# Patient Record
Sex: MALE | Race: WHITE | NOT HISPANIC OR LATINO | Employment: FULL TIME | URBAN - METROPOLITAN AREA
[De-identification: names, ages, dates, MRNs, and addresses within clinical notes are randomized per-mention and may not be internally consistent; named-entity substitution may affect disease eponyms.]

---

## 2017-02-17 ENCOUNTER — TELEPHONE (OUTPATIENT)
Dept: FAMILY MEDICINE | Facility: CLINIC | Age: 57
End: 2017-02-17

## 2017-02-17 NOTE — TELEPHONE ENCOUNTER
Reason for Call:  Other appointment    Detailed comments: Patient would like to come in for his Physical and Fasting Labs  Is Dr. Neetu Kaur willing to squeeze him in? He starts a New Job on March 3rd and  Would like to come in before then, he said he could come in separate for his labs if needed  Thank you    Phone Number Patient can be reached at: Cell number on file:    Telephone Information:   Mobile [phone]       Best Time: Anytime    Can we leave a detailed message on this number? YES    Call taken on 2/17/2017 at 11:53 AM by Mehul Marr

## 2017-02-20 ENCOUNTER — DOCUMENTATION ONLY (OUTPATIENT)
Dept: LAB | Facility: CLINIC | Age: 57
End: 2017-02-20

## 2017-02-20 DIAGNOSIS — Z11.59 NEED FOR HEPATITIS C SCREENING TEST: Primary | ICD-10-CM

## 2017-02-20 DIAGNOSIS — E03.9 HYPOTHYROIDISM, UNSPECIFIED TYPE: Chronic | ICD-10-CM

## 2017-02-20 DIAGNOSIS — E78.5 HYPERLIPIDEMIA, UNSPECIFIED HYPERLIPIDEMIA TYPE: Chronic | ICD-10-CM

## 2017-02-20 DIAGNOSIS — I10 HTN, GOAL BELOW 140/90: Chronic | ICD-10-CM

## 2017-02-20 NOTE — TELEPHONE ENCOUNTER
?Wed March 1st at 11 AM? Ok to offer fasting labs ahead of time and let me know if he wants them ordered.

## 2017-02-20 NOTE — PROGRESS NOTES
Please review, associate diagnosis, and sign pending pre physical lab orders for patient's upcoming 2/22/17 lab appointment.     Thank you,  Lab

## 2017-02-22 DIAGNOSIS — I10 HTN, GOAL BELOW 140/90: Chronic | ICD-10-CM

## 2017-02-22 DIAGNOSIS — E78.5 HYPERLIPIDEMIA, UNSPECIFIED HYPERLIPIDEMIA TYPE: Chronic | ICD-10-CM

## 2017-02-22 DIAGNOSIS — E03.9 HYPOTHYROIDISM, UNSPECIFIED TYPE: Chronic | ICD-10-CM

## 2017-02-22 DIAGNOSIS — Z11.59 NEED FOR HEPATITIS C SCREENING TEST: ICD-10-CM

## 2017-02-22 LAB
ALBUMIN SERPL-MCNC: 4 G/DL (ref 3.4–5)
ALP SERPL-CCNC: 87 U/L (ref 40–150)
ALT SERPL W P-5'-P-CCNC: 26 U/L (ref 0–70)
ANION GAP SERPL CALCULATED.3IONS-SCNC: 7 MMOL/L (ref 3–14)
AST SERPL W P-5'-P-CCNC: 17 U/L (ref 0–45)
BILIRUB SERPL-MCNC: 0.7 MG/DL (ref 0.2–1.3)
BUN SERPL-MCNC: 17 MG/DL (ref 7–30)
CALCIUM SERPL-MCNC: 9.5 MG/DL (ref 8.5–10.1)
CHLORIDE SERPL-SCNC: 108 MMOL/L (ref 94–109)
CHOLEST SERPL-MCNC: 184 MG/DL
CO2 SERPL-SCNC: 27 MMOL/L (ref 20–32)
CREAT SERPL-MCNC: 0.76 MG/DL (ref 0.66–1.25)
ERYTHROCYTE [DISTWIDTH] IN BLOOD BY AUTOMATED COUNT: 13 % (ref 10–15)
GFR SERPL CREATININE-BSD FRML MDRD: ABNORMAL ML/MIN/1.7M2
GLUCOSE SERPL-MCNC: 103 MG/DL (ref 70–99)
HCT VFR BLD AUTO: 40.6 % (ref 40–53)
HCV AB SERPL QL IA: NORMAL
HDLC SERPL-MCNC: 66 MG/DL
HGB BLD-MCNC: 12.9 G/DL (ref 13.3–17.7)
LDLC SERPL CALC-MCNC: 106 MG/DL
MCH RBC QN AUTO: 30.4 PG (ref 26.5–33)
MCHC RBC AUTO-ENTMCNC: 31.8 G/DL (ref 31.5–36.5)
MCV RBC AUTO: 96 FL (ref 78–100)
NONHDLC SERPL-MCNC: 118 MG/DL
PLATELET # BLD AUTO: 296 10E9/L (ref 150–450)
POTASSIUM SERPL-SCNC: 4.4 MMOL/L (ref 3.4–5.3)
PROT SERPL-MCNC: 7.2 G/DL (ref 6.8–8.8)
RBC # BLD AUTO: 4.25 10E12/L (ref 4.4–5.9)
SODIUM SERPL-SCNC: 142 MMOL/L (ref 133–144)
TRIGL SERPL-MCNC: 62 MG/DL
TSH SERPL DL<=0.005 MIU/L-ACNC: 2.65 MU/L (ref 0.4–4)
WBC # BLD AUTO: 6.9 10E9/L (ref 4–11)

## 2017-02-22 PROCEDURE — 80053 COMPREHEN METABOLIC PANEL: CPT | Performed by: INTERNAL MEDICINE

## 2017-02-22 PROCEDURE — 36415 COLL VENOUS BLD VENIPUNCTURE: CPT | Performed by: INTERNAL MEDICINE

## 2017-02-22 PROCEDURE — 86803 HEPATITIS C AB TEST: CPT | Performed by: INTERNAL MEDICINE

## 2017-02-22 PROCEDURE — 85027 COMPLETE CBC AUTOMATED: CPT | Performed by: INTERNAL MEDICINE

## 2017-02-22 PROCEDURE — 80061 LIPID PANEL: CPT | Performed by: INTERNAL MEDICINE

## 2017-02-22 PROCEDURE — 84443 ASSAY THYROID STIM HORMONE: CPT | Performed by: INTERNAL MEDICINE

## 2017-03-01 ENCOUNTER — OFFICE VISIT (OUTPATIENT)
Dept: FAMILY MEDICINE | Facility: CLINIC | Age: 57
End: 2017-03-01
Payer: COMMERCIAL

## 2017-03-01 VITALS
WEIGHT: 209.1 LBS | TEMPERATURE: 97.1 F | OXYGEN SATURATION: 99 % | SYSTOLIC BLOOD PRESSURE: 121 MMHG | HEART RATE: 89 BPM | HEIGHT: 74 IN | DIASTOLIC BLOOD PRESSURE: 81 MMHG | BODY MASS INDEX: 26.84 KG/M2

## 2017-03-01 DIAGNOSIS — Z00.01 ENCOUNTER FOR ANNUAL GENERAL MEDICAL EXAMINATION WITH ABNORMAL FINDINGS IN ADULT: Primary | ICD-10-CM

## 2017-03-01 DIAGNOSIS — E78.5 HYPERLIPIDEMIA, UNSPECIFIED HYPERLIPIDEMIA TYPE: Chronic | ICD-10-CM

## 2017-03-01 DIAGNOSIS — L13.0 DERMATITIS HERPETIFORMIS: ICD-10-CM

## 2017-03-01 DIAGNOSIS — I10 HTN, GOAL BELOW 140/90: Chronic | ICD-10-CM

## 2017-03-01 DIAGNOSIS — Z88.9 MULTIPLE ALLERGIES: Chronic | ICD-10-CM

## 2017-03-01 PROCEDURE — 99396 PREV VISIT EST AGE 40-64: CPT | Performed by: INTERNAL MEDICINE

## 2017-03-01 RX ORDER — LEVOTHYROXINE SODIUM 137 UG/1
TABLET ORAL
Qty: 90 TABLET | Refills: 3 | Status: CANCELLED | OUTPATIENT
Start: 2017-03-01

## 2017-03-01 RX ORDER — LOSARTAN POTASSIUM AND HYDROCHLOROTHIAZIDE 12.5; 5 MG/1; MG/1
1 TABLET ORAL DAILY
Qty: 90 TABLET | Refills: 3 | Status: CANCELLED | OUTPATIENT
Start: 2017-03-01

## 2017-03-01 RX ORDER — ATORVASTATIN CALCIUM 20 MG/1
20 TABLET, FILM COATED ORAL AT BEDTIME
Qty: 90 TABLET | Refills: 3 | Status: CANCELLED | OUTPATIENT
Start: 2017-03-01

## 2017-03-01 RX ORDER — ATORVASTATIN CALCIUM 10 MG/1
10 TABLET, FILM COATED ORAL AT BEDTIME
Qty: 90 TABLET | Refills: 3 | Status: SHIPPED | OUTPATIENT
Start: 2017-03-01 | End: 2018-04-01

## 2017-03-01 RX ORDER — DAPSONE 25 MG/1
50 TABLET ORAL DAILY
Qty: 180 TABLET | Refills: 3 | Status: CANCELLED | OUTPATIENT
Start: 2017-03-01

## 2017-03-01 NOTE — NURSING NOTE
"Chief Complaint   Patient presents with     Physical       Initial /81 (BP Location: Right arm, Patient Position: Chair, Cuff Size: Adult Large)  Pulse 89  Temp 97.1  F (36.2  C) (Oral)  Ht 6' 2\" (1.88 m)  Wt 209 lb 1.6 oz (94.8 kg)  SpO2 99%  BMI 26.85 kg/m2 Estimated body mass index is 26.85 kg/(m^2) as calculated from the following:    Height as of this encounter: 6' 2\" (1.88 m).    Weight as of this encounter: 209 lb 1.6 oz (94.8 kg).  Medication Reconciliation: complete   Carola Gomez MA  "

## 2017-03-01 NOTE — PATIENT INSTRUCTIONS
"Take a half tablet (10 mg) of Lipitor daily.  Read the book \"Younger Next Year\".  Follow up with me annually or sooner as needed-- feel free to schedule an appointment sooner for fasting labs and to see me if major changes.     Preventive Health Recommendations  Male Ages 50   64    Yearly exam:             See your health care provider every year in order to  o   Review health changes.   o   Discuss preventive care.    o   Review your medicines if your doctor has prescribed any.     Have a cholesterol test every 5 years, or more frequently if you are at risk for high cholesterol/heart disease.     Have a diabetes test (fasting glucose) every three years. If you are at risk for diabetes, you should have this test more often.     Have a colonoscopy at age 50, or have a yearly FIT test (stool test). These exams will check for colon cancer.      Talk with your health care provider about whether or not a prostate cancer screening test (PSA) is right for you.    You should be tested each year for STDs (sexually transmitted diseases), if you re at risk.     Shots: Get a flu shot each year. Get a tetanus shot every 10 years.     Nutrition:    Eat at least 5 servings of fruits and vegetables daily.     Eat whole-grain bread, whole-wheat pasta and brown rice instead of white grains and rice.     Talk to your provider about Calcium and Vitamin D.     Lifestyle    Exercise for at least 150 minutes a week (30 minutes a day, 5 days a week). This will help you control your weight and prevent disease.     Limit alcohol to one drink per day.     No smoking.     Wear sunscreen to prevent skin cancer.     See your dentist every six months for an exam and cleaning.     See your eye doctor every 1 to 2 years.  "

## 2017-03-01 NOTE — MR AVS SNAPSHOT
"              After Visit Summary   3/1/2017    Juan C Garcia    MRN: [id number]           Patient Information     Date Of Birth          1960        Visit Information        Provider Department      3/1/2017 11:00 AM Neetu Kaur,  [facility]        Today's Diagnoses     Encounter for annual general medical examination with abnormal findings in adult    -  1    HTN, goal below 140/90        Hypothyroidism        Hyperlipidemia, unspecified hyperlipidemia type        Dermatitis herpetiformis          Care Instructions    Take a half tablet (10 mg) of Lipitor daily.  Read the book \"Younger Next Year\".  Follow up with me annually or sooner as needed-- feel free to schedule an appointment sooner for fasting labs and to see me if major changes.     Preventive Health Recommendations  Male Ages 50 - 64    Yearly exam:             See your health care provider every year in order to  o   Review health changes.   o   Discuss preventive care.    o   Review your medicines if your doctor has prescribed any.     Have a cholesterol test every 5 years, or more frequently if you are at risk for high cholesterol/heart disease.     Have a diabetes test (fasting glucose) every three years. If you are at risk for diabetes, you should have this test more often.     Have a colonoscopy at age 50, or have a yearly FIT test (stool test). These exams will check for colon cancer.      Talk with your health care provider about whether or not a prostate cancer screening test (PSA) is right for you.    You should be tested each year for STDs (sexually transmitted diseases), if you re at risk.     Shots: Get a flu shot each year. Get a tetanus shot every 10 years.     Nutrition:    Eat at least 5 servings of fruits and vegetables daily.     Eat whole-grain bread, whole-wheat pasta and brown rice instead of white grains and rice.     Talk to your provider about Calcium and Vitamin D.     Lifestyle    Exercise for at " "least 150 minutes a week (30 minutes a day, 5 days a week). This will help you control your weight and prevent disease.     Limit alcohol to one drink per day.     No smoking.     Wear sunscreen to prevent skin cancer.     See your dentist every six months for an exam and cleaning.     See your eye doctor every 1 to 2 years.        Follow-ups after your visit        Who to contact     If you have questions or need follow up information about today's clinic visit or your schedule please contact [facility] directly at [phone].  Normal or non-critical lab and imaging results will be communicated to you by [facility], letter or phone within 4 business days after the clinic has received the results. If you do not hear from us within 7 days, please contact the clinic through [facility] or phone. If you have a critical or abnormal lab result, we will notify you by phone as soon as possible.  Submit refill requests through [employer] or call your pharmacy and they will forward the refill request to us. Please allow 3 business days for your refill to be completed.          Additional Information About Your Visit        [employer] Information     [employer] gives you secure access to your electronic health record. If you see a primary care provider, you can also send messages to your care team and make appointments. If you have questions, please call your primary care clinic.  If you do not have a primary care provider, please call [phone] and they will assist you.        Care EveryWhere ID     This is your Care EveryWhere ID. This could be used by other organizations to access your [place] medical records  [id number]        Your Vitals Were     Pulse Temperature Height Pulse Oximetry BMI (Body Mass Index)       89 97.1  F (36.2  C) (Oral) 6' 2\" (1.88 m) 99% 26.85 kg/m2        Blood Pressure from Last 3 Encounters:   03/01/17 121/81   06/25/16 129/75   06/14/16 129/74    Weight from Last 3 Encounters:   03/01/17 209 " lb 1.6 oz (94.8 kg)   06/25/16 220 lb (99.8 kg)   06/14/16 220 lb (99.8 kg)              Today, you had the following     No orders found for display         Today's Medication Changes          These changes are accurate as of: 3/1/17 11:46 AM.  If you have any questions, ask your nurse or doctor.               These medicines have changed or have updated prescriptions.        Dose/Directions    atorvastatin 10 MG tablet   Commonly known as:  LIPITOR   This may have changed:    - medication strength  - how much to take   Used for:  Hyperlipidemia, unspecified hyperlipidemia type   Changed by:  Neetu Kaur DO        Dose:  10 mg   Take 1 tablet (10 mg) by mouth At Bedtime   Quantity:  90 tablet   Refills:  3            Where to get your medicines      These medications were sent to [facility]/pharmacy #[id number] - [place], [place] - [address]  [address] [address]     Phone:  [phone]     atorvastatin 10 MG tablet                Primary Care Provider Office Phone # Fax #    Neetu Kaur [phone]       [facility] [address] [facility] [address]  [facility] [address]        Thank you!     Thank you for choosing [facility]  for your care. Our goal is always to provide you with excellent care. Hearing back from our patients is one way we can continue to improve our services. Please take a few minutes to complete the written survey that you may receive in the mail after your visit with us. Thank you!             Your Updated Medication List - Protect others around you: Learn how to safely use, store and throw away your medicines at www.disposemymeds.org.          This list is accurate as of: 3/1/17 11:46 AM.  Always use your most recent med list.                   Brand Name Dispense Instructions for use    ADVIL PO      Take 400 mg by mouth as needed       ALLERGY INJECTIONS          atorvastatin 10 MG tablet    LIPITOR    90 tablet    Take 1 tablet (10 mg) by mouth At  Bedtime       azelastine-fluticasone 137-50 MCG/ACT nasal spray    DYMISTA     Spray 1 spray into both nostrils daily       BREO ELLIPTA 200-25 MCG/INH oral inhaler   Generic drug:  fluticasone-vilanterol      Inhale 1 puff into the lungs daily       dapsone 25 MG tablet     180 tablet    Take 2 tablets (50 mg) by mouth daily       fexofenadine 180 MG tablet    ALLEGRA     Take 180 mg by mouth as needed for allergies       levothyroxine 137 MCG tablet    SYNTHROID/LEVOTHROID    90 tablet    TAKE 137 MCG (1 TABLET) BY MOUTH DAILY       losartan-hydrochlorothiazide 50-12.5 MG per tablet    HYZAAR    90 tablet    Take 1 tablet by mouth daily       LOVAZA 1 G capsule   Generic drug:  omega-3 acid ethyl esters      Take 1 g by mouth daily       testosterone 50 MG/5GM (1%) topical gel    ANDROGEL/TESTIM     Place onto the skin daily

## 2017-03-01 NOTE — PROGRESS NOTES
"  SUBJECTIVE:     CC: Juan C Garcia is an 56 year old male who presents for preventative health visit.     Healthy Habits:  Annual Exam:  Getting at least 3 servings of Calcium per day:: NO  Bi-annual eye exam:: Yes  Dental care twice a year:: NO  Sleep apnea or symptoms of sleep apnea:: Sleep apnea  Diet:: Gluten-free/reduced  Frequency of exercise:: 2-3 days/week  Taking medications regularly:: No  Medication side effects:: Not applicable  Additional concerns today:: YES  PHQ-2 Depressed: Several days, Several days  PHQ-2 Score: 2  Duration of exercise:: Greater than 60 minutes  Barriers to taking medications:: Problems remembering to take them      {Outside tests to abstract? :307024}    {additional problems to add:248811}    Today's PHQ-2 Score:   PHQ-2 ( 1999 Pfizer) 2/26/2017 3/15/2016   Q1: Little interest or pleasure in doing things - 0   Q2: Feeling down, depressed or hopeless - 0   PHQ-2 Score - 0   Little interest or pleasure in doing things Several days -   Feeling down, depressed or hopeless Several days -   PHQ-2 Score 2 -       Abuse: Current or Past(Physical, Sexual or Emotional)- NO  Do you feel safe in your environment - Yes    Social History   Substance Use Topics     Smoking status: Never Smoker     Smokeless tobacco: Never Used     Alcohol use 0.0 oz/week     0 Standard drinks or equivalent per week      Comment: H/o significant alcohol use, currently none June 2016     The patient does not drink >3 drinks per day nor >7 drinks per week.    Last PSA:   PSA   Date Value Ref Range Status   07/02/2014 0.6 ng/mL Final       Recent Labs   Lab Test  02/22/17   0828  03/17/16   0809  02/02/15   1002   CHOL  184  213*  252*   HDL  66  46  48   LDL  106*  140*  152*   TRIG  62  133  259*   CHOLHDLRATIO   --    --   5.2*   NHDL  118  167*   --        Reviewed orders with patient. Reviewed health maintenance and updated orders accordingly - {Yes/No:493499::\"Yes\"}    Reviewed and updated as needed this " "visit by clinical staff         Reviewed and updated as needed this visit by Provider        {HISTORY OPTIONS:810453}    ROS:  {MALE ROS-adult preventive care package:539045::\"C: NEGATIVE for fever, chills, change in weight\",\"I: NEGATIVE for worrisome rashes, moles or lesions\",\"E: NEGATIVE for vision changes or irritation\",\"ENT: NEGATIVE for ear, mouth and throat problems\",\"R: NEGATIVE for significant cough or SOB\",\"CV: NEGATIVE for chest pain, palpitations or peripheral edema\",\"GI: NEGATIVE for nausea, abdominal pain, heartburn, or change in bowel habits\",\" male: negative for dysuria, hematuria, decreased urinary stream, erectile dysfunction, urethral discharge\",\"M: NEGATIVE for significant arthralgias or myalgia\",\"N: NEGATIVE for weakness, dizziness or paresthesias\",\"P: NEGATIVE for changes in mood or affect\"}    {CHRONICPROBDATA:625520}  OBJECTIVE:     There were no vitals taken for this visit.  EXAM:  {Exam Choices:833041}    ASSESSMENT/PLAN:     {Diag Picklist:978250}    COUNSELING:  {MALE COUNSELING MESSAGES:094471::\"Reviewed preventive health counseling, as reflected in patient instructions\"}    {Blood Pressure - Adult Preventive:936498}     reports that he has never smoked. He has never used smokeless tobacco.  {Tobacco Cessation needed for ACO -- Delete if patient is a non-smoker:689614}  Estimated body mass index is 28.25 kg/(m^2) as calculated from the following:    Height as of 6/25/16: 6' 2\" (1.88 m).    Weight as of 6/25/16: 220 lb (99.8 kg).   {Weight Management Plan needed for ACO:452201}    Counseling Resources:  ATP IV Guidelines  Pooled Cohorts Equation Calculator  FRAX Risk Assessment  ICSI Preventive Guidelines  Dietary Guidelines for Americans, 2010  USDA's MyPlate  ASA Prophylaxis  Lung CA Screening    Neetu Kaur, DO  [facility]  Answers for HPI/ROS submitted by the patient on 2/26/2017     "

## 2017-03-01 NOTE — PROGRESS NOTES
SUBJECTIVE:     CC: Juan C Garcia is an 56 year old male who presents for preventative health visit.     Healthy Habits:  Annual Exam:  Getting at least 3 servings of Calcium per day:: NO  Bi-annual eye exam:: Yes  Dental care twice a year:: NO - planning to find a dentist  Sleep apnea or symptoms of sleep apnea:: Sleep apnea  Diet:: Gluten-free/reduced  Frequency of exercise:: 2-3 days/week  Taking medications regularly:: No - problems remembering to take them at times  Medication side effects:: Not applicable  Additional concerns today:: YES  Duration of exercise:: Greater than 60 minutes  Barriers to taking medications:: Problems remembering to take them      Juan C reports that he is feeling well and is pleased that he has lost weight and is following a healthier diet. Really decided to take his health seriously.  Beginning new job in a week after period of unemployment.   Has been currently taking cholesterol medications about 2x per week. Forgets to take it at times. Wonders about being able to reduce dose given healthier habits.  Will seek out a dentist for the coming year.  Pt reports sleeps through the night but will rotate frequently due to back pain. H/o past surgeries. Said motivated him to loose weight from abdomen to reduce pain. Wants to strengthen core.  Reviewed labs with patient with all normal results except for slightly elevated LDL and blood sugar.   Denies CP or SOB with activity-- continues to use Breo inhaler daily.   Continues to follow with dermatology annually.   Continues to get allergy shots with good effect.     Today's PHQ-2 Score:   PHQ-2 ( 1999 Pfizer) 3/1/2017   Q1: Little interest or pleasure in doing things 0   Q2: Feeling down, depressed or hopeless 0   PHQ-2 Score 0   Little interest or pleasure in doing things -   Feeling down, depressed or hopeless -   PHQ-2 Score -     Abuse: Current or Past(Physical, Sexual or Emotional)- NO  Do you feel safe in your environment -  "Yes    Social History   Substance Use Topics     Smoking status: Never Smoker     Smokeless tobacco: Never Used     Alcohol use 0.0 oz/week     0 Standard drinks or equivalent per week      Comment: H/o significant alcohol use, currently none June 2016     The patient does not drink >3 drinks per day nor >7 drinks per week.    Last PSA:   PSA   Date Value Ref Range Status   07/02/2014 0.6 ng/mL Final       Recent Labs   Lab Test  02/22/17   0828  03/17/16   0809  02/02/15   1002   CHOL  184  213*  252*   HDL  66  46  48   LDL  106*  140*  152*   TRIG  62  133  259*   CHOLHDLRATIO   --    --   5.2*   NHDL  118  167*   --        Reviewed orders with patient. Reviewed health maintenance and updated orders accordingly - Yes    ROS:  Comprehensive ROS negative unless as stated above in HPI.    This document serves as a record of the services and decisions personally performed and made by Neetu Kaur DO. It was created on his/her behalf by Roselia Ramirez, a trained medical scribe. The creation of this document is based the provider's statements to the medical scribe.  Scribe Roselia Ramirez 11:26 AM, March 1, 2017    OBJECTIVE:     /81 (BP Location: Right arm, Patient Position: Chair, Cuff Size: Adult Large)  Pulse 89  Temp 97.1  F (36.2  C) (Oral)  Ht 6' 2\" (1.88 m)  Wt 209 lb 1.6 oz (94.8 kg)  SpO2 99%  BMI 26.85 kg/m2  EXAM:  GENERAL: healthy, alert and no distress  EYES: Eyes grossly normal to inspection, PERRL and conjunctivae and sclerae normal  HENT: ear canals and TM's normal, nose and mouth without ulcers or lesions  NECK: no adenopathy, no asymmetry, masses, or scars and thyroid normal to palpation  RESP: lungs clear to auscultation - no rales, rhonchi or wheezes  CV: regular rate and rhythm, normal S1 S2, no S3 or S4, no murmur, click or rub, no peripheral edema, no carotid bruits   ABDOMEN: soft, nontender, no hepatosplenomegaly, no masses and bowel sounds normal  MS: no gross musculoskeletal " "defects noted, no edema  SKIN: no suspicious lesions or rashes, well healed surgical scar incisions on back and neck  NEURO: Normal strength and tone, mentation intact and speech normal  PSYCH: mentation appears normal, affect normal/bright    ASSESSMENT/PLAN:     1. Encounter for annual general medical examination with abnormal findings in adult  Congratulated Juan C on lifestyle changes - he looks really energized and the healthiest I have seen him    2. HTN, goal below 140/90  At goal.    3. Hypothyroidism  Follows with endocrinologist    4. Hyperlipidemia, unspecified hyperlipidemia type  Better controlled  Counseled patient on healthy diet and assessed ASCVD risk factor calculator and he'd like to try to cut statin dose in half and take daily b/c is currently only remembering to take the 20 mg dose a few times per week  Total cholesterol: 184, HDL: 66, LDL: 106  - atorvastatin (LIPITOR) 10 MG tablet; Take 1 tablet (10 mg) by mouth At Bedtime  Dispense: 90 tablet; Refill: 3    5. Dermatitis herpetiformis  Follows with dermatology - on dapsone  Stable hemoglobin  Monitors gluten in diet    6. Multiple allergies  Follows with Allergist and getting allergy shots and using inhalers as directed     Patient Instructions   Take a half tablet (10 mg) of Lipitor daily.  Read the book \"Younger Next Year\".  Follow up with me annually or sooner as needed-- feel free to schedule an appointment sooner for fasting labs and to see me if major changes.     COUNSELING:  Reviewed preventive health counseling, as reflected in patient instructions       Regular exercise       Healthy diet/nutrition   reports that he has never smoked. He has never used smokeless tobacco.  Estimated body mass index is 26.85 kg/(m^2) as calculated from the following:    Height as of this encounter: 6' 2\" (1.88 m).    Weight as of this encounter: 209 lb 1.6 oz (94.8 kg).   Weight management plan: Discussed healthy diet and exercise guidelines and " patient will follow up in 12 months in clinic to re-evaluate.    This document serves as a record of the services and decisions personally performed and made by Neetu Kaur DO. It was created on his/her behalf by Roselia Ramirez, a trained medical scribe. The creation of this document is based the provider's statements to the medical scribe.  Scribhever Ramirez 11:29 AM, March 1, 2017    Neetu Kaur DO  [facility]

## 2017-03-11 RX ORDER — DAPSONE 25 MG/1
50 TABLET ORAL DAILY
Qty: 180 TABLET | Refills: 3 | Status: SHIPPED | OUTPATIENT
Start: 2017-03-11 | End: 2018-04-11

## 2017-03-11 RX ORDER — LOSARTAN POTASSIUM AND HYDROCHLOROTHIAZIDE 12.5; 5 MG/1; MG/1
1 TABLET ORAL DAILY
Qty: 90 TABLET | Refills: 3 | Status: SHIPPED | OUTPATIENT
Start: 2017-03-11 | End: 2018-04-09

## 2017-03-25 ENCOUNTER — MYC MEDICAL ADVICE (OUTPATIENT)
Dept: FAMILY MEDICINE | Facility: CLINIC | Age: 57
End: 2017-03-25

## 2017-03-25 DIAGNOSIS — R06.83 SNORING: Primary | ICD-10-CM

## 2017-04-13 ENCOUNTER — TELEPHONE (OUTPATIENT)
Dept: SLEEP MEDICINE | Facility: CLINIC | Age: 57
End: 2017-04-13

## 2017-04-13 ENCOUNTER — OFFICE VISIT (OUTPATIENT)
Dept: SLEEP MEDICINE | Facility: CLINIC | Age: 57
End: 2017-04-13
Payer: COMMERCIAL

## 2017-04-13 VITALS
HEIGHT: 74 IN | SYSTOLIC BLOOD PRESSURE: 125 MMHG | OXYGEN SATURATION: 95 % | DIASTOLIC BLOOD PRESSURE: 76 MMHG | HEART RATE: 79 BPM | BODY MASS INDEX: 27.34 KG/M2 | WEIGHT: 213 LBS

## 2017-04-13 DIAGNOSIS — R06.83 SNORING: ICD-10-CM

## 2017-04-13 DIAGNOSIS — G47.30 OBSERVED SLEEP APNEA: Primary | ICD-10-CM

## 2017-04-13 PROCEDURE — 99204 OFFICE O/P NEW MOD 45 MIN: CPT | Performed by: INTERNAL MEDICINE

## 2017-04-13 ASSESSMENT — PAIN SCALES - GENERAL: PAINLEVEL: NO PAIN (0)

## 2017-04-13 NOTE — MR AVS SNAPSHOT
"              After Visit Summary   4/13/2017    Juan C Garcia    MRN: [id number]           Patient Information     Date Of Birth          1960        Visit Information        Provider Department      4/13/2017 10:30 AM Edgardo Madsen MD [facility]        Today's Diagnoses     Observed sleep apnea    -  1    Snoring          Care Instructions    MY TREATMENT INFORMATION FOR SLEEP APNEA-  Juan C Garcia    DOCTOR : Edgardo Madsen MD  SLEEP CENTER :      MY CONTACT NUMBER:       If I haven't had a sleep study yet, what can I expect?  A personal story from [employer]  https://www.[employer] [facility]com/watch?v=AxPLmlRpnCs    Am I having a home sleep study?  Here is a video in case you get home and want to make sure you have done it correctly  https://www.[employer].com/watch?v=MPJ4P9eKsa8&feature=youtu.be    Frequently asked questions:  1. What is Obstructive Sleep Apnea (BARRY)? BARRY is the most common type of sleep apnea. Apnea literally means, \"without breath.\" It is characterized by repetitive pauses in breathing, despite continued effort to breathe, and is usually associated with a reduction in blood oxygen saturation. Apneas can last 10 to over 60 seconds. It is caused by narrowing or collapse of the upper airway as muscles relax during sleep. Severity of sleep apnea is determined by frequency of breathing events and their effect on your sleep and oxygen levels determined during sleep testing.   2. What are the consequences of BARRY? Symptoms include: daytime sleepiness- possibly increasing the risk of falling asleep while driving, unrefreshing/restless sleep, snoring, insomnia, waking frequently to urinate, waking with heartburn or reflux, reduced concentration and memory, and morning headaches. Other health consequences may include development of high blood pressure and other cardiovascular disease in persons who are susceptible. Untreated BARRY  can contribute to heart disease, stroke and diabetes.   3. What " are the treatment options? In most situations, sleep apnea is a lifelong disease that must be managed with daily therapy. Medications are not effective for sleep apnea and surgery is generally not performed until other therapies have been tried. Therapy is usually tailored to the individual patient based on many factors including your wishes as well as severity of sleep apnea and severity of obesity. Continuous Positive Airway (CPAP) is the most reliable treatment. An oral device to hold your jaw forward is usually the next most reliable option. Other options include postioning devices (to keep you off your back), weight loss, and surgery including a tongue pacing device. There is more detail about some of these options below.            1. CPAP-  WHAT DOES IT DO AND HOW CAN I LEARN TO WEAR IT?                               BEFORE I START, CAN I WATCH A MOVIE TO GET A PLAN ON HOW TO USE CPAP?  https://www.[employer].com/watch?e=u7F86ph854N      Continuous positive airway pressure, or CPAP, is the most effective treatment for obstructive sleep apnea. It works by blowing room air, through a mask, to hold your throat open. A decision to use CPAP is a major step forward in the pursuit of a healthier life. The successful use of CPAP will help you breathe easier, sleep better and live healthier. You can choose CPAP equipment from any durable medical equipment provider that meets your needs.  Using CPAP can be a positive experience if you keep these alexandra points in mind:  1. Commitment  CPAP is not a quick fix for your problem. It involves a long-term commitment to improve your sleep and your health.    2. Communication  Stay in close communication with both your sleep doctor and your CPAP supplier. Ask lots of questions and seek help when you need it.    3. Consistency  Use CPAP all night, every night and for every nap. You will receive the maximum health benefits from CPAP when you use it every time that you sleep. This will  "also make it easier for your body to adjust to the treatment.    4. Correction  The first machine and mask that you try may not be the best ones for you. Work with your sleep doctor and your CPAP supplier to make corrections to your equipment selection. Ask about trying a different type of machine or mask if you have ongoing problems. Make sure that your mask is a good fit and learn to use your equipment properly.    5. Challenge  Tell a family member or close friend to ask you each morning if you used your CPAP the previous night. Have someone to challenge you to give it your best effort.    6. Connection   Your adjustment to CPAP will be easier if you are able to connect with others who use the same treatment. Ask your sleep doctor if there is a support group in your area for people who have sleep apnea, or look for one on the Internet.  7. Comfort   Increase your level of comfort by using a saline spray, decongestant or heated humidifier if CPAP irritates your nose, mouth or throat. Use your unit's \"ramp\" setting to slowly get used to the air pressure level. There may be soft pads you can buy that will fit over your mask straps. Look on www.CPAP.com for accessories that can help make CPAP use more comfortable.  8. Cleaning   Clean your mask, tubing and headgear on a regular basis. Put this time in your schedule so that you don't forget to do it. Check and replace the filters for your CPAP unit and humidifier.    9. Completion   Although you are never finished with CPAP therapy, you should reward yourself by celebrating the completion of your first month of treatment. Expect this first month to be your hardest period of adjustment. It will involve some trial and error as you find the machine, mask and pressure settings that are right for you.    10. Continuation  After your first month of treatment, continue to make a daily commitment to use your CPAP all night, every night and for every nap.    CPAP-Tips to " starting with success:  Begin using your CPAP for short periods of time during the day while you watch TV or read.    Use CPAP every night and for every nap. Using it less often reduces the health benefits and makes it harder for your body to get used to it.    Make small adjustments to your mask, tubing, straps and headgear until you get the right fit. Tightening the mask may actually worsen the leak.  If it leaves significant marks on your face or irritates the bridge of your nose, it may not be the best mask for you.  Speak with the person who supplied the mask and consider trying other masks. Insurances will allow you to try different masks during the first month of starting CPAP.  Insurance also covers a new mask, hose and filter about every 6 months.    Use a saline nasal spray to ease mild nasal congestion. Neti-Pot or saline nasal rinses may also help. Nasal gel sprays can help reduce nasal dryness.  Biotene mouthwash can be helpful to protect your teeth if you experience frequent dry mouth.  Dry mouth may be a sign of air escaping out of your mouth or out of the mask in the case of a full face mask.  Speak with your provider if you expect that is the case.     Take a nasal decongestant to relieve more severe nasal or sinus congestion.  Do not use Afrin (oxymetazoline) nasal spray more than 3 days in a row.  Speak with your sleep doctor if your nasal congestion is chronic.    Use a heated humidifier that fits your CPAP model to enhance your breathing comfort. Adjust the heat setting up if you get a dry nose or throat, down if you get condensation in the hose or mask.  Position the CPAP lower than you so that any condensation in the hose drains back into the machine rather than towards the mask.    Try a system that uses nasal pillows if traditional masks give you problems.    Clean your mask, tubing and headgear once a week. Make sure the equipment dries fully.    Regularly check and replace the filters for  your CPAP unit and humidifier.    Work closely with your sleep provider and your CPAP supplier to make sure that you have the machine, mask and air pressure setting that works best for you. It is better to stop using it and call your provider to solve problems than to lay awake all night frustrated with the device.    BESIDES CPAP, WHAT OTHER THERAPIES ARE THERE?      Positioning Device  Positioning devices are generally used when sleep apnea is mild and only occurs on your back.This example shows a pillow that straps around the waist. It may be appropriate for those whose sleep study shows milder sleep apnea that occurs primarily when lying flat on one's back. Preliminary studies have shown benefit but effectiveness at home may need to be verified by a home sleep test. These devices are generally not covered by medical insurance.                      Oral Appliance  What is oral appliance therapy?  An oral appliance is a small acrylic device that fits over the upper and lower teeth or tongue (similar to an orthodontic retainer or a mouth guard). This device slightly advances the lower jaw or tongue, which moves the base of the tongue forward, opens the airway, improves breathing and can effectively treat snoring and obstructive sleep apnea sleep apnea. The appliance is fabricated and customized by a qualified dentist with experience in treating snoring and sleep apnea. Oral appliances are usually well tolerated and have relatively high compliance by patients1, 2, 3.  When is an oral appliance indicated?  Oral appliance therapy is recommended as a first-line treatment for patients with primary snoring, mild sleep apnea, and for patients with moderate sleep apnea who prefer appliance therapy to use of CPAP4, 5. Severity of sleep apnea is determined by sleep testing and is based on the number of respiratory events per hour of sleep.   How successful is oral appliance therapy?  The success rate of oral appliance  therapy in patients with mild sleep apnea is 75-80% while in patients with moderate sleep apnea it is 50-70%. The chance of success in patients with severe sleep apnea is 40-50%. The research also shows that oral appliances have a beneficial effect on the cardiovascular health of BARRY patients at the same magnitude as CPAP therapy7.  Oral appliances should be a second-line treatment in cases of severe sleep apnea, but if not completely successful then a combination therapy utilizing CPAP plus oral appliance therapy may be effective. Oral appliances tend to be effective in a broad range of patients although studies show that the patients who have the highest success are females, younger patients, those with milder disease, and less severe obesity. 3, 6.   The chances of success are lower in patients who have more severe BARRY, are older, and those who are morbidly obese.     Example of an oral appliance   Finding a dentist that practices dental sleep medicine  Specific training is available through the American Academy of Dental Sleep Medicine for dentists interested in working in the field of sleep. To find a dentist who is educated in the field of sleep and the use of oral appliances, near you, visit the Web site of the American Academy of Dental Sleep Medicine; also see   http://www.accpstorage.org/newOrganization/patients/oralAppliances.pdf  To search for a dentist certified in these practices:  Http://aadsm.org/FindADentist.aspx?1  1. Hai et al. Objectively measured vs self-reported compliance during oral appliance therapy for sleep-disordered breathing. Chest 2013; 144(5): 0472-3098.  2. Ivy et al. Objective measurement of compliance during oral appliance therapy for sleep-disordered breathing. Thorax 2013; 68(1): 91-96.  3. Nahomy et al. Mandibular advancement devices in 620 men and women with BARRY and snoring: tolerability and predictors of treatment success. Chest 2004; 125: 1601-5408.  4.  Timothy et al. Oral appliances for snoring and BARRY: a review. Sleep 2006; 29: 244-262.  5. Rodrick et al. Oral appliance treatment for BARRY: an update. J Clin Sleep Med 2014; 10(2): 215-227.  6. lloyd Campbell al. Predictors of OSAH treatment outcome. J Dent Res 2007; 86: 3012-3253.      Weight Loss:    Weight management is a personal decision.  If you are interested in exploring weight loss strategies, the following discussion covers the impact on weight loss on sleep apnea and the approaches that may be successful.    Weight loss decreases severity of sleep apnea in most people with obesity. For those with mild obesity who have developed snoring with weight gain, even 15-30 pound weight loss can improve and occasionally eliminate sleep apnea.  Structured and life-long dietary and health habits are necessary to lose weight and keep healthier weight levels.     Though there may be significant health benefits from weight loss, long-term weight loss is very difficult to achieve- studies show success with dietary management in less than 10% of people. In addition, substantial weight loss may require years of dietary control and may be difficult if patients have severe obesity. In these cases, surgical management may be considered.  Finally, older individuals who have tolerated obesity without health complications may be less likely to benefit from weight loss strategies.    Your BMI is Body mass index is 27.35 kg/(m^2).  Body mass index (BMI) is one way to tell whether you are at a healthy weight, overweight, or obese. It measures your weight in relation to your height.  A BMI of 18.5 to 24.9 is in the healthy range. A person with a BMI of 25 to 29.9 is considered overweight, and someone with a BMI of 30 or greater is considered obese. More than two-thirds of American adults are considered overweight or obese.  Being overweight or obese increases the risk for further weight gain. Excess weight may lead to heart  disease and diabetes.  Creating and following plans for healthy eating and physical activity may help you improve your health.  Weight control is part of healthy lifestyle and includes exercise, emotional health, and healthy eating habits. Careful eating habits lifelong are the mainstay of weight control. Though there are significant health benefits from weight loss, long-term weight loss with diet alone may be very difficult to achieve- studies show long-term success with dietary management in less than 10% of people. Attaining a healthy weight may be especially difficult to achieve in those with severe obesity. In some cases, medications, devices and surgical management might be considered.  What can you do?  If you are overweight or obese and are interested in methods for weight loss, you should discuss this with your provider.     Consider reducing daily calorie intake by 500 calories.     Keep a food journal.     Avoiding skipping meals, consider cutting portions instead.    Diet combined with exercise helps maintain muscle while optimizing fat loss. Strength training is particularly important for building and maintaining muscle mass. Exercise helps reduce stress, increase energy, and improves fitness. Increasing exercise without diet control, however, may not burn enough calories to loose weight.       Start walking three days a week 10-20 minutes at a time    Work towards walking thirty minutes five days a week     Eventually, increase the speed of your walking for 1-2 minutes at time    In addition, we recommend that you review healthy lifestyles and methods for weight loss available through the National Institutes of Health patient information sites:  http://win.niddk.nih.gov/publications/index.htm    And look into health and wellness programs that may be available through your health insurance provider, employer, local community center, or [employer].    Weight management plan: Patient was referred to  their PCP to discuss a diet and exercise plan.    Surgery:    Upper Airway Surgery for BARRY  Surgery for BARRY is a second-line treatment option in the management of sleep apnea.  Surgery should be considered for patients who are having a difficult time tolerating CPAP.    Surgery for BARRY is directed at areas that are responsible for narrowing or complete obstruction of the airway during sleep.  There are a wide range of procedures available to enlarge and/or stabilize the airway to prevent blockage of breathing in the three major areas where it can occur: the palate, tongue, and nasal regions.  Successful surgical treatment depends on the accurate identification of the factors responsible for obstructive sleep apnea in each person.  A personalized approach is required because there is no single treatment that works well for everyone.  Because of anatomic variation, consultation with an examination by a sleep surgeon is a critical first step in determining what surgical options are best for each patient.  In some cases, examination during sedation may be recommended in order to guide the selection of procedures.  Patients will be counseled about risks and benefits as well as the typical recovery course after surgery. Surgery is typically not a cure for a person s BARRY.  However, surgery will often significantly improve one s BARRY severity (termed  success rate ).  Even in the absence of a cure, surgery will decrease the cardiovascular risk associated with OSA7; improve overall quality of life8 (sleepiness, functionality, sleep quality, etc).          Palate Procedures:  Patients with BARRY often have narrowing of their airway in the region of their tonsils and uvula.  The goals of palate procedures are to widen the airway in this region as well as to help the tissues resist collapse.  Modern palate procedure techniques focus on tissue conservation and soft tissue rearrangement, rather than tissue removal.  Often the uvula  is preserved in this procedure. Residual sleep apnea is common in patient after pharyngoplasty with an average reduction in sleep apnea events of 33%2.      Tongue Procedures:  While patients are awake, the muscles that surround the throat are active and keep this region open for breathing. These muscles relax during sleep, allowing the tongue and other structures to collapse and block breathing.  There are several different tongue procedures available.  Selection of a tongue base procedure depends on characteristics seen on physical exam.  Generally, procedures are aimed at removing bulky tissues in this area or preventing the back of the tongue from falling back during sleep.  Success rates for tongue surgery range from 50-62%3.    Hypoglossal Nerve Stimulation:  Hypoglossal nerve stimulation has recently received approval from the [place] Food and Drug Administration for the treatment of obstructive sleep apnea.  This is based on research showing that the system was safe and effective in treating sleep apnea6.  Results showed that the median AHI score decreased 68%, from 29.3 to 9.0. This therapy uses an implant system that senses breathing patterns and delivers mild stimulation to airway muscles, which keeps the airway open during sleep.  The system consists of three fully implanted components: a small generator (similar in size to a pacemaker), a breathing sensor, and a stimulation lead.  Using a small handheld remote, a patient turns the therapy on before bed and off upon awakening.    Candidates for this device must be greater than 22 years of age, have moderate to severe BARRY (AHI between 20-65), BMI less than 32, have tried CPAP/oral appliance without success, and have appropriate upper airway anatomy (determined by a sleep endoscopy performed by Dr. Francois).    Hypoglossal Nerve Stimulation Pathway:    The sleep surgeon s office will work with the patient through the insurance prior-authorization  process (including communications and appeals).    Nasal Procedures:  Nasal obstruction can interfere with nasal breathing during the day and night.  Studies have shown that relief of nasal obstruction can improve the ability of some patients to tolerate positive airway pressure therapy for obstructive sleep apnea1.  Treatment options include medications such as nasal saline, topical corticosteroid and antihistamine sprays, and oral medications such as antihistamines or decongestants. Non-surgical treatments can include external nasal dilators for selected patients. If these are not successful by themselves, surgery can improve the nasal airway either alone or in combination with these other options.      Combination Procedures:  Combination of surgical procedures and other treatments may be recommended, particularly if patients have more than one area of narrowing or persistent positional disease.  The success rate of combination surgery ranges from 66-80%2,3.      1. Yonatan AGUILAR. The Role of the Nose in Snoring and Obstructive Sleep Apnoea: An Update.  Eur Arch Otorhinolaryngol. 2011; 268: 1365-73.  2.  Kervin SM; Nahun JA; Jigna JR; Pallanch JF; Sneha MB; Juanis SG; Jc DRIVER. Surgical modifications of the upper airway for obstructive sleep apnea in adults: a systematic review and meta-analysis. SLEEP 2010;33(10):7303-5898. Carlos THOMSON. Hypopharyngeal surgery in obstructive sleep apnea: an evidence-based medicine review.  Arch Otolaryngol Head Neck Surg. 2006 Feb;132(2):206-13.  3. Mauro YH1, Amdaor Y, Adam MIGUEL. The efficacy of anatomically based multilevel surgery for obstructive sleep apnea. Otolaryngol Head Neck Surg. 2003 Oct;129(4):327-35.  4. Kezirian E, Goldberg A. Hypopharyngeal Surgery in Obstructive Sleep Apnea: An Evidence-Based Medicine Review. Arch Otolaryngol Head Neck Surg. 2006 Feb;132(2):206-13.  5. Nichole VALERA et al. Upper-Airway Stimulation for Obstructive Sleep Apnea.  N Engl J Med. 2014 Tripp  9;370(2):139-49.  6. Nelsy Y et al. Increased Incidence of Cardiovascular Disease in Middle-aged Men with Obstructive Sleep Apnea. Am J Respir Crit Care Med; 2002 166: 159-165  7. Logan DUFFY et al. Studying Life Effects and Effectiveness of Palatopharyngoplasty (SLEEP) study: Subjective Outcomes of Isolated Uvulopalatopharyngoplasty. Otolaryngol Head Neck Surg. 2011; 144: 623-631.              Your BMI is Body mass index is 27.35 kg/(m^2).  Weight management is a personal decision.  If you are interested in exploring weight loss strategies, the following discussion covers the approaches that may be successful. Body mass index (BMI) is one way to tell whether you are at a healthy weight, overweight, or obese. It measures your weight in relation to your height.  A BMI of 18.5 to 24.9 is in the healthy range. A person with a BMI of 25 to 29.9 is considered overweight, and someone with a BMI of 30 or greater is considered obese. More than two-thirds of American adults are considered overweight or obese.  Being overweight or obese increases the risk for further weight gain. Excess weight may lead to heart disease and diabetes.  Creating and following plans for healthy eating and physical activity may help you improve your health.  Weight control is part of healthy lifestyle and includes exercise, emotional health, and healthy eating habits. Careful eating habits lifelong are the mainstay of weight control. Though there are significant health benefits from weight loss, long-term weight loss with diet alone may be very difficult to achieve- studies show long-term success with dietary management in less than 10% of people. Attaining a healthy weight may be especially difficult to achieve in those with severe obesity. In some cases, medications, devices and surgical management might be considered.  What can you do?  If you are overweight or obese and are interested in methods for weight loss, you should discuss this with your  provider.     Consider reducing daily calorie intake by 500 calories.     Keep a food journal.     Avoiding skipping meals, consider cutting portions instead.    Diet combined with exercise helps maintain muscle while optimizing fat loss. Strength training is particularly important for building and maintaining muscle mass. Exercise helps reduce stress, increase energy, and improves fitness. Increasing exercise without diet control, however, may not burn enough calories to loose weight.       Start walking three days a week 10-20 minutes at a time    Work towards walking thirty minutes five days a week     Eventually, increase the speed of your walking for 1-2 minutes at time    In addition, we recommend that you review healthy lifestyles and methods for weight loss available through the National Institutes of Health patient information sites:  http://win.niddk.nih.gov/publications/index.htm    And look into health and wellness programs that may be available through your health insurance provider, employer, local community center, or [employer].    Weight management plan: Patient was referred to their PCP to discuss a diet and exercise plan.            Follow-ups after your visit        Your next 10 appointments already scheduled     Apr 18, 2017  2:30 PM CDT   HST  with BED 7  SLEEP   [facility] [place] ([facility])    [address]  [place] [facility] [address]   [phone]            Apr 19, 2017 11:00 AM CDT   HST Drop Off with  SLEEP CENTER DME   [facility] ([facility])    [address]  [facility] [facility] [address]   [phone]            Apr 28, 2017  4:00 PM CDT   Return Sleep Patient with Edgardo Madsen MD   [facility] [place] ([facility])    [address]  [facility] [address]   [phone]              Future tests that were ordered for you today      "Open Future Orders        Priority Expected Expires Ordered    HST-Home Sleep Apnea Test Routine  10/13/2017 4/13/2017            Who to contact     If you have questions or need follow up information about today's clinic visit or your schedule please contact [facility] directly at [phone].  Normal or non-critical lab and imaging results will be communicated to you by MyChart, letter or phone within 4 business days after the clinic has received the results. If you do not hear from us within 7 days, please contact the clinic through [employer] or phone. If you have a critical or abnormal lab result, we will notify you by phone as soon as possible.  Submit refill requests through EasyLink or call your pharmacy and they will forward the refill request to us. Please allow 3 business days for your refill to be completed.          Additional Information About Your Visit        MyChart Information     EasyLink gives you secure access to your electronic health record. If you see a primary care provider, you can also send messages to your care team and make appointments. If you have questions, please call your primary care clinic.  If you do not have a primary care provider, please call [phone] and they will assist you.        Care EveryWhere ID     This is your Care EveryWhere ID. This could be used by other organizations to access your [place] medical records  [id number]        Your Vitals Were     Pulse Height Pulse Oximetry BMI (Body Mass Index)          79 1.88 m (6' 2\") 95% 27.35 kg/m2         Blood Pressure from Last 3 Encounters:   04/13/17 125/76   03/01/17 121/81   06/25/16 129/75    Weight from Last 3 Encounters:   04/13/17 96.6 kg (213 lb)   03/01/17 94.8 kg (209 lb 1.6 oz)   06/25/16 99.8 kg (220 lb)              We Performed the Following     SLEEP EVALUATION & MANAGEMENT REFERRAL - ADULT        Primary Care Provider Office Phone # Fax #    Neetu Kaur [phone] " [phone]       [facility] [address]  [facility] [address]        Thank you!     Thank you for choosing [facility]  for your care. Our goal is always to provide you with excellent care. Hearing back from our patients is one way we can continue to improve our services. Please take a few minutes to complete the written survey that you may receive in the mail after your visit with us. Thank you!             Your Updated Medication List - Protect others around you: Learn how to safely use, store and throw away your medicines at www.disposemymeds.org.          This list is accurate as of: 4/13/17 11:15 AM.  Always use your most recent med list.                   Brand Name Dispense Instructions for use    ADVIL PO      Take 400 mg by mouth as needed       ALLERGY INJECTIONS          atorvastatin 10 MG tablet    LIPITOR    90 tablet    Take 1 tablet (10 mg) by mouth At Bedtime       azelastine-fluticasone 137-50 MCG/ACT nasal spray    DYMISTA     Spray 1 spray into both nostrils daily       BREO ELLIPTA 200-25 MCG/INH oral inhaler   Generic drug:  fluticasone-vilanterol      Inhale 1 puff into the lungs daily       dapsone 25 MG tablet     180 tablet    Take 2 tablets (50 mg) by mouth daily       fexofenadine 180 MG tablet    ALLEGRA     Take 180 mg by mouth as needed for allergies       levothyroxine 137 MCG tablet    SYNTHROID/LEVOTHROID    90 tablet    TAKE 137 MCG (1 TABLET) BY MOUTH DAILY       losartan-hydrochlorothiazide 50-12.5 MG per tablet    HYZAAR    90 tablet    Take 1 tablet by mouth daily       LOVAZA 1 G capsule   Generic drug:  omega-3 acid ethyl esters      Take 1 g by mouth daily       testosterone 50 MG/5GM (1%) topical gel    ANDROGEL/TESTIM     Place onto the skin daily

## 2017-04-13 NOTE — TELEPHONE ENCOUNTER
Left a message patient to contact sleep center with his bedtime, this info is needed for his home sleep test.

## 2017-04-13 NOTE — PATIENT INSTRUCTIONS
"MY TREATMENT INFORMATION FOR SLEEP APNEA-  Juan C Garcia    DOCTOR : Edgardo Madsen MD  SLEEP CENTER :      MY CONTACT NUMBER:       If I haven't had a sleep study yet, what can I expect?  A personal story from [place]  https://www.[employer].com/watch?v=AxPLmlRpnCs    Am I having a home sleep study?  Here is a video in case you get home and want to make sure you have done it correctly  https://www.[employer].com/watch?v=QPD4S9iEbe0&feature=youtu.be    Frequently asked questions:  1. What is Obstructive Sleep Apnea (BARRY)? BARRY is the most common type of sleep apnea. Apnea literally means, \"without breath.\" It is characterized by repetitive pauses in breathing, despite continued effort to breathe, and is usually associated with a reduction in blood oxygen saturation. Apneas can last 10 to over 60 seconds. It is caused by narrowing or collapse of the upper airway as muscles relax during sleep. Severity of sleep apnea is determined by frequency of breathing events and their effect on your sleep and oxygen levels determined during sleep testing.   2. What are the consequences of BARRY? Symptoms include: daytime sleepiness- possibly increasing the risk of falling asleep while driving, unrefreshing/restless sleep, snoring, insomnia, waking frequently to urinate, waking with heartburn or reflux, reduced concentration and memory, and morning headaches. Other health consequences may include development of high blood pressure and other cardiovascular disease in persons who are susceptible. Untreated BARRY  can contribute to heart disease, stroke and diabetes.   3. What are the treatment options? In most situations, sleep apnea is a lifelong disease that must be managed with daily therapy. Medications are not effective for sleep apnea and surgery is generally not performed until other therapies have been tried. Therapy is usually tailored to the individual patient based on many factors including your wishes as well as severity of sleep " apnea and severity of obesity. Continuous Positive Airway (CPAP) is the most reliable treatment. An oral device to hold your jaw forward is usually the next most reliable option. Other options include postioning devices (to keep you off your back), weight loss, and surgery including a tongue pacing device. There is more detail about some of these options below.            1. CPAP-  WHAT DOES IT DO AND HOW CAN I LEARN TO WEAR IT?                               BEFORE I START, CAN I WATCH A MOVIE TO GET A PLAN ON HOW TO USE CPAP?  https://www.[employer].com/watch?x=x0E71kg376L      Continuous positive airway pressure, or CPAP, is the most effective treatment for obstructive sleep apnea. It works by blowing room air, through a mask, to hold your throat open. A decision to use CPAP is a major step forward in the pursuit of a healthier life. The successful use of CPAP will help you breathe easier, sleep better and live healthier. You can choose CPAP equipment from any durable medical equipment provider that meets your needs.  Using CPAP can be a positive experience if you keep these alexandra points in mind:  1. Commitment  CPAP is not a quick fix for your problem. It involves a long-term commitment to improve your sleep and your health.    2. Communication  Stay in close communication with both your sleep doctor and your CPAP supplier. Ask lots of questions and seek help when you need it.    3. Consistency  Use CPAP all night, every night and for every nap. You will receive the maximum health benefits from CPAP when you use it every time that you sleep. This will also make it easier for your body to adjust to the treatment.    4. Correction  The first machine and mask that you try may not be the best ones for you. Work with your sleep doctor and your CPAP supplier to make corrections to your equipment selection. Ask about trying a different type of machine or mask if you have ongoing problems. Make sure that your mask is a good  "fit and learn to use your equipment properly.    5. Challenge  Tell a family member or close friend to ask you each morning if you used your CPAP the previous night. Have someone to challenge you to give it your best effort.    6. Connection   Your adjustment to CPAP will be easier if you are able to connect with others who use the same treatment. Ask your sleep doctor if there is a support group in your area for people who have sleep apnea, or look for one on the Internet.  7. Comfort   Increase your level of comfort by using a saline spray, decongestant or heated humidifier if CPAP irritates your nose, mouth or throat. Use your unit's \"ramp\" setting to slowly get used to the air pressure level. There may be soft pads you can buy that will fit over your mask straps. Look on www.CPAP.com for accessories that can help make CPAP use more comfortable.  8. Cleaning   Clean your mask, tubing and headgear on a regular basis. Put this time in your schedule so that you don't forget to do it. Check and replace the filters for your CPAP unit and humidifier.    9. Completion   Although you are never finished with CPAP therapy, you should reward yourself by celebrating the completion of your first month of treatment. Expect this first month to be your hardest period of adjustment. It will involve some trial and error as you find the machine, mask and pressure settings that are right for you.    10. Continuation  After your first month of treatment, continue to make a daily commitment to use your CPAP all night, every night and for every nap.    CPAP-Tips to starting with success:  Begin using your CPAP for short periods of time during the day while you watch TV or read.    Use CPAP every night and for every nap. Using it less often reduces the health benefits and makes it harder for your body to get used to it.    Make small adjustments to your mask, tubing, straps and headgear until you get the right fit. Tightening the mask " may actually worsen the leak.  If it leaves significant marks on your face or irritates the bridge of your nose, it may not be the best mask for you.  Speak with the person who supplied the mask and consider trying other masks. Insurances will allow you to try different masks during the first month of starting CPAP.  Insurance also covers a new mask, hose and filter about every 6 months.    Use a saline nasal spray to ease mild nasal congestion. Neti-Pot or saline nasal rinses may also help. Nasal gel sprays can help reduce nasal dryness.  Biotene mouthwash can be helpful to protect your teeth if you experience frequent dry mouth.  Dry mouth may be a sign of air escaping out of your mouth or out of the mask in the case of a full face mask.  Speak with your provider if you expect that is the case.     Take a nasal decongestant to relieve more severe nasal or sinus congestion.  Do not use Afrin (oxymetazoline) nasal spray more than 3 days in a row.  Speak with your sleep doctor if your nasal congestion is chronic.    Use a heated humidifier that fits your CPAP model to enhance your breathing comfort. Adjust the heat setting up if you get a dry nose or throat, down if you get condensation in the hose or mask.  Position the CPAP lower than you so that any condensation in the hose drains back into the machine rather than towards the mask.    Try a system that uses nasal pillows if traditional masks give you problems.    Clean your mask, tubing and headgear once a week. Make sure the equipment dries fully.    Regularly check and replace the filters for your CPAP unit and humidifier.    Work closely with your sleep provider and your CPAP supplier to make sure that you have the machine, mask and air pressure setting that works best for you. It is better to stop using it and call your provider to solve problems than to lay awake all night frustrated with the device.    BESIDES CPAP, WHAT OTHER THERAPIES ARE  THERE?      Positioning Device  Positioning devices are generally used when sleep apnea is mild and only occurs on your back.This example shows a pillow that straps around the waist. It may be appropriate for those whose sleep study shows milder sleep apnea that occurs primarily when lying flat on one's back. Preliminary studies have shown benefit but effectiveness at home may need to be verified by a home sleep test. These devices are generally not covered by medical insurance.                      Oral Appliance  What is oral appliance therapy?  An oral appliance is a small acrylic device that fits over the upper and lower teeth or tongue (similar to an orthodontic retainer or a mouth guard). This device slightly advances the lower jaw or tongue, which moves the base of the tongue forward, opens the airway, improves breathing and can effectively treat snoring and obstructive sleep apnea sleep apnea. The appliance is fabricated and customized by a qualified dentist with experience in treating snoring and sleep apnea. Oral appliances are usually well tolerated and have relatively high compliance by patients1, 2, 3.  When is an oral appliance indicated?  Oral appliance therapy is recommended as a first-line treatment for patients with primary snoring, mild sleep apnea, and for patients with moderate sleep apnea who prefer appliance therapy to use of CPAP4, 5. Severity of sleep apnea is determined by sleep testing and is based on the number of respiratory events per hour of sleep.   How successful is oral appliance therapy?  The success rate of oral appliance therapy in patients with mild sleep apnea is 75-80% while in patients with moderate sleep apnea it is 50-70%. The chance of success in patients with severe sleep apnea is 40-50%. The research also shows that oral appliances have a beneficial effect on the cardiovascular health of BARRY patients at the same magnitude as CPAP therapy7.  Oral appliances should be a  second-line treatment in cases of severe sleep apnea, but if not completely successful then a combination therapy utilizing CPAP plus oral appliance therapy may be effective. Oral appliances tend to be effective in a broad range of patients although studies show that the patients who have the highest success are females, younger patients, those with milder disease, and less severe obesity. 3, 6.   The chances of success are lower in patients who have more severe BARRY, are older, and those who are morbidly obese.     Example of an oral appliance   Finding a dentist that practices dental sleep medicine  Specific training is available through the American Academy of Dental Sleep Medicine for dentists interested in working in the field of sleep. To find a dentist who is educated in the field of sleep and the use of oral appliances, near you, visit the Web site of the American Academy of Dental Sleep Medicine; also see   http://www.accpstorage.org/newOrganization/patients/oralAppliances.pdf  To search for a dentist certified in these practices:  Http://aadsm.org/FindADentist.aspx?1  1. Hai et al. Objectively measured vs self-reported compliance during oral appliance therapy for sleep-disordered breathing. Chest 2013; 144(5): 9834-3375.  2. Ivy, et al. Objective measurement of compliance during oral appliance therapy for sleep-disordered breathing. Thorax 2013; 68(1): 91-96.  3. Nahomy, et al. Mandibular advancement devices in 620 men and women with BARRY and snoring: tolerability and predictors of treatment success. Chest 2004; 125: 3652-2387.  4. Timothy, et al. Oral appliances for snoring and BARRY: a review. Sleep 2006; 29: 244-262.  5. Rodrick et al. Oral appliance treatment for BARRY: an update. J Clin Sleep Med 2014; 10(2): 215-227.  6. Shannan et al. Predictors of OSAH treatment outcome. J Dent Res 2007; 86: 2866-5194.      Weight Loss:    Weight management is a personal decision.  If you are  interested in exploring weight loss strategies, the following discussion covers the impact on weight loss on sleep apnea and the approaches that may be successful.    Weight loss decreases severity of sleep apnea in most people with obesity. For those with mild obesity who have developed snoring with weight gain, even 15-30 pound weight loss can improve and occasionally eliminate sleep apnea.  Structured and life-long dietary and health habits are necessary to lose weight and keep healthier weight levels.     Though there may be significant health benefits from weight loss, long-term weight loss is very difficult to achieve- studies show success with dietary management in less than 10% of people. In addition, substantial weight loss may require years of dietary control and may be difficult if patients have severe obesity. In these cases, surgical management may be considered.  Finally, older individuals who have tolerated obesity without health complications may be less likely to benefit from weight loss strategies.    Your BMI is Body mass index is 27.35 kg/(m^2).  Body mass index (BMI) is one way to tell whether you are at a healthy weight, overweight, or obese. It measures your weight in relation to your height.  A BMI of 18.5 to 24.9 is in the healthy range. A person with a BMI of 25 to 29.9 is considered overweight, and someone with a BMI of 30 or greater is considered obese. More than two-thirds of American adults are considered overweight or obese.  Being overweight or obese increases the risk for further weight gain. Excess weight may lead to heart disease and diabetes.  Creating and following plans for healthy eating and physical activity may help you improve your health.  Weight control is part of healthy lifestyle and includes exercise, emotional health, and healthy eating habits. Careful eating habits lifelong are the mainstay of weight control. Though there are significant health benefits from weight  loss, long-term weight loss with diet alone may be very difficult to achieve- studies show long-term success with dietary management in less than 10% of people. Attaining a healthy weight may be especially difficult to achieve in those with severe obesity. In some cases, medications, devices and surgical management might be considered.  What can you do?  If you are overweight or obese and are interested in methods for weight loss, you should discuss this with your provider.     Consider reducing daily calorie intake by 500 calories.     Keep a food journal.     Avoiding skipping meals, consider cutting portions instead.    Diet combined with exercise helps maintain muscle while optimizing fat loss. Strength training is particularly important for building and maintaining muscle mass. Exercise helps reduce stress, increase energy, and improves fitness. Increasing exercise without diet control, however, may not burn enough calories to loose weight.       Start walking three days a week 10-20 minutes at a time    Work towards walking thirty minutes five days a week     Eventually, increase the speed of your walking for 1-2 minutes at time    In addition, we recommend that you review healthy lifestyles and methods for weight loss available through the National Institutes of Health patient information sites:  http://win.niddk.nih.gov/publications/index.htm    And look into health and wellness programs that may be available through your health insurance provider, employer, local community center, or [employer].    Weight management plan: Patient was referred to their PCP to discuss a diet and exercise plan.    Surgery:    Upper Airway Surgery for BARRY  Surgery for BARRY is a second-line treatment option in the management of sleep apnea.  Surgery should be considered for patients who are having a difficult time tolerating CPAP.    Surgery for BARRY is directed at areas that are responsible for narrowing or complete obstruction  of the airway during sleep.  There are a wide range of procedures available to enlarge and/or stabilize the airway to prevent blockage of breathing in the three major areas where it can occur: the palate, tongue, and nasal regions.  Successful surgical treatment depends on the accurate identification of the factors responsible for obstructive sleep apnea in each person.  A personalized approach is required because there is no single treatment that works well for everyone.  Because of anatomic variation, consultation with an examination by a sleep surgeon is a critical first step in determining what surgical options are best for each patient.  In some cases, examination during sedation may be recommended in order to guide the selection of procedures.  Patients will be counseled about risks and benefits as well as the typical recovery course after surgery. Surgery is typically not a cure for a person s BARRY.  However, surgery will often significantly improve one s BARRY severity (termed  success rate ).  Even in the absence of a cure, surgery will decrease the cardiovascular risk associated with OSA7; improve overall quality of life8 (sleepiness, functionality, sleep quality, etc).          Palate Procedures:  Patients with BARRY often have narrowing of their airway in the region of their tonsils and uvula.  The goals of palate procedures are to widen the airway in this region as well as to help the tissues resist collapse.  Modern palate procedure techniques focus on tissue conservation and soft tissue rearrangement, rather than tissue removal.  Often the uvula is preserved in this procedure. Residual sleep apnea is common in patient after pharyngoplasty with an average reduction in sleep apnea events of 33%2.      Tongue Procedures:  While patients are awake, the muscles that surround the throat are active and keep this region open for breathing. These muscles relax during sleep, allowing the tongue and other structures  to collapse and block breathing.  There are several different tongue procedures available.  Selection of a tongue base procedure depends on characteristics seen on physical exam.  Generally, procedures are aimed at removing bulky tissues in this area or preventing the back of the tongue from falling back during sleep.  Success rates for tongue surgery range from 50-62%3.    Hypoglossal Nerve Stimulation:  Hypoglossal nerve stimulation has recently received approval from the [place] Food and Drug Administration for the treatment of obstructive sleep apnea.  This is based on research showing that the system was safe and effective in treating sleep apnea6.  Results showed that the median AHI score decreased 68%, from 29.3 to 9.0. This therapy uses an implant system that senses breathing patterns and delivers mild stimulation to airway muscles, which keeps the airway open during sleep.  The system consists of three fully implanted components: a small generator (similar in size to a pacemaker), a breathing sensor, and a stimulation lead.  Using a small handheld remote, a patient turns the therapy on before bed and off upon awakening.    Candidates for this device must be greater than 22 years of age, have moderate to severe BARRY (AHI between 20-65), BMI less than 32, have tried CPAP/oral appliance without success, and have appropriate upper airway anatomy (determined by a sleep endoscopy performed by Dr. Francois).    Hypoglossal Nerve Stimulation Pathway:    The sleep surgeon s office will work with the patient through the insurance prior-authorization process (including communications and appeals).    Nasal Procedures:  Nasal obstruction can interfere with nasal breathing during the day and night.  Studies have shown that relief of nasal obstruction can improve the ability of some patients to tolerate positive airway pressure therapy for obstructive sleep apnea1.  Treatment options include medications such as nasal  saline, topical corticosteroid and antihistamine sprays, and oral medications such as antihistamines or decongestants. Non-surgical treatments can include external nasal dilators for selected patients. If these are not successful by themselves, surgery can improve the nasal airway either alone or in combination with these other options.      Combination Procedures:  Combination of surgical procedures and other treatments may be recommended, particularly if patients have more than one area of narrowing or persistent positional disease.  The success rate of combination surgery ranges from 66-80%2,3.      1. Yonatan AGUILAR. The Role of the Nose in Snoring and Obstructive Sleep Apnoea: An Update.  Eur Arch Otorhinolaryngol. 2011; 268: 1365-73.  2.  Kervin SM; Nahun JA; Jigna JR; Pallanch JF; Sneha MB; Juanis SG; Jc DRIVER. Surgical modifications of the upper airway for obstructive sleep apnea in adults: a systematic review and meta-analysis. SLEEP 2010;33(10):7321-8009. Carlos THOMSON. Hypopharyngeal surgery in obstructive sleep apnea: an evidence-based medicine review.  Arch Otolaryngol Head Neck Surg. 2006 Feb;132(2):206-13.  3. Mauro YH1, Amador Y, Adam MIGUEL. The efficacy of anatomically based multilevel surgery for obstructive sleep apnea. Otolaryngol Head Neck Surg. 2003 Oct;129(4):327-35.  4. Carlos THOMSON, Goldberg A. Hypopharyngeal Surgery in Obstructive Sleep Apnea: An Evidence-Based Medicine Review. Arch Otolaryngol Head Neck Surg. 2006 Feb;132(2):206-13.  5. Nichole VALERA et al. Upper-Airway Stimulation for Obstructive Sleep Apnea.  N Engl J Med. 2014 Jan 9;370(2):139-49.  6. Nelsy Y et al. Increased Incidence of Cardiovascular Disease in Middle-aged Men with Obstructive Sleep Apnea. Am J Respir Crit Care Med; 2002 166: 159-165  7. Logan DUFFY et al. Studying Life Effects and Effectiveness of Palatopharyngoplasty (SLEEP) study: Subjective Outcomes of Isolated Uvulopalatopharyngoplasty. Otolaryngol Head Neck Surg. 2011;  144: 623-631.              Your BMI is Body mass index is 27.35 kg/(m^2).  Weight management is a personal decision.  If you are interested in exploring weight loss strategies, the following discussion covers the approaches that may be successful. Body mass index (BMI) is one way to tell whether you are at a healthy weight, overweight, or obese. It measures your weight in relation to your height.  A BMI of 18.5 to 24.9 is in the healthy range. A person with a BMI of 25 to 29.9 is considered overweight, and someone with a BMI of 30 or greater is considered obese. More than two-thirds of American adults are considered overweight or obese.  Being overweight or obese increases the risk for further weight gain. Excess weight may lead to heart disease and diabetes.  Creating and following plans for healthy eating and physical activity may help you improve your health.  Weight control is part of healthy lifestyle and includes exercise, emotional health, and healthy eating habits. Careful eating habits lifelong are the mainstay of weight control. Though there are significant health benefits from weight loss, long-term weight loss with diet alone may be very difficult to achieve- studies show long-term success with dietary management in less than 10% of people. Attaining a healthy weight may be especially difficult to achieve in those with severe obesity. In some cases, medications, devices and surgical management might be considered.  What can you do?  If you are overweight or obese and are interested in methods for weight loss, you should discuss this with your provider.     Consider reducing daily calorie intake by 500 calories.     Keep a food journal.     Avoiding skipping meals, consider cutting portions instead.    Diet combined with exercise helps maintain muscle while optimizing fat loss. Strength training is particularly important for building and maintaining muscle mass. Exercise helps reduce stress, increase  energy, and improves fitness. Increasing exercise without diet control, however, may not burn enough calories to loose weight.       Start walking three days a week 10-20 minutes at a time    Work towards walking thirty minutes five days a week     Eventually, increase the speed of your walking for 1-2 minutes at time    In addition, we recommend that you review healthy lifestyles and methods for weight loss available through the National Institutes of Health patient information sites:  http://win.niddk.nih.gov/publications/index.htm    And look into health and wellness programs that may be available through your health insurance provider, employer, local community center, or [employer].    Weight management plan: Patient was referred to their PCP to discuss a diet and exercise plan.

## 2017-04-13 NOTE — PROGRESS NOTES
Sleep Consultation:    Date on this visit: 4/13/2017    Juan C Garcia is sent by Neetu Kaur for a sleep consultation regarding sleep apnea.    Primary Physician: Neetu Kaur     Chief complaint: snoring, witnessed apneas in sleep    Presenting History:    Juan C Garcia presents with a history of loud, frequent snoring and apneas in his sleep witnessed by his wife. His wife has made him aware of loud snoring for about 5 years. Over the last one year, he has been noted to have frequent snort arousals, gasping episodes in sleep and witnessed apneas. Snoring is loud and disruptive to wife's sleep. There are no clear precipitating factors. Patent is not aware of these breathing disturbances.     He doesnot sleep on his back due to back pain. He usually sleeps on his sides.     He experiences his sleep as non restorative. He wake sup feeling unrefreshed. He drinks 3-4 cups of coffee in the morning. He reports some tiredness in the day but denies any excessive sleepiness. [place] score is 6. He denies any significant impairment in alertness while driving.     He goes to bed at 9:30 pm and can fall asleep in 5 minutes. He wakes up 3-4 times during the night spontaneously;y. He can return to sleep without difficulty.     He denies restless leg symptoms. There is no history of dream enactment behavior or other parasomnias.     Allergies:    Allergies   Allergen Reactions     No Known Allergies        Medications:    Current Outpatient Prescriptions   Medication Sig Dispense Refill     losartan-hydrochlorothiazide (HYZAAR) 50-12.5 MG per tablet Take 1 tablet by mouth daily 90 tablet 3     dapsone 25 MG tablet Take 2 tablets (50 mg) by mouth daily 180 tablet 3     atorvastatin (LIPITOR) 10 MG tablet Take 1 tablet (10 mg) by mouth At Bedtime 90 tablet 3     levothyroxine (SYNTHROID, LEVOTHROID) 137 MCG tablet TAKE 137 MCG (1 TABLET) BY MOUTH DAILY 90 tablet 3     ALLERGY INJECTIONS        testosterone  (ANDROGEL/TESTIM) 50 MG/5GM (1%) gel Place onto the skin daily       fluticasone - vilanterol (BREO ELLIPTA) 200-25 MCG/INH oral inhaler Inhale 1 puff into the lungs daily       Ibuprofen (ADVIL PO) Take 400 mg by mouth as needed        azelastine-fluticasone (DYMISTA) 137-50 MCG/ACT nasal spray Spray 1 spray into both nostrils daily       fexofenadine (ALLEGRA) 180 MG tablet Take 180 mg by mouth as needed for allergies        omega-3 acid ethyl esters (LOVAZA) 1 G capsule Take 1 g by mouth daily         Problem List:  Patient Active Problem List    Diagnosis Date Noted     Anxiety 10/22/2016     Priority: Medium     Anemia, unspecified anemia type 03/20/2016     Priority: Medium     With elevated reticulocyte count - likely r/t Dapsone       Hypothyroidism 02/02/2015     Priority: Medium     Allergic rhinitis 10/13/2014     Priority: Medium     Problem list name updated by automated process. Provider to review       Primary hypogonadism in male      Low FSH, single left testicle - followed by Dr. Flores of endo       Dermatitis herpetiformis      Dx by biopsy of rash on elbow per derm       HTN, goal below 140/90      Hyperlipidemia      Multiple allergies      allergist with immunotherapy       Alcohol use         Past Medical/Surgical History:  Past Medical History:   Diagnosis Date     Alcohol use      Dermatitis herpetiformis 2000    Dx by biopsy of rash on elbow per derm     HTN, goal below 140/90      Hyperlipidemia      Lung nodule Feb 2003    Benign; Left upper lobe     Multiple allergies     allergist with immunotherapy; Allergist is Dr. Santos in [place]     Primary hypogonadism in male     Low FSH, single left testicle - followed by Dr. Flores of endo     Pulmonary embolus (H) Feb 2003    Evaluated by ?[facility] - unknown cause     Thyroid nodule Feb 2005    Benign per [facility]NA --> taking Levothyroxine ever since     Past Surgical History:   Procedure Laterality Date     ORCHIOPEXY CHILD Right     Age 10 due  to undescended testicl     OTHER SURGICAL HISTORY  July 1997    Cervical laminectomy C4-5-6     OTHER SURGICAL HISTORY  Jan 2000    Right index tendon repair     OTHER SURGICAL HISTORY  August 2004    Ankle/wrist repair     OTHER SURGICAL HISTORY  April 2007    Lumbar microdiscectomy L5-S1     OTHER SURGICAL HISTORY  March 2010    Cervical fusion C5-6-7     OTHER SURGICAL HISTORY  March 2011    Cervical fusion C3-4-5     OTHER SURGICAL HISTORY  July 2011    Lumbar fusion L5-S1       Social History:    works in marketing. No smoking history. Alcohol use is occasional.     Social History     Social History     Marital status:      Spouse name: N/A     Number of children: N/A     Years of education: N/A     Occupational History     Not on file.     Social History Main Topics     Smoking status: Never Smoker     Smokeless tobacco: Never Used     Alcohol use 0.0 oz/week     0 Standard drinks or equivalent per week      Comment: H/o significant alcohol use, currently none June 2016     Drug use: No     Sexual activity: Yes     Partners: Female     Other Topics Concern     Not on file     Social History Narrative       Family History:    Father snored loudly.     Family History   Problem Relation Age of Onset     Hypertension Mother      Hypertension Father      CEREBROVASCULAR DISEASE Father      CANCER Father      throat - was a smoker     HEART DISEASE Father      CABG     DIABETES Maternal Grandfather      Family History Negative Brother      Family History Negative Sister      Unknown/Adopted Maternal Grandmother      CEREBROVASCULAR DISEASE Paternal Grandmother      Family History Negative Son      Family History Negative Son        Review of Systems:  A complete review of systems reviewed by me is negative with the exeption of what has been mentioned in the history of present illness.  CONSTITUTIONAL: NEGATIVE for weight gain/loss, fever, chills, sweats or night sweats, drug allergies.  EYES: NEGATIVE for  "changes in vision, blind spots, double vision.  ENT:  POSITIVE for  ear pain and sore throat  CARDIAC: NEGATIVE for fast heartbeats or fluttering in chest, chest pain or pressure, breathlessness when lying flat, swollen legs or swollen feet.  NEUROLOGIC: NEGATIVE headaches, weakness or numbness in the arms or legs.  DERMATOLOGIC: NEGATIVE for rashes, new moles or change in mole(s)  PULMONARY: NEGATIVE SOB at rest, SOB with activity, dry cough, productive cough, coughing up blood, wheezing or whistling when breathing.    GASTROINTESTINAL: NEGATIVE for nausea or vomitting, loose or watery stools, fat or grease in stools, constipation, abdominal pain, bowel movements black in color or blood noted.  GENITOURINARY: NEGATIVE for pain during urination, blood in urine, urinating more frequently than usual, irregular menstrual periods.  MUSCULOSKELETAL:  POSITIVE for  muscle pain and bone or joint pain  ENDOCRINE: NEGATIVE for increased thirst or urination, diabetes.  LYMPHATIC: NEGATIVE for swollen lymph nodes, lumps or bumps in the breasts or nipple discharge.    Physical Examination:  Vitals: /76  Pulse 79  Ht 1.88 m (6' 2\")  Wt 96.6 kg (213 lb)  SpO2 95%  BMI 27.35 kg/m2  BMI= Body mass index is 27.35 kg/(m^2).    Neck Cir (cm): 42 cm    [place] Total Score 4/13/2017   Total score - [place] 6       GENERAL APPEARANCE: healthy, alert and no distress  EYES: Eyes grossly normal to inspection, PERRL and conjunctivae and sclerae normal  HENT: nose and mouth without ulcers or lesions and oropharynx crowded  NECK: no adenopathy, no asymmetry, masses, or scars and thyroid normal to palpation  RESP: lungs clear to auscultation - no rales, rhonchi or wheezes  CV: regular rates and rhythm, normal S1 S2, no S3 or S4 and no murmur, click or rub  ABDOMEN: normal  MS: extremities normal- no gross deformities noted  NEURO: Normal strength and tone, mentation intact and speech normal  PSYCH: mentation appears normal and affect " normal/bright  Mallampati Class: IV.  Tonsillar Stage: 1  hidden by pillars.    Impression/Plan:    1. High risk for obstructive sleep apnea  2. Hypertension  3. Snoring   4. Witnessed apneas in sleep     - Patient presents with loud, frequent snoring, witnessed apneas and non restorative sleep. Oropharynx is narrow on examination and he has neck circumference at 41 cm. He is at a high risk of sleep apnea considering symptoms, anatomical and dermatographic risk factors.,     An overnight sleep study is recommended for revaluation of sleep apnea. He can be a candidate for home sleep apnea testing considering high risk for sleep apnea.     He has a history of bruxism and is interested in a dental appliance for treatmnet. This can be appropriate if sleep apnea is in the mild to moderate range.     Plan:     1. Home sleep apnea testing   2. Follow up after study for further recommendations.       Literature provided regarding sleep apnea.      He will follow up with me in approximately two weeks after his sleep study has been competed to review the results and discuss plan of care.       Sleep study reviewed.   Obstructive sleep apnea reviewed.  Complications of untreated sleep apnea were reviewed.    I spent a total of 45 minutes with patient with more than 50% in counseling     Edgardo Madsen MD     CC: Neetu Kaur

## 2017-04-13 NOTE — NURSING NOTE
"Chief Complaint   Patient presents with     Sleep Problem     Sleep issues, waking up during the night       Initial /76  Pulse 79  Ht 1.88 m (6' 2\")  Wt 96.6 kg (213 lb)  SpO2 95%  BMI 27.35 kg/m2 Estimated body mass index is 27.35 kg/(m^2) as calculated from the following:    Height as of this encounter: 1.88 m (6' 2\").    Weight as of this encounter: 96.6 kg (213 lb).  Medication Reconciliation: complete   Neck 42cm  ESS 6/24  Isabell Doss MA      "

## 2017-04-18 ENCOUNTER — OFFICE VISIT (OUTPATIENT)
Dept: SLEEP MEDICINE | Facility: CLINIC | Age: 57
End: 2017-04-18
Payer: COMMERCIAL

## 2017-04-18 DIAGNOSIS — G47.30 OBSERVED SLEEP APNEA: ICD-10-CM

## 2017-04-18 DIAGNOSIS — R06.83 SNORING: Primary | ICD-10-CM

## 2017-04-18 PROCEDURE — G0399 HOME SLEEP TEST/TYPE 3 PORTA: HCPCS | Performed by: INTERNAL MEDICINE

## 2017-04-18 NOTE — PROGRESS NOTES
Patient presented to clinic for  and demonstration of the 9:30pm. Patient was set up and instructed use. Patient verbalized understanding and will be returning device before noon.    Kathy Cesar  Clinical Coordinator

## 2017-04-18 NOTE — MR AVS SNAPSHOT
After Visit Summary   4/18/2017    Juan C Garcia    MRN: [id number]           Patient Information     Date Of Birth          1960        Visit Information        Provider Department      4/18/2017 2:30 PM BED 7 SH SLEEP [facility]        Today's Diagnoses     Snoring    -  1       Follow-ups after your visit        Your next 10 appointments already scheduled     Apr 19, 2017 11:00 AM CDT   HST Drop Off with  SLEEP CENTER DME   [facility] ([facility])    [address]  [place] [id number] [phone]            Apr 28, 2017  4:00 PM CDT   Return Sleep Patient with Edgardo Madsen MD   [facility]    [address]  [place] [id number] [phone]              Who to contact     If you have questions or need follow up information about today's clinic visit or your schedule please contact [facility] directly at [phone].  Normal or non-critical lab and imaging results will be communicated to you by Shiftgighart, letter or phone within 4 business days after the clinic has received the results. If you do not hear from us within 7 days, please contact the clinic through MiniVaxt or phone. If you have a critical or abnormal lab result, we will notify you by phone as soon as possible.  Submit refill requests through Lumatix or call your pharmacy and they will forward the refill request to us. Please allow 3 business days for your refill to be completed.          Additional Information About Your Visit        Shiftgighart Information     Lumatix gives you secure access to your electronic health record. If you see a primary care provider, you can also send messages to your care team and make appointments. If you have questions, please call your primary care clinic.  If you do not have a primary care provider, please call [phone]  and they will assist you.        Care EveryWhere ID     This is your Care EveryWhere ID. This could be used by other organizations to access your [place] medical records  [id number]         Blood Pressure from Last 3 Encounters:   04/13/17 125/76   03/01/17 121/81   06/25/16 129/75    Weight from Last 3 Encounters:   04/13/17 96.6 kg (213 lb)   03/01/17 94.8 kg (209 lb 1.6 oz)   06/25/16 99.8 kg (220 lb)              Today, you had the following     No orders found for display       Primary Care Provider Office Phone # Fax #    Neetu Kaur, [phone]       [facility] [place] [address] [facility] [address]  [place] [address]        Thank you!     Thank you for choosing [facility]  for your care. Our goal is always to provide you with excellent care. Hearing back from our patients is one way we can continue to improve our services. Please take a few minutes to complete the written survey that you may receive in the mail after your visit with us. Thank you!             Your Updated Medication List - Protect others around you: Learn how to safely use, store and throw away your medicines at www.disposemymeds.org.          This list is accurate as of: 4/18/17  4:46 PM.  Always use your most recent med list.                   Brand Name Dispense Instructions for use    ADVIL PO      Take 400 mg by mouth as needed       ALLERGY INJECTIONS          atorvastatin 10 MG tablet    LIPITOR    90 tablet    Take 1 tablet (10 mg) by mouth At Bedtime       azelastine-fluticasone 137-50 MCG/ACT nasal spray    DYMISTA     Spray 1 spray into both nostrils daily       BREO ELLIPTA 200-25 MCG/INH oral inhaler   Generic drug:  fluticasone-vilanterol      Inhale 1 puff into the lungs daily       dapsone 25 MG tablet     180 tablet    Take 2 tablets (50 mg) by mouth daily       fexofenadine 180 MG tablet    ALLEGRA     Take 180 mg by mouth as needed for allergies       levothyroxine 137 MCG tablet     SYNTHROID/LEVOTHROID    90 tablet    TAKE 137 MCG (1 TABLET) BY MOUTH DAILY       losartan-hydrochlorothiazide 50-12.5 MG per tablet    HYZAAR    90 tablet    Take 1 tablet by mouth daily       LOVAZA 1 G capsule   Generic drug:  omega-3 acid ethyl esters      Take 1 g by mouth daily       testosterone 50 MG/5GM (1%) topical gel    ANDROGEL/TESTIM     Place onto the skin daily

## 2017-04-19 ENCOUNTER — DOCUMENTATION ONLY (OUTPATIENT)
Dept: SLEEP MEDICINE | Facility: CLINIC | Age: 57
End: 2017-04-19

## 2017-04-20 NOTE — PROCEDURES
"HOME SLEEP STUDY INTERPRETATION    Patient: Juan C Garcia  MRN: [id number]  YOB: 1960  Study Date: 2017  Referring Provider: Neetu Kaur;   Ordering Provider: Edgardo Madsen MD, MD     Indications for Home Study: Juan C Garcia is a 57 year old male with a history of hypertension, hyperlipidemia, hypothyroidism  who presents with symptoms suggestive of obstructive sleep apnea.    Estimated body mass index is 27.35 kg/(m^2) as calculated from the following:    Height as of 17: 1.88 m (6' 2\").    Weight as of 17: 96.6 kg (213 lb).  Total score - [place]: 6 (2017 10:00 AM)  STOP-BAN/8    Data: A full night home sleep study was performed recording the standard physiologic parameters including body position, movement, sound, nasal pressure, thermal oral airflow, chest and abdominal movements with respiratory inductance plethysmography, and oxygen saturation by pulse oximetry. Pulse rate was estimated by oximetry recording. This study was considered adequate based on > 4 hours of quality oximetry and respiratory recording. As specified by the AASM Manual for the Scoring of Sleep and Associated events, version 2.3, Rule VIII.D 1B, 4% oxygen desaturation scoring for hypopneas is used as a standard of care on all home sleep apnea testing.    Analysis Time:  475.6 minutes    Respiration:   Sleep Associated Hypoxemia: sustained hypoxemia was present. Baseline oxygen saturation was 91.6%.  Time with saturation less than or equal to 88% was 49 minutes. The lowest oxygen saturation was 72%.   Snoring: Snoring was present.  Respiratory events: The home study revealed a presence of 5 obstructive apneas and 0 mixed and 3 central apneas. There were 46 hypopneas resulting in a combined apnea/hypopnea index [AHI] of 6.8 events per hour.  AHI was 15.2 per hour supine, - per hour prone, 2.6 per hour on left side, and 0.7 per hour on right side.   Pattern: Excluding events noted above, " respiratory rate and pattern was Normal.    Position: Percent of time spent: supine - 38.8%, prone - 0%, on left - 24.3%, on right - 36.5%.    Heart Rate: By pulse oximetry tachycardia was noted.     Assessment:   Mild obstructive sleep apnea.  Sleep associated hypoxemia was present.    Recommendations:  Consider auto-CPAP at 5-15 cmH2O, oral appliance therapy or polysomnography with full night PAP titration.  Suggest optimizing sleep hygiene and avoiding sleep deprivation.  Weight management.    Diagnosis Code(s): Obstructive Sleep Apnea G47.33, Hypoxemia G47.36    Edgardo Madsen MD, MD, April 20, 2017   Diplomate, American Board of Psychiatry and Neurology, Sleep Medicine

## 2017-04-28 ENCOUNTER — OFFICE VISIT (OUTPATIENT)
Dept: SLEEP MEDICINE | Facility: CLINIC | Age: 57
End: 2017-04-28
Payer: COMMERCIAL

## 2017-04-28 DIAGNOSIS — G47.33 OSA (OBSTRUCTIVE SLEEP APNEA): Primary | ICD-10-CM

## 2017-04-28 PROCEDURE — 99441 ZZC PHYSICIAN TELEPHONE EVALUATION 5-10 MIN: CPT | Performed by: INTERNAL MEDICINE

## 2017-04-28 NOTE — PROGRESS NOTES
Phone call.      Sleep Study Follow-Up :    Date : 4/28/2017    Juan C Garcia was contacted on the phone for follow-up of his home sleep study done on 4/18/2017 at the [facility] [place] for possible sleep apnea.    Respiratory events: The home study revealed a presence of 5 obstructive apneas and 0 mixed and 3 central apneas. There were 46 hypopneas resulting in a combined apnea/hypopnea index [AHI] of 6.8 events per hour. AHI was 15.2 per hour supine, - per hour prone, 2.6 per hour on left side, and 0.7 per hour on right side.     Snoring: Snoring was present.    Sleep Associated Hypoxemia: sustained hypoxemia was present. Baseline oxygen saturation was 91.6%. Time with saturation less than or equal to 88% was 49 minutes. The lowest oxygen saturation was 72%.     Position: Percent of time spent: supine - 38.8%, prone - 0%, on left - 24.3%, on right - 36.5%.    These findings were reviewed with patient.     Past medical/surgical history, family history, social history, medications and allergies were reviewed.      Problem List:  Patient Active Problem List    Diagnosis Date Noted     Anxiety 10/22/2016     Priority: Medium     Anemia, unspecified anemia type 03/20/2016     Priority: Medium     With elevated reticulocyte count - likely r/t Dapsone       Hypothyroidism 02/02/2015     Priority: Medium     Allergic rhinitis 10/13/2014     Priority: Medium     Problem list name updated by automated process. Provider to review       Primary hypogonadism in male      Low FSH, single left testicle - followed by Dr. Flores of endo       Dermatitis herpetiformis      Dx by biopsy of rash on elbow per derm       HTN, goal below 140/90      Hyperlipidemia      Multiple allergies      allergist with immunotherapy       Alcohol use         Impression/Plan:    1. Mild obstructive sleep apnea with supine predominance   2. Sleep related hypoxemia     - Treatment options for mild sleep apnea was reviewed. Patient  prefers a dental appliance. This is appropriate therapy considering mild positional sleep apnea.      Plan:     1. Dental sleep medicine referral for treatment of sleep apnea.      He will follow up with me in about 6 month(s).     Ten minutes spent with patient on phone , all of which were spent counseling, coordinating plan of care.      Edgardo Madsen MD    CC: Neetu Kaur

## 2017-04-28 NOTE — MR AVS SNAPSHOT
After Visit Summary   4/28/2017    Juan C Garcia    MRN: [id number]           Patient Information     Date Of Birth          1960        Visit Information        Provider Department      4/28/2017 4:00 PM Edgardo Madsen MD [facility]        Today's Diagnoses     BARRY (obstructive sleep apnea)           Follow-ups after your visit        Who to contact     If you have questions or need follow up information about today's clinic visit or your schedule please contact [facility] directly at [phone].  Normal or non-critical lab and imaging results will be communicated to you by KidzVuzhart, letter or phone within 4 business days after the clinic has received the results. If you do not hear from us within 7 days, please contact the clinic through MeetingSproutt or phone. If you have a critical or abnormal lab result, we will notify you by phone as soon as possible.  Submit refill requests through Litehouse or call your pharmacy and they will forward the refill request to us. Please allow 3 business days for your refill to be completed.          Additional Information About Your Visit        MyChart Information     Litehouse gives you secure access to your electronic health record. If you see a primary care provider, you can also send messages to your care team and make appointments. If you have questions, please call your primary care clinic.  If you do not have a primary care provider, please call [phone] and they will assist you.        Care EveryWhere ID     This is your Care EveryWhere ID. This could be used by other organizations to access your [place] medical records  [id number]         Blood Pressure from Last 3 Encounters:   04/13/17 125/76   03/01/17 121/81   06/25/16 129/75    Weight from Last 3 Encounters:   04/13/17 96.6 kg (213 lb)   03/01/17 94.8 kg (209 lb 1.6 oz)   06/25/16 99.8 kg (220 lb)              Today, you had the following     No orders  found for display       Primary Care Provider Office Phone # Fax #    Neetu Kaur [phone]       [facility] [address] [facility] [address]  [facility] [address]        Thank you!     Thank you for choosing [facility]  for your care. Our goal is always to provide you with excellent care. Hearing back from our patients is one way we can continue to improve our services. Please take a few minutes to complete the written survey that you may receive in the mail after your visit with us. Thank you!             Your Updated Medication List - Protect others around you: Learn how to safely use, store and throw away your medicines at www.disposemymeds.org.          This list is accurate as of: 4/28/17  4:27 PM.  Always use your most recent med list.                   Brand Name Dispense Instructions for use    ADVIL PO      Take 400 mg by mouth as needed       ALLERGY INJECTIONS          atorvastatin 10 MG tablet    LIPITOR    90 tablet    Take 1 tablet (10 mg) by mouth At Bedtime       azelastine-fluticasone 137-50 MCG/ACT nasal spray    DYMISTA     Spray 1 spray into both nostrils daily       BREO ELLIPTA 200-25 MCG/INH oral inhaler   Generic drug:  fluticasone-vilanterol      Inhale 1 puff into the lungs daily       dapsone 25 MG tablet     180 tablet    Take 2 tablets (50 mg) by mouth daily       fexofenadine 180 MG tablet    ALLEGRA     Take 180 mg by mouth as needed for allergies       levothyroxine 137 MCG tablet    SYNTHROID/LEVOTHROID    90 tablet    TAKE 137 MCG (1 TABLET) BY MOUTH DAILY       losartan-hydrochlorothiazide 50-12.5 MG per tablet    HYZAAR    90 tablet    Take 1 tablet by mouth daily       LOVAZA 1 G capsule   Generic drug:  omega-3 acid ethyl esters      Take 1 g by mouth daily       testosterone 50 MG/5GM (1%) topical gel    ANDROGEL/TESTIM     Place onto the skin daily

## 2017-05-18 DIAGNOSIS — E78.5 HYPERLIPIDEMIA: ICD-10-CM

## 2017-05-19 RX ORDER — ATORVASTATIN CALCIUM 20 MG/1
TABLET, FILM COATED ORAL
Qty: 90 TABLET | Refills: 2 | OUTPATIENT
Start: 2017-05-19

## 2017-06-04 DIAGNOSIS — E78.5 HYPERLIPIDEMIA: ICD-10-CM

## 2017-06-05 RX ORDER — ATORVASTATIN CALCIUM 20 MG/1
TABLET, FILM COATED ORAL
Qty: 90 TABLET | Refills: 2 | OUTPATIENT
Start: 2017-06-05

## 2017-07-07 ENCOUNTER — TRANSFERRED RECORDS (OUTPATIENT)
Dept: HEALTH INFORMATION MANAGEMENT | Facility: CLINIC | Age: 57
End: 2017-07-07

## 2017-09-25 ENCOUNTER — RADIANT APPOINTMENT (OUTPATIENT)
Dept: GENERAL RADIOLOGY | Facility: CLINIC | Age: 57
End: 2017-09-25
Attending: FAMILY MEDICINE
Payer: COMMERCIAL

## 2017-09-25 ENCOUNTER — OFFICE VISIT (OUTPATIENT)
Dept: URGENT CARE | Facility: URGENT CARE | Age: 57
End: 2017-09-25
Payer: COMMERCIAL

## 2017-09-25 VITALS
WEIGHT: 216 LBS | TEMPERATURE: 97.8 F | SYSTOLIC BLOOD PRESSURE: 117 MMHG | OXYGEN SATURATION: 98 % | BODY MASS INDEX: 27.73 KG/M2 | HEART RATE: 72 BPM | DIASTOLIC BLOOD PRESSURE: 72 MMHG

## 2017-09-25 DIAGNOSIS — M79.645 PAIN OF FINGER OF LEFT HAND: Primary | ICD-10-CM

## 2017-09-25 DIAGNOSIS — M79.645 PAIN OF FINGER OF LEFT HAND: ICD-10-CM

## 2017-09-25 PROCEDURE — 73130 X-RAY EXAM OF HAND: CPT | Mod: LT

## 2017-09-25 PROCEDURE — 99213 OFFICE O/P EST LOW 20 MIN: CPT | Performed by: FAMILY MEDICINE
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
## 2017-09-25 NOTE — MR AVS SNAPSHOT
After Visit Summary   9/25/2017    Juan C Garcia    MRN: [id number]           Patient Information     Date Of Birth          1960        Visit Information        Provider Department      9/25/2017 5:45 PM Ben Richmond MD [facility]        Today's Diagnoses     Pain of finger of left hand    -  1       Follow-ups after your visit        Your next 10 appointments already scheduled     Oct 18, 2017  3:00 PM CDT   Return Sleep Patient with Edgardo Madsen MD   [facility] ([facility]    [address] [address] [phone]              Who to contact     If you have questions or need follow up information about today's clinic visit or your schedule please contact [facility] directly at [phone].  Normal or non-critical lab and imaging results will be communicated to you by MyChart, letter or phone within 4 business days after the clinic has received the results. If you do not hear from us within 7 days, please contact the clinic through [employer] or phone. If you have a critical or abnormal lab result, we will notify you by phone as soon as possible.  Submit refill requests through [employer] or call your pharmacy and they will forward the refill request to us. Please allow 3 business days for your refill to be completed.          Additional Information About Your Visit        MyChart Information     [employer] gives you secure access to your electronic health record. If you see a primary care provider, you can also send messages to your care team and make appointments. If you have questions, please call your primary care clinic.  If you do not have a primary care provider, please call [phone] and they will assist you.        Care EveryWhere ID     This is your Care EveryWhere ID. This could be used by other organizations to access your [facility]  records  [id number]        Your Vitals Were     Pulse Temperature Pulse Oximetry BMI (Body Mass Index)          72 97.8  F (36.6  C) (Oral) 98% 27.73 kg/m2         Blood Pressure from Last 3 Encounters:   09/25/17 117/72   04/13/17 125/76   03/01/17 121/81    Weight from Last 3 Encounters:   09/25/17 216 lb (98 kg)   04/13/17 213 lb (96.6 kg)   03/01/17 209 lb 1.6 oz (94.8 kg)               Primary Care Provider Office Phone # Fax #    Neetu Kaur,  [phone] [address] [facility] [address]  [facility] [address]        Equal Access to Services     SABA HOLLINGSWORTH : Hadii red zhu hadasho Soomaali, waaxda luqadaha, qaybta kaalmada adeegyada, mayra godinez . So [facility] [phone].    ATENCIÓN: Si habla español, tiene a clay disposición servicios gratuitos de asistencia lingüística. [facility] [phone].    We comply with applicable federal civil rights laws and [place] laws. We do not discriminate on the basis of race, color, national origin, age, disability sex, sexual orientation or gender identity.            Thank you!     Thank you for choosing [facility]  for your care. Our goal is always to provide you with excellent care. Hearing back from our patients is one way we can continue to improve our services. Please take a few minutes to complete the written survey that you may receive in the mail after your visit with us. Thank you!             Your Updated Medication List - Protect others around you: Learn how to safely use, store and throw away your medicines at www.disposemymeds.org.          This list is accurate as of: 9/25/17  7:11 PM.  Always use your most recent med list.                   Brand Name Dispense Instructions for use Diagnosis    ADVIL PO      Take 400 mg by mouth as needed        ALLERGY INJECTIONS       Other chest pain       atorvastatin 10 MG tablet    LIPITOR    90 tablet    Take 1 tablet (10 mg) by mouth At Bedtime     Hyperlipidemia, unspecified hyperlipidemia type       azelastine-fluticasone 137-50 MCG/ACT nasal spray    DYMISTA     Spray 1 spray into both nostrils daily        BREO ELLIPTA 200-25 MCG/INH oral inhaler   Generic drug:  fluticasone-vilanterol      Inhale 1 puff into the lungs daily    Other chest pain       dapsone 25 MG tablet     180 tablet    Take 2 tablets (50 mg) by mouth daily    Dermatitis herpetiformis       fexofenadine 180 MG tablet    ALLEGRA     Take 180 mg by mouth as needed for allergies        levothyroxine 137 MCG tablet    SYNTHROID/LEVOTHROID    90 tablet    TAKE 137 MCG (1 TABLET) BY MOUTH DAILY    Hypothyroidism       losartan-hydrochlorothiazide 50-12.5 MG per tablet    HYZAAR    90 tablet    Take 1 tablet by mouth daily    HTN, goal below 140/90       LOVAZA 1 G capsule   Generic drug:  omega-3 acid ethyl esters      Take 1 g by mouth daily        testosterone 50 MG/5GM (1%) topical gel    ANDROGEL/TESTIM     Place onto the skin daily    Other chest pain
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
## 2017-10-13 ENCOUNTER — TRANSFERRED RECORDS (OUTPATIENT)
Dept: HEALTH INFORMATION MANAGEMENT | Facility: CLINIC | Age: 57
End: 2017-10-13

## 2017-10-18 ENCOUNTER — OFFICE VISIT (OUTPATIENT)
Dept: SLEEP MEDICINE | Facility: CLINIC | Age: 57
End: 2017-10-18
Payer: COMMERCIAL

## 2017-10-18 VITALS
HEART RATE: 90 BPM | WEIGHT: 215.4 LBS | BODY MASS INDEX: 27.64 KG/M2 | HEIGHT: 74 IN | RESPIRATION RATE: 16 BRPM | DIASTOLIC BLOOD PRESSURE: 91 MMHG | SYSTOLIC BLOOD PRESSURE: 135 MMHG | OXYGEN SATURATION: 90 %

## 2017-10-18 DIAGNOSIS — G47.33 OSA (OBSTRUCTIVE SLEEP APNEA): Primary | ICD-10-CM

## 2017-10-18 DIAGNOSIS — G47.34 SLEEP RELATED HYPOXIA: ICD-10-CM

## 2017-10-18 PROCEDURE — 99213 OFFICE O/P EST LOW 20 MIN: CPT | Performed by: INTERNAL MEDICINE

## 2017-10-18 NOTE — PATIENT INSTRUCTIONS

## 2017-10-18 NOTE — MR AVS SNAPSHOT
After Visit Summary   10/18/2017    Juan C Garcia    MRN: [id number]           Patient Information     Date Of Birth          1960        Visit Information        Provider Department      10/18/2017 3:00 PM Edgardo Madsen MD [facility]        Today's Diagnoses     BARRY (obstructive sleep apnea)    -  1    Sleep related hypoxia          Care Instructions      Your BMI is Body mass index is 27.64 kg/(m^2).  Weight management is a personal decision.  If you are interested in exploring weight loss strategies, the following discussion covers the approaches that may be successful. Body mass index (BMI) is one way to tell whether you are at a healthy weight, overweight, or obese. It measures your weight in relation to your height.  A BMI of 18.5 to 24.9 is in the healthy range. A person with a BMI of 25 to 29.9 is considered overweight, and someone with a BMI of 30 or greater is considered obese. More than two-thirds of American adults are considered overweight or obese.  Being overweight or obese increases the risk for further weight gain. Excess weight may lead to heart disease and diabetes.  Creating and following plans for healthy eating and physical activity may help you improve your health.  Weight control is part of healthy lifestyle and includes exercise, emotional health, and healthy eating habits. Careful eating habits lifelong are the mainstay of weight control. Though there are significant health benefits from weight loss, long-term weight loss with diet alone may be very difficult to achieve- studies show long-term success with dietary management in less than 10% of people. Attaining a healthy weight may be especially difficult to achieve in those with severe obesity. In some cases, medications, devices and surgical management might be considered.  What can you do?  If you are overweight or obese and are interested in methods for weight loss, you should discuss this with  your provider.     Consider reducing daily calorie intake by 500 calories.     Keep a food journal.     Avoiding skipping meals, consider cutting portions instead.    Diet combined with exercise helps maintain muscle while optimizing fat loss. Strength training is particularly important for building and maintaining muscle mass. Exercise helps reduce stress, increase energy, and improves fitness. Increasing exercise without diet control, however, may not burn enough calories to loose weight.       Start walking three days a week 10-20 minutes at a time    Work towards walking thirty minutes five days a week     Eventually, increase the speed of your walking for 1-2 minutes at time    In addition, we recommend that you review healthy lifestyles and methods for weight loss available through the National Institutes of Health patient information sites:  http://win.niddk.nih.gov/publications/index.htm    And look into health and wellness programs that may be available through your health insurance provider, employer, local community center, or [employer].    Weight management plan: Patient was referred to their PCP to discuss a diet and exercise plan.              Follow-ups after your visit        Your next 10 appointments already scheduled     Oct 24, 2017  3:30 PM CDT   Oximetry  with BED 7  SLEEP   [facility] ([facility])    [address] [place] [id number] [phone]              Future tests that were ordered for you today     Open Future Orders        Priority Expected Expires Ordered    Overnight oximetry study Routine  4/16/2018 10/18/2017            Who to contact     If you have questions or need follow up information about today's clinic visit or your schedule please contact [facility] directly at [phone].  Normal or non-critical lab and imaging results will be communicated to you by MyChart, letter or phone  "within 4 business days after the clinic has received the results. If you do not hear from us within 7 days, please contact the clinic through [employer] or phone. If you have a critical or abnormal lab result, we will notify you by phone as soon as possible.  Submit refill requests through [employer] or call your pharmacy and they will forward the refill request to us. Please allow 3 business days for your refill to be completed.          Additional Information About Your Visit        [employer] Information     [employer] gives you secure access to your electronic health record. If you see a primary care provider, you can also send messages to your care team and make appointments. If you have questions, please call your primary care clinic.  If you do not have a primary care provider, please call [phone] and they will assist you.        Care EveryWhere ID     This is your Care EveryWhere ID. This could be used by other organizations to access your [place] medical records  [id number]        Your Vitals Were     Pulse Respirations Height Pulse Oximetry BMI (Body Mass Index)       90 16 1.88 m (6' 2.02\") 90% 27.64 kg/m2        Blood Pressure from Last 3 Encounters:   10/18/17 (!) 135/91   09/25/17 117/72   04/13/17 125/76    Weight from Last 3 Encounters:   10/18/17 97.7 kg (215 lb 6.4 oz)   09/25/17 98 kg (216 lb)   04/13/17 96.6 kg (213 lb)                 Today's Medication Changes          These changes are accurate as of: 10/18/17  3:37 PM.  If you have any questions, ask your nurse or doctor.               Stop taking these medicines if you haven't already. Please contact your care team if you have questions.     azelastine-fluticasone 137-50 MCG/ACT nasal spray   Commonly known as:  DYMISTA           BREO ELLIPTA 200-25 MCG/INH oral inhaler   Generic drug:  fluticasone-vilanterol           fexofenadine 180 MG tablet   Commonly known as:  ALLEGRA           LOVAZA 1 G capsule   Generic drug:  omega-3 acid ethyl esters "                    Primary Care Provider Office Phone # Fax #    Neetu Kaur [phone] [address] [facility] [address]  [facility] [address]        Equal Access to Services     SABA HOLLINGSWORTH : Hadii red ku hadrocioo Soomaali, waaxda luqadaha, qaybta kaalmada adeegyada, mayra piñan kandacemadelyn bowden gretchen tam. So [facility] [phone].    ATENCIÓN: Si habla español, tiene a clay disposición servicios gratuitos de asistencia lingüística. Llame al [phone].    We comply with applicable federal civil rights laws and [place] laws. We do not discriminate on the basis of race, color, national origin, age, disability, sex, sexual orientation, or gender identity.            Thank you!     Thank you for choosing [facility]  for your care. Our goal is always to provide you with excellent care. Hearing back from our patients is one way we can continue to improve our services. Please take a few minutes to complete the written survey that you may receive in the mail after your visit with us. Thank you!             Your Updated Medication List - Protect others around you: Learn how to safely use, store and throw away your medicines at www.disposemymeds.org.          This list is accurate as of: 10/18/17  3:37 PM.  Always use your most recent med list.                   Brand Name Dispense Instructions for use Diagnosis    ADVIL PO      Take 400 mg by mouth as needed        ALLERGY INJECTIONS       Other chest pain       atorvastatin 10 MG tablet    LIPITOR    90 tablet    Take 1 tablet (10 mg) by mouth At Bedtime    Hyperlipidemia, unspecified hyperlipidemia type       dapsone 25 MG tablet     180 tablet    Take 2 tablets (50 mg) by mouth daily    Dermatitis herpetiformis       levothyroxine 137 MCG tablet    SYNTHROID/LEVOTHROID    90 tablet    TAKE 137 MCG (1 TABLET) BY MOUTH DAILY    Hypothyroidism       losartan-hydrochlorothiazide 50-12.5 MG per tablet    HYZAAR    90 tablet    Take 1  tablet by mouth daily    HTN, goal below 140/90       testosterone 50 MG/5GM (1%) topical gel    ANDROGEL/TESTIM     Place onto the skin daily    Other chest pain

## 2017-10-18 NOTE — NURSING NOTE
"Chief Complaint   Patient presents with     Sleep Problem     Follow up lolly, dental device       Initial BP (!) 135/91  Pulse 90  Resp 16  Ht 1.88 m (6' 2.02\")  Wt 97.7 kg (215 lb 6.4 oz)  SpO2 90%  BMI 27.64 kg/m2 Estimated body mass index is 27.64 kg/(m^2) as calculated from the following:    Height as of this encounter: 1.88 m (6' 2.02\").    Weight as of this encounter: 97.7 kg (215 lb 6.4 oz).  Medication Reconciliation: complete   ESS 3  Isabell Doss MA        "

## 2017-10-19 NOTE — PROGRESS NOTES
PROGRESS NOTE      CHIEF COMPLAINT:  Follow up for obstructive sleep apnea.      HISTORY OF PRESENT ILLNESS:  Mr. Juan C Garcia has mild obstructive sleep apnea.  His home sleep test from 04/18/2017 showed an apnea-hypopnea index of 6.8 events per hour with a supine dependence.  AHI was 15.2 per hour in the supine position, 2.6 on the left side and 0.7 on the right side.  There was evidence for sleep-related hypoxemia with a total of 49 minutes with a saturation of 88%.      The patient opted for a dental appliance, which he has obtained through dental sleep specialist at the [facility].      The patient had a positive response to dental appliance therapy.  Snoring has significantly reduced, resolved on many days and presented to light snoring on other days.  He has experienced an improvement in his sleep quality.  He wakes up feeling more refreshed and has an improvement in his daytime alertness.  He had some initial intolerance with the dental appliance, but is now able to use it every day without any significant difficulty.      The patient drinks 3-4 coffees daily,  2-3 in the morning, 1 in the afternoon and another one after dinner.  He wakes up 2-3 times during the night for uncertain reasons and sometimes uses the restroom.      We discussed his progress so far.  A positive response to the dental appliance is noted.  At this time, I have recommended that we perform an overnight oximetry to assess his oxygen saturations in sleep.      ASSESSMENT:   1.  Mild obstructive sleep apnea.   2.  Sleep-related hypoxemia.      TREATMENT PLAN:   1.  Overnight oximetry on dental appliance to assess response.   2.  I will phone him with the results.   3.  I have advised him to avoid coffee or caffeinated drinks after 4:00 p.m.      I spent a total of 15 minutes with this patient, with more than 50% spent in counseling.         HUI GRIMALDO MD             D: 10/18/2017 15:36   T: 10/18/2017 20:16   MT: MEL       Name:     GENARO CASANOVA   MRN:      [id number]        Account:      [id number]   :      1960           Visit Date:   10/18/2017      Document: [id number]       cc: Neetu Kaur DO

## 2017-11-01 ENCOUNTER — MYC MEDICAL ADVICE (OUTPATIENT)
Dept: FAMILY MEDICINE | Facility: CLINIC | Age: 57
End: 2017-11-01

## 2017-11-01 DIAGNOSIS — M79.645 PAIN OF FINGER OF LEFT HAND: Primary | ICD-10-CM

## 2017-11-01 NOTE — TELEPHONE ENCOUNTER
TO PCP:   See below, Pt was seen in [facility] 9/25/17  Should pt schedule follow up with you? Or a specialist?   Please advise     Nadia LAWLER RN

## 2017-11-03 ENCOUNTER — OFFICE VISIT (OUTPATIENT)
Dept: ORTHOPEDICS | Facility: CLINIC | Age: 57
End: 2017-11-03

## 2017-11-03 VITALS
WEIGHT: 221.8 LBS | HEART RATE: 85 BPM | DIASTOLIC BLOOD PRESSURE: 83 MMHG | HEIGHT: 75 IN | SYSTOLIC BLOOD PRESSURE: 146 MMHG | BODY MASS INDEX: 27.58 KG/M2

## 2017-11-03 DIAGNOSIS — M79.645 FINGER PAIN, LEFT: Primary | ICD-10-CM

## 2017-11-03 NOTE — MR AVS SNAPSHOT
After Visit Summary   11/3/2017    Juan C Garcia    MRN: [id number]           Patient Information     Date Of Birth          1960        Visit Information        Provider Department      11/3/2017 10:30 AM Kalpana Miranda MD [facility]        Today's Diagnoses     Finger pain, left    -  1       Follow-ups after your visit        Additional Services     HAND THERAPY       Hand Therapy Referral: left hand sprained collaterals 3rd and 4th fingers                  Follow-up notes from your care team     Return in about 4 weeks (around 12/1/2017), or if symptoms worsen or fail to improve.      Who to contact     Please call your clinic at [phone] to:    Ask questions about your health    Make or cancel appointments    Discuss your medicines    Learn about your test results    Speak to your doctor   If you have compliments or concerns about an experience at your clinic, or if you wish to file a complaint, please contact [facility] Patient Relations at [phone] or email us at [email or web address]         Additional Information About Your Visit        MyChart Information     [employer] gives you secure access to your electronic health record. If you see a primary care provider, you can also send messages to your care team and make appointments. If you have questions, please call your primary care clinic.  If you do not have a primary care provider, please call [phone] and they will assist you.      [employer] is an electronic gateway that provides easy, online access to your medical records. With [employer], you can request a clinic appointment, read your test results, renew a prescription or communicate with your care team.     To access your existing account, please contact your [facility] or call [phone] for assistance.        Care EveryWhere ID     This is your Care EveryWhere ID. This could be  "used by other organizations to access your [place] medical records  [id number]        Your Vitals Were     Pulse Height BMI (Body Mass Index)             85 6' 3\" (1.905 m) 27.72 kg/m2          Blood Pressure from Last 3 Encounters:   11/03/17 146/83   10/18/17 (!) 135/91   09/25/17 117/72    Weight from Last 3 Encounters:   11/03/17 221 lb 12.8 oz (100.6 kg)   10/18/17 215 lb 6.4 oz (97.7 kg)   09/25/17 216 lb (98 kg)              We Performed the Following     HAND THERAPY        Primary Care Provider Office Phone # Fax #    Neetu Kaur,  [phone] [address]  [place] [address]        Equal Access to Services     [facility] : Hadii red zhu hadasho Soomaali, waaxda luqadaha, qaybta kaalmada adeegyada, mayra godinez . So [facility] [phone].    ATENCIÓN: Si habla español, tiene a clay disposición servicios gratuitos de asistencia lingüística. Angel al [phone].    We comply with applicable federal civil rights laws and [place] laws. We do not discriminate on the basis of race, color, national origin, age, disability, sex, sexual orientation, or gender identity.            Thank you!     Thank you for choosing [facility]  for your care. Our goal is always to provide you with excellent care. Hearing back from our patients is one way we can continue to improve our services. Please take a few minutes to complete the written survey that you may receive in the mail after your visit with us. Thank you!             Your Updated Medication List - Protect others around you: Learn how to safely use, store and throw away your medicines at www.disposemymeds.org.          This list is accurate as of: 11/3/17 12:07 PM.  Always use your most recent med list.                   Brand Name Dispense Instructions for use Diagnosis    ADVIL PO      Take 400 mg by mouth as needed        ALLERGY INJECTIONS       Other chest pain       atorvastatin 10 MG " tablet    LIPITOR    90 tablet    Take 1 tablet (10 mg) by mouth At Bedtime    Hyperlipidemia, unspecified hyperlipidemia type       dapsone 25 MG tablet     180 tablet    Take 2 tablets (50 mg) by mouth daily    Dermatitis herpetiformis       levothyroxine 137 MCG tablet    SYNTHROID/LEVOTHROID    90 tablet    TAKE 137 MCG (1 TABLET) BY MOUTH DAILY    Hypothyroidism       losartan-hydrochlorothiazide 50-12.5 MG per tablet    HYZAAR    90 tablet    Take 1 tablet by mouth daily    HTN, goal below 140/90       testosterone 50 MG/5GM (1%) topical gel    ANDROGEL/TESTIM     Place onto the skin daily    Other chest pain
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
## 2017-12-18 ENCOUNTER — THERAPY VISIT (OUTPATIENT)
Dept: OCCUPATIONAL THERAPY | Facility: CLINIC | Age: 57
End: 2017-12-18
Payer: COMMERCIAL

## 2017-12-18 DIAGNOSIS — M25.642 STIFFNESS OF FINGER JOINT OF LEFT HAND: ICD-10-CM

## 2017-12-18 DIAGNOSIS — M79.645 PAIN OF FINGER OF LEFT HAND: Primary | ICD-10-CM

## 2017-12-18 PROCEDURE — 97165 OT EVAL LOW COMPLEX 30 MIN: CPT | Mod: GO | Performed by: OCCUPATIONAL THERAPIST

## 2017-12-18 PROCEDURE — 97022 WHIRLPOOL THERAPY: CPT | Mod: GO | Performed by: OCCUPATIONAL THERAPIST

## 2017-12-18 PROCEDURE — 97110 THERAPEUTIC EXERCISES: CPT | Mod: GO | Performed by: OCCUPATIONAL THERAPIST
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
## 2018-01-03 ENCOUNTER — THERAPY VISIT (OUTPATIENT)
Dept: OCCUPATIONAL THERAPY | Facility: CLINIC | Age: 58
End: 2018-01-03
Payer: COMMERCIAL

## 2018-01-03 DIAGNOSIS — M79.645 PAIN OF FINGER OF LEFT HAND: ICD-10-CM

## 2018-01-03 DIAGNOSIS — M25.642 STIFFNESS OF FINGER JOINT OF LEFT HAND: ICD-10-CM

## 2018-01-03 PROCEDURE — 97140 MANUAL THERAPY 1/> REGIONS: CPT | Mod: GO | Performed by: OCCUPATIONAL THERAPIST

## 2018-01-03 PROCEDURE — 97110 THERAPEUTIC EXERCISES: CPT | Mod: GO | Performed by: OCCUPATIONAL THERAPIST

## 2018-01-03 NOTE — PROGRESS NOTES
SOAP note objective information for 1/3/2018.  ROM  Pain Report:  - none    + mild    ++ moderate    +++ severe   HAND 12/18/2017 1/3/18   AROM(PROM) left    Long MP 0/95 0/103   PIP 0/101 0/106   DIP 0/75 0/77   CERVANTES 271 286   Ring MP 0/88 0/92   PIP 0/104 0/104   DIP 0/79 0/79   CERVANTES 271 275   Please refer to the daily flowsheet for treatment today, total treatment time and time spent performing 1:1 timed codes.       Home Exercise Program:  Tendon gliding  Gentle  and pinch strengthening  Massage to long and ring fingers    Next Visit:  A/AA/PROM  MFR

## 2018-01-29 ENCOUNTER — THERAPY VISIT (OUTPATIENT)
Dept: OCCUPATIONAL THERAPY | Facility: CLINIC | Age: 58
End: 2018-01-29
Payer: COMMERCIAL

## 2018-01-29 DIAGNOSIS — M25.642 STIFFNESS OF FINGER JOINT OF LEFT HAND: ICD-10-CM

## 2018-01-29 DIAGNOSIS — M79.645 PAIN OF FINGER OF LEFT HAND: ICD-10-CM

## 2018-01-29 PROCEDURE — 97110 THERAPEUTIC EXERCISES: CPT | Mod: GO | Performed by: OCCUPATIONAL THERAPIST

## 2018-01-29 PROCEDURE — 97140 MANUAL THERAPY 1/> REGIONS: CPT | Mod: GO | Performed by: OCCUPATIONAL THERAPIST

## 2018-01-29 NOTE — MR AVS SNAPSHOT
After Visit Summary   1/29/2018    Juan C Garcia    MRN: [id number]           Patient Information     Date Of Birth          1960        Visit Information        Provider Department      1/29/2018 4:30 PM Eileen Kilgore OT [facility]        Today's Diagnoses     Pain of finger of left hand        Stiffness of finger joint of left hand           Follow-ups after your visit        Who to contact     If you have questions or need follow up information about today's clinic visit or your schedule please contact [facility] directly at [phone].  Normal or non-critical lab and imaging results will be communicated to you by [employer], letter or phone within 4 business days after the clinic has received the results. If you do not hear from us within 7 days, please contact the clinic through [employer] or phone. If you have a critical or abnormal lab result, we will notify you by phone as soon as possible.  Submit refill requests through [employer] or call your pharmacy and they will forward the refill request to us. Please allow 3 business days for your refill to be completed.          Additional Information About Your Visit        MyChart Information     [employer] gives you secure access to your electronic health record. If you see a primary care provider, you can also send messages to your care team and make appointments. If you have questions, please call your primary care clinic.  If you do not have a primary care provider, please call [phone] and they will assist you.        Care EveryWhere ID     This is your Care EveryWhere ID. This could be used by other organizations to access your [facility] medical records  [id number]         Blood Pressure from Last 3 Encounters:   11/03/17 146/83   10/18/17 (!) 135/91   09/25/17 117/72    Weight from Last 3 Encounters:   11/03/17 100.6 kg (221 lb 12.8 oz)   10/18/17 97.7 kg (215 lb 6.4 oz)   09/25/17 98 kg (216 lb)              We  Performed the Following     MANUAL THER TECH,1+REGIONS,EA 15 MIN     THERAPEUTIC EXERCISES        Primary Care Provider Office Phone # Fax #    Neetu Kaur [phone] [address] [facility] [address]  [facility] [address]        Equal Access to Services     JUNE HOLLINGSWORTH : Hadii aad ku hadasho Soomaali, waaxda luqadaha, qaybta kaalmada adeegyada, waxay idiin hayaan ademadelyn irvincasiejose lalillie . So [facility] [phone].    ATENCIÓN: Si habla español, tiene a clay disposición servicios gratuitos de asistencia lingüística. Llame al [phone].    We comply with applicable federal civil rights laws and [place] laws. We do not discriminate on the basis of race, color, national origin, age, disability, sex, sexual orientation, or gender identity.            Thank you!     Thank you for choosing [facility]  for your care. Our goal is always to provide you with excellent care. Hearing back from our patients is one way we can continue to improve our services. Please take a few minutes to complete the written survey that you may receive in the mail after your visit with us. Thank you!             Your Updated Medication List - Protect others around you: Learn how to safely use, store and throw away your medicines at www.disposemymeds.org.          This list is accurate as of 1/29/18 11:59 PM.  Always use your most recent med list.                   Brand Name Dispense Instructions for use Diagnosis    ADVIL PO      Take 400 mg by mouth as needed        ALLERGY INJECTIONS       Other chest pain       atorvastatin 10 MG tablet    LIPITOR    90 tablet    Take 1 tablet (10 mg) by mouth At Bedtime    Hyperlipidemia, unspecified hyperlipidemia type       dapsone 25 MG tablet     180 tablet    Take 2 tablets (50 mg) by mouth daily    Dermatitis herpetiformis       levothyroxine 137 MCG tablet    SYNTHROID/LEVOTHROID    90 tablet    TAKE 137 MCG (1 TABLET) BY MOUTH DAILY    Hypothyroidism        losartan-hydrochlorothiazide 50-12.5 MG per tablet    HYZAAR    90 tablet    Take 1 tablet by mouth daily    HTN, goal below 140/90       testosterone 50 MG/5GM (1%) topical gel    ANDROGEL/TESTIM     Place onto the skin daily    Other chest pain

## 2018-01-29 NOTE — PROGRESS NOTES
"Hand Therapy Progress Note    Current Date:  1/29/2018    Diagnosis: L long and ring finger PIP joint sprains  DOI:  October 2017  Procedure:  none    Precautions: NA    Reporting period is 12/18/17 to 1/29/2018    Subjective:   Subjective changes noted by patient:  \"It's been doing better.  The ring finger is still a little bit problematic.\"  Functional changes noted by patient:  Improvement in Self Care Tasks (dressing, eating, bathing, hygiene/toileting), Work Tasks, Sleep Patterns, Recreational Activities, Household Chores and Driving   Patient has noted adverse reaction to:  None    Objective:  Pain Level Report  VAS(0-10) 12/18/2017 1/29/18   At Rest: 0/10 0/10   At Worst: 5/10 2/10     Report of Pain:  Location:  long finger and ring finger PIP joints  Pain Quality:  Sharp  Frequency: intermittent    Pain is worst:  daytime  Exacerbated by:  Over doing it, bumping it  Relieved by:  rest  Progression:  improving  Finger Edema  Circumference:  (Measured in cm)  Edema 12/18/2017 1/29/18   side left    Long P1 7.1 6.6   PIP 7.1 7.0   P2 6.4 6.3   Ring P1 6.3 6.2   PIP 6.5 6.4   P2 5.7 5.5     Sensation: WNL throughout all nerve distributions; per patient report    ROM  Pain Report:  - none    + mild    ++ moderate    +++ severe   HAND 12/18/2017 1/29/18   AROM(PROM) left    Long MP 0/95 0/96   PIP 0/101 0/100   DIP 0/75 0/72   CERVANTES 271    Ring MP 0/88 0/86   PIP 0/104 0/104   DIP 0/79 0/75   CERVANTES 271      Strength:   (Measured in pounds)  Pain Report:  - none    + mild    ++ moderate    +++ severe     12/18/2017 1/29/18   Trials right left    1  2  3 99  101  102 28+  32+  46+ 30++  54+  63   Average: 101 35 49     Lat Pinch  12/18/2017 1/29/18   Trials right left    1  2  3 21  23  22 22++  15+  18+ 21  20  20   Average: 22 18 20     3 Pt Pinch  12/18/2017 1/29/18   Trials right left    1  2  3 21  22  22 18  19  16 17  17  19   Average: 22 18 18     Please refer to the daily flowsheet for treatment provided " today.     Assessment:  Response to therapy has been improvement to:  ROM of Fingers: All Planes  Strength:   and pinch  Pain:  frequency is less, intensity of pain is decreased, duration of pain is decreased and less tender over affected area    Overall Assessment:  Patient's symptoms are resolving.  STG/LTG:  STGoals have been reviewed and progress or achievement has occurred;  see goal sheet for details and updates.    Plan:  Frequency/Duration:  DC to I HEP.  Appropriateness of Rx I have re-evaluated this patient and find that the nature, scope, duration and intensity of the therapy is appropriate for the medical condition of the patient.  Recommendations for Continued Therapy  DC to I HEP.    Home Exercise Program:  Tendon gliding  Gentle  and pinch strengthening  Massage to long and ring fingers   strengthening

## 2018-02-19 PROBLEM — M79.645 PAIN OF FINGER OF LEFT HAND: Status: RESOLVED | Noted: 2017-12-18 | Resolved: 2018-02-19

## 2018-02-19 PROBLEM — M25.642 STIFFNESS OF FINGER JOINT OF LEFT HAND: Status: RESOLVED | Noted: 2017-12-18 | Resolved: 2018-02-19

## 2018-03-05 ENCOUNTER — TELEPHONE (OUTPATIENT)
Dept: FAMILY MEDICINE | Facility: CLINIC | Age: 58
End: 2018-03-05

## 2018-03-05 DIAGNOSIS — E78.5 HYPERLIPIDEMIA, UNSPECIFIED HYPERLIPIDEMIA TYPE: Chronic | ICD-10-CM

## 2018-03-05 DIAGNOSIS — E03.9 HYPOTHYROIDISM, UNSPECIFIED TYPE: Primary | Chronic | ICD-10-CM

## 2018-03-05 DIAGNOSIS — I10 HTN, GOAL BELOW 140/90: Chronic | ICD-10-CM

## 2018-03-05 NOTE — TELEPHONE ENCOUNTER
PCP see below - pt requesting lab orders   Annual physical scheduled for 4/11/18    Nadia LAWLER RN

## 2018-03-19 ENCOUNTER — TRANSFERRED RECORDS (OUTPATIENT)
Dept: HEALTH INFORMATION MANAGEMENT | Facility: CLINIC | Age: 58
End: 2018-03-19

## 2018-04-09 DIAGNOSIS — I10 HTN, GOAL BELOW 140/90: Chronic | ICD-10-CM

## 2018-04-09 NOTE — TELEPHONE ENCOUNTER
"losartan-hydrochlorothiazide (HYZAAR) 50-12.5 MG per tablet 90 tablet 3 3/11/2017     Last Written Prescription Date:  3/11/17  Last Fill Quantity: 90,  # refills: 3   Last office visit: 3/1/2017 with prescribing provider:  ashley   Future Office Visit:   Next 5 appointments (look out 90 days)     Apr 11, 2018  2:00 PM CDT   PHYSICAL with Neetu Kaur,    [facility]    [address] [facility] [address] [phone]                 Requested Prescriptions   Pending Prescriptions Disp Refills     losartan-hydrochlorothiazide (HYZAAR) 50-12.5 MG per tablet [Pharmacy Med Name: LOSARTAN-HCTZ 50-12.5 MG TAB] 90 tablet 3     Sig: TAKE 1 TABLET BY MOUTH DAILY    Angiotensin-II Receptors Failed    4/9/2018  9:39 AM       Failed - Blood pressure under 140/90 in past 12 months    BP Readings from Last 3 Encounters:   11/03/17 146/83   10/18/17 (!) 135/91   09/25/17 117/72                Failed - Normal serum creatinine on file in past 12 months    Recent Labs   Lab Test  02/22/17   0828   CR  0.76            Failed - Normal serum potassium on file in past 12 months    Recent Labs   Lab Test  02/22/17   0828   POTASSIUM  4.4                   Passed - Recent (12 mo) or future (30 days) visit within the authorizing provider's specialty    Patient had office visit in the last 12 months or has a visit in the next 30 days with authorizing provider or within the authorizing provider's specialty.  See \"Patient Info\" tab in inbasket, or \"Choose Columns\" in Meds & Orders section of the refill encounter.           Passed - Patient is age 18 or older        PHQ-9 SCORE 1/29/2016 2/15/2016   Total Score 12 5       "

## 2018-04-10 RX ORDER — LOSARTAN POTASSIUM AND HYDROCHLOROTHIAZIDE 12.5; 5 MG/1; MG/1
TABLET ORAL
Qty: 90 TABLET | Refills: 0 | Status: SHIPPED | OUTPATIENT
Start: 2018-04-10 | End: 2018-04-11

## 2018-04-10 NOTE — TELEPHONE ENCOUNTER
Pres[place] per [facility] [place].  Jaz Roy RN

## 2018-04-11 ENCOUNTER — OFFICE VISIT (OUTPATIENT)
Dept: FAMILY MEDICINE | Facility: CLINIC | Age: 58
End: 2018-04-11
Payer: COMMERCIAL

## 2018-04-11 VITALS
DIASTOLIC BLOOD PRESSURE: 80 MMHG | TEMPERATURE: 97.8 F | HEIGHT: 75 IN | WEIGHT: 220 LBS | BODY MASS INDEX: 27.35 KG/M2 | OXYGEN SATURATION: 95 % | SYSTOLIC BLOOD PRESSURE: 132 MMHG | HEART RATE: 72 BPM

## 2018-04-11 DIAGNOSIS — L13.0 DERMATITIS HERPETIFORMIS: ICD-10-CM

## 2018-04-11 DIAGNOSIS — E03.9 HYPOTHYROIDISM, UNSPECIFIED TYPE: Chronic | ICD-10-CM

## 2018-04-11 DIAGNOSIS — Z12.5 SCREENING FOR PROSTATE CANCER: ICD-10-CM

## 2018-04-11 DIAGNOSIS — E78.5 HYPERLIPIDEMIA, UNSPECIFIED HYPERLIPIDEMIA TYPE: Chronic | ICD-10-CM

## 2018-04-11 DIAGNOSIS — Z00.01 ENCOUNTER FOR PREVENTATIVE ADULT HEALTH CARE EXAM WITH ABNORMAL FINDINGS: Primary | ICD-10-CM

## 2018-04-11 DIAGNOSIS — Z78.9 ALCOHOL USE: ICD-10-CM

## 2018-04-11 DIAGNOSIS — I10 HTN, GOAL BELOW 140/90: Chronic | ICD-10-CM

## 2018-04-11 DIAGNOSIS — G47.33 OSA (OBSTRUCTIVE SLEEP APNEA): ICD-10-CM

## 2018-04-11 LAB
ERYTHROCYTE [DISTWIDTH] IN BLOOD BY AUTOMATED COUNT: 12.7 % (ref 10–15)
HCT VFR BLD AUTO: 40.7 % (ref 40–53)
HGB BLD-MCNC: 13.1 G/DL (ref 13.3–17.7)
MCH RBC QN AUTO: 30.8 PG (ref 26.5–33)
MCHC RBC AUTO-ENTMCNC: 32.2 G/DL (ref 31.5–36.5)
MCV RBC AUTO: 96 FL (ref 78–100)
PLATELET # BLD AUTO: 246 10E9/L (ref 150–450)
RBC # BLD AUTO: 4.25 10E12/L (ref 4.4–5.9)
WBC # BLD AUTO: 7.8 10E9/L (ref 4–11)

## 2018-04-11 PROCEDURE — 80061 LIPID PANEL: CPT | Performed by: INTERNAL MEDICINE

## 2018-04-11 PROCEDURE — 36415 COLL VENOUS BLD VENIPUNCTURE: CPT | Performed by: INTERNAL MEDICINE

## 2018-04-11 PROCEDURE — 82607 VITAMIN B-12: CPT | Performed by: INTERNAL MEDICINE

## 2018-04-11 PROCEDURE — G0103 PSA SCREENING: HCPCS | Performed by: INTERNAL MEDICINE

## 2018-04-11 PROCEDURE — 80053 COMPREHEN METABOLIC PANEL: CPT | Performed by: INTERNAL MEDICINE

## 2018-04-11 PROCEDURE — 85027 COMPLETE CBC AUTOMATED: CPT | Performed by: INTERNAL MEDICINE

## 2018-04-11 PROCEDURE — 99396 PREV VISIT EST AGE 40-64: CPT | Performed by: INTERNAL MEDICINE

## 2018-04-11 RX ORDER — DAPSONE 25 MG/1
50 TABLET ORAL DAILY
Qty: 180 TABLET | Refills: 3 | Status: SHIPPED | OUTPATIENT
Start: 2018-04-11 | End: 2019-05-06

## 2018-04-11 RX ORDER — LOSARTAN POTASSIUM AND HYDROCHLOROTHIAZIDE 12.5; 5 MG/1; MG/1
1 TABLET ORAL DAILY
Qty: 90 TABLET | Refills: 3 | Status: SHIPPED | OUTPATIENT
Start: 2018-04-11 | End: 2019-03-08

## 2018-04-11 NOTE — MR AVS SNAPSHOT
After Visit Summary   4/11/2018    Juan C Garcia    MRN: [id number]           Patient Information     Date Of Birth          1960        Visit Information        Provider Department      4/11/2018 2:00 PM Neetu Kaur,  [facility]        Today's Diagnoses     Encounter for preventative adult health care exam with abnormal findings    -  1    Dermatitis herpetiformis        Hypothyroidism, unspecified type        Hyperlipidemia, unspecified hyperlipidemia type        BARRY (obstructive sleep apnea)        HTN, goal below 140/90        Screening for prostate cancer          Care Instructions    Labs today and keep up the good work  Follow up at least annually or sooner if needed    Preventive Health Recommendations  Male Ages 50 - 64    Yearly exam:             See your health care provider every year in order to  o   Review health changes.   o   Discuss preventive care.    o   Review your medicines if your doctor has prescribed any.     Have a cholesterol test every 5 years, or more frequently if you are at risk for high cholesterol/heart disease.     Have a diabetes test (fasting glucose) every three years. If you are at risk for diabetes, you should have this test more often.     Have a colonoscopy at age 50, or have a yearly FIT test (stool test). These exams will check for colon cancer.      Talk with your health care provider about whether or not a prostate cancer screening test (PSA) is right for you.    You should be tested each year for STDs (sexually transmitted diseases), if you re at risk.     Shots: Get a flu shot each year. Get a tetanus shot every 10 years.     Nutrition:    Eat at least 5 servings of fruits and vegetables daily.     Eat whole-grain bread, whole-wheat pasta and brown rice instead of white grains and rice.     Talk to your provider about Calcium and Vitamin D.     Lifestyle    Exercise for at least 150 minutes a week (30 minutes a day, 5 days a  week). This will help you control your weight and prevent disease.     Limit alcohol to one drink per day.     No smoking.     Wear sunscreen to prevent skin cancer.     See your dentist every six months for an exam and cleaning.     See your eye doctor every 1 to 2 years.            Follow-ups after your visit        Your next 10 appointments already scheduled     May 24, 2018  3:30 PM CDT   Oximetry  with BED 7 SH SLEEP   [facility] ([facility]    [address]  [place] [address] [phone]            May 25, 2018  3:00 PM CDT   Oximetry Drop Off with  SLEEP CENTER DME   [facility] ([facility])    [address]  [facility] [address] [phone]              Who to contact     If you have questions or need follow up information about today's clinic visit or your schedule please contact [facility] directly at [phone].  Normal or non-critical lab and imaging results will be communicated to you by [employer], letter or phone within 4 business days after the clinic has received the results. If you do not hear from us within 7 days, please contact the clinic through [employer] or phone. If you have a critical or abnormal lab result, we will notify you by phone as soon as possible.  Submit refill requests through [employer] or call your pharmacy and they will forward the refill request to us. Please allow 3 business days for your refill to be completed.          Additional Information About Your Visit        Anybots Information     Any[employer]ots gives you secure access to your electronic health record. If you see a primary care provider, you can also send messages to your care team and make appointments. If you have questions, please call your primary care clinic.  If you do not have a primary care provider, please call [phone] and they will assist you.        Care EveryWhere ID  "    This is your Care EveryWhere ID. This could be used by other organizations to access your [place] medical records  [id number]        Your Vitals Were     Pulse Temperature Height Pulse Oximetry BMI (Body Mass Index)       72 97.8  F (36.6  C) (Tympanic) 6' 3\" (1.905 m) 95% 27.5 kg/m2        Blood Pressure from Last 3 Encounters:   04/11/18 132/80   11/03/17 146/83   10/18/17 (!) 135/91    Weight from Last 3 Encounters:   04/11/18 220 lb (99.8 kg)   11/03/17 221 lb 12.8 oz (100.6 kg)   10/18/17 215 lb 6.4 oz (97.7 kg)              We Performed the Following     CBC with platelets     Comprehensive metabolic panel     Lipid panel reflex to direct LDL Fasting     PSA, screen     Vitamin B12          Today's Medication Changes          These changes are accurate as of 4/11/18  2:56 PM.  If you have any questions, ask your nurse or doctor.               These medicines have changed or have updated prescriptions.        Dose/Directions    losartan-hydrochlorothiazide 50-12.5 MG per tablet   Commonly known as:  HYZAAR   This may have changed:  See the new instructions.   Used for:  HTN, goal below 140/90   Changed by:  Neetu Kaur DO        Dose:  1 tablet   Take 1 tablet by mouth daily   Quantity:  90 tablet   Refills:  3            Where to get your medicines      These medications were sent to [facility]/pharmacy #[id number] - [place], [place] - [address]  [address] [address]     Phone:  [phone]     dapsone 25 MG tablet    losartan-hydrochlorothiazide 50-12.5 MG per tablet                Primary Care Provider Office Phone # Fax #    Neetu Kaur [phone] [address]  [facility] [address]        Equal Access to Services     SABA HOLLINGSWORTH AH: Philip Cooper, waaxda luqadaha, qaybta kaalmada nikki, mayra tam. So [facility] [phone].    ATENCIÓN: Si habla español, tiene a clay disposición servicios gratuitos de asistencia " lingüística. Angel al [phone].    We comply with applicable federal civil rights laws and [place] laws. We do not discriminate on the basis of race, color, national origin, age, disability, sex, sexual orientation, or gender identity.            Thank you!     Thank you for choosing [facility]  for your care. Our goal is always to provide you with excellent care. Hearing back from our patients is one way we can continue to improve our services. Please take a few minutes to complete the written survey that you may receive in the mail after your visit with us. Thank you!             Your Updated Medication List - Protect others around you: Learn how to safely use, store and throw away your medicines at www.disposemymeds.org.          This list is accurate as of 4/11/18  2:56 PM.  Always use your most recent med list.                   Brand Name Dispense Instructions for use Diagnosis    ADVIL PO      Take 400 mg by mouth as needed        ALLERGY INJECTIONS       Other chest pain       ARNUITY ELLIPTA IN           atorvastatin 10 MG tablet    LIPITOR    30 tablet    TAKE 1 TABLET (10 MG) BY MOUTH AT BEDTIME    Hyperlipidemia, unspecified hyperlipidemia type       dapsone 25 MG tablet     180 tablet    Take 2 tablets (50 mg) by mouth daily    Dermatitis herpetiformis       levothyroxine 137 MCG tablet    SYNTHROID/LEVOTHROID    90 tablet    TAKE 137 MCG (1 TABLET) BY MOUTH DAILY    Hypothyroidism       losartan-hydrochlorothiazide 50-12.5 MG per tablet    HYZAAR    90 tablet    Take 1 tablet by mouth daily    HTN, goal below 140/90       testosterone 50 MG/5GM (1%) topical gel    ANDROGEL/TESTIM     Place onto the skin daily    Other chest pain

## 2018-04-11 NOTE — PROGRESS NOTES
"  SUBJECTIVE:   CC: Juan C Garcia is an 58 year old male who presents for preventative health visit.     Healthy Habits:    Do you get at least three servings of calcium containing foods daily (dairy, green leafy vegetables, etc.)? yes    Amount of exercise or daily activities, outside of work: 2-3 day(s) per week    Problems taking medications regularly No    Medication side effects: No    Have you had an eye exam in the past two years? yes    Do you see a dentist twice per year? yes    Do you have sleep apnea, excessive snoring or daytime drowsiness? Yes - treated with dental appliance    Testosterone checked with [facility] Dr. Kavya Flores and very stable and she is monitoring his thyroid as well  Taking MVI and B12 and fish oil PRN  Dx sleep apnea and has dental device that is \"outstanding\" but Nov crowns placed which threw things off for a few months   Getting alcoholism under control as well and read a couple good books that help him feel under better control but still drinks for a period at times and realizes he feels better if he doesn't drink at all and is coming to realization that it will be better if he quits completely. Mom has dementia and has h/o alcohol abuse as well so that is a motivator to him - she lives in an [facility].  Fingers are really improved after hand therapy ; 95% better.   Better job with less stress; travels monthly for a week at a time.  Son was dx with testicular cancer last year but doing really well with therapy.  Dapsone 25-50 mg per day to keep his dermatitis herpetiformis under control.  Lipitor 5-6 days per week as forgets sometimes.  No urinary flow changes but some increased frequency; nocturia less with BARRY treatment (now 1 time per night).  Back on allergy shots per allergist.  No mole concerns.    Today's PHQ-2 Score:   PHQ-2 ( 1999 [employer]) 3/1/2017 2/26/2017   Q1: Little interest or pleasure in doing things 0 -   Q2: Feeling down, depressed or hopeless " "0 -   PHQ-2 Score 0 -   Q1: Little interest or pleasure in doing things - Several days   Q2: Feeling down, depressed or hopeless - Several days   PHQ-2 Score - 2       Abuse: Current or Past(Physical, Sexual or Emotional)- No  Do you feel safe in your environment - Yes    Social History   Substance Use Topics     Smoking status: Never Smoker     Smokeless tobacco: Never Used     Alcohol use 0.0 oz/week     0 Standard drinks or equivalent per week      Comment: H/o significant alcohol use, currently none June 2016      If you drink alcohol do you typically have >3 drinks per day or >7 drinks per week? Yes - AUDIT SCORE:     No flowsheet data found.                      Last PSA:   PSA   Date Value Ref Range Status   04/11/2018 0.50 0 - 4 ug/L Final     Comment:     Assay Method:  Chemiluminescence using Siemens Vista analyzer       Reviewed orders with patient. Reviewed health maintenance and updated orders accordingly - Yes    ROS:  Comprehensive ROS negative unless as stated above in HPI.     OBJECTIVE:   /80  Pulse 72  Temp 97.8  F (36.6  C) (Tympanic)  Ht 6' 3\" (1.905 m)  Wt 220 lb (99.8 kg)  SpO2 95%  BMI 27.5 kg/m2  EXAM:  GENERAL: healthy, alert and no distress  EYES: Eyes grossly normal to inspection, PERRL and conjunctivae and sclerae normal  HENT: ear canals and TM's normal, nose and mouth without ulcers or lesions  NECK: no adenopathy, no asymmetry, masses, or scars and thyroid normal to palpation  RESP: lungs clear to auscultation - no rales, rhonchi or wheezes  CV: regular rate and rhythm, normal S1 S2, no S3 or S4, no murmur, click or rub, no peripheral edema   ABDOMEN: soft, nontender, no hepatosplenomegaly, no masses and bowel sounds normal  MS: minor swelling and stiffness around left 3rd and 4th fingers  SKIN: no suspicious lesions or rashes  NEURO: Normal strength and tone, mentation intact and speech normal  PSYCH: mentation appears normal, affect normal/bright  Wt Readings from Last 4 " "Encounters:   04/11/18 220 lb (99.8 kg)   11/03/17 221 lb 12.8 oz (100.6 kg)   10/18/17 215 lb 6.4 oz (97.7 kg)   09/25/17 216 lb (98 kg)       ASSESSMENT/PLAN:   1. Encounter for preventative adult health care exam with abnormal findings  Up to date on preventative health care    2. Dermatitis herpetiformis  Well controlled on medication and with diet of reduced glutin  No longer follows with dermatology  - dapsone 25 MG tablet; Take 2 tablets (50 mg) by mouth daily  Dispense: 180 tablet; Refill: 3  - Vitamin B12    3. Hypothyroidism, unspecified type  Follows with endocrinologist Dr. Flores for Levothyroxine and also testosterone therapy    4. Hyperlipidemia, unspecified hyperlipidemia type  Send in refill based on labs - advised if he is forgetting occasionally to take statin at night, ok to take in AM for better compliance  - Lipid panel reflex to direct LDL Fasting    5. BARRY (obstructive sleep apnea)  Follows with sleep medicine physician Dr. Madsen and using the dental appliance with improvement in how he feels and is due to schedule f/u overnight oximetry    6. HTN, goal below 140/90  At goal  - CBC with platelets  - Comprehensive metabolic panel  - losartan-hydrochlorothiazide (HYZAAR) 50-12.5 MG per tablet; Take 1 tablet by mouth daily  Dispense: 90 tablet; Refill: 3    7. Alcohol use  Goes in waves but heading in right direction in terms of understanding alcohol abuse and health benefits of not drinking at all    8. Screening for prostate cancer  Check PSA b/c of testosterone therapy  - PSA, screen    COUNSELING:  Reviewed preventive health counseling, as reflected in patient instructions       Regular exercise       Healthy diet/nutrition   reports that he has never smoked. He has never used smokeless tobacco.  Estimated body mass index is 27.5 kg/(m^2) as calculated from the following:    Height as of this encounter: 6' 3\" (1.905 m).    Weight as of this encounter: 220 lb (99.8 kg).   Weight management " plan: Discussed healthy diet and exercise guidelines and patient will follow up in 12 months in clinic to re-evaluate.    Patient Instructions   Labs today and keep up the good work  Follow up at least annually or sooner if needed    Neetu Kaur,   [facility]

## 2018-04-11 NOTE — NURSING NOTE
"Chief Complaint   Patient presents with     Physical       Initial There were no vitals taken for this visit. Estimated body mass index is 27.72 kg/(m^2) as calculated from the following:    Height as of 11/3/17: 6' 3\" (1.905 m).    Weight as of 11/3/17: 221 lb 12.8 oz (100.6 kg).  Medication Reconciliation: complete    Oscar Vasquez CMA on 4/11/2018 at 2:11 PM    "

## 2018-04-11 NOTE — PATIENT INSTRUCTIONS
Labs today and keep up the good work  Follow up at least annually or sooner if needed    Preventive Health Recommendations  Male Ages 50   64    Yearly exam:             See your health care provider every year in order to  o   Review health changes.   o   Discuss preventive care.    o   Review your medicines if your doctor has prescribed any.     Have a cholesterol test every 5 years, or more frequently if you are at risk for high cholesterol/heart disease.     Have a diabetes test (fasting glucose) every three years. If you are at risk for diabetes, you should have this test more often.     Have a colonoscopy at age 50, or have a yearly FIT test (stool test). These exams will check for colon cancer.      Talk with your health care provider about whether or not a prostate cancer screening test (PSA) is right for you.    You should be tested each year for STDs (sexually transmitted diseases), if you re at risk.     Shots: Get a flu shot each year. Get a tetanus shot every 10 years.     Nutrition:    Eat at least 5 servings of fruits and vegetables daily.     Eat whole-grain bread, whole-wheat pasta and brown rice instead of white grains and rice.     Talk to your provider about Calcium and Vitamin D.     Lifestyle    Exercise for at least 150 minutes a week (30 minutes a day, 5 days a week). This will help you control your weight and prevent disease.     Limit alcohol to one drink per day.     No smoking.     Wear sunscreen to prevent skin cancer.     See your dentist every six months for an exam and cleaning.     See your eye doctor every 1 to 2 years.

## 2018-04-12 LAB
ALBUMIN SERPL-MCNC: 4.2 G/DL (ref 3.4–5)
ALP SERPL-CCNC: 67 U/L (ref 40–150)
ALT SERPL W P-5'-P-CCNC: 43 U/L (ref 0–70)
ANION GAP SERPL CALCULATED.3IONS-SCNC: 7 MMOL/L (ref 3–14)
AST SERPL W P-5'-P-CCNC: 77 U/L (ref 0–45)
BILIRUB SERPL-MCNC: 0.8 MG/DL (ref 0.2–1.3)
BUN SERPL-MCNC: 14 MG/DL (ref 7–30)
CALCIUM SERPL-MCNC: 8.8 MG/DL (ref 8.5–10.1)
CHLORIDE SERPL-SCNC: 107 MMOL/L (ref 94–109)
CHOLEST SERPL-MCNC: 240 MG/DL
CO2 SERPL-SCNC: 24 MMOL/L (ref 20–32)
CREAT SERPL-MCNC: 0.75 MG/DL (ref 0.66–1.25)
GFR SERPL CREATININE-BSD FRML MDRD: >90 ML/MIN/1.7M2
GLUCOSE SERPL-MCNC: 88 MG/DL (ref 70–99)
HDLC SERPL-MCNC: 65 MG/DL
LDLC SERPL CALC-MCNC: 165 MG/DL
NONHDLC SERPL-MCNC: 175 MG/DL
POTASSIUM SERPL-SCNC: 3.7 MMOL/L (ref 3.4–5.3)
PROT SERPL-MCNC: 7.4 G/DL (ref 6.8–8.8)
PSA SERPL-ACNC: 0.5 UG/L (ref 0–4)
SODIUM SERPL-SCNC: 138 MMOL/L (ref 133–144)
TRIGL SERPL-MCNC: 52 MG/DL
VIT B12 SERPL-MCNC: 316 PG/ML (ref 193–986)

## 2018-04-15 RX ORDER — ATORVASTATIN CALCIUM 10 MG/1
10 TABLET, FILM COATED ORAL DAILY
Qty: 90 TABLET | Refills: 3 | Status: SHIPPED | OUTPATIENT
Start: 2018-04-15 | End: 2019-03-08

## 2018-05-24 ENCOUNTER — OFFICE VISIT (OUTPATIENT)
Dept: SLEEP MEDICINE | Facility: CLINIC | Age: 58
End: 2018-05-24
Payer: COMMERCIAL

## 2018-05-24 DIAGNOSIS — G47.33 OSA (OBSTRUCTIVE SLEEP APNEA): ICD-10-CM

## 2018-05-24 DIAGNOSIS — G47.34 SLEEP RELATED HYPOXIA: ICD-10-CM

## 2018-05-24 NOTE — MR AVS SNAPSHOT
After Visit Summary   5/24/2018    Juan C Garcia    MRN: [id number]           Patient Information     Date Of Birth          1960        Visit Information        Provider Department      5/24/2018 3:30 PM BED 7 SH SLEEP [facility]        Today's Diagnoses     BARRY (obstructive sleep apnea)           Follow-ups after your visit        Who to contact     If you have questions or need follow up information about today's clinic visit or your schedule please contact [facility] directly at [phone].  Normal or non-critical lab and imaging results will be communicated to you by [employer], letter or phone within 4 business days after the clinic has received the results. If you do not hear from us within 7 days, please contact the clinic through [employer] or phone. If you have a critical or abnormal lab result, we will notify you by phone as soon as possible.  Submit refill requests through [employer] or call your pharmacy and they will forward the refill request to us. Please allow 3 business days for your refill to be completed.          Additional Information About Your Visit        MyChart Information     [employer] gives you secure access to your electronic health record. If you see a primary care provider, you can also send messages to your care team and make appointments. If you have questions, please call your primary care clinic.  If you do not have a primary care provider, please call [phone] and they will assist you.        Care EveryWhere ID     This is your Care EveryWhere ID. This could be used by other organizations to access your [facility] medical records  [id number]         Blood Pressure from Last 3 Encounters:   04/11/18 132/80   11/03/17 146/83   10/18/17 (!) 135/91    Weight from Last 3 Encounters:   04/11/18 99.8 kg (220 lb)   11/03/17 100.6 kg (221 lb 12.8 oz)   10/18/17 97.7 kg (215 lb 6.4 oz)              Today, you had the following     No  orders found for display       Primary Care Provider Office Phone # Fax #    Neetu Kaur [phone] [address] [address]        Equal Access to Services     SABA HOLLINGSWORTH : Hadii red ku hadrocioo Sochristianali, waaxda luqadaha, qaybta kaalmada adeegyada, waxay idiin wangn kandacemadelyn bowden gretchen tam. So [facility] [phone].    ATENCIÓN: Si habla español, tiene a clay disposición servicios gratuitos de asistencia lingüística. Llame al [phone].    We comply with applicable federal civil rights laws and [place] laws. We do not discriminate on the basis of race, color, national origin, age, disability, sex, sexual orientation, or gender identity.            Thank you!     Thank you for choosing [facility]  for your care. Our goal is always to provide you with excellent care. Hearing back from our patients is one way we can continue to improve our services. Please take a few minutes to complete the written survey that you may receive in the mail after your visit with us. Thank you!             Your Updated Medication List - Protect others around you: Learn how to safely use, store and throw away your medicines at www.disposemymeds.org.          This list is accurate as of 5/24/18 11:59 PM.  Always use your most recent med list.                   Brand Name Dispense Instructions for use Diagnosis    ADVIL PO      Take 400 mg by mouth as needed        ALLERGY INJECTIONS       Other chest pain       ARNUITY ELLIPTA IN           atorvastatin 10 MG tablet    LIPITOR    90 tablet    Take 1 tablet (10 mg) by mouth daily    Hyperlipidemia, unspecified hyperlipidemia type       dapsone 25 MG tablet     180 tablet    Take 2 tablets (50 mg) by mouth daily    Dermatitis herpetiformis       levothyroxine 137 MCG tablet    SYNTHROID/LEVOTHROID    90 tablet    TAKE 137 MCG (1 TABLET) BY MOUTH DAILY    Hypothyroidism       losartan-hydrochlorothiazide 50-12.5 MG per tablet     HYZAAR    90 tablet    Take 1 tablet by mouth daily    HTN, goal below 140/90       testosterone 50 MG/5GM (1%) topical gel    ANDROGEL/TESTIM     Place onto the skin daily    Other chest pain

## 2018-05-25 ENCOUNTER — DOCUMENTATION ONLY (OUTPATIENT)
Dept: SLEEP MEDICINE | Facility: CLINIC | Age: 58
End: 2018-05-25
Payer: COMMERCIAL

## 2018-05-25 DIAGNOSIS — G47.33 OSA (OBSTRUCTIVE SLEEP APNEA): ICD-10-CM

## 2018-05-30 NOTE — PROGRESS NOTES
Patient instructed on GRIFFIN use. Patient demonstrated and verbalized knowledge of use. Insurance was verified by financial securing. Device programmed. Device will be returned tomorrow before noon.      Kathy BryantCesar  [facility] - Specialist

## 2018-06-07 NOTE — NURSING NOTE
Overnight oximetry completed by patient.     Recording date: 05/24/2018    Duration: 06:46:36    Note: Download successful. Copy given to provider and scanned in to chart.      Kathy Cesar  [facility] - Specialist

## 2018-06-20 NOTE — PROGRESS NOTES
Oximetry on dental appliance results were reviewed with patient. KAIT is 6.8 which is not a significant improvement from baseline AHI of 6.8 on home sleep test from April 2017. Time below 88 is 20 minutes compared to 49 minutes.     Results were discussed. Patient plans to follow up with Dr. Weiss, dental sleep specialist for further adjustment.     He will follow up with me again in 3 months to consider repeat testing or other treatment options.

## 2018-06-20 NOTE — PROCEDURES
PHYSICIAN INTERPRETATION   HOME OXIMETRY   Patient: Juan C Garcia  MRN: [id number]  YOB: 1960  Study Date: 05/24/2018   Referring Physician: Neetu Kaur  Ordering Physician: Edgardo Madsen MD, MD     Conditions:  Dental appliance,Room air  Quality: artifact-free     Measure [threshold]                 Time less than or equal to SpO2 88% [5 min]:  20.5min  Duration monitoring [> 2 hours artifact free]:  06:46 hours                    4% O2 desat index [ > 15/ hour]:    6.8/ hour                    Pattern:       sustained                                  Assessment:   No significant change in sleep apnea events with dental appliance considering KAIT of 6.8 on this oximetry compared to AHI of 6.8 on HST from 4/18/2017.   Hypoxemia is better compared to baseline but some degree of hypoxemia remains.     Recommendations:    - Consider further mandibular advancement if clinically appropriate.   - If acceptable to patient, consider auto PAP therapy.       Diagnosis Code(s):  Hypoxemia G47.36, Sleep apnea G47.33   Edgardo Madsen MD, MD, June 20, 2018

## 2018-09-17 ENCOUNTER — TRANSFERRED RECORDS (OUTPATIENT)
Dept: HEALTH INFORMATION MANAGEMENT | Facility: CLINIC | Age: 58
End: 2018-09-17

## 2018-09-18 ENCOUNTER — TRANSFERRED RECORDS (OUTPATIENT)
Dept: HEALTH INFORMATION MANAGEMENT | Facility: CLINIC | Age: 58
End: 2018-09-18

## 2018-10-18 ENCOUNTER — OFFICE VISIT (OUTPATIENT)
Dept: FAMILY MEDICINE | Facility: CLINIC | Age: 58
End: 2018-10-18
Payer: COMMERCIAL

## 2018-10-18 VITALS
OXYGEN SATURATION: 95 % | HEIGHT: 75 IN | WEIGHT: 215 LBS | TEMPERATURE: 98 F | BODY MASS INDEX: 26.73 KG/M2 | DIASTOLIC BLOOD PRESSURE: 79 MMHG | SYSTOLIC BLOOD PRESSURE: 124 MMHG | HEART RATE: 84 BPM

## 2018-10-18 DIAGNOSIS — G89.29 CHRONIC PAIN OF LEFT KNEE: ICD-10-CM

## 2018-10-18 DIAGNOSIS — Z01.818 PREOP GENERAL PHYSICAL EXAM: Primary | ICD-10-CM

## 2018-10-18 DIAGNOSIS — M25.562 CHRONIC PAIN OF LEFT KNEE: ICD-10-CM

## 2018-10-18 PROCEDURE — 99214 OFFICE O/P EST MOD 30 MIN: CPT | Performed by: INTERNAL MEDICINE

## 2018-10-18 NOTE — MR AVS SNAPSHOT
After Visit Summary   10/18/2018    Juan C Garcia    MRN: [id number]           Patient Information     Date Of Birth          1960        Visit Information        Provider Department      10/18/2018 5:40 PM Suzanne Zhang MD [facility]        Today's Diagnoses     Preop general physical exam    -  1    Chronic pain of left knee          Care Instructions      Before Your Surgery      Call your surgeon if there is any change in your health. This includes signs of a cold or flu (such as a sore throat, runny nose, cough, rash or fever).    Do not smoke, drink alcohol or take over the counter medicine (unless your surgeon or primary care doctor tells you to) for the 24 hours before and after surgery.    If you take prescribed drugs: Follow your doctor s orders about which medicines to take and which to stop until after surgery.    Eating and drinking prior to surgery: follow the instructions from your surgeon    Take a shower or bath the night before surgery. Use the soap your surgeon gave you to gently clean your skin. If you do not have soap from your surgeon, use your regular soap. Do not shave or scrub the surgery site.  Wear clean pajamas and have clean sheets on your bed.           Follow-ups after your visit        Who to contact     If you have questions or need follow up information about today's clinic visit or your schedule please contact [facility] directly at [phone].  Normal or non-critical lab and imaging results will be communicated to you by MyChart, letter or phone within 4 business days after the clinic has received the results. If you do not hear from us within 7 days, please contact the clinic through MyChart or phone. If you have a critical or abnormal lab result, we will notify you by phone as soon as possible.  Submit refill requests through [employer] or call your pharmacy and they will forward the refill request to us. Please allow 3 business  "days for your refill to be completed.          Additional Information About Your Visit        MyChart Information     [employer] gives you secure access to your electronic health record. If you see a primary care provider, you can also send messages to your care team and make appointments. If you have questions, please call your primary care clinic.  If you do not have a primary care provider, please call [phone] and they will assist you.        Care EveryWhere ID     This is your Care EveryWhere ID. This could be used by other organizations to access your [facility] medical records  [id number]        Your Vitals Were     Pulse Temperature Height Pulse Oximetry BMI (Body Mass Index)       84 98  F (36.7  C) (Tympanic) 6' 3\" (1.905 m) 95% 26.87 kg/m2        Blood Pressure from Last 3 Encounters:   10/18/18 124/79   04/11/18 132/80   11/03/17 146/83    Weight from Last 3 Encounters:   10/18/18 215 lb (97.5 kg)   04/11/18 220 lb (99.8 kg)   11/03/17 221 lb 12.8 oz (100.6 kg)              Today, you had the following     No orders found for display       Primary Care Provider Office Phone # Fax #    Neetu Hassan DO Natasha [phone] [address] [facility] [address]  [facility] [address]        Equal Access to Services     [facility]: Hadii aad ku hadasho Soomaali, waaxda luqadaha, qaybta kaalmada adeegyada, waxay irma haydaphne godinez . So [facility] [phone].    ATENCIÓN: Si habla español, tiene a clay disposición servicios gratuitos de asistencia lingüística. Llame al [phone].    We comply with applicable federal civil rights laws and [place] laws. We do not discriminate on the basis of race, color, national origin, age, disability, sex, sexual orientation, or gender identity.            Thank you!     Thank you for choosing [facility]  for your care. Our goal is always to provide you with excellent care. Hearing back from our patients is one way we can continue to improve our " services. Please take a few minutes to complete the written survey that you may receive in the mail after your visit with us. Thank you!             Your Updated Medication List - Protect others around you: Learn how to safely use, store and throw away your medicines at www.disposemymeds.org.          This list is accurate as of 10/18/18  6:10 PM.  Always use your most recent med list.                   Brand Name Dispense Instructions for use Diagnosis    ADVIL PO      Take 400 mg by mouth as needed        ALLERGY INJECTIONS       Other chest pain       ARNUITY ELLIPTA IN           atorvastatin 10 MG tablet    LIPITOR    90 tablet    Take 1 tablet (10 mg) by mouth daily    Hyperlipidemia, unspecified hyperlipidemia type       dapsone 25 MG tablet     180 tablet    Take 2 tablets (50 mg) by mouth daily    Dermatitis herpetiformis       levothyroxine 137 MCG tablet    SYNTHROID/LEVOTHROID    90 tablet    TAKE 137 MCG (1 TABLET) BY MOUTH DAILY    Hypothyroidism       losartan-hydrochlorothiazide 50-12.5 MG per tablet    HYZAAR    90 tablet    Take 1 tablet by mouth daily    HTN, goal below 140/90       testosterone 50 MG/5GM (1%) topical gel    ANDROGEL/TESTIM     Place onto the skin daily    Other chest pain

## 2018-10-18 NOTE — NURSING NOTE
"Chief Complaint   Patient presents with     Pre-Op Exam       Initial /79  Pulse 84  Temp 98  F (36.7  C) (Tympanic)  Ht 6' 3\" (1.905 m)  Wt 215 lb (97.5 kg)  SpO2 95%  BMI 26.87 kg/m2 Estimated body mass index is 26.87 kg/(m^2) as calculated from the following:    Height as of this encounter: 6' 3\" (1.905 m).    Weight as of this encounter: 215 lb (97.5 kg).  BP completed using cuff size: large    Health Maintenance Due   Topic Date Due     HIV SCREEN (SYSTEM ASSIGNED)  03/18/1976     ADVANCE DIRECTIVE PLANNING Q5 YRS  03/18/2013     INFLUENZA VACCINE (1) 09/01/2018         Madelyn Schaeffer MA 10/18/18        "

## 2018-10-18 NOTE — PROGRESS NOTES
[place] [address] [facility] [address]  [phone]  Dept: [phone]    PRE-OP EVALUATION:  Today's date: 10/18/2018    Juan C Garcia (: 1960) presents for pre-operative evaluation assessment as requested by Dr. Velasquez.  He requires evaluation and anesthesia risk assessment prior to undergoing surgery/procedure for left knee ortho scope surgery for treatment of Left knee pains.    Fax number for surgical facility: [phone]  [facility]   Primary Physician: Neetu Kaur  Type of Anesthesia Anticipated: to be determined    Patient has a Health Care Directive or Living Will:  YES     Preop Questions 10/18/2018   Who is doing your surgery? Ron   What are you having done? [facility]   Date of Surgery/Procedure: 10/25/18   Facility or Hospital where procedure/surgery will be performed: [facility]   1.  Do you have a history of Heart attack, stroke, stent, coronary bypass surgery, or other heart surgery? No   2.  Do you ever have any pain or discomfort in your chest? YES - pulmonary embolism in the past 2003.     3.  Do you have a history of  Heart Failure? No   4.   Are you troubled by shortness of breath when:  walking on a level surface, or up a slight hill, or at night? No   5.  Do you currently have a cold, bronchitis or other respiratory infection? No   6.  Do you have a cough, shortness of breath, or wheezing? No   7.  Do you sometimes get pains in the calves of your legs when you walk? No   8. Do you or anyone in your family have previous history of blood clots? YES - Hx of blood clots.    9.  Do you or does anyone in your family have a serious bleeding problem such as prolonged bleeding following surgeries or cuts? No   10. Have you ever had problems with anemia or been told to take iron pills? No   11. Have you had any abnormal blood loss such as black, tarry or bloody stools? No   12. Have you ever had a blood  transfusion? No   13. Have you or any of your relatives ever had problems with anesthesia? No   14. Do you have sleep apnea, excessive snoring or daytime drowsiness? YES - dental appliance for sleep apnea.    15. Do you have any prosthetic heart valves? No   16. Do you have prosthetic joints? No         HPI:     HPI related to upcoming procedure: patient Juan C Garcia is a very pleasant 58 year old male with history of chronic left knee pains who presents to internal medicine clinic for a pre op cardiac evaluation for upcoming left knee ortho scope surgery for treatment of Left knee pains. Patient denies any chest pain, headaches, fever or chills. He denies any allergies to anesthesia agents. He denies any CAD, CVA or Type 2 Diabetes. Patient does not take any daily aspirin or any other blood thinner medications.      MEDICAL HISTORY:     Patient Active Problem List    Diagnosis Date Noted     BARRY (obstructive sleep apnea) 04/28/2017     Priority: Medium     Mild BARRY       Anxiety 10/22/2016     Priority: Medium     Anemia, unspecified anemia type 03/20/2016     Priority: Medium     With elevated reticulocyte count - likely r/t Dapsone       Primary hypogonadism in male      Priority: Medium     Low FSH, single left testicle - followed by Dr. Flores of endo       Hypothyroidism 02/02/2015     Priority: Medium     Dermatitis herpetiformis      Priority: Medium     Dx by biopsy of rash on elbow per derm       HTN, goal below 140/90      Priority: Medium     Hyperlipidemia      Priority: Medium     Alcohol use      Priority: Medium     Allergic rhinitis 10/13/2014     Priority: Medium     Problem list name updated by automated process. Provider to review        Past Medical History:   Diagnosis Date     Alcohol use      Dermatitis herpetiformis 2000    Dx by biopsy of rash on elbow per derm     HTN, goal below 140/90      Hyperlipidemia      Lung nodule Feb 2003    Benign; Left upper lobe     Multiple allergies      allergist with immunotherapy; Allergist is Dr. Santos in [place]     Primary hypogonadism in male     Low FSH, single left testicle - followed by Dr. Flores of endo     Pulmonary embolus (H) Feb 2003    Evaluated by ?[place] - unknown cause     Thyroid nodule Feb 2005    Benign per FNA --> taking Levothyroxine ever since     Past Surgical History:   Procedure Laterality Date     ORCHIOPEXY CHILD Right     Age 10 due to undescended testicl     OTHER SURGICAL HISTORY  July 1997    Cervical laminectomy C4-5-6     OTHER SURGICAL HISTORY  Jan 2000    Right index tendon repair     OTHER SURGICAL HISTORY  August 2004    Ankle/wrist repair     OTHER SURGICAL HISTORY  April 2007    Lumbar microdiscectomy L5-S1     OTHER SURGICAL HISTORY  March 2010    Cervical fusion C5-6-7     OTHER SURGICAL HISTORY  March 2011    Cervical fusion C3-4-5     OTHER SURGICAL HISTORY  July 2011    Lumbar fusion L5-S1     Current Outpatient Prescriptions   Medication Sig Dispense Refill     ALLERGY INJECTIONS        atorvastatin (LIPITOR) 10 MG tablet Take 1 tablet (10 mg) by mouth daily 90 tablet 3     dapsone 25 MG tablet Take 2 tablets (50 mg) by mouth daily 180 tablet 3     Fluticasone Furoate (ARNUITY ELLIPTA IN)        Ibuprofen (ADVIL PO) Take 400 mg by mouth as needed        levothyroxine (SYNTHROID, LEVOTHROID) 137 MCG tablet TAKE 137 MCG (1 TABLET) BY MOUTH DAILY 90 tablet 3     losartan-hydrochlorothiazide (HYZAAR) 50-12.5 MG per tablet Take 1 tablet by mouth daily 90 tablet 3     testosterone (ANDROGEL/TESTIM) 50 MG/5GM (1%) gel Place onto the skin daily       OTC products: None, except as noted above    No Known Allergies   Latex Allergy: NO    Social History   Substance Use Topics     Smoking status: Never Smoker     Smokeless tobacco: Never Used     Alcohol use 0.0 oz/week     0 Standard drinks or equivalent per week      Comment: H/o significant alcohol use, currently none June 2016     History   Drug Use No       REVIEW OF  "SYSTEMS:   Constitutional, neuro, ENT, endocrine, pulmonary, cardiac, gastrointestinal, genitourinary, musculoskeletal, integument and psychiatric systems are negative, except as otherwise noted.    EXAM:   /79  Pulse 84  Temp 98  F (36.7  C) (Tympanic)  Ht 6' 3\" (1.905 m)  Wt 215 lb (97.5 kg)  SpO2 95%  BMI 26.87 kg/m2    GENERAL APPEARANCE: alert and no distress     EYES: EOMI,  PERRL     HENT: ear canals and TM's normal and nose and mouth without ulcers or lesions     NECK: no adenopathy, no asymmetry, masses, or scars and thyroid normal to palpation     RESP: lungs clear to auscultation - no rales, rhonchi or wheezes     CV: regular rates and rhythm, normal S1 S2, no S3 or S4 and no murmur, click or rub     ABDOMEN:  soft, nontender, no HSM or masses and bowel sounds normal     MS: left knee pains noted     SKIN: no suspicious lesions or rashes     NEURO: Normal strength and tone, sensory exam grossly normal, mentation intact and speech normal     PSYCH: mentation appears normal. and affect normal/bright     LYMPHATICS: No cervical adenopathy    DIAGNOSTICS:   No labs or EKG required for low risk surgery    Recent Labs   Lab Test  04/11/18   1506  02/22/17   0828   06/25/16   1442   03/17/16   0809   HGB  13.1*  12.9*   < >  13.2*   < >  12.8*   PLT  246  296   < >  301   --   245   INR   --    --    --   0.97   --   0.93   NA  138  142   --   138   --   140   POTASSIUM  3.7  4.4   --   3.8   --   4.0   CR  0.75  0.76   --   0.84   --   0.81    < > = values in this interval not displayed.        IMPRESSION:   Reason for surgery/procedure: Left knee pains.  Diagnosis/reason for consult: pre op cardiac evaluation for upcoming left knee ortho scope surgery for treatment of Left knee pains.    The proposed surgical procedure is considered LOW risk.    REVISED CARDIAC RISK INDEX  The patient has the following serious cardiovascular risks for perioperative complications such as (MI, PE, VFib and 3  AV " Block):  No serious cardiac risks  INTERPRETATION: 0 risks: Class I (very low risk - 0.4% complication rate)    The patient has the following additional risks for perioperative complications:  No identified additional risks      ICD-10-CM    1. Preop general physical exam Z01.818    2. Chronic pain of left knee M25.562     G89.29        RECOMMENDATIONS:     --Patient is to take all scheduled medications on the day of surgery.    APPROVAL GIVEN to proceed with proposed procedure, without further diagnostic evaluation       Signed Electronically by: Suzanne Zhang MD    Copy of this evaluation report is provided to requesting physician.    [facility] Preop Guidelines    Revised Cardiac Risk Index

## 2018-11-02 ENCOUNTER — TRANSFERRED RECORDS (OUTPATIENT)
Dept: HEALTH INFORMATION MANAGEMENT | Facility: CLINIC | Age: 58
End: 2018-11-02

## 2019-02-11 ENCOUNTER — TELEPHONE (OUTPATIENT)
Dept: LAB | Facility: CLINIC | Age: 59
End: 2019-02-11

## 2019-02-11 NOTE — TELEPHONE ENCOUNTER
"Patient sent a web request today:    \"Blood Draw.\"    Patient would like to schedule at the  Clinic.    Earliest time avail 3/4 or 3/5 (prep for Friday appt in [place]).    Please contact patient.    Thank you.    Central Scheduling  April ROSE  "

## 2019-02-11 NOTE — TELEPHONE ENCOUNTER
Routing to Dr. Zhang for possible lab orders.     Route back so can be scheduled at [facility]     Should we change PCP ?

## 2019-02-13 NOTE — TELEPHONE ENCOUNTER
Returned call. Patient wants to schedule a new fasting yearly physical appointment and have the labs done on the same day after the yearly physical exam appointment.

## 2019-03-08 ENCOUNTER — OFFICE VISIT (OUTPATIENT)
Dept: FAMILY MEDICINE | Facility: CLINIC | Age: 59
End: 2019-03-08
Payer: COMMERCIAL

## 2019-03-08 VITALS
WEIGHT: 220 LBS | TEMPERATURE: 97.1 F | SYSTOLIC BLOOD PRESSURE: 124 MMHG | OXYGEN SATURATION: 97 % | DIASTOLIC BLOOD PRESSURE: 77 MMHG | BODY MASS INDEX: 28.23 KG/M2 | HEIGHT: 74 IN | HEART RATE: 71 BPM

## 2019-03-08 DIAGNOSIS — Z00.00 ROUTINE HISTORY AND PHYSICAL EXAMINATION OF ADULT: Primary | ICD-10-CM

## 2019-03-08 DIAGNOSIS — I10 HTN, GOAL BELOW 140/90: Chronic | ICD-10-CM

## 2019-03-08 DIAGNOSIS — Z13.1 SCREENING FOR DIABETES MELLITUS: ICD-10-CM

## 2019-03-08 DIAGNOSIS — E78.5 HYPERLIPIDEMIA, UNSPECIFIED HYPERLIPIDEMIA TYPE: Chronic | ICD-10-CM

## 2019-03-08 DIAGNOSIS — Z12.5 SCREENING FOR PROSTATE CANCER: ICD-10-CM

## 2019-03-08 LAB
ANION GAP SERPL CALCULATED.3IONS-SCNC: 6 MMOL/L (ref 3–14)
BUN SERPL-MCNC: 14 MG/DL (ref 7–30)
CALCIUM SERPL-MCNC: 9.7 MG/DL (ref 8.5–10.1)
CHLORIDE SERPL-SCNC: 106 MMOL/L (ref 94–109)
CHOLEST SERPL-MCNC: 241 MG/DL
CO2 SERPL-SCNC: 28 MMOL/L (ref 20–32)
CREAT SERPL-MCNC: 0.84 MG/DL (ref 0.66–1.25)
GFR SERPL CREATININE-BSD FRML MDRD: >90 ML/MIN/{1.73_M2}
GLUCOSE SERPL-MCNC: 98 MG/DL (ref 70–99)
HBA1C MFR BLD: 4.7 % (ref 0–5.6)
HDLC SERPL-MCNC: 53 MG/DL
LDLC SERPL CALC-MCNC: 155 MG/DL
NONHDLC SERPL-MCNC: 188 MG/DL
POTASSIUM SERPL-SCNC: 4.2 MMOL/L (ref 3.4–5.3)
PSA SERPL-ACNC: 0.6 UG/L (ref 0–4)
SODIUM SERPL-SCNC: 140 MMOL/L (ref 133–144)
TRIGL SERPL-MCNC: 166 MG/DL

## 2019-03-08 PROCEDURE — 83036 HEMOGLOBIN GLYCOSYLATED A1C: CPT | Performed by: INTERNAL MEDICINE

## 2019-03-08 PROCEDURE — 80048 BASIC METABOLIC PNL TOTAL CA: CPT | Performed by: INTERNAL MEDICINE

## 2019-03-08 PROCEDURE — 99396 PREV VISIT EST AGE 40-64: CPT | Performed by: INTERNAL MEDICINE

## 2019-03-08 PROCEDURE — G0103 PSA SCREENING: HCPCS | Performed by: INTERNAL MEDICINE

## 2019-03-08 PROCEDURE — 36415 COLL VENOUS BLD VENIPUNCTURE: CPT | Performed by: INTERNAL MEDICINE

## 2019-03-08 PROCEDURE — 80061 LIPID PANEL: CPT | Performed by: INTERNAL MEDICINE

## 2019-03-08 RX ORDER — ATORVASTATIN CALCIUM 10 MG/1
10 TABLET, FILM COATED ORAL DAILY
Qty: 90 TABLET | Refills: 3 | Status: SHIPPED | OUTPATIENT
Start: 2019-03-08 | End: 2020-02-17

## 2019-03-08 RX ORDER — LOSARTAN POTASSIUM AND HYDROCHLOROTHIAZIDE 12.5; 5 MG/1; MG/1
1 TABLET ORAL DAILY
Qty: 90 TABLET | Refills: 3 | Status: SHIPPED | OUTPATIENT
Start: 2019-03-08 | End: 2019-04-15 | Stop reason: ALTCHOICE

## 2019-03-08 ASSESSMENT — MIFFLIN-ST. JEOR: SCORE: 1879.25

## 2019-03-08 NOTE — PROGRESS NOTES
SUBJECTIVE:   CC: Juan C Garcia is an 58 year old male who presents for preventive health visit.     Healthy Habits:    Do you get at least three servings of calcium containing foods daily (dairy, green leafy vegetables, etc.)? yes    Amount of exercise or daily activities, outside of work: 3 day(s) per week    Problems taking medications regularly No    Medication side effects: No    Have you had an eye exam in the past two years? yes    Do you see a dentist twice per year? yes    Do you have sleep apnea, excessive snoring or daytime drowsiness?yes      Today's PHQ-2 Score:   PHQ-2 ( 1999 [employer]) 3/1/2017 2/26/2017   Q1: Little interest or pleasure in doing things 0 -   Q2: Feeling down, depressed or hopeless 0 -   PHQ-2 Score 0 -   Q1: Little interest or pleasure in doing things - Several days   Q2: Feeling down, depressed or hopeless - Several days   PHQ-2 Score - 2       Abuse: Current or Past(Physical, Sexual or Emotional)- No  Do you feel safe in your environment? Yes    Social History     Tobacco Use     Smoking status: Never Smoker     Smokeless tobacco: Never Used   Substance Use Topics     Alcohol use: Yes     Alcohol/week: 0.0 oz     Comment: H/o significant alcohol use, currently none June 2016     If you drink alcohol do you typically have >3 drinks per day or >7 drinks per week? No                      Last PSA:   PSA   Date Value Ref Range Status   04/11/2018 0.50 0 - 4 ug/L Final     Comment:     Assay Method:  Chemiluminescence using Siemens Vista analyzer       Reviewed orders with patient. Reviewed health maintenance and updated orders accordingly - Yes  Labs reviewed in EPIC    Reviewed and updated as needed this visit by clinical staff         Reviewed and updated as needed this visit by Provider        Past Medical History:   Diagnosis Date     Alcohol use      Dermatitis herpetiformis 2000    Dx by biopsy of rash on elbow per derm     HTN, goal below 140/90      Hyperlipidemia      Lung  "nodule Feb 2003    Benign; Left upper lobe     Multiple allergies     allergist with immunotherapy; Allergist is Dr. Santos in [place]     Primary hypogonadism in male     Low FSH, single left testicle - followed by Dr. Flores of endo     Pulmonary embolus (H) Feb 2003    Evaluated by [facility] - unknown cause     Thyroid nodule Feb 2005    Benign per FNA --> taking Levothyroxine ever since        ROS:  CONSTITUTIONAL: NEGATIVE for fever, chills, change in weight  INTEGUMENTARY/SKIN: NEGATIVE for worrisome rashes, moles or lesions  EYES: NEGATIVE for vision changes or irritation  ENT: NEGATIVE for ear, mouth and throat problems  RESP: NEGATIVE for significant cough or SOB  CV: NEGATIVE for chest pain, palpitations or peripheral edema  GI: NEGATIVE for nausea, abdominal pain, heartburn, or change in bowel habits   male: negative for dysuria, hematuria, decreased urinary stream, erectile dysfunction, urethral discharge  MUSCULOSKELETAL: NEGATIVE for significant arthralgias or myalgia  NEURO: NEGATIVE for weakness, dizziness or paresthesias  PSYCHIATRIC: NEGATIVE for changes in mood or affect    OBJECTIVE:   /77 (BP Location: Left arm, Patient Position: Sitting, Cuff Size: Adult Regular)   Pulse 71   Temp 97.1  F (36.2  C) (Oral)   Ht 1.882 m (6' 2.1\")   Wt 99.8 kg (220 lb)   SpO2 97%   BMI 28.17 kg/m    EXAM:  GENERAL: healthy, alert and no distress  EYES: Eyes grossly normal to inspection, PERRL and conjunctivae and sclerae normal  HENT: ear canals and TM's normal, nose and mouth without ulcers or lesions  NECK: no adenopathy, no asymmetry, masses, or scars and thyroid normal to palpation  RESP: lungs clear to auscultation - no rales, rhonchi or wheezes  CV: regular rate and rhythm, normal S1 S2, no S3 or S4, no murmur, click or rub, no peripheral edema and peripheral pulses strong  ABDOMEN: soft, nontender, no hepatosplenomegaly, no masses and bowel sounds normal  MS: no gross musculoskeletal defects " noted, no edema  SKIN: no suspicious lesions or rashes  NEURO: Normal strength and tone, mentation intact and speech normal  PSYCH: mentation appears normal, affect normal/bright    Diagnostic Test Results:  Results for orders placed or performed in visit on 05/24/18   Overnight oximetry study    Narrative    Edgardo Madsen MD     6/20/2018  2:46 PM  PHYSICIAN INTERPRETATION   HOME OXIMETRY   Patient: Juan C Garcia  MRN: [id number]  YOB: 1960  Study Date: 05/24/2018   Referring Physician: Neetu Kaur  Ordering Physician: Edgardo Madsen MD, MD     Conditions:  Dental appliance,Room air  Quality: artifact-free     Measure [threshold]                 Time less than or equal to SpO2 88% [5 min]:  20.5min  Duration monitoring [> 2 hours artifact free]:  06:46 hours                      4% O2 desat index [ > 15/ hour]:    6.8/ hour                    Pattern:       sustained                                  Assessment:   No significant change in sleep apnea events with dental appliance   considering KAIT of 6.8 on this oximetry compared to AHI of 6.8 on   HST from 4/18/2017.   Hypoxemia is better compared to baseline but some degree of   hypoxemia remains.     Recommendations:    - Consider further mandibular advancement if clinically   appropriate.   - If acceptable to patient, consider auto PAP therapy.       Diagnosis Code(s):  Hypoxemia G47.36, Sleep apnea G47.33   Edgardo Madsen MD, MD, June 20, 2018          ASSESSMENT/PLAN:   (Z00.00) Routine history and physical examination of adult  (primary encounter diagnosis)  Comment: yearly physical exam today  Plan: CBC with platelets differential, Basic         metabolic panel  (Ca, Cl, CO2, Creat, Gluc, K,         Na, BUN)      (I10) HTN, goal below 140/90  Comment: BP is well controlled  Plan: losartan-hydrochlorothiazide (HYZAAR) 50-12.5         MG tablet      (E78.5) Hyperlipidemia, unspecified hyperlipidemia type  Comment: patient is due for lipid  "panel lab  Plan: atorvastatin (LIPITOR) 10 MG tablet, Lipid         panel reflex to direct LDL Fasting      (Z12.5) Screening for prostate cancer  Comment: patient is due for PSA lab  Plan: PSA, screen      (Z13.1) Screening for diabetes mellitus  Comment: patient is due for diabetes screening  Plan: Hemoglobin A1c        COUNSELING:  Reviewed preventive health counseling, as reflected in patient instructions  Special attention given to:        Regular exercise       Healthy diet/nutrition    BP Readings from Last 1 Encounters:   10/18/18 124/79     Estimated body mass index is 26.87 kg/m  as calculated from the following:    Height as of 10/18/18: 1.905 m (6' 3\").    Weight as of 10/18/18: 97.5 kg (215 lb).           reports that  has never smoked. he has never used smokeless tobacco.      Counseling Resources:  ATP IV Guidelines  Pooled Cohorts Equation Calculator  FRAX Risk Assessment  ICSI Preventive Guidelines  Dietary Guidelines for Americans, 2010  USDA's MyPlate  ASA Prophylaxis  Lung CA Screening    Suzanne Zhang MD  [facility]  "

## 2019-03-11 ENCOUNTER — TELEPHONE (OUTPATIENT)
Dept: FAMILY MEDICINE | Facility: CLINIC | Age: 59
End: 2019-03-11

## 2019-03-11 NOTE — TELEPHONE ENCOUNTER
Left message for pt that labs OK except for mild hyperlipidemia. Advise diet and exercise. No change in meds.     Oscar Vasquez, CMA on 3/11/2019 at 12:38 PM

## 2019-04-12 ENCOUNTER — TELEPHONE (OUTPATIENT)
Dept: FAMILY MEDICINE | Facility: CLINIC | Age: 59
End: 2019-04-12

## 2019-04-12 DIAGNOSIS — I10 ESSENTIAL HYPERTENSION: Primary | ICD-10-CM

## 2019-04-12 NOTE — TELEPHONE ENCOUNTER
Losartan-hydrochlorothiazide 50-12.5 mg    Last Written Prescription Date:  03/08/19  Last Fill Quantity: 90 tablets,  # refills: 3   Last office visit: 3/8/2019 with prescribing provider:  Vicente   Future Office Visit:      Pharmacy is running low is this medication. Is there an alternative that patient can use? Patient is willing to take alternative blood pressure medication if needed.

## 2019-04-12 NOTE — TELEPHONE ENCOUNTER
Reason for Call:  Medication or medication refill:    Do you use a [facility]?  Name of the pharmacy and phone number for the current request:      [facility] [address] [place] [facility], [place] - [address]    Name of the medication requested:   losartan-hydrochlorothiazide (HYZAAR) 50-12.5 MG tablet    Other request: Pt called and stated that he needs a refill of this medication, but he reports that the pharmacy is running low on this med. He says if need be he can take another medication for the BP.     Can we leave a detailed message on this number? YES    Phone number patient can be reached at: Cell number on file:    Telephone Information:   Mobile [phone]     Best Time: Any    Call taken on 4/12/2019 at 3:04 PM by Ayah Childers

## 2019-04-15 RX ORDER — LOSARTAN POTASSIUM 50 MG/1
50 TABLET ORAL DAILY
Qty: 90 TABLET | Refills: 3 | Status: SHIPPED | OUTPATIENT
Start: 2019-04-15 | End: 2020-02-17

## 2019-04-15 RX ORDER — HYDROCHLOROTHIAZIDE 12.5 MG/1
25 TABLET ORAL DAILY
Qty: 90 TABLET | Refills: 3 | Status: SHIPPED | OUTPATIENT
Start: 2019-04-15 | End: 2020-02-17 | Stop reason: ALTCHOICE

## 2019-06-12 DIAGNOSIS — I10 ESSENTIAL HYPERTENSION: ICD-10-CM

## 2019-06-12 NOTE — TELEPHONE ENCOUNTER
"Pending Prescriptions:                       Disp   Refills    losartan (COZAAR) 50 MG tablet            90 tab*3            Sig: Take 1 tablet (50 mg) by mouth daily    Last Written Prescription Date:  4/15/19  Last Fill Quantity: 90,  # refills: 3   Last office visit: 3/8/2019 with prescribing provider:     Future Office Visit:    Requested Prescriptions   Pending Prescriptions Disp Refills     losartan (COZAAR) 50 MG tablet 90 tablet 3     Sig: Take 1 tablet (50 mg) by mouth daily       There is no refill protocol information for this order        Requested Prescriptions   Pending Prescriptions Disp Refills     losartan (COZAAR) 50 MG tablet 90 tablet 3     Sig: Take 1 tablet (50 mg) by mouth daily       Angiotensin-II Receptors Passed - 6/12/2019  2:40 PM        Passed - Blood pressure under 140/90 in past 12 months     BP Readings from Last 3 Encounters:   03/08/19 124/77   10/18/18 124/79   04/11/18 132/80                 Passed - Recent (12 mo) or future (30 days) visit within the authorizing provider's specialty     Patient had office visit in the last 12 months or has a visit in the next 30 days with authorizing provider or within the authorizing provider's specialty.  See \"Patient Info\" tab in inbasket, or \"Choose Columns\" in Meds & Orders section of the refill encounter.              Passed - Medication is active on med list        Passed - Patient is age 18 or older        Passed - Normal serum creatinine on file in past 12 months     Recent Labs   Lab Test 03/08/19  0852   CR 0.84             Passed - Normal serum potassium on file in past 12 months     Recent Labs   Lab Test 03/08/19  0852   POTASSIUM 4.2                      "

## 2019-06-13 RX ORDER — LOSARTAN POTASSIUM 50 MG/1
50 TABLET ORAL DAILY
Refills: 0
Start: 2019-06-13

## 2019-07-26 NOTE — NURSING NOTE
HST drop off  Download successful. Routed for scoring.    Kathy Cesar  Clinical Coordinator     ambulate

## 2019-08-28 ENCOUNTER — APPOINTMENT (OUTPATIENT)
Dept: GENERAL RADIOLOGY | Facility: CLINIC | Age: 59
End: 2019-08-28
Attending: EMERGENCY MEDICINE
Payer: COMMERCIAL

## 2019-08-28 ENCOUNTER — NURSE TRIAGE (OUTPATIENT)
Dept: FAMILY MEDICINE | Facility: CLINIC | Age: 59
End: 2019-08-28

## 2019-08-28 ENCOUNTER — HOSPITAL ENCOUNTER (EMERGENCY)
Facility: CLINIC | Age: 59
Discharge: HOME | End: 2019-08-28
Attending: EMERGENCY MEDICINE | Admitting: EMERGENCY MEDICINE
Payer: COMMERCIAL

## 2019-08-28 VITALS
HEART RATE: 72 BPM | OXYGEN SATURATION: 96 % | SYSTOLIC BLOOD PRESSURE: 122 MMHG | DIASTOLIC BLOOD PRESSURE: 85 MMHG | RESPIRATION RATE: 20 BRPM | TEMPERATURE: 98.3 F

## 2019-08-28 DIAGNOSIS — R07.89 ATYPICAL CHEST PAIN: ICD-10-CM

## 2019-08-28 LAB
ANION GAP SERPL CALCULATED.3IONS-SCNC: 3 MMOL/L (ref 3–14)
BUN SERPL-MCNC: 16 MG/DL (ref 7–30)
CALCIUM SERPL-MCNC: 9 MG/DL (ref 8.5–10.1)
CHLORIDE SERPL-SCNC: 107 MMOL/L (ref 94–109)
CO2 SERPL-SCNC: 27 MMOL/L (ref 20–32)
CREAT SERPL-MCNC: 0.92 MG/DL (ref 0.66–1.25)
D DIMER PPP FEU-MCNC: 0.3 UG/ML FEU (ref 0–0.5)
ERYTHROCYTE [DISTWIDTH] IN BLOOD BY AUTOMATED COUNT: 12.4 % (ref 10–15)
GFR SERPL CREATININE-BSD FRML MDRD: 90 ML/MIN/{1.73_M2}
GLUCOSE SERPL-MCNC: 86 MG/DL (ref 70–99)
HCT VFR BLD AUTO: 42.8 % (ref 40–53)
HGB BLD-MCNC: 13.3 G/DL (ref 13.3–17.7)
MCH RBC QN AUTO: 29 PG (ref 26.5–33)
MCHC RBC AUTO-ENTMCNC: 31.1 G/DL (ref 31.5–36.5)
MCV RBC AUTO: 93 FL (ref 78–100)
PLATELET # BLD AUTO: 275 10E9/L (ref 150–450)
POTASSIUM SERPL-SCNC: 4.3 MMOL/L (ref 3.4–5.3)
RBC # BLD AUTO: 4.59 10E12/L (ref 4.4–5.9)
SODIUM SERPL-SCNC: 137 MMOL/L (ref 133–144)
TROPONIN I SERPL-MCNC: <0.015 UG/L (ref 0–0.04)
TROPONIN I SERPL-MCNC: <0.015 UG/L (ref 0–0.04)
WBC # BLD AUTO: 8 10E9/L (ref 4–11)

## 2019-08-28 PROCEDURE — 84484 ASSAY OF TROPONIN QUANT: CPT | Performed by: EMERGENCY MEDICINE

## 2019-08-28 PROCEDURE — 93005 ELECTROCARDIOGRAM TRACING: CPT | Mod: 76

## 2019-08-28 PROCEDURE — 99285 EMERGENCY DEPT VISIT HI MDM: CPT | Mod: 25

## 2019-08-28 PROCEDURE — 85379 FIBRIN DEGRADATION QUANT: CPT | Performed by: EMERGENCY MEDICINE

## 2019-08-28 PROCEDURE — 85027 COMPLETE CBC AUTOMATED: CPT | Performed by: EMERGENCY MEDICINE

## 2019-08-28 PROCEDURE — 80048 BASIC METABOLIC PNL TOTAL CA: CPT | Performed by: EMERGENCY MEDICINE

## 2019-08-28 PROCEDURE — 25000132 ZZH RX MED GY IP 250 OP 250 PS 637: Performed by: EMERGENCY MEDICINE

## 2019-08-28 PROCEDURE — 93005 ELECTROCARDIOGRAM TRACING: CPT

## 2019-08-28 PROCEDURE — 71046 X-RAY EXAM CHEST 2 VIEWS: CPT

## 2019-08-28 RX ORDER — ACETAMINOPHEN 500 MG
1000 TABLET ORAL ONCE
Status: COMPLETED | OUTPATIENT
Start: 2019-08-28 | End: 2019-08-28

## 2019-08-28 RX ORDER — ONDANSETRON 4 MG/1
4 TABLET, ORALLY DISINTEGRATING ORAL ONCE
Status: DISCONTINUED | OUTPATIENT
Start: 2019-08-28 | End: 2019-08-28 | Stop reason: CLARIF

## 2019-08-28 RX ADMIN — ACETAMINOPHEN 1000 MG: 500 TABLET, FILM COATED ORAL at 16:21

## 2019-08-28 ASSESSMENT — ENCOUNTER SYMPTOMS
SHORTNESS OF BREATH: 1
ABDOMINAL PAIN: 0

## 2019-08-28 NOTE — TELEPHONE ENCOUNTER
"FYI TO PCP:     Patient going to ER     Concern as pt has a history of pulmonary embolism, having SOB and chest pain, SOB not alleviated with inhaler     Triaged per Epic Triage Protocol, gave care advice which patient plans to follow.  See Care advice tab for more information.  Patent to call back if further questions or concerns.    Nadia LAWLER RN      Initial Assessment Questions   1. LOCATION: \"Where does it hurt?\" can't localize. Generally throughout-has been about a week, took yesterday off to relax. Today not feeling better. Hx of blood clots, occasionally gets sensation and doesn't want to over-react. Persistent x1 week. Went to work today    2. RADIATION: \"Does the pain go anywhere else?\" (e.g., into neck, jaw, arms, back) no radiation, pinching piercing sensation   3. ONSET: \"When did the chest pain begin?\" (Minutes, hours or days) constant   4. PATTERN \"Does the pain come and go, or has it been constant since it started?\" \"Does it get worse with exertion?\" not worse with exertion - fatigued, not himself   5. DURATION: \"How long does it last\" (e.g., seconds, minutes, hours) days   6. SEVERITY: \"How bad is the pain?\" (e.g., Scale 1-10; mild, moderate, or severe) 5/10   - MILD (1-3): doesn't interfere with normal activities   - MODERATE (4-7): interferes with normal activities or awakens from sleep  - SEVERE (8-10): excruciating pain, unable to do any normal activities   7. CARDIAC RISK FACTORS: \"Do you have any history of heart problems or risk factors for heart disease?\" (e.g., prior heart attack, angina; high blood pressure, diabetes, being overweight, high cholesterol, smoking, or strong family history of heart disease)  8. PULMONARY RISK FACTORS: \"Do you have any history of lung disease?\" (e.g., blood clots in lung, asthma, emphysema, birth control pills) blood clots >10 years ago. Multiple all over. Pulmonary   9. CAUSE: \"What do you think is causing the chest pain?\"  10. OTHER SYMPTOMS: \"Do you have any " "other symptoms?\" (e.g., dizziness, nausea, vomiting, sweating, fever, difficulty breathing, cough) SOB, no N/V, YES to dizziness/lightheadness   11. PREGNANCY: \"Is there any chance you are pregnant?\" \"When was your last menstrual period?\" Yes SOB - constant, not sudden onset. Faint SOB. Uses inhaler for allergies. Not helping SOB        Reason for Disposition    Chest pain lasting longer than 5 minutes and ANY of the following:* Over 50 years old* Over 30 years old and at least one cardiac risk factor (i.e., high blood pressure, diabetes, high cholesterol, obesity, smoker or strong family history of heart disease)* Pain is crushing, pressure-like, or heavy * Took nitroglycerin and chest pain was not relieved* History of heart disease (i.e., angina, heart attack, bypass surgery, angioplasty, CHF)    Difficulty breathing    Additional Information    Negative: Severe difficulty breathing (e.g., struggling for each breath, speaks in single words)    Negative: Passed out (i.e., fainted, collapsed and was not responding)    Protocols used: CHEST PAIN-A-OH      "

## 2019-08-28 NOTE — ED PROVIDER NOTES
History     Chief Complaint:  Chest Pain and Shortness of Breath    HPI   Juan C Garcia is a 59 year old male with a history of hypertension, hyperlipidemia, and previous pulmonary embolism who presents to the emergency department for evaluation of chest pain and shortness of breath. The patient reports that a week ago he began feeling pain in his central chest. He states feeling a pinching chest pain that has been constant since onset.  It does not radiate.  He does not identify any clear provoking or alleviating factors. He states that his pain is improved by taking Tylenol and Advil which he has been rotating since onset. His last dose of Advil was around 1200 today.  He mentions a concern that his pain is stress related.  He also mentions feeling short of breath. He currently rates his discomfort as a 3 or 5 our of 10. He denies any leg swelling or abdominal pain. Of note, the patient reports that he walks 30-40 minutes a day, plays golf once a week and notes no exacerbation of his pain with these activities. Patient has a previous history of unprovoked PE in 2003, and underwent thorough evaluation for hypercoagulable state, which he reports to have been negative.  No hx of premature CAD.    Cardiac/PE/DVT Risk Factors:  History of hypertension - Positive  History of hyperlipidemia - Positive  History of diabetes - Negative  History of smoking - Negative  Personal history of PE/DVT - Positive  Family history of PE/DVT - Negative  Family history of heart complications - Positive- Father  Recent travel - [facility] a month ago  Recent surgery - Positive knee surgery in 10/2018  Other immobilizations - Negative  Cancer - Negative    Allergies:  No Known Drug Allergies    Medications:    Lipitor  Aczone  Arnuity Ellipta  Hydrodiuril  Synthroid  Cozaar    Past Medical History:    Alcohol use  Dermatitis herpetiformis  Hypertension  Hyperlipidemia  Lung nodule  Multiple allergies  Primary hypogonadism  Thyroid  nodule  Anxiety  BARRY    Past Surgical History:    Orchiopexy child  Cervical laminectomy  Right index tendon repair  Ankle/wrist surgery  Lumbar microdiscectomy  Cervical fusion C5-6-7  Cervical fusion C3-4-5  Lumbar fusion    Family History:    Hypertension  Dementia  Alcoholism  Cerebrovascular disease  Cancer  Heart disease    Social History:  The patient presents alone.  Smoking Status: Never  Smokeless Tobacco: Never  Alcohol Use: Yes  Drug Use: No  Marital Status:        Review of Systems   Respiratory: Positive for shortness of breath.    Cardiovascular: Positive for chest pain. Negative for leg swelling.   Gastrointestinal: Negative for abdominal pain.   All other systems reviewed and are negative.      Physical Exam   Vitals:  Patient Vitals for the past 24 hrs:   BP Temp Temp src Pulse Heart Rate Resp SpO2   08/28/19 1530 -- -- -- -- 62 -- 96 %   08/28/19 1515 122/85 -- -- -- -- -- --   08/28/19 1509 (!) 127/92 98.3  F (36.8  C) Temporal 72 -- 20 98 %     Physical Exam  General:              Well-nourished              Speaking in full sentences  Eyes:              Conjunctiva without injection or scleral icterus  ENT:              Moist mucous membranes              Nares patent              Pinnae normal  Neck:              Full ROM              No stiffness appreciated  Resp:              Lungs CTAB              No crackles, wheezing or audible rubs              Good air movement  CV:                    Normal rate, regular rhythm              S1 and S2 present              No murmur, gallop or rub              No tenderness to palpation over chest wall  GI:              BS present              Abdomen soft without distention              Non-tender to light and deep palpation              No guarding or rebound tenderness  Skin:              Warm, dry, well perfused              No rashes or open wounds on exposed skin  MSK:              Moves all extremities              No focal deformities or  swelling              No calf tenderness  Neuro:              Alert              Answers questions appropriately              Moves all extremities equally              Gait stable  Psych:              Normal affect, normal mood     WELLS PE SCORE for Pulmonary Embolism likelihood (calculator)  Background  Calculates likelihood of PE based on 7 criteria including suspected DVT, HR>100, recent surgery or immobilization, prior VTE, hemoptysis, active cancer.  Data  has Allergic rhinitis; Dermatitis herpetiformis; HTN, goal below 140/90; Hyperlipidemia; Hypothyroidism; Alcohol use; Primary hypogonadism in male; Anemia, unspecified anemia type; Anxiety; and BARRY (obstructive sleep apnea) on their problem list.   has a past surgical history that includes other surgical history (July 1997); other surgical history (Jan 2000); other surgical history (August 2004); other surgical history (April 2007); other surgical history (March 2010); other surgical history (March 2011); other surgical history (July 2011); and Orchiopexy child (Right).  Pulse: 72  Criteria              Of possible 12.5 points for 7 possible items:  1.5 points: Surgery or immobilization in last 4 weeks  1.5 points: Prior DVT or Pulmonary embolism  Interpretation  Wells PE Score: 3  Score 3-6 points: Moderate PE probability (20.5% risk)    Emergency Department Course   ECG:  Indication: Chest pain  Completed at 1505.  Read at 1513.   Rate 70 bpm. AK interval 168. QRS duration 88. QT/QTc 398/429. P-R-T axes 72 -10 32.  Normal sinuys rhythm  Right atrial enlargement  Borderline ECG  No significant change when compared to EKG dated 06/25/16.    ECG:  Indication: Chest pain  Completed at 1724.  Read at 1728.   Rate 67 bpm. AK interval 180. QRS duration 88. QT/QTc 416/439. P-R-T axes 61 -7 27.  Normal sinus rhythm  Normal ECG.    Imaging:  Radiographic findings were communicated with the patient who voiced understanding of the findings.    XR Chest PA & LAT  No  acute cardiopulmonary disease. Partially visualized  cervical hardware. Multilevel degenerative changes seen in the spine.  Reading per radiology.    Laboratory:  Troponin (Collected 1517): <0.015  Troponin (Collected 1712): <0.015    D Dimer (Collected 1517): 0.3    CBC: WNL. (WBC 8.0, HGB 13.3, )  BMP: AWNL (Creatinine 0.92)    Interventions:  1621 Tylenol 1000 mg PO    Emergency Department Course:  Nursing notes and vitals reviewed. 1607 I performed an exam of the patient as documented above.     Medicine administered as documented above. Blood drawn. This was sent to the lab for further testing, results above.    The patient was sent for a chest XR while in the emergency department, findings above.     1753 I rechecked the patient and discussed the results of his workup thus far.     Findings and plan explained to the Patient. Patient discharged home with instructions regarding supportive care, medications, and reasons to return. The importance of close follow-up was reviewed.     I personally reviewed the laboratory results with the Patient and answered all related questions prior to discharge.     Impression & Plan      Medical Decision Making:  Juan C Garcia is a markedly pleasant 59-year-old male with a history of hypertension, and hyperlipidemia as well as previous unprovoked pulmonary embolism, presenting to the emergency department for evaluation of chest pain.  VS on presentation unremarkable.  DDX is broad, including though is not limited to, ACS, PE, dissection, pneumothorax, pneumonia, musculoskeletal pain, atypical reflux, esophageal spasm, referred intra-abdominal process, among others.  Presently, the precise cause for patient's chest discomfort remains somewhat unclear.  ACS strongly considered though presently felt to be unlikely.  His symptoms are atypical in that he exhibits a focal location of pain, described as pinching, which does not radiate, nor is it associated with exertion.  He  continues to be quite active with his daily activities, which has not exacerbated his symptoms.  EKG x2 are without acute changes compared with previous, and troponin is undetectable x2 despite greater than 1 week of constant symptoms.  Pulmonary embolism considered in light of previous history of PE as well as travel.  Patient is deemed moderate risk utilizing Wells criteria, though d-dimer returned within normal limits.  We discussed this, and using shared decision-making, do feel CT chest can be deferred safely.  Patient additionally is without pleuritic discomfort, hypoxia, nor tachycardia.  Chest x-ray negative for pneumothorax or pneumonia.  Aortic dissection unlikely in the absence of ripping or tearing pain rating towards his back, unremarkable mediastinum on chest x-ray, and symmetric radial pulses.  He exhibits no localizing abdominal tenderness to suggest referred intra-abdominal process.  He was provided Tylenol, noting improvements in symptoms.  I cannot reproduce his pain with palpation.  Clinical impression and results discussed.  At this time I feel he is safe for discharge home.  Given low pretest probability for ACS, will have patient follow-up with PCP prior to ordering stress test.  Return to ER with worsening chest pain, shortness of breath, or any other new or troubling symptoms.  Patient felt comfortable with this plan of care and all questions were answered prior to discharge.     Diagnosis:    ICD-10-CM    1. Atypical chest pain R07.89      Disposition:  discharged to home  IJustice, am serving as a scribe on 8/28/2019 at 6:19 PM to personally document services performed by Vasile Roberts MD. providers found based on my observations and the provider's statements to me.     Justice Puentes  8/28/2019   [facility] EMERGENCY DEPARTMENT       Vasile Roberts MD  08/28/19 7144

## 2019-08-28 NOTE — DISCHARGE INSTRUCTIONS
Please follow-up with your primary care provider in 2 to 3 days for recheck.    At this time, the precise cause for your pain is not entirely clear.

## 2019-08-28 NOTE — ED AVS SNAPSHOT
[facility] Emergency Department  [address]  [facility] [address]  Phone:  [phone]  Fax:  [phone]                                    Juan C Garcia   MRN: [id number]    Department:  [facility] Emergency Department   Date of Visit:  8/28/2019           After Visit Summary Signature Page    I have received my discharge instructions, and my questions have been answered. I have discussed any challenges I see with this plan with the nurse or doctor.    ..........................................................................................................................................  Patient/Patient Representative Signature      ..........................................................................................................................................  Patient Representative Print Name and Relationship to Patient    ..................................................               ................................................  Date                                   Time    ..........................................................................................................................................  Reviewed by Signature/Title    ...................................................              ..............................................  Date                                               Time          22EPIC Rev 08/18

## 2019-08-28 NOTE — TELEPHONE ENCOUNTER
"Reason for call:  Patient reporting a symptom    Symptom or request: minor \"irritation\" chest pain    Duration (how long have symptoms been present): started over the weekend, not resolved    Have you been treated for this before? Yes    Additional comments: hx of blood clots, pt thought it would resolve on own but not improving    Phone Number patient can be reached at:  Cell number on file:    Telephone Information:   Mobile [phone]       Best Time:  any    Can we leave a detailed message on this number:  YES    Call taken on 8/28/2019 at 12:42 PM by Cony Gupta    "

## 2019-08-29 LAB
INTERPRETATION ECG - MUSE: NORMAL
INTERPRETATION ECG - MUSE: NORMAL

## 2019-09-02 DIAGNOSIS — L13.0 DERMATITIS HERPETIFORMIS: ICD-10-CM

## 2019-09-04 RX ORDER — DAPSONE 25 MG/1
50 TABLET ORAL DAILY
Qty: 60 TABLET | Refills: 2 | Status: SHIPPED | OUTPATIENT
Start: 2019-09-04 | End: 2020-01-09

## 2019-09-04 NOTE — TELEPHONE ENCOUNTER
Dapsone      Last Written Prescription Date:  05/07/19  Last Fill Quantity: 180,   # refills: 0  Last Office Visit: 03/08/19  Future Office visit:       Routing refill request to provider for review/approval because:  Drug not on the [facility], [facility] or [facility] refill protocol or controlled substance    Chasity Cueto MA

## 2019-10-02 ENCOUNTER — HEALTH MAINTENANCE LETTER (OUTPATIENT)
Age: 59
End: 2019-10-02

## 2020-01-09 DIAGNOSIS — L13.0 DERMATITIS HERPETIFORMIS: ICD-10-CM

## 2020-01-09 RX ORDER — DAPSONE 25 MG/1
50 TABLET ORAL DAILY
Qty: 60 TABLET | Refills: 1 | Status: SHIPPED | OUTPATIENT
Start: 2020-01-09 | End: 2020-06-15

## 2020-01-27 ENCOUNTER — HOSPITAL ENCOUNTER (OUTPATIENT)
Facility: CLINIC | Age: 60
Setting detail: OBSERVATION
Discharge: HOME | End: 2020-01-28
Attending: EMERGENCY MEDICINE | Admitting: INTERNAL MEDICINE
Payer: COMMERCIAL

## 2020-01-27 ENCOUNTER — APPOINTMENT (OUTPATIENT)
Dept: GENERAL RADIOLOGY | Facility: CLINIC | Age: 60
End: 2020-01-27
Attending: EMERGENCY MEDICINE
Payer: COMMERCIAL

## 2020-01-27 DIAGNOSIS — R07.9 CHEST PAIN, UNSPECIFIED TYPE: ICD-10-CM

## 2020-01-27 DIAGNOSIS — Z78.9 ALCOHOL USE: Primary | ICD-10-CM

## 2020-01-27 LAB
ALBUMIN SERPL-MCNC: 4.2 G/DL (ref 3.4–5)
ALP SERPL-CCNC: 77 U/L (ref 40–150)
ALT SERPL W P-5'-P-CCNC: 33 U/L (ref 0–70)
ANION GAP SERPL CALCULATED.3IONS-SCNC: 8 MMOL/L (ref 3–14)
AST SERPL W P-5'-P-CCNC: 24 U/L (ref 0–45)
BASOPHILS # BLD AUTO: 0.1 10E9/L (ref 0–0.2)
BASOPHILS NFR BLD AUTO: 0.7 %
BILIRUB DIRECT SERPL-MCNC: 0.1 MG/DL (ref 0–0.2)
BILIRUB SERPL-MCNC: 0.7 MG/DL (ref 0.2–1.3)
BUN SERPL-MCNC: 13 MG/DL (ref 7–30)
CALCIUM SERPL-MCNC: 9.5 MG/DL (ref 8.5–10.1)
CHLORIDE SERPL-SCNC: 108 MMOL/L (ref 94–109)
CO2 SERPL-SCNC: 25 MMOL/L (ref 20–32)
CREAT SERPL-MCNC: 0.79 MG/DL (ref 0.66–1.25)
D DIMER PPP FEU-MCNC: <0.3 UG/ML FEU (ref 0–0.5)
DIFFERENTIAL METHOD BLD: NORMAL
EOSINOPHIL # BLD AUTO: 0.2 10E9/L (ref 0–0.7)
EOSINOPHIL NFR BLD AUTO: 2.5 %
ERYTHROCYTE [DISTWIDTH] IN BLOOD BY AUTOMATED COUNT: 13.3 % (ref 10–15)
ETHANOL SERPL-MCNC: 0.05 G/DL
GFR SERPL CREATININE-BSD FRML MDRD: >90 ML/MIN/{1.73_M2}
GLUCOSE SERPL-MCNC: 78 MG/DL (ref 70–99)
HCT VFR BLD AUTO: 43 % (ref 40–53)
HGB BLD-MCNC: 13.6 G/DL (ref 13.3–17.7)
IMM GRANULOCYTES # BLD: 0.1 10E9/L (ref 0–0.4)
IMM GRANULOCYTES NFR BLD: 0.7 %
LIPASE SERPL-CCNC: 138 U/L (ref 73–393)
LYMPHOCYTES # BLD AUTO: 1.8 10E9/L (ref 0.8–5.3)
LYMPHOCYTES NFR BLD AUTO: 26.4 %
MCH RBC QN AUTO: 28.9 PG (ref 26.5–33)
MCHC RBC AUTO-ENTMCNC: 31.6 G/DL (ref 31.5–36.5)
MCV RBC AUTO: 91 FL (ref 78–100)
MONOCYTES # BLD AUTO: 0.6 10E9/L (ref 0–1.3)
MONOCYTES NFR BLD AUTO: 8.3 %
NEUTROPHILS # BLD AUTO: 4.1 10E9/L (ref 1.6–8.3)
NEUTROPHILS NFR BLD AUTO: 61.4 %
NRBC # BLD AUTO: 0 10*3/UL
NRBC BLD AUTO-RTO: 0 /100
NT-PROBNP SERPL-MCNC: 31 PG/ML (ref 0–900)
PLATELET # BLD AUTO: 251 10E9/L (ref 150–450)
POTASSIUM SERPL-SCNC: 4 MMOL/L (ref 3.4–5.3)
PROT SERPL-MCNC: 7.5 G/DL (ref 6.8–8.8)
RBC # BLD AUTO: 4.71 10E12/L (ref 4.4–5.9)
SODIUM SERPL-SCNC: 141 MMOL/L (ref 133–144)
TROPONIN I SERPL-MCNC: <0.015 UG/L (ref 0–0.04)
WBC # BLD AUTO: 6.7 10E9/L (ref 4–11)

## 2020-01-27 PROCEDURE — 25000132 ZZH RX MED GY IP 250 OP 250 PS 637: Performed by: EMERGENCY MEDICINE

## 2020-01-27 PROCEDURE — G0378 HOSPITAL OBSERVATION PER HR: HCPCS

## 2020-01-27 PROCEDURE — 80320 DRUG SCREEN QUANTALCOHOLS: CPT | Performed by: EMERGENCY MEDICINE

## 2020-01-27 PROCEDURE — 84484 ASSAY OF TROPONIN QUANT: CPT | Performed by: EMERGENCY MEDICINE

## 2020-01-27 PROCEDURE — 93005 ELECTROCARDIOGRAM TRACING: CPT

## 2020-01-27 PROCEDURE — 96375 TX/PRO/DX INJ NEW DRUG ADDON: CPT

## 2020-01-27 PROCEDURE — 99285 EMERGENCY DEPT VISIT HI MDM: CPT | Mod: 25

## 2020-01-27 PROCEDURE — 84484 ASSAY OF TROPONIN QUANT: CPT | Performed by: PHYSICIAN ASSISTANT

## 2020-01-27 PROCEDURE — 25000132 ZZH RX MED GY IP 250 OP 250 PS 637: Performed by: PHYSICIAN ASSISTANT

## 2020-01-27 PROCEDURE — 96374 THER/PROPH/DIAG INJ IV PUSH: CPT

## 2020-01-27 PROCEDURE — 85379 FIBRIN DEGRADATION QUANT: CPT | Performed by: EMERGENCY MEDICINE

## 2020-01-27 PROCEDURE — 25000128 H RX IP 250 OP 636: Performed by: PHYSICIAN ASSISTANT

## 2020-01-27 PROCEDURE — 83690 ASSAY OF LIPASE: CPT | Performed by: EMERGENCY MEDICINE

## 2020-01-27 PROCEDURE — 36415 COLL VENOUS BLD VENIPUNCTURE: CPT | Performed by: PHYSICIAN ASSISTANT

## 2020-01-27 PROCEDURE — 83880 ASSAY OF NATRIURETIC PEPTIDE: CPT | Performed by: EMERGENCY MEDICINE

## 2020-01-27 PROCEDURE — 71046 X-RAY EXAM CHEST 2 VIEWS: CPT

## 2020-01-27 PROCEDURE — 80076 HEPATIC FUNCTION PANEL: CPT | Performed by: EMERGENCY MEDICINE

## 2020-01-27 PROCEDURE — 99220 ZZC INITIAL OBSERVATION CARE,LEVL III: CPT | Performed by: INTERNAL MEDICINE

## 2020-01-27 PROCEDURE — 80320 DRUG SCREEN QUANTALCOHOLS: CPT | Performed by: INTERNAL MEDICINE

## 2020-01-27 PROCEDURE — 25000128 H RX IP 250 OP 636: Performed by: EMERGENCY MEDICINE

## 2020-01-27 PROCEDURE — 85025 COMPLETE CBC W/AUTO DIFF WBC: CPT | Performed by: EMERGENCY MEDICINE

## 2020-01-27 PROCEDURE — 80048 BASIC METABOLIC PNL TOTAL CA: CPT | Performed by: EMERGENCY MEDICINE

## 2020-01-27 RX ORDER — ACETAMINOPHEN 325 MG/1
975 TABLET ORAL ONCE
Status: DISCONTINUED | OUTPATIENT
Start: 2020-01-27 | End: 2020-01-27

## 2020-01-27 RX ORDER — PANTOPRAZOLE SODIUM 40 MG/1
40 TABLET, DELAYED RELEASE ORAL
Status: DISCONTINUED | OUTPATIENT
Start: 2020-01-27 | End: 2020-01-28 | Stop reason: HOSPADM

## 2020-01-27 RX ORDER — ALUMINA, MAGNESIA, AND SIMETHICONE 2400; 2400; 240 MG/30ML; MG/30ML; MG/30ML
30 SUSPENSION ORAL EVERY 4 HOURS PRN
Status: DISCONTINUED | OUTPATIENT
Start: 2020-01-27 | End: 2020-01-28 | Stop reason: HOSPADM

## 2020-01-27 RX ORDER — LIDOCAINE 40 MG/G
CREAM TOPICAL
Status: DISCONTINUED | OUTPATIENT
Start: 2020-01-27 | End: 2020-01-28 | Stop reason: HOSPADM

## 2020-01-27 RX ORDER — ATORVASTATIN CALCIUM 10 MG/1
10 TABLET, FILM COATED ORAL EVERY EVENING
Status: DISCONTINUED | OUTPATIENT
Start: 2020-01-27 | End: 2020-01-28 | Stop reason: HOSPADM

## 2020-01-27 RX ORDER — IBUPROFEN 600 MG/1
600 TABLET, FILM COATED ORAL ONCE
Status: DISCONTINUED | OUTPATIENT
Start: 2020-01-27 | End: 2020-01-27

## 2020-01-27 RX ORDER — NITROGLYCERIN 0.4 MG/1
0.4 TABLET SUBLINGUAL EVERY 5 MIN PRN
Status: DISCONTINUED | OUTPATIENT
Start: 2020-01-27 | End: 2020-01-27

## 2020-01-27 RX ORDER — LANOLIN ALCOHOL/MO/W.PET/CERES
100 CREAM (GRAM) TOPICAL DAILY
Status: DISCONTINUED | OUTPATIENT
Start: 2020-01-27 | End: 2020-01-28 | Stop reason: HOSPADM

## 2020-01-27 RX ORDER — HYDROCHLOROTHIAZIDE 12.5 MG/1
12.5 CAPSULE ORAL DAILY
Status: DISCONTINUED | OUTPATIENT
Start: 2020-01-28 | End: 2020-01-28 | Stop reason: HOSPADM

## 2020-01-27 RX ORDER — LOSARTAN POTASSIUM 50 MG/1
50 TABLET ORAL DAILY
Status: DISCONTINUED | OUTPATIENT
Start: 2020-01-28 | End: 2020-01-28 | Stop reason: HOSPADM

## 2020-01-27 RX ORDER — NITROGLYCERIN 0.4 MG/1
0.4 TABLET SUBLINGUAL EVERY 5 MIN PRN
Status: DISCONTINUED | OUTPATIENT
Start: 2020-01-27 | End: 2020-01-28 | Stop reason: HOSPADM

## 2020-01-27 RX ORDER — DAPSONE 25 MG/1
50 TABLET ORAL DAILY
Status: DISCONTINUED | OUTPATIENT
Start: 2020-01-28 | End: 2020-01-28 | Stop reason: HOSPADM

## 2020-01-27 RX ORDER — ASPIRIN 81 MG/1
324 TABLET, CHEWABLE ORAL ONCE
Status: COMPLETED | OUTPATIENT
Start: 2020-01-27 | End: 2020-01-27

## 2020-01-27 RX ORDER — NALOXONE HYDROCHLORIDE 0.4 MG/ML
.1-.4 INJECTION, SOLUTION INTRAMUSCULAR; INTRAVENOUS; SUBCUTANEOUS
Status: DISCONTINUED | OUTPATIENT
Start: 2020-01-27 | End: 2020-01-28 | Stop reason: HOSPADM

## 2020-01-27 RX ORDER — ASPIRIN 81 MG/1
162 TABLET, CHEWABLE ORAL ONCE
Status: DISCONTINUED | OUTPATIENT
Start: 2020-01-27 | End: 2020-01-27

## 2020-01-27 RX ORDER — ACETAMINOPHEN 325 MG/1
650 TABLET ORAL EVERY 4 HOURS PRN
Status: DISCONTINUED | OUTPATIENT
Start: 2020-01-27 | End: 2020-01-28 | Stop reason: HOSPADM

## 2020-01-27 RX ORDER — LORAZEPAM 2 MG/ML
1-2 INJECTION INTRAMUSCULAR EVERY 30 MIN PRN
Status: DISCONTINUED | OUTPATIENT
Start: 2020-01-27 | End: 2020-01-28 | Stop reason: HOSPADM

## 2020-01-27 RX ORDER — LEVOTHYROXINE SODIUM 137 UG/1
137 TABLET ORAL
Status: DISCONTINUED | OUTPATIENT
Start: 2020-01-27 | End: 2020-01-28 | Stop reason: HOSPADM

## 2020-01-27 RX ORDER — ONDANSETRON 2 MG/ML
4 INJECTION INTRAMUSCULAR; INTRAVENOUS EVERY 30 MIN PRN
Status: DISCONTINUED | OUTPATIENT
Start: 2020-01-27 | End: 2020-01-27

## 2020-01-27 RX ORDER — MULTIPLE VITAMINS W/ MINERALS TAB 9MG-400MCG
1 TAB ORAL DAILY
Status: DISCONTINUED | OUTPATIENT
Start: 2020-01-27 | End: 2020-01-28 | Stop reason: HOSPADM

## 2020-01-27 RX ORDER — MORPHINE SULFATE 2 MG/ML
2 INJECTION, SOLUTION INTRAMUSCULAR; INTRAVENOUS
Status: DISCONTINUED | OUTPATIENT
Start: 2020-01-27 | End: 2020-01-28 | Stop reason: HOSPADM

## 2020-01-27 RX ORDER — ACETAMINOPHEN 650 MG/1
650 SUPPOSITORY RECTAL EVERY 4 HOURS PRN
Status: DISCONTINUED | OUTPATIENT
Start: 2020-01-27 | End: 2020-01-28 | Stop reason: HOSPADM

## 2020-01-27 RX ORDER — FOLIC ACID 1 MG/1
1 TABLET ORAL DAILY
Status: DISCONTINUED | OUTPATIENT
Start: 2020-01-27 | End: 2020-01-28 | Stop reason: HOSPADM

## 2020-01-27 RX ORDER — LORAZEPAM 1 MG/1
1-2 TABLET ORAL EVERY 30 MIN PRN
Status: DISCONTINUED | OUTPATIENT
Start: 2020-01-27 | End: 2020-01-28 | Stop reason: HOSPADM

## 2020-01-27 RX ORDER — ASPIRIN 81 MG/1
81 TABLET ORAL DAILY
Status: DISCONTINUED | OUTPATIENT
Start: 2020-01-28 | End: 2020-01-28 | Stop reason: HOSPADM

## 2020-01-27 RX ADMIN — NITROGLYCERIN 0.4 MG: 0.4 TABLET SUBLINGUAL at 10:48

## 2020-01-27 RX ADMIN — ASPIRIN 81 MG 324 MG: 81 TABLET ORAL at 10:09

## 2020-01-27 RX ADMIN — ATORVASTATIN CALCIUM 10 MG: 10 TABLET, FILM COATED ORAL at 20:52

## 2020-01-27 RX ADMIN — PANTOPRAZOLE SODIUM 40 MG: 40 TABLET, DELAYED RELEASE ORAL at 16:21

## 2020-01-27 RX ADMIN — FOLIC ACID 1 MG: 1 TABLET ORAL at 16:21

## 2020-01-27 RX ADMIN — MORPHINE SULFATE 2 MG: 2 INJECTION, SOLUTION INTRAMUSCULAR; INTRAVENOUS at 18:30

## 2020-01-27 RX ADMIN — ACETAMINOPHEN 650 MG: 325 TABLET, FILM COATED ORAL at 16:21

## 2020-01-27 RX ADMIN — ONDANSETRON HYDROCHLORIDE 4 MG: 2 INJECTION, SOLUTION INTRAMUSCULAR; INTRAVENOUS at 10:09

## 2020-01-27 RX ADMIN — LEVOTHYROXINE SODIUM 137 MCG: 137 TABLET ORAL at 18:30

## 2020-01-27 RX ADMIN — NITROGLYCERIN 0.4 MG: 0.4 TABLET SUBLINGUAL at 10:11

## 2020-01-27 RX ADMIN — MULTIPLE VITAMINS W/ MINERALS TAB 1 TABLET: TAB at 16:21

## 2020-01-27 RX ADMIN — NITROGLYCERIN 0.4 MG: 0.4 TABLET SUBLINGUAL at 10:31

## 2020-01-27 RX ADMIN — THIAMINE HCL TAB 100 MG 100 MG: 100 TAB at 16:21

## 2020-01-27 RX ADMIN — ACETAMINOPHEN 650 MG: 325 TABLET, FILM COATED ORAL at 20:52

## 2020-01-27 ASSESSMENT — ENCOUNTER SYMPTOMS
BACK PAIN: 0
COUGH: 0
CHILLS: 0
FEVER: 0
NUMBNESS: 1
ARTHRALGIAS: 0
DIAPHORESIS: 0
LIGHT-HEADEDNESS: 1
NAUSEA: 1
SHORTNESS OF BREATH: 1

## 2020-01-27 ASSESSMENT — HEART SCORE
TROPONIN: LESS THAN OR EQUAL TO NORMAL LIMIT
RISK FACTORS: >2 RISK FACTORS OR HX OF ATHEROSCLEROTIC DISEASE
HEART SCORE: 4
ECG: NORMAL
HISTORY: MODERATELY SUSPICIOUS
AGE: 45-64

## 2020-01-27 NOTE — ED NOTES
"[facility]  ED Nurse Handoff Report    Juan C Garcia is a 59 year old male   ED Chief complaint: Chest Pain  . ED Diagnosis:   Final diagnoses:   None     Allergies:   Allergies   Allergen Reactions     No Known Allergies        Code Status: Full Code  Activity level - Baseline/Home:  Independent. Activity Level - Current:   Independent. Lift room needed: No. Bariatric: No   Needed: No   Isolation: No. Infection: Not Applicable.     Vital Signs:   Vitals:    01/27/20 0916   BP: (!) 135/109   Pulse: 85   Resp: 22   Temp: 98.1  F (36.7  C)   SpO2: 99%       Cardiac Rhythm:  ,   Cardiac  Cardiac Rhythm: Normal sinus rhythm  Pain level:    Patient confused: No. Patient Falls Risk: No.   Elimination Status: Has voided   Patient Report - Initial Complaint: midsternal chest pain for an hour. Focused Assessment: hx of HTN and high cholesterol, hx of PE \"years ago\", c/o chest pain today, c/o feeling dizzy and c/o shortness of breath. No cough. Denies diaphoresis.    Tests Performed:   Labs Ordered and Resulted from Time of ED Arrival Up to the Time of Departure from the ED   CBC WITH PLATELETS DIFFERENTIAL   D DIMER QUANTITATIVE   BASIC METABOLIC PANEL   TROPONIN I   NT PROBNP INPATIENT   PULSE OXIMETRY NURSING   CARDIAC CONTINUOUS MONITORING   PERIPHERAL IV CATHETER   PATIENT CARE ORDER     XR Chest 2 Views    (Results Pending)     . Abnormal Results: see above .   Treatments provided: meds  Family Comments: none  OBS brochure/video discussed/provided to patient:  No  ED Medications:   Medications   nitroGLYcerin (NITROSTAT) sublingual tablet 0.4 mg (0.4 mg Sublingual Given 1/27/20 1048)   ondansetron (ZOFRAN) injection 4 mg (4 mg Intravenous Given 1/27/20 1009)   ibuprofen (ADVIL/MOTRIN) tablet 600 mg (has no administration in time range)   acetaminophen (TYLENOL) tablet 975 mg (has no administration in time range)   aspirin (ASA) chewable tablet 324 mg (324 mg Oral Given 1/27/20 1009)     Drips " infusing:  No  For the majority of the shift, the patient's behavior Green. Interventions performed were none.     Severe Sepsis OR Septic Shock Diagnosis Present: No      ED Nurse Name/Phone Number: Krys Salmeron RN,   11:10 AM    RECEIVING UNIT ED HANDOFF REVIEW    Above ED Nurse Handoff Report was reviewed: Yes  Reviewed by: Leopoldo Gill RN on January 27, 2020 at 1:46 PM

## 2020-01-27 NOTE — ED TRIAGE NOTES
Patient presents to the ED with midsternal chest pain. Reports began one hour ago and also feels SOB. Patient reports a history of a PE, but states this feels different.

## 2020-01-27 NOTE — ED PROVIDER NOTES
History     Chief Complaint:  Chest Pain    HPI   Juan C Garcia is a 59 year old male with history of idiopathic pulmonary embolism (2003) without current anticoagulation, hypertension, hyperlipidemia, and Obstructive Sleep Apnea who presents with chest pain. Juan C reports mid sternal chest pain with associated lightheadedness and shortness of breath beginning about 0730 this morning. His chest pain is described as a constant but dull ache. Additionally, he has some nausea and numbness in his fingertips, though he notes the latter is not unusual for him given his history of spinal surgery. He has not taken any Aspirin today -- he does not take one daily -- and has not taken any analgesia for his chest pain. Juan C denies recent cough or cold symptoms, chills, fever, diaphoresis, and radiation of his pain into his shoulder or back. He reports that his symptoms today are reminiscent of those when he was diagnosed with his unprovoked pulmonary embolism in 2003. He was on anticoagulation following the PE, but has not been on them for a long time since. At time of diagnosis in 2003, patient underwent a thorough evaluation for hypercoagulable state, which he reports was unremarkable.     Patient admits to drinking alcohol daily. He has gone days without alcohol use without significant issues. Patient denies history of alcohol withdrawal seizures or DTS.     Cardiac/PE/DVT Risk Factors:  History of hypertension - positive   History of hyperlipidemia - positive   History of diabetes - negative   History of smoking - negative   Personal history of PE/DVT - positive in 2003  Family history of PE/DVT - negative   Family history of heart complications - positive -- father had an MI around age 60 and CABG x3  Hormone use - positive: uses testosterone   Recent travel - negative   Recent surgery - negative   Other immobilizations - negative   Cancer - negative     Allergies:  No Known Drug Allergies      Medications:     Atorvastatin   Dapsone   Arnuity ellipta   Levothyroxine   Losartan  Testosterone     Past Medical History:    Alcohol use   Anxiety   Dermatitis herpetiformis   Hypertension   Hyperlipidemia   Lung nodule   Multiple allergies   Primary hypogonadism in male   Pulmonary embolism   Thyroid nodule   Obstructive Sleep Apnea     Past Surgical History:    Orchiopexy child   Cervical laminectomy   Right index tendon repair  Ankle/wrist repair   Lumbar microdiscectomy   Cervical fusion x2   Lumbar fusion    Family History:    Mother - hypertension, dementia, alcoholism   Father - hypertension, cerebrovascular disease, throat cancer  Son - testicular cancer     Social History:  The patient was alone.  Smoking Status: Never  Smokeless Tobacco: Never  Alcohol Use: yes   Marital Status:        Review of Systems   Constitutional: Negative for chills, diaphoresis and fever.   Respiratory: Positive for shortness of breath. Negative for cough.    Cardiovascular: Positive for chest pain.   Gastrointestinal: Positive for nausea.   Musculoskeletal: Negative for arthralgias and back pain.   Neurological: Positive for light-headedness and numbness.   All other systems reviewed and are negative.      Physical Exam     Patient Vitals for the past 24 hrs:   BP Temp Pulse Resp SpO2   01/27/20 0916 (!) 135/109 98.1  F (36.7  C) 85 22 99 %       Physical Exam  General: Patient is awake, alert and interactive when I enter the room  Head: The scalp, face, and head appear normal  Eyes: The pupils are equal, round, and reactive to light. Conjunctivae and sclerae are normal  Neck: Normal range of motion. No anterior cervical lymphadenopathy noted  CV: Regular rate. S1/S2. No murmurs. Peripheral pulses including radial pulses are symmetric.   Resp: Lungs are clear without wheezes or rales. No respiratory distress.   GI: Abdomen is soft, no rigidity, guarding, or rebound. No distension. No tenderness to palpation in any quadrant.     MS:  Chest wall is non tender to palpation. Normal tone. Joints grossly normal without effusions. No asymmetric leg swelling, calf or thigh tenderness.    Skin: No rash or lesions noted. Normal capillary refill noted  Neuro: Speech is normal and fluent. Face is symmetric. Moving all extremities.   Psych:  Normal affect.  Appropriate interactions.    Emergency Department Course     ECG:  Indication: chest pain   Completed at 0913.  Read at 0947.   Normal sinus rhythm   Normal ECG   No significant changes compared to previous 08/28/2019  Rate 79 bpm. AK interval 156. QRS duration 82. QT/QTc 396/454. P-R-T axes 73 -15 28.    Imaging:  Radiology findings were communicated with the patient and Admitting MD who voiced understanding of the findings.    XR Chest 2 Views  IMPRESSION: PA and lateral views of the chest. Lungs are clear. Heart  is normal in size. No effusions are evident. No pneumothorax. Lower  cervical spine fusion plate is noted.  Report per radiology     Laboratory:  Laboratory findings were communicated with the patient and Admitting MD who voiced understanding of the findings.    CBC: AWNL. (WBC 6.7, HGB 13.6, )   BMP: AWNL (Creatinine 0.79)     Troponin (Collected 0946): <0.015  Troponin pending     D-Dimer: <0.3   BNP: 31      Interventions:  1009 ASA 324mg PO    Zofran 4mg IV   1011 Nitroglycerin 0.4mg SL   1031 Nitroglycerin 0.4mg SL  1048 Nitroglycerin 0.4mg SL    Emergency Department Course:  Past medical records, nursing notes, and vitals reviewed.    0940 I performed an exam of the patient as documented above.     EKG obtained in the ED, see results above.   IV was inserted and blood was drawn for laboratory testing, results above.  The patient was sent for a CXR while in the emergency department, results above.     1147 I rechecked the patient and discussed the results of his workup thus far.     1222 I spoke with Dr. Ivan of the Hospitalist service regarding patient's presentation,  findings, and plan of care.    1223 Patient rechecked and updated.      Findings and plan explained to the Patient who consents to admission. Discussed the patient with Dr. Ivan, who will admit the patient to an observation bed for further monitoring, evaluation, and treatment.    I personally reviewed the laboratory and imaging results with the Patient and answered all related questions prior to admission.     HEART SCORE:    History:   Moderately suspicious (1)    ECG:   Non-specific repolarization abnormality (1)    Age:   45-65 (1)    Risk Factors:  3 or More Risk Factors (2)    Troponin:   <1 normal limit (0)    Total:   4      **Risk factors: DM, current or recent smoker (< 90 days), HTN, hyperlipidemia, Fam hx of CAD, BMI > 30, history of atheroslcerosis    A HEART score of < 3 places their risk of major adverse cardiac event at about 1.7% at 6 weeks (<1% at 30 days).  If repeated troponin at 3 hours likely reduces risk to less than 1%.         Impression & Plan     Medical Decision Making:  Juan C Garcia is a 59 year old male who presents for evaluation of chest pain for the last two hours. A broad differential was of course considered.  Certainly ischemia is in differential. I doubt pulmonary embolism, aortic dissection, pneumonia or pneumothorax. DDimer is negative.  Chest x-ray does not show any signs of pneumonia, pneumothorax, widened mediastinum or pulmonary edema.  EKG did not show any acute signs of ischemia or dysrhythmia.  Troponins negative x1.  Aspirin was given in the emergency department. My suspicion of unstable angina at this point is very low and given risk/benefit ratio would not start lovenox or heparin. Given the nature, patient risk factors and timing of the patient's symptoms, I will admit the patient  to the hospital for further workup and treatment  The patient agrees to be admitted and all questions were answered.      Diagnosis:    ICD-10-CM    1. Chest pain, unspecified type  R07.9        Disposition:  Admitted to observation.    Scribe Disclosure:  I, Sindy Obrien, am serving as a scribe at 9:44 AM on 1/27/2020 to document services personally performed by Mikal Fontaine MD based on my observations and the provider's statements to me. 1/27/2020   [facility] EMERGENCY DEPARTMENT       Mikal Fontaine MD  01/27/20 1222

## 2020-01-27 NOTE — PHARMACY-ADMISSION MEDICATION HISTORY
Admission medication history interview status for this patient is complete. See [facility] admission navigator for allergy information, prior to admission medications and immunization status.     Medication history interview source(s):Patient  Medication history resources (including written lists, pill bottles, clinic record):None  Primary pharmacy: [facility] [address] in [place]    Changes made to PTA medication list:  Added: none  Deleted: none  Changed: none    Actions taken by pharmacist (provider contacted, etc):called [facility] to clarify hydrochlorothiazide - dispensed as prescribed 12.5mg tabs- take two tabs (25mg) daily. Per pt: takes one tab daily.     Additional medication history information:None    Medication reconciliation/reorder completed by provider prior to medication history?  No     Do you take OTC medications (eg tylenol, ibuprofen, fish oil, eye/ear drops, etc)? Yes     For patients on insulin therapy: No    Prior to Admission medications    Medication Sig Last Dose Taking? Auth Provider   atorvastatin (LIPITOR) 10 MG tablet Take 1 tablet (10 mg) by mouth daily 1/26/2020 at pm Yes Suzanne Zhang MD   dapsone (ACZONE) 25 MG tablet TAKE 2 TABLETS (50 MG) BY MOUTH DAILY 1/27/2020 at am Yes Suzanne Zhang MD   fluticasone (ARNUITY ELLIPTA) 200 MCG/ACT inhaler Inhale 1 puff into the lungs daily 1/27/2020 at am Yes Unknown, Entered By History   hydrochlorothiazide (HYDRODIURIL) 12.5 MG tablet Take 2 tablets (25 mg) by mouth daily 1/27/2020 at am - 1 tab Yes Suzanne Zhang MD   Ibuprofen (ADVIL PO) Take 400 mg by mouth as needed  prn Yes Reported, Patient   levothyroxine (SYNTHROID, LEVOTHROID) 137 MCG tablet TAKE 137 MCG (1 TABLET) BY MOUTH DAILY 1/26/2020 at Unknown time Yes Neetu aKur,    losartan (COZAAR) 50 MG tablet Take 1 tablet (50 mg) by mouth daily 1/27/2020 at am Yes Suzanne hZang MD   testosterone (ANDROGEL/TESTIM) 50 MG/5GM (1%) gel Place onto the skin daily 1/26/2020 at  Unknown time Yes Reported, Patient

## 2020-01-27 NOTE — H&P
History and Physical     Juan C Garcia MRN# [id number]   YOB: 1960 Age: 59 year old      Date of Admission:  1/27/2020    Primary care provider: Suzanne Zhang          Assessment and Plan:   Juan C Garcia is a 59 year old male with a PMH significant for every day alcohol use, hx of PE >10 years ago not currently on anticoagulation, hypertension and hyperlipidemia who presents with constant central chest pressure since this AM.    Patient was discussed with Dr. Fontaine , who was provider in ED. Chart review of ED work up was reviewed as well as chart review of Care Everywhere, previous visits and admissions.     #Atypical nonexertional, atraumatic constant chest pressure  Patient developed nonexertional chest pressure under his sternum this morning without radiating pain.  Concurrently he feels more short of breath, lightheaded and nauseated.  He does not describe any infectious-like symptoms.  His pain is not reproducible and not worse with deep inspiration.  It has been constant since starting at 730.  He is afebrile with pressure 137/76 and breathing comfortably on room air with no hypoxia.  His initial troponin is undetectable and his d-dimer is normal.  Chest x-ray is clear and EKG shows no ischemia.  He had a normal stress test 3 years ago but risk factors include hypertension, hyperlipidemia and family history.  His story is not very concerning and I have a higher suspicion for GI source of his discomfort.  On exam he has no significant epigastric pain or right upper quadrant pain but does drink a lot of alcohol and uses NSAIDs regularly.  -Monitor on telemetry  -Trend troponins  -Add lipid panel  -Stress echo in a.m.  -Protonix for stomach protection, recommend discharge on Protonix or omeprazole  -Recommend discontinuation of NSAIDs and alcohol    #Daily alcohol use  Admits to drinking 750 mL of vodka every 2 to 3 days.  Does not have any documented history of withdrawal.  He  admits that he should quit and is likely to pursue AA after discharge.  He declines our chemical dependency services.  -CIWA protocol ordered with Ativan triggered dosing  -Oral vitamins  -Declined chemical dependency consult    #Hypertension  -Continue PTA losartan hydrochlorothiazide    #Hyperlipidemia  -Continue PTA statin    #Cutaneous celiac disease  -Continue PTA dapsone    #History of PE  Patient states he had multiple pulmonary embolism greater than 10 years ago.  He was on anticoagulation for 6 months and there was no cause found for his PEs.  -D-dimer normal  -Low suspicion for PE given vital signs and negative d-dimer      Social: No concerns  Code: Discussed with patient and they have chosen full code  VTE prophylaxis: Ambulation  Disposition: Observation                    Chief Complaint:   Chest pain         History of Present Illness:   Juan C Garcia is a 59 year old male who presents with central chest pressure.  He states he was in his usual state of health today when he arrived to work at 730 noting nonexertional central chest pressure just over his sternum.  This was not exacerbated with movement or palpation.  It did not radiate into his arms or up into his jaw.  Since the pain started he has felt slightly lightheaded, nauseous and short of breath.  Since it started it is been constant.  He has never had similar pain before but does recall having a PE but the pain was much more severe.  He also has intermittent central chest pains not associated with exertion.  He walks his dog 45 minutes daily without significant chest discomfort.  He drinks 750 mL of vodka every 2 days but denies history of withdrawal.  He denies history of reflux and denies black/bloody stools but does take approximately 10 NSAIDs a week for chronic musculoskeletal pain.  Risk factors include positive family history with his dad having a bypass surgery and also hypertension/hyperlipidemia.             Past Medical History:      Past Medical History:   Diagnosis Date     Alcohol use      Dermatitis herpetiformis 2000    Dx by biopsy of rash on elbow per derm     HTN, goal below 140/90      Hyperlipidemia      Lung nodule Feb 2003    Benign; Left upper lobe     Multiple allergies     allergist with immunotherapy; Allergist is Dr. Santos in [place]     Primary hypogonadism in male     Low FSH, single left testicle - followed by Dr. Flores of endo     Pulmonary embolus (H) Feb 2003    Evaluated by ?[place] - unknown cause     Thyroid nodule Feb 2005    Benign per [facility] --> taking Levothyroxine ever since               Past Surgical History:     Past Surgical History:   Procedure Laterality Date     ORCHIOPEXY CHILD Right     Age 10 due to undescended testicl     OTHER SURGICAL HISTORY  July 1997    Cervical laminectomy C4-5-6     OTHER SURGICAL HISTORY  Jan 2000    Right index tendon repair     OTHER SURGICAL HISTORY  August 2004    Ankle/wrist repair     OTHER SURGICAL HISTORY  April 2007    Lumbar microdiscectomy L5-S1     OTHER SURGICAL HISTORY  March 2010    Cervical fusion C5-6-7     OTHER SURGICAL HISTORY  March 2011    Cervical fusion C3-4-5     OTHER SURGICAL HISTORY  July 2011    Lumbar fusion L5-S1               Social History:     Social History     Socioeconomic History     Marital status:      Spouse name: Not on file     Number of children: Not on file     Years of education: Not on file     Highest education level: Not on file   Occupational History     Not on file   Social Needs     Financial resource strain: Not on file     Food insecurity:     Worry: Not on file     Inability: Not on file     Transportation needs:     Medical: Not on file     Non-medical: Not on file   Tobacco Use     Smoking status: Never Smoker     Smokeless tobacco: Never Used   Substance and Sexual Activity     Alcohol use: Yes     Alcohol/week: 0.0 standard drinks     Comment: 4 drinks a week     Drug use: No     Sexual activity: Yes      Partners: Female   Lifestyle     Physical activity:     Days per week: Not on file     Minutes per session: Not on file     Stress: Not on file   Relationships     Social connections:     Talks on phone: Not on file     Gets together: Not on file     Attends [employer] service: Not on file     Active member of club or organization: Not on file     Attends meetings of clubs or organizations: Not on file     Relationship status: Not on file     Intimate partner violence:     Fear of current or ex partner: Not on file     Emotionally abused: Not on file     Physically abused: Not on file     Forced sexual activity: Not on file   Other Topics Concern     Parent/sibling w/ CABG, MI or angioplasty before 65F 55M? Not Asked   Social History Narrative     Not on file               Family History:     Family History   Problem Relation Age of Onset     Hypertension Mother      Dementia Mother      Alcoholism Mother      Hypertension Father      Cerebrovascular Disease Father      Cancer Father         throat - was a smoker     Heart Disease Father         CABG     Diabetes Maternal Grandfather      Family History Negative Brother      Family History Negative Sister      Cerebrovascular Disease Paternal Grandmother      Cancer Son         Testicular cancer     Family History Negative Son               Allergies:      Allergies   Allergen Reactions     No Known Allergies                Medications:     Prior to Admission medications    Medication Sig Last Dose Taking? Auth Provider   atorvastatin (LIPITOR) 10 MG tablet Take 1 tablet (10 mg) by mouth daily 1/26/2020 at pm Yes Suzanne Zhang MD   dapsone (ACZONE) 25 MG tablet TAKE 2 TABLETS (50 MG) BY MOUTH DAILY 1/27/2020 at am Yes Suzanne Zhang MD   fluticasone (ARNUITY ELLIPTA) 200 MCG/ACT inhaler Inhale 1 puff into the lungs daily 1/27/2020 at am Yes Unknown, Entered By History   hydrochlorothiazide (HYDRODIURIL) 12.5 MG tablet Take 2 tablets (25 mg) by mouth daily  1/27/2020 at am - 1 tab Yes Suzanne Zhang MD   Ibuprofen (ADVIL PO) Take 400 mg by mouth as needed  prn Yes Reported, Patient   levothyroxine (SYNTHROID, LEVOTHROID) 137 MCG tablet TAKE 137 MCG (1 TABLET) BY MOUTH DAILY 1/26/2020 at Unknown time Yes Neetu Kaur,    losartan (COZAAR) 50 MG tablet Take 1 tablet (50 mg) by mouth daily 1/27/2020 at am Yes Suzanne Zhang MD   testosterone (ANDROGEL/TESTIM) 50 MG/5GM (1%) gel Place onto the skin daily 1/26/2020 at Unknown time Yes Reported, Patient   ALLERGY INJECTIONS    Reported, Patient              Review of Systems:   A Comprehensive greater than 10 system review of systems was carried out.  Pertinent positives and negatives are noted above.  Otherwise negative for contributory information.            Physical Exam:   Blood pressure 137/76, pulse 81, temperature 98.6  F (37  C), temperature source Oral, resp. rate 16, weight 99.3 kg (219 lb), SpO2 97 %.  Exam:  GENERAL:  Comfortable.  PSYCH: pleasant, oriented, No acute distress.  HEENT:  PERRLA. Normal conjunctiva, normal hearing, nasal mucosa and Oropharynx are normal.  NECK:  Supple, no neck vein distention, adenopathy or bruits, normal thyroid.  HEART:  Normal S1, S2 with no murmur, no pericardial rub, gallops or S3 or S4.  LUNGS:  Clear to auscultation, normal Respiratory effort. No wheezing, rales or ronchi.  ABDOMEN:  Soft, no hepatosplenomegaly, normal bowel sounds.  Nontender when I press in his epigastrium and right upper quadrant.  EXTREMITIES:  No pedal edema, +2 pulses bilateral and equal.  SKIN:  Dry to touch, No rash, wound or ulcerations.  NEUROLOGIC:  CN 2-12 grossly intact, sensation is intact with no focal deficits.               Data:     Recent Labs   Lab 01/27/20  0946   WBC 6.7   HGB 13.6   HCT 43.0   MCV 91        Recent Labs   Lab 01/27/20  0946      POTASSIUM 4.0   CHLORIDE 108   CO2 25   ANIONGAP 8   GLC 78   BUN 13   CR 0.79   GFRESTIMATED >90   GFRESTBLACK  >90   JOSÉ MIGUEL 9.5   PROTTOTAL 7.5   ALBUMIN 4.2   BILITOTAL 0.7   ALKPHOS 77   AST 24   ALT 33     Recent Labs   Lab 01/27/20  0946   DD <0.3     Recent Labs   Lab 01/27/20  1211 01/27/20  0946   TROPI <0.015 <0.015         Recent Results (from the past 24 hour(s))   XR Chest 2 Views    Narrative    CHEST TWO VIEWS January 27, 2020 11:19 AM     HISTORY: Chest pain and shortness of breath.    COMPARISON: Chest x-ray 8/28/2019.      Impression    IMPRESSION: PA and lateral views of the chest. Lungs are clear. Heart  is normal in size. No effusions are evident. No pneumothorax. Lower  cervical spine fusion plate is noted.    MD Elizabeth SHUKLA PA-C    This patient was seen and discussed with Dr. Dotson who agrees with the current plans as outlined above.

## 2020-01-28 ENCOUNTER — APPOINTMENT (OUTPATIENT)
Dept: CARDIOLOGY | Facility: CLINIC | Age: 60
End: 2020-01-28
Attending: PHYSICIAN ASSISTANT
Payer: COMMERCIAL

## 2020-01-28 VITALS
HEIGHT: 74 IN | OXYGEN SATURATION: 95 % | HEART RATE: 72 BPM | SYSTOLIC BLOOD PRESSURE: 126 MMHG | DIASTOLIC BLOOD PRESSURE: 79 MMHG | WEIGHT: 213.9 LBS | TEMPERATURE: 98.3 F | RESPIRATION RATE: 20 BRPM | BODY MASS INDEX: 27.45 KG/M2

## 2020-01-28 LAB
CHOLEST SERPL-MCNC: 231 MG/DL
HDLC SERPL-MCNC: 65 MG/DL
INTERPRETATION ECG - MUSE: NORMAL
LDLC SERPL CALC-MCNC: 137 MG/DL
NONHDLC SERPL-MCNC: 166 MG/DL
TRIGL SERPL-MCNC: 146 MG/DL

## 2020-01-28 PROCEDURE — 93321 DOPPLER ECHO F-UP/LMTD STD: CPT | Mod: 26 | Performed by: INTERNAL MEDICINE

## 2020-01-28 PROCEDURE — 80061 LIPID PANEL: CPT | Performed by: PHYSICIAN ASSISTANT

## 2020-01-28 PROCEDURE — 99207 ZZC CDG-CODE CATEGORY CHANGED: CPT | Performed by: INTERNAL MEDICINE

## 2020-01-28 PROCEDURE — 93016 CV STRESS TEST SUPVJ ONLY: CPT | Performed by: INTERNAL MEDICINE

## 2020-01-28 PROCEDURE — 93350 STRESS TTE ONLY: CPT | Mod: TC

## 2020-01-28 PROCEDURE — 99217 ZZC OBSERVATION CARE DISCHARGE: CPT | Performed by: INTERNAL MEDICINE

## 2020-01-28 PROCEDURE — 25000132 ZZH RX MED GY IP 250 OP 250 PS 637: Performed by: PHYSICIAN ASSISTANT

## 2020-01-28 PROCEDURE — G0378 HOSPITAL OBSERVATION PER HR: HCPCS

## 2020-01-28 PROCEDURE — 93018 CV STRESS TEST I&R ONLY: CPT | Performed by: INTERNAL MEDICINE

## 2020-01-28 PROCEDURE — 93350 STRESS TTE ONLY: CPT | Mod: 26 | Performed by: INTERNAL MEDICINE

## 2020-01-28 PROCEDURE — 36415 COLL VENOUS BLD VENIPUNCTURE: CPT | Performed by: PHYSICIAN ASSISTANT

## 2020-01-28 PROCEDURE — 93325 DOPPLER ECHO COLOR FLOW MAPG: CPT | Mod: 26 | Performed by: INTERNAL MEDICINE

## 2020-01-28 PROCEDURE — 25500064 ZZH RX 255 OP 636: Performed by: INTERNAL MEDICINE

## 2020-01-28 RX ADMIN — LEVOTHYROXINE SODIUM 137 MCG: 137 TABLET ORAL at 06:38

## 2020-01-28 RX ADMIN — PANTOPRAZOLE SODIUM 40 MG: 40 TABLET, DELAYED RELEASE ORAL at 06:38

## 2020-01-28 RX ADMIN — DAPSONE 50 MG: 25 TABLET ORAL at 08:36

## 2020-01-28 RX ADMIN — MULTIPLE VITAMINS W/ MINERALS TAB 1 TABLET: TAB at 08:36

## 2020-01-28 RX ADMIN — THIAMINE HCL TAB 100 MG 100 MG: 100 TAB at 08:35

## 2020-01-28 RX ADMIN — FOLIC ACID 1 MG: 1 TABLET ORAL at 08:35

## 2020-01-28 RX ADMIN — ASPIRIN 81 MG: 81 TABLET, COATED ORAL at 08:35

## 2020-01-28 RX ADMIN — HUMAN ALBUMIN MICROSPHERES AND PERFLUTREN 3 ML: 10; .22 INJECTION, SOLUTION INTRAVENOUS at 08:00

## 2020-01-28 RX ADMIN — HYDROCHLOROTHIAZIDE 12.5 MG: 12.5 CAPSULE ORAL at 08:35

## 2020-01-28 RX ADMIN — ACETAMINOPHEN 650 MG: 325 TABLET, FILM COATED ORAL at 01:20

## 2020-01-28 RX ADMIN — LOSARTAN POTASSIUM 50 MG: 50 TABLET, FILM COATED ORAL at 08:35

## 2020-01-28 ASSESSMENT — MIFFLIN-ST. JEOR: SCORE: 1844.99

## 2020-01-28 NOTE — PLAN OF CARE
VSS.A0x4. denies SOB, lung sounds are clear. Complained of chest pain, given tylenol, effective. CIWA scores 0.Pt  is aware of stress echo in the am. NPO after midnight. Voiding without complains. Continue POC.  Sent to Echo at this time.

## 2020-01-28 NOTE — PLAN OF CARE
PRIMARY DIAGNOSIS: CHEST PAIN  OUTPATIENT/OBSERVATION GOALS TO BE MET BEFORE DISCHARGE:  1. Ruled out acute coronary syndrome (negative or stable Troponin):  Yes  2. Pain Status: Improved-controlled with oral pain medications.  3. Appropriate provocative testing performed: No  - Stress Test Procedure: Echo stress test in AM  - Interpretation of cardiac rhythm per telemetry tech: SR    4. Cleared by Consultants (if applicable):Yes  5. Return to near baseline physical activity: Yes  Discharge Planner Nurse   Safe discharge environment identified: Yes  Barriers to discharge: No       Entered by: Tawny Car 01/27/2020 10:59 PM     Please review provider order for any additional goals.   Nurse to notify provider when observation goals have been met and patient is ready for discharge.  Patient alert and oriented  Peripheral IV saline locked  VSS, on room air  Lungs clear  Positive BS, last BM today  Voiding without issues  Tolerating diet, will be NPO 2 hours prior to echo stress test in AM  Continue with plan of care

## 2020-01-28 NOTE — PLAN OF CARE
PRIMARY DIAGNOSIS: CHEST PAIN  OUTPATIENT/OBSERVATION GOALS TO BE MET BEFORE DISCHARGE:  1. Ruled out acute coronary syndrome (negative or stable Troponin):  Yes  2. Pain Status: Improved but still requiring IV narcotics.  3. Appropriate provocative testing performed: No  - Stress Test Procedure: Echo stress test in AM  - Interpretation of cardiac rhythm per telemetry tech: SR    4. Cleared by Consultants (if applicable):N/A  5. Return to near baseline physical activity: Yes  Discharge Planner Nurse   Safe discharge environment identified: Yes  Barriers to discharge: No       Entered by: Tawny Car 01/27/2020 9:42 PM     Please review provider order for any additional goals.   Nurse to notify provider when observation goals have been met and patient is ready for discharge.

## 2020-01-28 NOTE — PROGRESS NOTES
SPIRITUAL HEALTH SERVICES Progress Note  [facility] 3    Spiritual Health visit per consult order for emotional/spiritual support.  Pt is in process of discharging and is not available.      Filiberto Dodson MA  Staff   Pager: [phone]  Phone: [phone]

## 2020-01-29 ENCOUNTER — TELEPHONE (OUTPATIENT)
Dept: FAMILY MEDICINE | Facility: CLINIC | Age: 60
End: 2020-01-29

## 2020-01-29 NOTE — TELEPHONE ENCOUNTER
ED / Discharge Outreach Protocol    Patient Contact    Attempt # 1    Was call answered?  No.  Left message on voicemail with information to call me back.    DIANA RasmussenN, RN  [employer] Triage

## 2020-02-02 NOTE — DISCHARGE SUMMARY
Physician Discharge Summary     Name: Juan C Garcia    MRN: [id number]     YOB: 1960    Age: 59 year old                                             [facility]  Hospitalist Discharge Summary-Critical access hospital      Primary care provider: Suzanne Zhang      Admit date:  1/27/2020      Discharge date and time: 1/28/2020  9:57 AM       Discharge Physician:  Alexandr Dotson MD      Primary Discharge Diagnosis        #1.  Atypical chest pain: Admitted to telemetry bed, ruled out for acute coronary syndrome and stress testing obtained which showed no evidence of ischemia.  Chest pain resolved  #2.  Alcohol use disorder: Daily consumption of alcohol.  Patient was followed for withdrawal symptoms during hospitalization.  He was given oral vitamins and offered chemical dependency consultation which she declined.      Secondary Diagnosis /chronic medical conditions         Past Medical History:   Diagnosis Date     Alcohol use      Dermatitis herpetiformis 2000    Dx by biopsy of rash on elbow per derm     HTN, goal below 140/90      Hyperlipidemia      Lung nodule Feb 2003    Benign; Left upper lobe     Multiple allergies     allergist with immunotherapy; Allergist is Dr. Santos in [place]     Primary hypogonadism in male     Low FSH, single left testicle - followed by Dr. Flores of endo     Pulmonary embolus (H) Feb 2003    Evaluated by ?Earnest - unknown cause     Thyroid nodule Feb 2005    Benign per FNA --> taking Levothyroxine ever since         Past Surgical History:      Past Surgical History:   Procedure Laterality Date     ORCHIOPEXY CHILD Right     Age 10 due to undescended testicl     OTHER SURGICAL HISTORY  July 1997    Cervical laminectomy C4-5-6     OTHER SURGICAL HISTORY  Jan 2000    Right index tendon repair     OTHER SURGICAL HISTORY  August 2004    Ankle/wrist repair     OTHER SURGICAL HISTORY  April 2007    Lumbar microdiscectomy L5-S1     OTHER SURGICAL HISTORY  March 2010     "Cervical fusion C5-6-7     OTHER SURGICAL HISTORY  March 2011    Cervical fusion C3-4-5     OTHER SURGICAL HISTORY  July 2011    Lumbar fusion L5-S1               Brief Summary of Hospital stay :       Please refer to  Admission H&P note for full details of patient presentation.    Reason for Hospitalization(C/C,HPI and brief patient summary): Chest pain        Significant findings(Primary diagnosis )Procedures and treatments provided(Hospital course ,consults, procedures):Please see bellow for details      Juan C Garcia is a 59 year old male with a PMH significant for every day alcohol use, hx of PE >10 years ago not currently on anticoagulation, hypertension and hyperlipidemia who presents with constant central chest pressure.  Patient was admitted and closely monitored.  Serial troponins were obtained which showed no evidence of elevated troponin or coronary syndrome.  Stress testing was done with stress echo which was unremarkable.  His chest pain and epigastric abdominal pain was most likely from alcohol and use of NSAID.  He was instructed to quit alcohol and avoid nonsteroidal anti-inflammatory drugs and advised to Prilosec as well.  On the day of discharge patient was hemodynamically stable.  He was alert and oriented x3 and ambulatory.  Written discharge instruction was provided and patient was stable on discharge.    Consultations during hospital stay:    [facility] IP CONSULT      Patient discharge Condition:     stable    /79 (BP Location: Left arm)   Pulse 72   Temp 98.3  F (36.8  C) (Oral)   Resp 20   Ht 1.88 m (6' 2\")   Wt 97 kg (213 lb 14.4 oz)   SpO2 95%   BMI 27.46 kg/m             Discharge Instructions:       Patient/family instructions: Written discharge instruction given to patient/family    Discharge Medications:       Review of your medicines      START taking      Dose / Directions   omeprazole 20 MG DR capsule  Commonly known as:  priLOSEC  Used for:  Alcohol " use      Dose:  20 mg  Take 1 capsule (20 mg) by mouth daily  Refills:  0        CONTINUE these medicines which have NOT CHANGED      Dose / Directions   ADVIL PO      Dose:  400 mg  Take 400 mg by mouth as needed  Refills:  0     ALLERGY INJECTIONS  Used for:  Other chest pain      Refills:  0     atorvastatin 10 MG tablet  Commonly known as:  LIPITOR  Used for:  Hyperlipidemia, unspecified hyperlipidemia type      Dose:  10 mg  Take 1 tablet (10 mg) by mouth daily  Quantity:  90 tablet  Refills:  3     dapsone 25 MG tablet  Commonly known as:  ACZONE  Used for:  Dermatitis herpetiformis      Dose:  50 mg  TAKE 2 TABLETS (50 MG) BY MOUTH DAILY  Quantity:  60 tablet  Refills:  1     fluticasone 200 MCG/ACT inhaler  Commonly known as:  ARNUITY ELLIPTA      Dose:  1 puff  Inhale 1 puff into the lungs daily  Refills:  0     hydrochlorothiazide 12.5 MG tablet  Commonly known as:  HYDRODIURIL  Used for:  Essential hypertension      Dose:  25 mg  Take 2 tablets (25 mg) by mouth daily  Quantity:  90 tablet  Refills:  3     levothyroxine 137 MCG tablet  Commonly known as:  SYNTHROID/LEVOTHROID  Used for:  Hypothyroidism      TAKE 137 MCG (1 TABLET) BY MOUTH DAILY  Quantity:  90 tablet  Refills:  3     losartan 50 MG tablet  Commonly known as:  COZAAR  Used for:  Essential hypertension      Dose:  50 mg  Take 1 tablet (50 mg) by mouth daily  Quantity:  90 tablet  Refills:  3     testosterone 50 MG/5GM (1%) topical gel  Commonly known as:  ANDROGEL/TESTIM  Used for:  Other chest pain      Place onto the skin daily  Refills:  0           Where to get your medicines      Some of these will need a paper prescription and others can be bought over the counter. Ask your nurse if you have questions.    You don't need a prescription for these medications    omeprazole 20 MG DR capsule          Discharge diet:Orders Placed This Encounter      Diet    cardiac diet      Discharge activity:Activity as tolerated      Discharge  follow-up:    Follow up with primary care provider in 7 days or earlier if symptoms return or gets worse.    Follow up with consultant as instructed       Other instructions:    We discussed with patient/family about detail discharge instructions as well as discharge medications above including potential risks,side effects and benefits.Patient/family understood benefits and potential serious side effects of taking these medications and need to follow up with PCP if the patient develops complications.  Patient is also advised to see a doctor immediately for severe symptoms.        Major procedure performed/  Significant Diagnostic Studies:            Results for orders placed or performed during the hospital encounter of 20   XR Chest 2 Views    Narrative    CHEST TWO VIEWS 2020 11:19 AM     HISTORY: Chest pain and shortness of breath.    COMPARISON: Chest x-ray 2019.      Impression    IMPRESSION: PA and lateral views of the chest. Lungs are clear. Heart  is normal in size. No effusions are evident. No pneumothorax. Lower  cervical spine fusion plate is noted.    YESSI CASTELLANOS MD   Echo Stress Echocardiogram    Narrative    [id number]  [id number]  [id number]  [id number]^CHERRI^CRISTAL^VIVIEN           [facility]  Echocardiography Laboratory  [address] [place] [address]        Name: GENARO CASANOVA  MRN: [id number]  : 1960  Study Date: 2020 07:46 AM  Age: 59 yrs  Gender: Male  Patient Location: [facility], [place]  Reason For Study: Chest Pain  History: Family History,HTN,Hyperlipidemia,PE >10 years ago  Ordering Physician: CRISTAL HARLEY  Referring Physician: MAURICE BAEZ  Performed By: Sana Steve     BSA: 2.2 m2  Height: 74 in  Weight: 213 lb  HR: 73  BP: 110/76 mmHg  Medications: Losartan-HCTZ  _____________________________________________________________________________  __        Procedure  Stress Echo Complete. Contrast  Optison.  _____________________________________________________________________________  __        Interpretation Summary  1. Average exercise capacity.  2. The patient exhibited no chest pain during exercise.  3. This was a normal stress EKG with no evidence of stress-induced ischemia.  4. Rest echo: Normal left ventricular function and wall motion at rest. The  visual ejection fraction is estimated at 55-60%.  5. Stress echo: This was a normal stress echocardiogram with no evidence of  stress-induced ischemia. The visual ejection fraction is estimated at 65-70%.     No changes compared to stress echo from 7/2016.  _____________________________________________________________________________  __     Stress  The patient exercised 9:30.  RPP 26,576.  Exercise was stopped due to fatigue.  The patient exhibited no chest pain during exercise.  There was a normal BP response to exercise.  Target Heart Rate was achieved.  A high workload was achieved.  The Duke treadmill score was low risk ( >5 Duke score).  This was a normal stress EKG with no evidence of stress-induced ischemia.  This was a normal stress echocardiogram with no evidence of stress-induced  ischemia.  The visual ejection fraction is estimated at 65-70%.  Normal resting wall motion and no stress-induced wall motion abnormality.     Baseline  Resting ECG is normal.  The patient is in normal sinus rhythm.  Normal left ventricular function and wall motion at rest.  The visual ejection fraction is estimated at 55-60%.     Stress Results             Protocol:  Jesse          Maximum Predicted HR:   159 bpm             Target HR: 135 bpm        % Maximum Predicted HR: 95 %                       Stage  DurationHeart Rate  BP     Comment                           (mm:ss)   (bpm)                   Stage 1   3:00     104    150/76                   Stage 2   3:00     120    156/76                   Stage 3   3:00     141    176/76                   Stage 4   0:30      151      /   XIONG Score: 7                  RecoveryR  6:00      94    120/76FAC: Above               Stress Duration:   9:30 mm:ss *        Recovery Time: 6:00 mm:ss         Maximum Stress HR: 151 bpm *           METS:          10     _____________________________________________________________________________  __                             Report approved by: Balwinder Dominguez 01/28/2020 08:38 AM                    _____________________________________________________________________________  __          Recent Labs   Lab 01/27/20  0946   WBC 6.7   HGB 13.6   HCT 43.0   MCV 91        No results for input(s): CULT in the last 168 hours.  Recent Labs   Lab 01/27/20  0946      POTASSIUM 4.0   CHLORIDE 108   CO2 25   ANIONGAP 8   GLC 78   BUN 13   CR 0.79   GFRESTIMATED >90   GFRESTBLACK >90   JOSÉ MIGUEL 9.5   PROTTOTAL 7.5   ALBUMIN 4.2   BILITOTAL 0.7   ALKPHOS 77   AST 24   ALT 33       Recent Labs   Lab 01/27/20  0946   GLC 78       No results for input(s): INR in the last 168 hours.      Incidental findings that was discussed with patient/family and need follow up with PCP:     Pending Results:       Unresulted Labs Ordered in the Past 30 Days of this Admission     No orders found from 12/28/2019 to 1/28/2020.             Patient Allergies:       Allergies   Allergen Reactions     No Known Allergies          Disposition:     Disposition: home    Discharge needs: none         I saw and evaluated the patient on day of discharge and  discharge instructions reviewed  and  all the patient's questions and concerns addressed. Over 30 minutes spent on discharge and coordination of discharge process for this patient.      Disclaimer: This note consists of symbols derived from keyboarding, dictation and/or voice recognition software. As a result, there may be errors in the script that have gone undetected. Please consider this when interpreting information found in this chart

## 2020-02-03 NOTE — TELEPHONE ENCOUNTER
"ED / Discharge Outreach Protocol    Patient Contact    Attempt # 2    Was call answered?  Yes.  \"May I please speak with <Juan C>\"  Is patient available?   Yes    ED / Discharge Outreach Protocol      ED for acute condition Discharge Protocol    \"Hi, my name is Tonia Montana RN, a registered nurse, and I am calling from [facility].  I am calling to follow up and see how things are going for you after your recent emergency visit.\"    Tell me how you are doing now that you are home?\"     Pt is feeling much better, and would like to set up an appointment       Discharge Instructions    \"Let's review your discharge instructions.  What is/are the follow-up recommendations?  Pt. Response: F/u with PCP within 7 days     \"Has an appointment with your primary care provider been scheduled?\"  Pt will call back to schedule- he has to look at his work Calendar    Medications    \"Tell me what changed about your medicines when you discharged?\"           \"What questions do you have about your medications?\"   None        Call Summary    \"What questions or concerns do you have about your recent visit and your follow-up care?\"     none    \"If you have questions or things don't continue to improve, we encourage you contact us through the main clinic number (give number).  Even if the clinic is not open, triage nurses are available 24/7 to help you.     We would like you to know that our clinic has extended hours (provide information).  We also have urgent care (provide details on closest location and hours/contact info)\"    \"Thank you for your time and take care!\"                  "

## 2020-02-17 ENCOUNTER — OFFICE VISIT (OUTPATIENT)
Dept: FAMILY MEDICINE | Facility: CLINIC | Age: 60
End: 2020-02-17
Payer: COMMERCIAL

## 2020-02-17 ENCOUNTER — TELEPHONE (OUTPATIENT)
Dept: ADDICTION MEDICINE | Facility: CLINIC | Age: 60
End: 2020-02-17

## 2020-02-17 VITALS
SYSTOLIC BLOOD PRESSURE: 117 MMHG | OXYGEN SATURATION: 98 % | HEART RATE: 84 BPM | BODY MASS INDEX: 28.28 KG/M2 | TEMPERATURE: 97.4 F | DIASTOLIC BLOOD PRESSURE: 76 MMHG | WEIGHT: 220.4 LBS | HEIGHT: 74 IN

## 2020-02-17 DIAGNOSIS — Z78.9 ALCOHOL USE: ICD-10-CM

## 2020-02-17 DIAGNOSIS — I10 HTN, GOAL BELOW 140/90: Chronic | ICD-10-CM

## 2020-02-17 DIAGNOSIS — E78.5 HYPERLIPIDEMIA, UNSPECIFIED HYPERLIPIDEMIA TYPE: Chronic | ICD-10-CM

## 2020-02-17 DIAGNOSIS — I10 ESSENTIAL HYPERTENSION: ICD-10-CM

## 2020-02-17 DIAGNOSIS — K21.9 GASTROESOPHAGEAL REFLUX DISEASE, ESOPHAGITIS PRESENCE NOT SPECIFIED: ICD-10-CM

## 2020-02-17 DIAGNOSIS — D22.9 BENIGN MOLE: ICD-10-CM

## 2020-02-17 DIAGNOSIS — R07.89 ATYPICAL CHEST PAIN: Primary | ICD-10-CM

## 2020-02-17 PROCEDURE — 99214 OFFICE O/P EST MOD 30 MIN: CPT | Performed by: INTERNAL MEDICINE

## 2020-02-17 RX ORDER — ATORVASTATIN CALCIUM 10 MG/1
10 TABLET, FILM COATED ORAL DAILY
Qty: 90 TABLET | Refills: 3 | Status: SHIPPED | OUTPATIENT
Start: 2020-02-17 | End: 2021-03-15

## 2020-02-17 RX ORDER — LOSARTAN POTASSIUM 50 MG/1
50 TABLET ORAL DAILY
Qty: 90 TABLET | Refills: 3 | Status: SHIPPED | OUTPATIENT
Start: 2020-02-17 | End: 2021-03-15

## 2020-02-17 RX ORDER — HYDROCHLOROTHIAZIDE 12.5 MG/1
12.5 TABLET ORAL DAILY
Qty: 90 TABLET | Refills: 3 | Status: SHIPPED | OUTPATIENT
Start: 2020-02-17 | End: 2021-03-15

## 2020-02-17 ASSESSMENT — MIFFLIN-ST. JEOR: SCORE: 1874.48

## 2020-02-17 NOTE — PROGRESS NOTES
Chief Complaint:       Juan C Garcia is a 59 year old male who presents to clinic today for the following health issues:          Hospital Follow-up Visit:    Hospital/Nursing Home/IP Rehab Facility: [facility]  Date of Admission: 01/27/2020  Date of Discharge: 01/28/2020  Reason(s) for Admission:     #1.  Atypical chest pain:  #2.  Alcohol use disorder:              Problems taking medications regularly:  None       Medication changes since discharge: None       Problems adhering to non-medication therapy:  None    Summary of hospitalization:  [facility] discharge summary reviewed  Diagnostic Tests/Treatments reviewed.  Follow up needed: mental health addiction clinic  Other Healthcare Providers Involved in Patient s Care:         skin care clinic  Update since discharge: improved.     Post Discharge Medication Reconciliation: discharge medications reconciled and changed, per note/orders (see AVS).  Plan of care communicated with patient     Coding guidelines for this visit:  Type of Medical   Decision Making Face-to-Face Visit       within 7 Days of discharge Face-to-Face Visit        within 14 days of discharge   Moderate Complexity [id number]   High Complexity [id number]              HPI:   Patient Juan C Garcia is a very pleasant 59 year old male with history of alcohol dependence who presents to Internal Medicine clinic today for post hospital discharge follow up of recent hospitalization for atypical chest pains in the setting of acute alcohol abuse. No further chest pains. Patient is currently attending AA meetings for his alcohol dependence problem. He is interested in a mental health addictions clinic referral to alcohol treatment going forward. He reports that he hasn't drunk any alcohol since his recent hospital discharge.       Current Medications:     Current Outpatient Medications   Medication Sig Dispense Refill     ALLERGY INJECTIONS        atorvastatin (LIPITOR) 10 MG  tablet Take 1 tablet (10 mg) by mouth daily 90 tablet 3     dapsone (ACZONE) 25 MG tablet TAKE 2 TABLETS (50 MG) BY MOUTH DAILY 60 tablet 1     fluticasone (ARNUITY ELLIPTA) 200 MCG/ACT inhaler Inhale 1 puff into the lungs daily       hydrochlorothiazide (HYDRODIURIL) 12.5 MG tablet Take 2 tablets (25 mg) by mouth daily (Patient taking differently: Take 25 mg by mouth daily Patient reports taking 1 25mg tablet) 90 tablet 3     levothyroxine (SYNTHROID, LEVOTHROID) 137 MCG tablet TAKE 137 MCG (1 TABLET) BY MOUTH DAILY 90 tablet 3     losartan (COZAAR) 50 MG tablet Take 1 tablet (50 mg) by mouth daily 90 tablet 3     testosterone (ANDROGEL/TESTIM) 50 MG/5GM (1%) gel Place onto the skin daily           Allergies:      Allergies   Allergen Reactions     No Known Allergies             Past Medical History:     Past Medical History:   Diagnosis Date     Alcohol use      Dermatitis herpetiformis 2000    Dx by biopsy of rash on elbow per derm     HTN, goal below 140/90      Hyperlipidemia      Lung nodule Feb 2003    Benign; Left upper lobe     Multiple allergies     allergist with immunotherapy; Allergist is Dr. Santos in [place]     Primary hypogonadism in male     Low FSH, single left testicle - followed by Dr. Flores of endo     Pulmonary embolus (H) Feb 2003    Evaluated by ?[place] - unknown cause     Thyroid nodule Feb 2005    Benign per [facility] --> taking Levothyroxine ever since         Past Surgical History:     Past Surgical History:   Procedure Laterality Date     ORCHIOPEXY CHILD Right     Age 10 due to undescended testicl     OTHER SURGICAL HISTORY  July 1997    Cervical laminectomy C4-5-6     OTHER SURGICAL HISTORY  Jan 2000    Right index tendon repair     OTHER SURGICAL HISTORY  August 2004    Ankle/wrist repair     OTHER SURGICAL HISTORY  April 2007    Lumbar microdiscectomy L5-S1     OTHER SURGICAL HISTORY  March 2010    Cervical fusion C5-6-7     OTHER SURGICAL HISTORY  March 2011    Cervical fusion C3-4-5      OTHER SURGICAL HISTORY  July 2011    Lumbar fusion L5-S1         Family Medical History:     Family History   Problem Relation Age of Onset     Hypertension Mother      Dementia Mother      Alcoholism Mother      Hypertension Father      Cerebrovascular Disease Father      Cancer Father         throat - was a smoker     Heart Disease Father         CABG     Diabetes Maternal Grandfather      Family History Negative Brother      Family History Negative Sister      Cerebrovascular Disease Paternal Grandmother      Cancer Son         Testicular cancer     Family History Negative Son          Social History:     Social History     Socioeconomic History     Marital status:      Spouse name: Not on file     Number of children: Not on file     Years of education: Not on file     Highest education level: Not on file   Occupational History     Not on file   Social Needs     Financial resource strain: Not on file     Food insecurity:     Worry: Not on file     Inability: Not on file     Transportation needs:     Medical: Not on file     Non-medical: Not on file   Tobacco Use     Smoking status: Never Smoker     Smokeless tobacco: Never Used   Substance and Sexual Activity     Alcohol use: Yes     Alcohol/week: 0.0 standard drinks     Comment: 4 drinks a week     Drug use: No     Sexual activity: Yes     Partners: Female   Lifestyle     Physical activity:     Days per week: Not on file     Minutes per session: Not on file     Stress: Not on file   Relationships     Social connections:     Talks on phone: Not on file     Gets together: Not on file     Attends Scientologist service: Not on file     Active member of club or organization: Not on file     Attends meetings of clubs or organizations: Not on file     Relationship status: Not on file     Intimate partner violence:     Fear of current or ex partner: Not on file     Emotionally abused: Not on file     Physically abused: Not on file     Forced sexual activity: Not  "on file   Other Topics Concern     Parent/sibling w/ CABG, MI or angioplasty before 65F 55M? Not Asked   Social History Narrative     Not on file           Review of System:     Constitutional: Negative for fever or chills  Skin: Negative for rashes, positive for skin mole of the left shoulder that got darker recently  Ears/Nose/Throat: Negative for nasal congestion, sore throat  Respiratory: No shortness of breath, dyspnea on exertion, cough, or hemoptysis  Cardiovascular: Negative for chest pain since hospital discharge  Gastrointestinal: Negative for nausea, vomiting, positive for chronic GERD  Genitourinary: Negative for dysuria, hematuria  Musculoskeletal: Negative for myalgias  Neurologic: Negative for headaches  Psychiatric: Negative for depression, anxiety  Hematologic/Lymphatic/Immunologic: Negative  Endocrine: Negative  Behavioral: Negative for tobacco use, positive for recent hospitalization for alcohol dependence, in remission since hospital discharge.      Physical Exam:   /76 (BP Location: Right arm, Patient Position: Sitting, Cuff Size: Adult Large)   Pulse 84   Temp 97.4  F (36.3  C) (Tympanic)   Ht 1.88 m (6' 2\")   Wt 100 kg (220 lb 6.4 oz)   SpO2 98%   BMI 28.30 kg/m      GENERAL: alert and no distress  EYES: eyes grossly normal to inspection, and conjunctivae and sclerae normal  HENT: Normocephalic atraumatic. Nose and mouth without ulcers or lesions  NECK: supple  RESP: lungs clear to auscultation   CV: regular rate and rhythm, normal S1 S2  LYMPH: no peripheral edema   ABDOMEN: nondistended  MS: no gross musculoskeletal defects noted  SKIN: a dark brown color mole present on the left shoulder  NEURO: Alert & Oriented x 3.   PSYCH: mentation appears normal, affect normal        Diagnostic Test Results:     Lab Results   Component Value Date    WBC 6.7 01/27/2020     Lab Results   Component Value Date    RBC 4.71 01/27/2020     Lab Results   Component Value Date    HGB 13.6 01/27/2020 "     Lab Results   Component Value Date    HCT 43.0 01/27/2020     No components found for: MCT  Lab Results   Component Value Date    MCV 91 01/27/2020     Lab Results   Component Value Date    MCH 28.9 01/27/2020     Lab Results   Component Value Date    MCHC 31.6 01/27/2020     Lab Results   Component Value Date    RDW 13.3 01/27/2020     Lab Results   Component Value Date     01/27/2020     Last Comprehensive Metabolic Panel:  Sodium   Date Value Ref Range Status   01/27/2020 141 133 - 144 mmol/L Final     Potassium   Date Value Ref Range Status   01/27/2020 4.0 3.4 - 5.3 mmol/L Final     Chloride   Date Value Ref Range Status   01/27/2020 108 94 - 109 mmol/L Final     Carbon Dioxide   Date Value Ref Range Status   01/27/2020 25 20 - 32 mmol/L Final     Anion Gap   Date Value Ref Range Status   01/27/2020 8 3 - 14 mmol/L Final     Glucose   Date Value Ref Range Status   01/27/2020 78 70 - 99 mg/dL Final     Urea Nitrogen   Date Value Ref Range Status   01/27/2020 13 7 - 30 mg/dL Final     Creatinine   Date Value Ref Range Status   01/27/2020 0.79 0.66 - 1.25 mg/dL Final     GFR Estimate   Date Value Ref Range Status   01/27/2020 >90 >60 mL/min/[1.73_m2] Final     Comment:     Non  GFR Calc  Starting 12/18/2018, serum creatinine based estimated GFR (eGFR) will be   calculated using the Chronic Kidney Disease Epidemiology Collaboration   (CKD-EPI) equation.       Calcium   Date Value Ref Range Status   01/27/2020 9.5 8.5 - 10.1 mg/dL Final       ASSESSMENT/PLAN:     (Z72.89) Alcohol use  (R07.89) Atypical chest pain  (primary encounter diagnosis)  Comment: post hospital discharge follow up of recent hospitalization for atypical chest pains in the setting of acute alcohol abuse. No further chest pains. Patient is currently attending AA meetings for his alcohol dependence problem. He is interested in a mental health addictions clinic referral to alcohol treatment going forward. He reports that  he hasn't drunk any alcohol since his recent hospital discharge  Plan: I have advised the patient to continue his alcohol cessation efforts going forward.  MENTAL HEALTH REFERRAL  - Adult; Addiction         Medicine Provider; Addiction Medicine         Evaluation & Treatment; Addiction Medicine         Consultation, Evaluation & Treatment [phone]; Alcohol; Medication Assisted         Treatment: Alcohol; We will contact you t...           (E78.5) Hyperlipidemia, unspecified hyperlipidemia type  Comment: stable. Patient is due for a refill of Lipitor medication  Plan: atorvastatin (LIPITOR) 10 MG tablet           (I10) HTN, goal below 140/90  Comment: BP is at goal  Plan: I have refilled the patient's hydrochlorothiazide (HYDRODIURIL) 12.5 MG         Tablet and losartan (COZAAR) 50 MG tablet BP medications.      (K21.9) Gastroesophageal reflux disease, esophagitis presence not specified  Comment: symptoms well controlled currently.  Plan: continue current therapy, patient is also advised to avoid alcohol use.      (D22.9) Benign mole  Comment: chronic skin mole of the left shoulder which got darker recently  Plan: SKIN CARE REFERRAL for further evaluation going forward.        Follow Up Plan:     Patient is instructed to return to Internal Medicine clinic for follow-up visit in 1 month.        Suzanne Zhang MD  Internal Medicine  [facility]

## 2020-02-17 NOTE — TELEPHONE ENCOUNTER
Please review referral. Please route back to reception pool #[phone].     Thank you,    Tawny Barker  [facility]

## 2020-02-18 NOTE — TELEPHONE ENCOUNTER
Please schedule appointment for patient with   Addiction Medicine Provider   For alcohol use.  Please also give information for CD intake   =  [facility]  [phone] [address] [facility] [address]  [phone]

## 2020-02-19 NOTE — TELEPHONE ENCOUNTER
Writer attempted to reach pt; no answer. LVM requesting a call back for an appt. Two more attempts will be made.     Tawny Barker  [facility]

## 2020-02-24 NOTE — TELEPHONE ENCOUNTER
Writer attempted to reach pt; no answer. LVM requesting a call back for an appt. One more attempt will be made.     Tawny Barker  [facility]

## 2020-02-26 NOTE — TELEPHONE ENCOUNTER
Writer was able to get a hold of patient, he stated that he is no longer interested in seeing an addiction medicine provider. Patient was also informed that his referral is good for up to 1 year. Writer is closing this encounter, no further action needed.       Tawny Barker    [facility]

## 2020-04-09 ENCOUNTER — VIRTUAL VISIT (OUTPATIENT)
Dept: FAMILY MEDICINE | Facility: OTHER | Age: 60
End: 2020-04-09

## 2020-04-09 NOTE — PROGRESS NOTES
"Date: 2020 14:59:34  Clinician: Emile Curry  Clinician NPI: [id number]  Patient: Mikhail Garcia  Patient : 1960  Patient Address: [address] [place] [id number]  Patient Phone: [phone]  Visit Protocol: URI  Patient Summary:  Mikhail is a 60 year old ( : 1960 ) male who initiated a Visit for cold, sinus infection, or influenza. When asked the question \"Please sign me up to receive news, health information and promotions from [employer].\", Mikhail responded \"No\".    Mikhail states his symptoms started gradually 2-3 weeks ago. After his symptoms started, they improved and then got worse again.   His symptoms consist of a sore throat, malaise, and a headache.   Symptom details     Sore throat: Mikhail reports having mild throat pain (1-3 on a 10 point pain scale), does not have exudate on his tonsils, and can swallow liquids. He is not sure if the lymph nodes in his neck are enlarged. A rash has not appeared on the skin since the sore throat started.     Headache: He states the headache is moderate (4-6 on a 10 point pain scale).      Mikhail denies having rhinitis, facial pain or pressure, myalgias, cough, nasal congestion, ear pain, chills, diarrhea, vomiting, nausea, teeth pain, fever, and wheezing. He also denies taking antibiotic medication for the symptoms, having recent facial or sinus surgery in the past 60 days, and having a sinus infection within the past year. He is not experiencing dyspnea.   Precipitating events  Mikhail is not sure if he has been exposed to someone with strep throat.   Pertinent COVID-19 (Coronavirus) information  Mkihail has not traveled internationally or to the areas where COVID-19 (Coronavirus) is widespread, including cruise ship travel in the last 14 days before the start of his symptoms.   Mikhail has not had a close contact with a laboratory-confirmed COVID-19 patient within 14 days of symptom onset. He also has not had a close contact with a suspected COVID-19 patient " within 14 days of symptom onset.   Mikhail does not work or volunteer as healthcare worker or a  and does not work or volunteer in a healthcare facility. He does not live with a healthcare worker.   Pertinent medical history  Mikhail does not need a return to work/school note.   Weight: 215 lbs   Mikhail does not smoke or use smokeless tobacco.   Weight: 215 lbs    MEDICATIONS: losartan-hydrochlorothiazide oral, levothyroxine oral, AndroGel transdermal, Arnuity Ellipta inhalation, atorvastatin oral, dapsone oral, ALLERGIES: NKDA  Clinician Response:  Dear Mikhail,  Based on the information provided, you have a viral upper respiratory infection, otherwise known as a cold. Symptoms vary from person to person, but can include sneezing, coughing, a runny nose, sore throat, and headache and range from mild to severe.  Unfortunately, there are no medications that can cure a cold, so treatment is focused on controlling symptoms as much as possible. Most people gradually feel better until symptoms are gone in 1-2 weeks.  Medication information  Because you have a viral infection, antibiotics will not help you get better. Treating a viral infection with antibiotics could actually make you feel worse.  Unless you are allergic to the over-the-counter medication(s) below, I recommend using:       Acetaminophen (Tylenol or store brand) oral tablet. Take 1-2 tablets by mouth every 4-6 hours to help with the discomfort.      Saline nasal spray or drops(Ocean or store brand). Use 1-2 drops or sprays in each nostril as needed for congestion.     Over-the-counter medications do not require a prescription. Ask the pharmacist if you have any questions.  Self care  Steps you can take to be as comfortable as possible:     Rest.    Drink plenty of fluids.    Use throat lozenges.    Suck on frozen items such as popsicles.    Drink hot tea with lemon and honey.    Gargle with warm salt water (1/4 teaspoon of salt per 8 ounce glass of  water).     When to seek care  Please be seen in a clinic or urgent care if any of the following occur:   New symptoms develop, or symptoms become worse   Call ahead before going to the clinic or urgent care.  Call 911 or go to the emergency room if you feel that your throat is closing off, you suddenly develop a rash, you are unable to swallow fluids, you are drooling, or you are having difficulty breathing.  COVID-19 (Coronavirus) General Information  With the increase in the number of COVID-19 (Coronavirus) cases, we understand you may have some questions. Below is some helpful information on COVID-19 (Coronavirus).  How can I protect myself and others from the COVID-19 (Coronavirus)?  Because there is currently no vaccine to prevent infection, the best way to protect yourself is to avoid being exposed to this virus. Put distance between yourself and other people if COVID-19 (Coronavirus) is spreading in your community. The virus is thought to spread mainly from person-to-person.     Between people who are in close contact with one another (within about 6 about) for a prolonged period (10 minutes or longer).    Through respiratory droplets produced when an infected person coughs or sneezes.     The CDC recommends the following additional steps to protect yourself and others:     Wash your hands often with soap and water for at least 20 seconds, especially after blowing your nose, coughing, or sneezing; going to the bathroom; and before eating or preparing food.  Use an alcohol-based hand  that contains at least 60 percent alcohol if soap and water are not available.        Avoid touching your eyes, nose and mouth with unwashed hands.    Avoid close contact with people who are sick.    Stay home when you are sick.    Cover your cough or sneeze with a tissue, then throw the tissue in the trash.    Clean and disinfect frequently touched objects and surfaces.     You can help stop COVID-19 (Coronavirus) by  knowing the signs and symptoms:     Fever    Cough    Shortness of breath     Contact your healthcare provider if   Develop symptoms   AND   Have been in close contact with a person known to have COVID-19 (Coronavirus) or live in or have recently traveled from an area with ongoing spread of COVID-19 (Coronavirus). Call ahead before you go to a doctor's office or emergency room. Tell them about your recent travel and your symptoms.   For the most up to date information, visit the CDC's website.  Self-monitoring  Self-monitoring means people should monitor themselves for fever by taking their temperatures twice a day and remain alert for a cough or difficulty breathing.  It is important to check your health two times each day for 14 days after a potential exposure to a person with COVID-19 (Coronavirus) or after travel from a location where COVID-19 (Coronavirus) is widespread. If you have been exposed to a person with COVID-19 (Coronavirus), it may take up to 14 days to know if you will get sick. Follow the steps below to check and record your health.     Take your temperature with a thermometer twice a day, once in the morning and once in the evening, and watch for a cough or difficulty breathing for 14 days.    Write down your temperature and any COVID-19 symptoms you may have: feeling feverish, coughing, or difficulty breathing.    Stay home from work or school.    Do not take public transportation, taxis, or ride-shares.    Avoid crowded places (such as shopping centers and movie theaters) and limit your activities in public.    Keep your distance from others (about 6 feet or 2 meters).    If you get sick with fever, cough, or trouble breathing, contact your healthcare provider and tell them about your recent travel and/or your symptoms.    If you need to seek medical care for other reasons, such as dialysis, call ahead to your doctor and tell them about your recent travel.     Steps to help prevent the spread of  "COVID-19 (Coronavirus) if you are sick  If you are sick with COVID-19 (Coronavirus) or suspect you are infected with the virus that causes COVID-19 (Coronavirus), follow the steps below to help prevent the disease from spreading&nbsp;to people in your home and community.     Stay home except to get medical care. Home isolation may be started in consultation with your healthcare clinician.    Separate yourself from other people and animals in your home.    Call ahead before visiting your doctor if you have a medical appointment.    Wear a facemask when you are around other people.    Cover your cough and sneezes.    Clean your hands often.    Avoid sharing personal household items.    Clean and disinfect frequently touched objects and surfaces everyday.    You will need to have someone drop off medications or household supplies (if needed) at your house without coming inside or in contact with you or others living in your house.    Monitor your symptoms and seek prompt medical care if your illness is worsening (e.g. Difficulty breathing).    Discontinue home isolation only in consultation with your healthcare provider.     For more detailed and up to date information on what to do if you are sick, visit this link: What to Do If You Are Sick With COVID-19.  Do I need to be tested for COVID-19 (Coronavirus)?     Not everyone needs to be tested for COVID-19 (Coronavirus). Decisions on which patients receive testing will be based on the local spread of COVID-19 (Coronavirus) as well as the symptoms. Your healthcare provider will make the final decision on whether you should be tested.    In the meantime, if you have concerns that you may have been exposed, it is reasonable to practice \"social distancing.\"&nbsp; If you are ill with a cold or flu-like illness, please monitor your symptoms and call your healthcare provider if your symptoms worsen.    For more up to date information, visit this link: COVID-19 (Coronavirus) " Frequently Asked Questions and Answers.      Diagnosis: Viral URI  Diagnosis ICD: J06.9

## 2020-05-21 ENCOUNTER — VIRTUAL VISIT (OUTPATIENT)
Dept: FAMILY MEDICINE | Facility: CLINIC | Age: 60
End: 2020-05-21
Payer: COMMERCIAL

## 2020-05-21 DIAGNOSIS — J30.2 SEASONAL ALLERGIC RHINITIS, UNSPECIFIED TRIGGER: Chronic | ICD-10-CM

## 2020-05-21 DIAGNOSIS — J01.91 ACUTE RECURRENT SINUSITIS, UNSPECIFIED LOCATION: Primary | ICD-10-CM

## 2020-05-21 DIAGNOSIS — Z78.9 ALCOHOL USE: ICD-10-CM

## 2020-05-21 DIAGNOSIS — E78.5 HYPERLIPIDEMIA, UNSPECIFIED HYPERLIPIDEMIA TYPE: Chronic | ICD-10-CM

## 2020-05-21 PROCEDURE — 99214 OFFICE O/P EST MOD 30 MIN: CPT | Mod: 95 | Performed by: INTERNAL MEDICINE

## 2020-05-21 RX ORDER — PREDNISONE 20 MG/1
20 TABLET ORAL 2 TIMES DAILY
Qty: 20 TABLET | Refills: 0 | Status: SHIPPED | OUTPATIENT
Start: 2020-05-21 | End: 2020-05-31

## 2020-05-21 RX ORDER — AZITHROMYCIN 250 MG/1
TABLET, FILM COATED ORAL
Qty: 6 TABLET | Refills: 0 | Status: SHIPPED | OUTPATIENT
Start: 2020-05-21 | End: 2020-05-26

## 2020-05-21 NOTE — PROGRESS NOTES
"Juan C Garcia is a 60 year old male who is being evaluated via a billable telephone visit.      The patient has been notified of following:     \"This telephone visit will be conducted via a call between you and your physician/provider. We have found that certain health care needs can be provided without the need for a physical exam.  This service lets us provide the care you need with a short phone conversation.  If a prescription is necessary we can send it directly to your pharmacy.  If lab work is needed we can place an order for that and you can then stop by our lab to have the test done at a later time.    Telephone visits are billed at different rates depending on your insurance coverage. During this emergency period, for some insurers they may be billed the same as an in-person visit.  Please reach out to your insurance provider with any questions.    If during the course of the call the physician/provider feels a telephone visit is not appropriate, you will not be charged for this service.\"    Patient has given verbal consent for Telephone visit?  Yes    What phone number would you like to be contacted at? [phone]    How would you like to obtain your AVS? Josephhareleuterio    Chief Complaint:     Sore throat, sinusitis, allergic rhinitis      HPI:   Patient Juan C Garcia is a very pleasant 60 year old male with history of allergic rhinitis today for telephone evaluation of multiple concerns including recent recurrent sinusitis symptoms with sore throat with allergic rhinitis flare up symptoms. No chest pain, fever or chills at this time. Regarding the patient's chronic alcohol dependence, the patient reports no alcohol use since last clinic visit.         Current Medications:     Current Outpatient Medications   Medication Sig Dispense Refill     ALLERGY INJECTIONS        atorvastatin (LIPITOR) 10 MG tablet Take 1 tablet (10 mg) by mouth daily 90 tablet 3     azithromycin (ZITHROMAX) 250 MG tablet Take 2 " tablets (500 mg) by mouth daily for 1 day, THEN 1 tablet (250 mg) daily for 4 days. 6 tablet 0     dapsone (ACZONE) 25 MG tablet TAKE 2 TABLETS (50 MG) BY MOUTH DAILY 60 tablet 1     fluticasone (ARNUITY ELLIPTA) 200 MCG/ACT inhaler Inhale 1 puff into the lungs daily       hydrochlorothiazide (HYDRODIURIL) 12.5 MG tablet Take 1 tablet (12.5 mg) by mouth daily 90 tablet 3     levothyroxine (SYNTHROID, LEVOTHROID) 137 MCG tablet TAKE 137 MCG (1 TABLET) BY MOUTH DAILY 90 tablet 3     losartan (COZAAR) 50 MG tablet Take 1 tablet (50 mg) by mouth daily 90 tablet 3     predniSONE (DELTASONE) 20 MG tablet Take 1 tablet (20 mg) by mouth 2 times daily for 10 days 20 tablet 0     testosterone (ANDROGEL/TESTIM) 50 MG/5GM (1%) gel Place onto the skin daily           Allergies:      Allergies   Allergen Reactions     No Known Allergies             Past Medical History:     Past Medical History:   Diagnosis Date     Alcohol use      Dermatitis herpetiformis 2000    Dx by biopsy of rash on elbow per derm     HTN, goal below 140/90      Hyperlipidemia      Lung nodule Feb 2003    Benign; Left upper lobe     Multiple allergies     allergist with immunotherapy; Allergist is Dr. Santos in [place]     Primary hypogonadism in male     Low FSH, single left testicle - followed by Dr. Flores of endo     Pulmonary embolus (H) Feb 2003    Evaluated by Javier - unknown cause     Thyroid nodule Feb 2005    Benign per [facility]NA --> taking Levothyroxine ever since         Past Surgical History:     Past Surgical History:   Procedure Laterality Date     ORCHIOPEXY CHILD Right     Age 10 due to undescended testicl     OTHER SURGICAL HISTORY  July 1997    Cervical laminectomy C4-5-6     OTHER SURGICAL HISTORY  Jan 2000    Right index tendon repair     OTHER SURGICAL HISTORY  August 2004    Ankle/wrist repair     OTHER SURGICAL HISTORY  April 2007    Lumbar microdiscectomy L5-S1     OTHER SURGICAL HISTORY  March 2010    Cervical fusion C5-6-7     OTHER  SURGICAL HISTORY  March 2011    Cervical fusion C3-4-5     OTHER SURGICAL HISTORY  July 2011    Lumbar fusion L5-S1         Family Medical History:     Family History   Problem Relation Age of Onset     Hypertension Mother      Dementia Mother      Alcoholism Mother      Hypertension Father      Cerebrovascular Disease Father      Cancer Father         throat - was a smoker     Heart Disease Father         CABG     Diabetes Maternal Grandfather      Family History Negative Brother      Family History Negative Sister      Cerebrovascular Disease Paternal Grandmother      Cancer Son         Testicular cancer     Family History Negative Son          Social History:     Social History     Socioeconomic History     Marital status:      Spouse name: Not on file     Number of children: Not on file     Years of education: Not on file     Highest education level: Not on file   Occupational History     Not on file   Social Needs     Financial resource strain: Not on file     Food insecurity     Worry: Not on file     Inability: Not on file     Transportation needs     Medical: Not on file     Non-medical: Not on file   Tobacco Use     Smoking status: Never Smoker     Smokeless tobacco: Never Used   Substance and Sexual Activity     Alcohol use: Not Currently     Alcohol/week: 0.0 standard drinks     Drug use: No     Sexual activity: Yes     Partners: Female   Lifestyle     Physical activity     Days per week: Not on file     Minutes per session: Not on file     Stress: Not on file   Relationships     Social connections     Talks on phone: Not on file     Gets together: Not on file     Attends Evangelical service: Not on file     Active member of club or organization: Not on file     Attends meetings of clubs or organizations: Not on file     Relationship status: Not on file     Intimate partner violence     Fear of current or ex partner: Not on file     Emotionally abused: Not on file     Physically abused: Not on file      Forced sexual activity: Not on file   Other Topics Concern     Parent/sibling w/ CABG, MI or angioplasty before 65F 55M? Not Asked   Social History Narrative     Not on file           Review of System:     Constitutional: Negative for fever or chills  Skin: Negative for rashes  Ears/Nose/Throat: positive for nasal congestion, sore throat  Respiratory: No shortness of breath, dyspnea on exertion, cough, or hemoptysis  Cardiovascular: Negative for chest pain  Gastrointestinal: Negative for nausea, vomiting  Genitourinary: Negative for dysuria, hematuria  Musculoskeletal: Negative for myalgias  Neurologic: Negative for headaches  Psychiatric: Negative for depression, anxiety  Hematologic/Lymphatic/Immunologic: Negative  Endocrine: Negative  Behavioral: Negative for tobacco use, positive for chronic alcohol dependence, currently in remission      Physical Exam:   There were no vitals taken for this visit.      Diagnostic Test Results:       Recent Labs   Lab Test 01/28/20  0639 03/08/19  0852  02/02/15  1002   CHOL 231* 241*   < > 252*   HDL 65 53   < > 48   * 155*   < > 152*   TRIG 146 166*   < > 259*   CHOLHDLRATIO  --   --   --  5.2*    < > = values in this interval not displayed.       ASSESSMENT/PLAN:     (J30.2) Seasonal allergic rhinitis, unspecified trigger  (J01.91) Acute recurrent sinusitis, unspecified location  (primary encounter diagnosis)  Comment: recent recurrent sinusitis symptoms with sore throat with allergic rhinitis flare up symptoms.   Plan: azithromycin (ZITHROMAX) 250 MG tablet,         predniSONE (DELTASONE) 20 MG tablet      (Z72.89) Alcohol use  Comment: Regarding the patient's chronic alcohol dependence, the patient reports no alcohol use since last clinic visit.   Plan: patient is advised to continue his alcohol cessation efforts going forward.      (E78.5) Hyperlipidemia, unspecified hyperlipidemia type  Comment: stable on current therapy  Plan: continue Lipitor         Follow Up  Plan:     Patient is instructed to return to Internal Medicine clinic for follow-up visit in 1 week.      Phone call duration:  25 minutes    Suzanne Zhang MD

## 2020-06-13 DIAGNOSIS — L13.0 DERMATITIS HERPETIFORMIS: ICD-10-CM

## 2020-06-15 RX ORDER — DAPSONE 25 MG/1
TABLET ORAL
Qty: 60 TABLET | Refills: 1 | Status: SHIPPED | OUTPATIENT
Start: 2020-06-15 | End: 2020-08-19

## 2020-06-15 NOTE — TELEPHONE ENCOUNTER
Routing refill request to provider for review/approval because:  Drug not on the [facility] refill protocol     Please review and authorize if appropriate.    Thank you,   Aaron MAX RN

## 2020-07-31 ENCOUNTER — VIRTUAL VISIT (OUTPATIENT)
Dept: FAMILY MEDICINE | Facility: OTHER | Age: 60
End: 2020-07-31
Payer: COMMERCIAL

## 2020-07-31 PROCEDURE — 99421 OL DIG E/M SVC 5-10 MIN: CPT | Performed by: PHYSICIAN ASSISTANT

## 2020-07-31 NOTE — PROGRESS NOTES
"Date: 2020 13:24:02  Clinician: Fernanda Haile  Clinician NPI: [id number]  Patient: Mikhail Garcia  Patient : 1960  Patient Address: [address] [place] [id number]  Patient Phone: [phone]  Visit Protocol: URI  Patient Summary:  Mikhail is a 60 year old ( : 1960 ) male who initiated a Visit for COVID-19 (Coronavirus) evaluation and screening. When asked the question \"Please sign me up to receive news, health information and promotions from [employer].\", Mikhail responded \"No\".    Mikhail states his symptoms started gradually 5-6 days ago. After his symptoms started, they improved and then got worse again.   His symptoms consist of a sore throat and a headache.   Symptom details     Sore throat: Mikhail reports having moderate throat pain (4-6 on a 10 point pain scale), does not have exudate on his tonsils, and can swallow liquids. He is not sure if the lymph nodes in his neck are enlarged. A rash has not appeared on the skin since the sore throat started.     Headache: He states the headache is moderate (4-6 on a 10 point pain scale).      Mikhail denies having wheezing, nausea, teeth pain, ageusia, diarrhea, myalgias, anosmia, facial pain or pressure, fever, cough, nasal congestion, vomiting, rhinitis, malaise, ear pain, and chills. He also denies having recent facial or sinus surgery in the past 60 days, taking antibiotic medication in the past month, and having a sinus infection within the past year. He is not experiencing dyspnea.   Precipitating events  Within the past week, Mikhail has not been exposed to someone with strep throat.   Pertinent COVID-19 (Coronavirus) information  In the past 14 days, Mikhail has not worked in a congregate living setting.   He does not work or volunteer as healthcare worker or a  and does not work or volunteer in a healthcare facility.   Mikhail also has not lived in a congregate living setting in the past 14 days. He does not live with a healthcare worker.   " Mikhail has not had a close contact with a laboratory-confirmed COVID-19 patient within 14 days of symptom onset.   Pertinent medical history  Mikhail does not need a return to work/school note.   Weight: 215 lbs   Mikhail does not smoke or use smokeless tobacco.   Additional information as reported by the patient (free text): slight chest tightness // did not match questionnaire   Weight: 215 lbs    MEDICATIONS: atorvastatin oral, dapsone oral, AndroGel transdermal, levothyroxine oral, losartan-hydrochlorothiazide oral, Arnuity Ellipta inhalation, ALLERGIES: NKDA  Clinician Response:  Dear Mikhail,   Your symptoms show that you may have coronavirus (COVID-19). This illness can cause fever, cough and trouble breathing. Many people get a mild case and get better on their own. Some people can get very sick.  What should I do?  We would like to test you for this virus.   1. Please call [phone] to schedule your visit. Explain that you were referred by [facility] to have a COVID-19 test. Be ready to share your [facility] visit ID number.  The following will serve as your written order for this COVID Test, ordered by me, for the indication of suspected COVID [Z20.828]: The test will be ordered in [employer], our electronic health record, after you are scheduled. It will show as ordered and authorized by Turner Medina MD.  Order: COVID-19 (Coronavirus) PCR for SYMPTOMATIC testing from [facility].      2. When it's time for your COVID test:  Stay at least 6 feet away from others. (If someone will drive you to your test, stay in the backseat, as far away from the  as you can.)   Cover your mouth and nose with a mask, tissue or washcloth.  Go straight to the testing site. Don't make any stops on the way there or back.      3.Starting now: Stay home and away from others (self-isolate) until:   You've had no fever---and no medicine that reduces fever---for 3 full days (72 hours). And...   Your other symptoms have gotten better. For example, your  "cough or breathing has improved. And...   At least 10 days have passed since your symptoms started.       During this time, don't leave the house except for testing or medical care.   Stay in your own room, even for meals. Use your own bathroom if you can.   Stay away from others in your home. No hugging, kissing or shaking hands. No visitors.  Don't go to work, school or anywhere else.    Clean \"high touch\" surfaces often (doorknobs, counters, handles, etc.). Use a household cleaning spray or wipes. You'll find a full list of  on the EPA website: www.epa.gov/pesticide-registration/list-n-disinfectants-use-against-sars-cov-2.   Cover your mouth and nose with a mask, tissue or washcloth to avoid spreading germs.  Wash your hands and face often. Use soap and water.  Caregivers in these groups are at risk for severe illness due to COVID-19:  o People 65 years and older  o People who live in a nursing home or long-term care facility  o People with chronic disease (lung, heart, cancer, diabetes, kidney, liver, immunologic)  o People who have a weakened immune system, including those who:   Are in cancer treatment  Take medicine that weakens the immune system, such as corticosteroids  Had a bone marrow or organ transplant  Have an immune deficiency  Have poorly controlled HIV or AIDS  Are obese (body mass index of 40 or higher)  Smoke regularly   o Caregivers should wear gloves while washing dishes, handling laundry and cleaning bedrooms and bathrooms.  o Use caution when washing and drying laundry: Don't shake dirty laundry, and use the warmest water setting that you can.  o For more tips, go to www.cdc.gov/coronavirus/2019-ncov/downloads/10Things.pdf.    4.Sign up for [employer]. We know it's scary to hear that you might have COVID-19. We want to track your symptoms to make sure you're okay over the next 2 weeks. Please look for an email from [employer]---this is a free, online program that we'll use to keep " in touch. To sign up, follow the link in the email. Learn more at [email or web address]  How can I take care of myself?   Get lots of rest. Drink extra fluids (unless a doctor has told you not to).   Take Tylenol (acetaminophen) for fever or pain. If you have liver or kidney problems, ask your family doctor if it's okay to take Tylenol.   Adults can take either:    650 mg (two 325 mg pills) every 4 to 6 hours, or...   1,000 mg (two 500 mg pills) every 8 hours as needed.    Note: Don't take more than 3,000 mg in one day. Acetaminophen is found in many medicines (both prescribed and over-the-counter medicines). Read all labels to be sure you don't take too much.   For children, check the Tylenol bottle for the right dose. The dose is based on the child's age or weight.    If you have other health problems (like cancer, heart failure, an organ transplant or severe kidney disease): Call your specialty clinic if you don't feel better in the next 2 days.       Know when to call 911. Emergency warning signs include:    Trouble breathing or shortness of breath Pain or pressure in the chest that doesn't go away Feeling confused like you haven't felt before, or not being able to wake up Bluish-colored lips or face.  Where can I get more information?   [facility] -- About COVID-19: [email or web address]   CDC -- What to Do If You're Sick: www.cdc.gov/coronavirus/2019-ncov/about/steps-when-sick.html   CDC -- Ending Home Isolation: www.cdc.gov/coronavirus/2019-ncov/hcp/disposition-in-home-patients.html   CDC -- Caring for Someone: www.cdc.gov/coronavirus/2019-ncov/if-you-are-sick/care-for-someone.html   [facility] -- Interim Guidance for Hospital Discharge to Home: www.health.[facility].[place]/diseases/coronavirus/hcp/hospdischarge.pdf   [facility] clinical trials (COVID-19 research studies): [email or web address].[email or web address]-clinical-trials    Below are the COVID-19 hotlines at the [facility]  [facility] ([facility]). Interpreters are available.    For health questions: Call 705-740-2073 or [phone] (7 a.m. to 7 p.m.) For questions about schools and childcare: Call 919-102-7762 or 1-706.346.6091 (7 a.m. to 7 p.m.)    Diagnosis: Pain in throat  Diagnosis ICD: R07.0

## 2020-08-02 DIAGNOSIS — Z20.822 COVID-19 RULED OUT: Primary | ICD-10-CM

## 2020-08-02 PROCEDURE — U0003 INFECTIOUS AGENT DETECTION BY NUCLEIC ACID (DNA OR RNA); SEVERE ACUTE RESPIRATORY SYNDROME CORONAVIRUS 2 (SARS-COV-2) (CORONAVIRUS DISEASE [COVID-19]), AMPLIFIED PROBE TECHNIQUE, MAKING USE OF HIGH THROUGHPUT TECHNOLOGIES AS DESCRIBED BY CMS-2020-01-R: HCPCS | Performed by: FAMILY MEDICINE

## 2020-08-04 LAB
SARS-COV-2 RNA SPEC QL NAA+PROBE: NOT DETECTED
SPECIMEN SOURCE: NORMAL

## 2020-08-19 ENCOUNTER — VIRTUAL VISIT (OUTPATIENT)
Dept: FAMILY MEDICINE | Facility: CLINIC | Age: 60
End: 2020-08-19
Payer: COMMERCIAL

## 2020-08-19 DIAGNOSIS — Z78.9 ALCOHOL USE: ICD-10-CM

## 2020-08-19 DIAGNOSIS — Z76.0 ENCOUNTER FOR MEDICATION REFILL: ICD-10-CM

## 2020-08-19 DIAGNOSIS — J06.9 UPPER RESPIRATORY TRACT INFECTION, UNSPECIFIED TYPE: ICD-10-CM

## 2020-08-19 DIAGNOSIS — L13.0 DERMATITIS HERPETIFORMIS: Primary | Chronic | ICD-10-CM

## 2020-08-19 DIAGNOSIS — E78.5 HYPERLIPIDEMIA, UNSPECIFIED HYPERLIPIDEMIA TYPE: Chronic | ICD-10-CM

## 2020-08-19 DIAGNOSIS — J30.2 SEASONAL ALLERGIC RHINITIS, UNSPECIFIED TRIGGER: Chronic | ICD-10-CM

## 2020-08-19 DIAGNOSIS — Z20.822 EXPOSURE TO COVID-19 VIRUS: ICD-10-CM

## 2020-08-19 DIAGNOSIS — Z12.5 SCREENING FOR PROSTATE CANCER: ICD-10-CM

## 2020-08-19 PROCEDURE — 99214 OFFICE O/P EST MOD 30 MIN: CPT | Mod: 95 | Performed by: INTERNAL MEDICINE

## 2020-08-19 NOTE — PROGRESS NOTES
"  Juan C Garcia is a 60 year old male who is being evaluated via a billable telephone visit.      The patient has been notified of following:     \"This telephone visit will be conducted via a call between you and your physician/provider. We have found that certain health care needs can be provided without the need for a physical exam.  This service lets us provide the care you need with a short phone conversation.  If a prescription is necessary we can send it directly to your pharmacy.  If lab work is needed we can place an order for that and you can then stop by our lab to have the test done at a later time.    Telephone visits are billed at different rates depending on your insurance coverage. During this emergency period, for some insurers they may be billed the same as an in-person visit.  Please reach out to your insurance provider with any questions.    If during the course of the call the physician/provider feels a telephone visit is not appropriate, you will not be charged for this service.\"    Patient has given verbal consent for Telephone visit?  Yes    What phone number would you like to be contacted at? [phone]    How would you like to obtain your AVS? G[facility]ell    Chief Complaint:     Follow up on multiple concerns including medication refills    HPI:   Patient Juan C Garcia is a very pleasant 60 year old male with history of allergic rhinitis today for telephone evaluation of multiple concerns including medication refill regarding his chronic Dermatitis herpetiformis, he denies any acute rash flare up symptoms at this time. He is due for a refill of his Dapsone medication at this time. Regarding his recent URI sinusitis symptoms with sore throat with allergic rhinitis flare up symptoms, his URI symptoms have resolved. He reports previous COVID-19 exposure and is requesting a COVID-19 antibody screening test at this time. No chest pain, fever or chills at this time. Regarding the patient's " chronic alcohol dependence, the patient reports no alcohol use since last clinic visit.         Current Medications:     Current Outpatient Medications   Medication Sig Dispense Refill     ALLERGY INJECTIONS        atorvastatin (LIPITOR) 10 MG tablet Take 1 tablet (10 mg) by mouth daily 90 tablet 3     fluticasone (ARNUITY ELLIPTA) 200 MCG/ACT inhaler Inhale 1 puff into the lungs daily       hydrochlorothiazide (HYDRODIURIL) 12.5 MG tablet Take 1 tablet (12.5 mg) by mouth daily 90 tablet 3     levothyroxine (SYNTHROID, LEVOTHROID) 137 MCG tablet TAKE 137 MCG (1 TABLET) BY MOUTH DAILY 90 tablet 3     losartan (COZAAR) 50 MG tablet Take 1 tablet (50 mg) by mouth daily 90 tablet 3     testosterone (ANDROGEL/TESTIM) 50 MG/5GM (1%) gel Place onto the skin daily       dapsone (ACZONE) 25 MG tablet TAKE 2 TABLETS BY MOUTH EVERY  tablet 2         Allergies:      Allergies   Allergen Reactions     No Known Allergies             Past Medical History:     Past Medical History:   Diagnosis Date     Alcohol use      Dermatitis herpetiformis 2000    Dx by biopsy of rash on elbow per derm     HTN, goal below 140/90      Hyperlipidemia      Lung nodule Feb 2003    Benign; Left upper lobe     Multiple allergies     allergist with immunotherapy; Allergist is Dr. Santos in [place]     Primary hypogonadism in male     Low FSH, single left testicle - followed by Dr. Flores of endo     Pulmonary embolus (H) Feb 2003    Evaluated by ?[place] - unknown cause     Thyroid nodule Feb 2005    Benign per FNA --> taking Levothyroxine ever since         Past Surgical History:     Past Surgical History:   Procedure Laterality Date     ORCHIOPEXY CHILD Right     Age 10 due to undescended testicl     OTHER SURGICAL HISTORY  July 1997    Cervical laminectomy C4-5-6     OTHER SURGICAL HISTORY  Jan 2000    Right index tendon repair     OTHER SURGICAL HISTORY  August 2004    Ankle/wrist repair     OTHER SURGICAL HISTORY  April 2007    Lumbar  microdiscectomy L5-S1     OTHER SURGICAL HISTORY  March 2010    Cervical fusion C5-6-7     OTHER SURGICAL HISTORY  March 2011    Cervical fusion C3-4-5     OTHER SURGICAL HISTORY  July 2011    Lumbar fusion L5-S1         Family Medical History:     Family History   Problem Relation Age of Onset     Hypertension Mother      Dementia Mother      Alcoholism Mother      Hypertension Father      Cerebrovascular Disease Father      Cancer Father         throat - was a smoker     Heart Disease Father         CABG     Diabetes Maternal Grandfather      Family History Negative Brother      Family History Negative Sister      Cerebrovascular Disease Paternal Grandmother      Cancer Son         Testicular cancer     Family History Negative Son          Social History:     Social History     Socioeconomic History     Marital status:      Spouse name: Not on file     Number of children: Not on file     Years of education: Not on file     Highest education level: Not on file   Occupational History     Not on file   Social Needs     Financial resource strain: Not on file     Food insecurity     Worry: Not on file     Inability: Not on file     Transportation needs     Medical: Not on file     Non-medical: Not on file   Tobacco Use     Smoking status: Never Smoker     Smokeless tobacco: Never Used   Substance and Sexual Activity     Alcohol use: Not Currently     Alcohol/week: 0.0 standard drinks     Drug use: No     Sexual activity: Yes     Partners: Female   Lifestyle     Physical activity     Days per week: Not on file     Minutes per session: Not on file     Stress: Not on file   Relationships     Social connections     Talks on phone: Not on file     Gets together: Not on file     Attends Voodoo service: Not on file     Active member of club or organization: Not on file     Attends meetings of clubs or organizations: Not on file     Relationship status: Not on file     Intimate partner violence     Fear of current or  ex partner: Not on file     Emotionally abused: Not on file     Physically abused: Not on file     Forced sexual activity: Not on file   Other Topics Concern     Parent/sibling w/ CABG, MI or angioplasty before 65F 55M? Not Asked   Social History Narrative     Not on file           Review of System:     Constitutional: Negative for fever or chills  Skin: Negative for rashes, positive for Dermatitis herpetiformis, currently well controlled  Ears/Nose/Throat: positive for resolution of recent URI symptoms including nasal congestion, sore throat  Respiratory: No shortness of breath, dyspnea on exertion, cough, or hemoptysis  Cardiovascular: Negative for chest pain  Gastrointestinal: Negative for nausea, vomiting  Genitourinary: Negative for dysuria, hematuria  Musculoskeletal: Negative for myalgias  Neurologic: Negative for headaches  Psychiatric: Negative for depression, anxiety  Hematologic/Lymphatic/Immunologic: Negative  Endocrine: Negative  Behavioral: Negative for tobacco use, positive for chronic alcohol dependence, currently in remission      Physical Exam:   There were no vitals taken for this visit.  Respiratory: No cough over the phone  Neuro: patient sounds alert & oriented over the phone  Psych: mentation appears normal over the phone    Diagnostic Test Results:       Recent Labs   Lab Test 01/28/20  0639 03/08/19  0852  02/02/15  1002   CHOL 231* 241*   < > 252*   HDL 65 53   < > 48   * 155*   < > 152*   TRIG 146 166*   < > 259*   CHOLHDLRATIO  --   --   --  5.2*    < > = values in this interval not displayed.       ASSESSMENT/PLAN:     (Z76.0) Encounter for medication refill  (L13.0) Dermatitis herpetiformis  (primary encounter diagnosis)  Comment: stable. No acute rash flare up symptoms at this time. Patient is due for a refill of his Dapsone medication at this time.  Plan: I have refilled the patient's Dapsone medication at this time.    (J30.2) Seasonal allergic rhinitis, unspecified  trigger  Comment: stable.  Plan: continue current therapy    (Z20.828) Exposure to COVID-19 virus  (J06.9) Upper respiratory tract infection, unspecified type  Comment: recent exposure to COVID-19, previous URI symptoms have resolved. Patient requests the COVID-19 antibody screening lab at this time.   Plan: COVID-19 Virus (Coronavirus) Antibody & Titer         Reflex      (E78.5) Hyperlipidemia, unspecified hyperlipidemia type  Comment: stable on Lipitor. Patient is due for a repeat Lipid screening lab at this time.  Plan: Lipid panel reflex to direct LDL Fasting      (Z72.89) Alcohol use  Comment: former alcohol dependence, currently in remission.   Plan: I have advised the patient to continue his alcohol cessation efforts going forward.      (Z12.5) Screening for prostate cancer  Comment: patient is due for PSA lab  Plan: **Prostate spec antigen screen FUTURE anytime      Follow Up Plan:     Patient is instructed to return to Internal Medicine clinic for follow-up visit in 1 month.      Phone call duration:  25 minutes    Suzanne Zhang MD

## 2020-08-26 DIAGNOSIS — Z20.822 EXPOSURE TO COVID-19 VIRUS: ICD-10-CM

## 2020-08-26 DIAGNOSIS — J06.9 UPPER RESPIRATORY TRACT INFECTION, UNSPECIFIED TYPE: ICD-10-CM

## 2020-08-26 DIAGNOSIS — E78.5 HYPERLIPIDEMIA, UNSPECIFIED HYPERLIPIDEMIA TYPE: Chronic | ICD-10-CM

## 2020-08-26 DIAGNOSIS — Z12.5 SCREENING FOR PROSTATE CANCER: ICD-10-CM

## 2020-08-26 LAB
CHOLEST SERPL-MCNC: 216 MG/DL
HDLC SERPL-MCNC: 51 MG/DL
LDLC SERPL CALC-MCNC: 139 MG/DL
NONHDLC SERPL-MCNC: 165 MG/DL
PSA SERPL-ACNC: 0.76 UG/L (ref 0–4)
TRIGL SERPL-MCNC: 132 MG/DL

## 2020-08-26 PROCEDURE — 86769 SARS-COV-2 COVID-19 ANTIBODY: CPT | Performed by: INTERNAL MEDICINE

## 2020-08-26 PROCEDURE — G0103 PSA SCREENING: HCPCS | Performed by: INTERNAL MEDICINE

## 2020-08-26 PROCEDURE — 36415 COLL VENOUS BLD VENIPUNCTURE: CPT | Performed by: INTERNAL MEDICINE

## 2020-08-26 PROCEDURE — 80061 LIPID PANEL: CPT | Performed by: INTERNAL MEDICINE

## 2020-08-27 LAB
COVID-19 SPIKE RBD ABY TITER: NORMAL
COVID-19 SPIKE RBD ABY: NEGATIVE

## 2020-11-16 ENCOUNTER — VIRTUAL VISIT (OUTPATIENT)
Dept: FAMILY MEDICINE | Facility: CLINIC | Age: 60
End: 2020-11-16
Payer: COMMERCIAL

## 2020-11-16 DIAGNOSIS — Z78.9 ALCOHOL USE: ICD-10-CM

## 2020-11-16 DIAGNOSIS — E03.9 HYPOTHYROIDISM, UNSPECIFIED TYPE: Chronic | ICD-10-CM

## 2020-11-16 DIAGNOSIS — N52.9 ERECTILE DYSFUNCTION, UNSPECIFIED ERECTILE DYSFUNCTION TYPE: Primary | ICD-10-CM

## 2020-11-16 DIAGNOSIS — E78.5 HYPERLIPIDEMIA, UNSPECIFIED HYPERLIPIDEMIA TYPE: Chronic | ICD-10-CM

## 2020-11-16 PROCEDURE — 99214 OFFICE O/P EST MOD 30 MIN: CPT | Mod: 95 | Performed by: INTERNAL MEDICINE

## 2020-11-16 NOTE — PROGRESS NOTES
"Juan C Garcia is a 60 year old male who is being evaluated via a billable telephone visit.      The patient has been notified of following:     \"This telephone visit will be conducted via a call between you and your physician/provider. We have found that certain health care needs can be provided without the need for a physical exam.  This service lets us provide the care you need with a short phone conversation.  If a prescription is necessary we can send it directly to your pharmacy.  If lab work is needed we can place an order for that and you can then stop by our lab to have the test done at a later time.    Telephone visits are billed at different rates depending on your insurance coverage. During this emergency period, for some insurers they may be billed the same as an in-person visit.  Please reach out to your insurance provider with any questions.    If during the course of the call the physician/provider feels a telephone visit is not appropriate, you will not be charged for this service.\"    Patient has given verbal consent for Telephone visit?  Yes    What phone number would you like to be contacted at? l    How would you like to obtain your AVS? MyChart    Subjective     Juan C Garcia is a 60 year old male who presents via phone visit today for the following health issues:    HPI        Chief Complaint:     Follow up on multiple concerns including poorly controlled chronic ED symptoms    HPI:   Patient Juan C Garcia is a very pleasant 60 year old male with history of allergic rhinitis today for telephone evaluation of multiple concerns including chronic ED symptoms. Regarding his chronic dermatitis, he denies any acute rash flare up symptoms at this time. He is compliant with his Dapsone medication at this time. Regarding his recent URI sinusitis symptoms which have resolved, his recent COVID-19 antibody screening test is negative. No chest pain, fever or chills at this time. Regarding the " patient's chronic alcohol dependence, the patient reports no alcohol use since last clinic visit.         Current Medications:     Current Outpatient Medications   Medication Sig Dispense Refill     ALLERGY INJECTIONS        atorvastatin (LIPITOR) 10 MG tablet Take 1 tablet (10 mg) by mouth daily 90 tablet 3     dapsone (ACZONE) 25 MG tablet TAKE 2 TABLETS BY MOUTH EVERY  tablet 2     fluticasone (ARNUITY ELLIPTA) 200 MCG/ACT inhaler Inhale 1 puff into the lungs daily       hydrochlorothiazide (HYDRODIURIL) 12.5 MG tablet Take 1 tablet (12.5 mg) by mouth daily 90 tablet 3     levothyroxine (SYNTHROID, LEVOTHROID) 137 MCG tablet TAKE 137 MCG (1 TABLET) BY MOUTH DAILY 90 tablet 3     losartan (COZAAR) 50 MG tablet Take 1 tablet (50 mg) by mouth daily 90 tablet 3     sildenafil (REVATIO) 20 MG tablet Take 1 tablet (20 mg) by mouth daily as needed (ED) 90 tablet 3     testosterone (ANDROGEL/TESTIM) 50 MG/5GM (1%) gel Place onto the skin daily           Allergies:      Allergies   Allergen Reactions     No Known Allergies             Past Medical History:     Past Medical History:   Diagnosis Date     Alcohol use      Dermatitis herpetiformis 2000    Dx by biopsy of rash on elbow per derm     HTN, goal below 140/90      Hyperlipidemia      Lung nodule Feb 2003    Benign; Left upper lobe     Multiple allergies     allergist with immunotherapy; Allergist is Dr. Santos in [place]     Primary hypogonadism in male     Low FSH, single left testicle - followed by Dr. Flores of endo     Pulmonary embolus (H) Feb 2003    Evaluated by ?[place] - unknown cause     Thyroid nodule Feb 2005    Benign per FNA --> taking Levothyroxine ever since         Past Surgical History:     Past Surgical History:   Procedure Laterality Date     ORCHIOPEXY CHILD Right     Age 10 due to undescended testicl     OTHER SURGICAL HISTORY  July 1997    Cervical laminectomy C4-5-6     OTHER SURGICAL HISTORY  Jan 2000    Right index tendon repair      OTHER SURGICAL HISTORY  August 2004    Ankle/wrist repair     OTHER SURGICAL HISTORY  April 2007    Lumbar microdiscectomy L5-S1     OTHER SURGICAL HISTORY  March 2010    Cervical fusion C5-6-7     OTHER SURGICAL HISTORY  March 2011    Cervical fusion C3-4-5     OTHER SURGICAL HISTORY  July 2011    Lumbar fusion L5-S1         Family Medical History:     Family History   Problem Relation Age of Onset     Hypertension Mother      Dementia Mother      Alcoholism Mother      Hypertension Father      Cerebrovascular Disease Father      Cancer Father         throat - was a smoker     Heart Disease Father         CABG     Diabetes Maternal Grandfather      Family History Negative Brother      Family History Negative Sister      Cerebrovascular Disease Paternal Grandmother      Cancer Son         Testicular cancer     Family History Negative Son          Social History:     Social History     Socioeconomic History     Marital status:      Spouse name: Not on file     Number of children: Not on file     Years of education: Not on file     Highest education level: Not on file   Occupational History     Not on file   Social Needs     Financial resource strain: Not on file     Food insecurity     Worry: Not on file     Inability: Not on file     Transportation needs     Medical: Not on file     Non-medical: Not on file   Tobacco Use     Smoking status: Never Smoker     Smokeless tobacco: Never Used   Substance and Sexual Activity     Alcohol use: Not Currently     Alcohol/week: 0.0 standard drinks     Drug use: No     Sexual activity: Yes     Partners: Female   Lifestyle     Physical activity     Days per week: Not on file     Minutes per session: Not on file     Stress: Not on file   Relationships     Social connections     Talks on phone: Not on file     Gets together: Not on file     Attends Congregational service: Not on file     Active member of club or organization: Not on file     Attends meetings of clubs or  organizations: Not on file     Relationship status: Not on file     Intimate partner violence     Fear of current or ex partner: Not on file     Emotionally abused: Not on file     Physically abused: Not on file     Forced sexual activity: Not on file   Other Topics Concern     Parent/sibling w/ CABG, MI or angioplasty before 65F 55M? Not Asked   Social History Narrative     Not on file           Review of System:     Constitutional: Negative for fever or chills  Skin: Negative for rashes, positive for Dermatitis herpetiformis, currently well controlled  Ears/Nose/Throat: positive for resolution of recent URI symptoms including nasal congestion, sore throat  Respiratory: No shortness of breath, dyspnea on exertion, cough, or hemoptysis  Cardiovascular: Negative for chest pain  Gastrointestinal: Negative for nausea, vomiting  Genitourinary: Negative for dysuria, hematuria, positive for ED  Musculoskeletal: Negative for myalgias  Neurologic: Negative for headaches  Psychiatric: Negative for depression, anxiety  Hematologic/Lymphatic/Immunologic: Negative  Endocrine: Negative  Behavioral: Negative for tobacco use, positive for chronic alcohol dependence, currently in remission      Physical Exam:   There were no vitals taken for this visit.  Respiratory: No cough over the phone  Neuro: patient sounds alert & oriented over the phone  Psych: mentation appears normal over the phone    Diagnostic Test Results:       Recent Labs   Lab Test 01/28/20  0639 03/08/19  0852  02/02/15  1002   CHOL 231* 241*   < > 252*   HDL 65 53   < > 48   * 155*   < > 152*   TRIG 146 166*   < > 259*   CHOLHDLRATIO  --   --   --  5.2*    < > = values in this interval not displayed.       ASSESSMENT/PLAN:     (N52.9) Erectile dysfunction, unspecified erectile dysfunction type  (primary encounter diagnosis)  Comment: symptoms not well controlled  Plan: I have prescribed Sildenafil ED medication for treatment today    (E03.9) Hypothyroidism,  unspecified type  Comment: stable.   Plan: continue current therapy      (E78.5) Hyperlipidemia, unspecified hyperlipidemia type  Comment: stable on Lipitor  Plan: continue Lipitor      (Z72.89) Alcohol use  Comment: former alcohol dependence, currently in remission.   Plan: I have advised the patient to continue his alcohol cessation efforts going forward.        Follow Up Plan:     Patient is instructed to return to Internal Medicine clinic for follow-up visit in 1 month.      Phone call duration:  25 minutes    Suzanne Zhang MD

## 2021-01-15 ENCOUNTER — HEALTH MAINTENANCE LETTER (OUTPATIENT)
Age: 61
End: 2021-01-15

## 2021-03-08 DIAGNOSIS — Z00.00 ROUTINE HISTORY AND PHYSICAL EXAMINATION OF ADULT: ICD-10-CM

## 2021-03-08 LAB
ANION GAP SERPL CALCULATED.3IONS-SCNC: 3 MMOL/L (ref 3–14)
BUN SERPL-MCNC: 12 MG/DL (ref 7–30)
CALCIUM SERPL-MCNC: 10 MG/DL (ref 8.5–10.1)
CHLORIDE SERPL-SCNC: 105 MMOL/L (ref 94–109)
CHOLEST SERPL-MCNC: 187 MG/DL
CO2 SERPL-SCNC: 29 MMOL/L (ref 20–32)
CREAT SERPL-MCNC: 0.8 MG/DL (ref 0.66–1.25)
ERYTHROCYTE [DISTWIDTH] IN BLOOD BY AUTOMATED COUNT: 13.4 % (ref 10–15)
GFR SERPL CREATININE-BSD FRML MDRD: >90 ML/MIN/{1.73_M2}
GLUCOSE SERPL-MCNC: 103 MG/DL (ref 70–99)
HBA1C MFR BLD: 4.8 % (ref 0–5.6)
HCT VFR BLD AUTO: 43.8 % (ref 40–53)
HDLC SERPL-MCNC: 54 MG/DL
HGB BLD-MCNC: 14.2 G/DL (ref 13.3–17.7)
LDLC SERPL CALC-MCNC: 111 MG/DL
MCH RBC QN AUTO: 29.8 PG (ref 26.5–33)
MCHC RBC AUTO-ENTMCNC: 32.4 G/DL (ref 31.5–36.5)
MCV RBC AUTO: 92 FL (ref 78–100)
NONHDLC SERPL-MCNC: 133 MG/DL
PLATELET # BLD AUTO: 265 10E9/L (ref 150–450)
POTASSIUM SERPL-SCNC: 4.5 MMOL/L (ref 3.4–5.3)
PSA SERPL-ACNC: 0.66 UG/L (ref 0–4)
RBC # BLD AUTO: 4.77 10E12/L (ref 4.4–5.9)
SODIUM SERPL-SCNC: 137 MMOL/L (ref 133–144)
TRIGL SERPL-MCNC: 111 MG/DL
WBC # BLD AUTO: 6.4 10E9/L (ref 4–11)

## 2021-03-08 PROCEDURE — 80048 BASIC METABOLIC PNL TOTAL CA: CPT | Performed by: INTERNAL MEDICINE

## 2021-03-08 PROCEDURE — 80061 LIPID PANEL: CPT | Performed by: INTERNAL MEDICINE

## 2021-03-08 PROCEDURE — 83036 HEMOGLOBIN GLYCOSYLATED A1C: CPT | Performed by: INTERNAL MEDICINE

## 2021-03-08 PROCEDURE — 36415 COLL VENOUS BLD VENIPUNCTURE: CPT | Performed by: INTERNAL MEDICINE

## 2021-03-08 PROCEDURE — 85027 COMPLETE CBC AUTOMATED: CPT | Performed by: INTERNAL MEDICINE

## 2021-03-08 PROCEDURE — G0103 PSA SCREENING: HCPCS | Performed by: INTERNAL MEDICINE

## 2021-03-15 ENCOUNTER — OFFICE VISIT (OUTPATIENT)
Dept: FAMILY MEDICINE | Facility: CLINIC | Age: 61
End: 2021-03-15
Payer: COMMERCIAL

## 2021-03-15 VITALS
OXYGEN SATURATION: 98 % | TEMPERATURE: 96.9 F | WEIGHT: 205 LBS | SYSTOLIC BLOOD PRESSURE: 120 MMHG | HEART RATE: 70 BPM | DIASTOLIC BLOOD PRESSURE: 78 MMHG | HEIGHT: 74 IN | BODY MASS INDEX: 26.31 KG/M2

## 2021-03-15 DIAGNOSIS — I10 ESSENTIAL HYPERTENSION: ICD-10-CM

## 2021-03-15 DIAGNOSIS — E78.5 HYPERLIPIDEMIA, UNSPECIFIED HYPERLIPIDEMIA TYPE: Chronic | ICD-10-CM

## 2021-03-15 DIAGNOSIS — L13.0 DERMATITIS HERPETIFORMIS: ICD-10-CM

## 2021-03-15 DIAGNOSIS — Z00.00 ROUTINE HISTORY AND PHYSICAL EXAMINATION OF ADULT: Primary | ICD-10-CM

## 2021-03-15 DIAGNOSIS — I10 HTN, GOAL BELOW 140/90: Chronic | ICD-10-CM

## 2021-03-15 PROBLEM — I50.32 DIASTOLIC DYSFUNCTION WITH CHRONIC HEART FAILURE (H): Status: ACTIVE | Noted: 2021-03-15

## 2021-03-15 PROBLEM — R09.02 HYPOXEMIA: Status: ACTIVE | Noted: 2021-03-15

## 2021-03-15 PROCEDURE — 99396 PREV VISIT EST AGE 40-64: CPT | Performed by: INTERNAL MEDICINE

## 2021-03-15 RX ORDER — ATORVASTATIN CALCIUM 10 MG/1
10 TABLET, FILM COATED ORAL DAILY
Qty: 90 TABLET | Refills: 3 | Status: SHIPPED | OUTPATIENT
Start: 2021-03-15 | End: 2021-06-07

## 2021-03-15 RX ORDER — HYDROCHLOROTHIAZIDE 12.5 MG/1
12.5 TABLET ORAL DAILY
Qty: 90 TABLET | Refills: 3 | Status: SHIPPED | OUTPATIENT
Start: 2021-03-15 | End: 2022-04-27

## 2021-03-15 RX ORDER — LOSARTAN POTASSIUM 50 MG/1
50 TABLET ORAL DAILY
Qty: 90 TABLET | Refills: 3 | Status: SHIPPED | OUTPATIENT
Start: 2021-03-15 | End: 2022-04-27

## 2021-03-15 RX ORDER — DAPSONE 25 MG/1
25 TABLET ORAL DAILY
Qty: 90 TABLET | Refills: 3 | Status: SHIPPED | OUTPATIENT
Start: 2021-03-15 | End: 2021-06-07

## 2021-03-15 ASSESSMENT — MIFFLIN-ST. JEOR: SCORE: 1801.21

## 2021-03-15 NOTE — PROGRESS NOTES
SUBJECTIVE:   CC: Juan C Garcia is an 60 year old male who presents for preventative health visit.       Patient has been advised of split billing requirements and indicates understanding: Yes  Healthy Habits:     Getting at least 3 servings of Calcium per day:  Yes    Bi-annual eye exam:  NO    Dental care twice a year:  Yes    Sleep apnea or symptoms of sleep apnea:  Sleep apnea    Diet:  Regular (no restrictions)    Frequency of exercise:  2-3 days/week    Duration of exercise:  Greater than 60 minutes    Taking medications regularly:  Yes    Barriers to taking medications:  None    Medication side effects:  None    PHQ-2 Total Score: 0    Additional concerns today:  Yes    Ability to successfully perform activities of daily living: Yes, no assistance needed  Home safety:  none identified   Hearing impairment: none        Today's PHQ-2 Score:   PHQ-2 ( 1999 [employer]) 3/15/2021   Q1: Little interest or pleasure in doing things 0   Q2: Feeling down, depressed or hopeless 0   PHQ-2 Score 0   Q1: Little interest or pleasure in doing things -   Q2: Feeling down, depressed or hopeless -   PHQ-2 Score -       Abuse: Current or Past(Physical, Sexual or Emotional)- No  Do you feel safe in your environment? Yes    Have you ever done Advance Care Planning? (For example, a Health Directive, POLST, or a discussion with a medical provider or your loved ones about your wishes): Yes, patient states has an Advance Care Planning document and will bring a copy to the clinic.    Social History     Tobacco Use     Smoking status: Never Smoker     Smokeless tobacco: Never Used   Substance Use Topics     Alcohol use: Not Currently     Alcohol/week: 0.0 standard drinks     If you drink alcohol do you typically have >3 drinks per day or >7 drinks per week? No    Alcohol Use 3/15/2021   Prescreen: >3 drinks/day or >7 drinks/week? No     Last PSA:   PSA   Date Value Ref Range Status   03/08/2021 0.66 0 - 4 ug/L Final     Comment:      "Assay Method:  Chemiluminescence using Siemens Vista analyzer       Reviewed orders with patient. Reviewed health maintenance and updated orders accordingly - Yes  Labs reviewed in EPIC    Reviewed and updated as needed this visit by clinical staff   Allergies  Meds              Reviewed and updated as needed this visit by Provider                Past Medical History:   Diagnosis Date     Alcohol use      Dermatitis herpetiformis 2000    Dx by biopsy of rash on elbow per derm     HTN, goal below 140/90      Hyperlipidemia      Lung nodule Feb 2003    Benign; Left upper lobe     Multiple allergies     allergist with immunotherapy; Allergist is Dr. Santos in [place]     Primary hypogonadism in male     Low FSH, single left testicle - followed by Dr. Flores of endo     Pulmonary embolus (H) Feb 2003    Evaluated by ?[place] - unknown cause     Thyroid nodule Feb 2005    Benign per FNA --> taking Levothyroxine ever since        Review of Systems  CONSTITUTIONAL: NEGATIVE for fever, chills, change in weight  INTEGUMENTARY/SKIN: NEGATIVE for worrisome rashes, moles or lesions  EYES: NEGATIVE for vision changes or irritation  ENT: NEGATIVE for ear, mouth and throat problems  RESP: NEGATIVE for significant cough or SOB  CV: NEGATIVE for chest pain, palpitations or peripheral edema  GI: NEGATIVE for nausea, abdominal pain, heartburn, or change in bowel habits   male: negative for dysuria, hematuria, decreased urinary stream, erectile dysfunction, urethral discharge  MUSCULOSKELETAL: NEGATIVE for significant arthralgias or myalgia  NEURO: NEGATIVE for weakness, dizziness or paresthesias  PSYCHIATRIC: NEGATIVE for changes in mood or affect    OBJECTIVE:   /78 (BP Location: Right arm, Patient Position: Sitting, Cuff Size: Adult Large)   Pulse 70   Temp 96.9  F (36.1  C) (Temporal)   Ht 1.882 m (6' 2.1\")   Wt 93 kg (205 lb)   SpO2 98%   BMI 26.25 kg/m      Physical Exam  GENERAL: alert and no distress  EYES: Eyes " "grossly normal to inspection, PERRL and conjunctivae and sclerae normal  HENT: ear canals and TM's normal, nose and mouth without ulcers or lesions  NECK: no adenopathy, no asymmetry, masses, or scars and thyroid normal to palpation  RESP: lungs clear to auscultation - no rales, rhonchi or wheezes  CV: regular rate and rhythm, normal S1 S2, no S3 or S4, no murmur, click or rub, no peripheral edema and peripheral pulses strong  ABDOMEN: soft, nontender, no hepatosplenomegaly, no masses and bowel sounds normal  MS: no gross musculoskeletal defects noted, no edema  SKIN: no suspicious lesions or rashes  NEURO: Normal strength and tone, mentation intact and speech normal  PSYCH: mentation appears normal, affect normal/bright    Diagnostic Test Results:  Labs reviewed in Epic    ASSESSMENT/PLAN:   (Z00.00) Routine history and physical examination of adult  (primary encounter diagnosis)  (E78.5) Hyperlipidemia, unspecified hyperlipidemia type  Comment: improved. patient wants to try diet, exercise instead of statin medication for the next 6 months  Plan: discontinued atorvastatin (LIPITOR) 10 MG tablet today        (I10) HTN, goal below 140/90  Comment: BP is at goal  Plan: hydrochlorothiazide (HYDRODIURIL) 12.5 MG         tablet   losartan (COZAAR) 50 MG tablet      (L13.0) Dermatitis herpetiformis  Comment: stable  Plan: dapsone (ACZONE) 25 MG tablet        Patient has been advised of split billing requirements and indicates understanding: Yes  COUNSELING:   Reviewed preventive health counseling, as reflected in patient instructions  Special attention given to:        Regular exercise       Healthy diet/nutrition    Estimated body mass index is 28.3 kg/m  as calculated from the following:    Height as of 2/17/20: 1.88 m (6' 2\").    Weight as of 2/17/20: 100 kg (220 lb 6.4 oz).     Weight management plan: Discussed healthy diet and exercise guidelines    He reports that he has never smoked. He has never used smokeless " tobacco.      Counseling Resources:  ATP IV Guidelines  Pooled Cohorts Equation Calculator  FRAX Risk Assessment  ICSI Preventive Guidelines  Dietary Guidelines for Americans, 2010  USDA's MyPlate  ASA Prophylaxis  Lung CA Screening    Suzanne Zhang MD  [facility]

## 2021-03-22 ENCOUNTER — IMMUNIZATION (OUTPATIENT)
Dept: NURSING | Facility: CLINIC | Age: 61
End: 2021-03-22
Payer: COMMERCIAL

## 2021-03-22 PROCEDURE — 0001A PR COVID VAC PFIZER DIL RECON 30 MCG/0.3 ML IM: CPT

## 2021-03-22 PROCEDURE — 91300 PR COVID VAC PFIZER DIL RECON 30 MCG/0.3 ML IM: CPT

## 2021-04-12 ENCOUNTER — IMMUNIZATION (OUTPATIENT)
Dept: NURSING | Facility: CLINIC | Age: 61
End: 2021-04-12
Attending: INTERNAL MEDICINE
Payer: COMMERCIAL

## 2021-04-12 PROCEDURE — 0002A PR COVID VAC PFIZER DIL RECON 30 MCG/0.3 ML IM: CPT

## 2021-04-12 PROCEDURE — 91300 PR COVID VAC PFIZER DIL RECON 30 MCG/0.3 ML IM: CPT

## 2021-06-03 DIAGNOSIS — E78.5 HYPERLIPIDEMIA, UNSPECIFIED HYPERLIPIDEMIA TYPE: Chronic | ICD-10-CM

## 2021-06-03 LAB
CHOLEST SERPL-MCNC: 264 MG/DL
HDLC SERPL-MCNC: 61 MG/DL
LDLC SERPL CALC-MCNC: 183 MG/DL
NONHDLC SERPL-MCNC: 203 MG/DL
TRIGL SERPL-MCNC: 100 MG/DL

## 2021-06-03 PROCEDURE — 36415 COLL VENOUS BLD VENIPUNCTURE: CPT | Performed by: INTERNAL MEDICINE

## 2021-06-03 PROCEDURE — 80061 LIPID PANEL: CPT | Performed by: INTERNAL MEDICINE

## 2021-06-07 ENCOUNTER — OFFICE VISIT (OUTPATIENT)
Dept: FAMILY MEDICINE | Facility: CLINIC | Age: 61
End: 2021-06-07
Payer: COMMERCIAL

## 2021-06-07 VITALS
HEART RATE: 66 BPM | TEMPERATURE: 96.9 F | SYSTOLIC BLOOD PRESSURE: 127 MMHG | WEIGHT: 202 LBS | DIASTOLIC BLOOD PRESSURE: 80 MMHG | BODY MASS INDEX: 25.93 KG/M2 | HEIGHT: 74 IN | OXYGEN SATURATION: 98 %

## 2021-06-07 DIAGNOSIS — N52.9 ERECTILE DYSFUNCTION, UNSPECIFIED ERECTILE DYSFUNCTION TYPE: ICD-10-CM

## 2021-06-07 DIAGNOSIS — I10 HTN, GOAL BELOW 140/90: ICD-10-CM

## 2021-06-07 DIAGNOSIS — E66.3 OVERWEIGHT (BMI 25.0-29.9): ICD-10-CM

## 2021-06-07 DIAGNOSIS — E03.9 HYPOTHYROIDISM, UNSPECIFIED TYPE: ICD-10-CM

## 2021-06-07 DIAGNOSIS — E78.5 HYPERLIPIDEMIA, UNSPECIFIED HYPERLIPIDEMIA TYPE: Primary | ICD-10-CM

## 2021-06-07 PROCEDURE — 99214 OFFICE O/P EST MOD 30 MIN: CPT | Performed by: INTERNAL MEDICINE

## 2021-06-07 RX ORDER — ATORVASTATIN CALCIUM 10 MG/1
5 TABLET, FILM COATED ORAL DAILY
Qty: 45 TABLET | Refills: 3 | Status: SHIPPED | OUTPATIENT
Start: 2021-06-07 | End: 2022-05-20

## 2021-06-07 ASSESSMENT — MIFFLIN-ST. JEOR: SCORE: 1782.61

## 2021-06-07 NOTE — PROGRESS NOTES
Angel messina is a 61 year old who presents for the following health issues     HPI     Chief Complaint   Patient presents with     Recheck Medication     Hypertension, hyperlipidemia    HPI:   Patient Juan C Garcia is a very pleasant 61 year old male with history of alcohol dependence, hypertension, hyperlipidemia who presents to Internal Medicine clinic today for follow up of multiple concerns including hypertension, hyperlipidemia.  No chest pains, headaches, fever or chills. Patient's most recent lipid screening lab results are not at goal, patient is not currently on any statin medication and is willing to restart a trial of low dose Lipitor cholesterol medication at this time. the patient has also lost weight through his current diet, exercise weight loss program lately.      Current Medications:     Current Outpatient Medications   Medication Sig Dispense Refill     ALLERGY INJECTIONS        atorvastatin (LIPITOR) 10 MG tablet Take 0.5 tablets (5 mg) by mouth daily 45 tablet 3     fluticasone (ARNUITY ELLIPTA) 200 MCG/ACT inhaler Inhale 1 puff into the lungs daily       hydrochlorothiazide (HYDRODIURIL) 12.5 MG tablet Take 1 tablet (12.5 mg) by mouth daily 90 tablet 3     levothyroxine (SYNTHROID, LEVOTHROID) 137 MCG tablet TAKE 137 MCG (1 TABLET) BY MOUTH DAILY 90 tablet 3     losartan (COZAAR) 50 MG tablet Take 1 tablet (50 mg) by mouth daily 90 tablet 3     sildenafil (REVATIO) 20 MG tablet Take 1 tablet (20 mg) by mouth daily as needed (ED) 90 tablet 3     testosterone (ANDROGEL/TESTIM) 50 MG/5GM (1%) gel Place onto the skin daily           Allergies:      Allergies   Allergen Reactions     No Known Allergies             Past Medical History:     Past Medical History:   Diagnosis Date     Alcohol use      Dermatitis herpetiformis 2000    Dx by biopsy of rash on elbow per derm     HTN, goal below 140/90      Hyperlipidemia      Lung nodule Feb 2003    Benign; Left upper lobe     Multiple allergies      allergist with immunotherapy; Allergist is Dr. Santos in [place]     Primary hypogonadism in male     Low FSH, single left testicle - followed by Dr. Flores of endo     Pulmonary embolus (H) Feb 2003    Evaluated by ?[facility] - unknown cause     Thyroid nodule Feb 2005    Benign per [facility] --> taking Levothyroxine ever since     Uncomplicated asthma          Past Surgical History:     Past Surgical History:   Procedure Laterality Date     BACK SURGERY       COLONOSCOPY  April 2012     HEAD & NECK SURGERY       HERNIA REPAIR       ORCHIOPEXY CHILD Right     Age 10 due to undescended testicl     ORTHOPEDIC SURGERY       OTHER SURGICAL HISTORY  July 1997    Cervical laminectomy C4-5-6     OTHER SURGICAL HISTORY  Jan 2000    Right index tendon repair     OTHER SURGICAL HISTORY  August 2004    Ankle/wrist repair     OTHER SURGICAL HISTORY  April 2007    Lumbar microdiscectomy L5-S1     OTHER SURGICAL HISTORY  March 2010    Cervical fusion C5-6-7     OTHER SURGICAL HISTORY  March 2011    Cervical fusion C3-4-5     OTHER SURGICAL HISTORY  July 2011    Lumbar fusion L5-S1         Family Medical History:     Family History   Problem Relation Age of Onset     Hypertension Mother      Dementia Mother      Alcoholism Mother      Hypertension Father      Cerebrovascular Disease Father      Cancer Father         throat - was a smoker     Heart Disease Father         CABG     Diabetes Maternal Grandfather      Family History Negative Brother      Family History Negative Sister      Cerebrovascular Disease Paternal Grandmother      Cancer Son         Testicular cancer     Family History Negative Son          Social History:     Social History     Socioeconomic History     Marital status:      Spouse name: Not on file     Number of children: Not on file     Years of education: Not on file     Highest education level: Not on file   Occupational History     Not on file   Social Needs     Financial resource strain: Not on file      Food insecurity     Worry: Not on file     Inability: Not on file     Transportation needs     Medical: Not on file     Non-medical: Not on file   Tobacco Use     Smoking status: Never Smoker     Smokeless tobacco: Never Used   Substance and Sexual Activity     Alcohol use: Not Currently     Alcohol/week: 0.0 standard drinks     Drug use: No     Sexual activity: Yes     Partners: Female     Birth control/protection: Pull-out method   Lifestyle     Physical activity     Days per week: Not on file     Minutes per session: Not on file     Stress: Not on file   Relationships     Social connections     Talks on phone: Not on file     Gets together: Not on file     Attends [employer] service: Not on file     Active member of club or organization: Not on file     Attends meetings of clubs or organizations: Not on file     Relationship status: Not on file     Intimate partner violence     Fear of current or ex partner: Not on file     Emotionally abused: Not on file     Physically abused: Not on file     Forced sexual activity: Not on file   Other Topics Concern     Parent/sibling w/ CABG, MI or angioplasty before 65F 55M? Yes     Comment: father bypass 60ish   Social History Narrative     Not on file           Review of System:     Constitutional: Negative for fever or chills  Skin: Negative for rashes  Ears/Nose/Throat: Negative for nasal congestion, sore throat  Respiratory: No shortness of breath, dyspnea on exertion, cough, or hemoptysis  Cardiovascular: Negative for chest pain since hospital discharge  Gastrointestinal: Negative for nausea, vomiting, positive for chronic GERD  Genitourinary: Negative for dysuria, hematuria  Musculoskeletal: Negative for myalgias  Neurologic: Negative for headaches  Psychiatric: Negative for depression, anxiety  Hematologic/Lymphatic/Immunologic: Negative  Endocrine: positive for hypothyroidism  Behavioral: Negative for tobacco use      Physical Exam:   /80 (BP Location: Right  "arm, Patient Position: Sitting, Cuff Size: Adult Large)   Pulse 66   Temp 96.9  F (36.1  C) (Temporal)   Ht 1.882 m (6' 2.1\")   Wt 91.6 kg (202 lb)   SpO2 98%   BMI 25.87 kg/m      GENERAL: alert and no distress  EYES: eyes grossly normal to inspection, and conjunctivae and sclerae normal  HENT: Normocephalic atraumatic. Nose and mouth without ulcers or lesions  NECK: supple  RESP: lungs clear to auscultation   CV: regular rate and rhythm, normal S1 S2  LYMPH: no peripheral edema   ABDOMEN: nondistended  MS: no gross musculoskeletal defects noted  SKIN: a dark brown color mole present on the left shoulder  NEURO: Alert & Oriented x 3.   PSYCH: mentation appears normal, affect normal        Diagnostic Test Results:     Recent Labs   Lab Test 06/03/21  0807 03/08/21  0809 02/02/15  1002 02/02/15  1002   CHOL 264* 187   < > 252*   HDL 61 54   < > 48   * 111*   < > 152*   TRIG 100 111   < > 259*   CHOLHDLRATIO  --   --   --  5.2*    < > = values in this interval not displayed.       Last Comprehensive Metabolic Panel:  Sodium   Date Value Ref Range Status   03/08/2021 137 133 - 144 mmol/L Final     Potassium   Date Value Ref Range Status   03/08/2021 4.5 3.4 - 5.3 mmol/L Final     Chloride   Date Value Ref Range Status   03/08/2021 105 94 - 109 mmol/L Final     Carbon Dioxide   Date Value Ref Range Status   03/08/2021 29 20 - 32 mmol/L Final     Anion Gap   Date Value Ref Range Status   03/08/2021 3 3 - 14 mmol/L Final     Glucose   Date Value Ref Range Status   03/08/2021 103 (H) 70 - 99 mg/dL Final     Comment:     Fasting specimen     Urea Nitrogen   Date Value Ref Range Status   03/08/2021 12 7 - 30 mg/dL Final     Creatinine   Date Value Ref Range Status   03/08/2021 0.80 0.66 - 1.25 mg/dL Final     GFR Estimate   Date Value Ref Range Status   03/08/2021 >90 >60 mL/min/[1.73_m2] Final     Comment:     Non  GFR Calc  Starting 12/18/2018, serum creatinine based estimated GFR (eGFR) will " be   calculated using the Chronic Kidney Disease Epidemiology Collaboration   (CKD-EPI) equation.       Calcium   Date Value Ref Range Status   03/08/2021 10.0 8.5 - 10.1 mg/dL Final       ASSESSMENT/PLAN:     (E66.3) Overweight (BMI 25.0-29.9)  (E78.5) Hyperlipidemia, unspecified hyperlipidemia type  (primary encounter diagnosis)  Comment: not at goal, patient is not currently on any statin medication and is willing to restart a trial of low dose Lipitor cholesterol medication at this time.   Plan: start atorvastatin (LIPITOR) 5 MG tablet once daily, recheck Lipid         panel reflex to direct LDL Fasting in 6 months  In the meantime, the patient is advised to continue his current diet, exercise weight loss program going forward.    (I10) HTN, goal below 140/90  Comment: BP is at goal, well controlled  Plan: continue current therapy    (N52.9) Erectile dysfunction, unspecified erectile dysfunction type  Comment: stable  Plan: continue current therapy    (E03.9) Hypothyroidism, unspecified type  Comment: stable  Plan: continue current therapy        Follow Up Plan:     Patient is instructed to return to Internal Medicine clinic for follow-up visit in 6 months.        Suzanne Zhang MD  Internal Medicine  [facility]

## 2021-09-04 ENCOUNTER — HEALTH MAINTENANCE LETTER (OUTPATIENT)
Age: 61
End: 2021-09-04

## 2021-09-11 ENCOUNTER — OFFICE VISIT (OUTPATIENT)
Dept: URGENT CARE | Facility: URGENT CARE | Age: 61
End: 2021-09-11
Payer: COMMERCIAL

## 2021-09-11 VITALS
RESPIRATION RATE: 20 BRPM | DIASTOLIC BLOOD PRESSURE: 80 MMHG | HEART RATE: 74 BPM | OXYGEN SATURATION: 98 % | SYSTOLIC BLOOD PRESSURE: 119 MMHG | TEMPERATURE: 98 F

## 2021-09-11 DIAGNOSIS — R07.81 RIB PAIN ON RIGHT SIDE: Primary | ICD-10-CM

## 2021-09-11 PROCEDURE — 99213 OFFICE O/P EST LOW 20 MIN: CPT | Performed by: PHYSICIAN ASSISTANT

## 2021-09-11 NOTE — PATIENT INSTRUCTIONS
Patient Education     Rib Fracture (Broken Rib)     A chest x-ray may be done.   Your ribs are curved bones in your chest. They help protect your lungs and expand and contract when you breathe. Children's ribs bend easily and can often withstand a blow or fall. But adult ribs are more likely to break (fracture) under stress. Even coughing or a hard sneeze can fracture a rib.  When to go to the Emergency Room (ER)  Although they can be painful, most rib fractures aren't serious. But they often make it hard to cough or breathe deeply. Get medical care right away if you have:    Trouble breathing.    Nausea, vomiting, or stomach pain with a sore or bruised rib.    Pain that worsens over time.    An injury to the chest or stomach.  What to expect in the ER  Here is what will happen in the ER:     A healthcare provider will ask about your injury and examine you carefully.    An X-ray of your chest will likely be taken to show any major damage to ribs and lungs. But ribs can have small breaks that don't show up on X-rays, even though they still hurt.    You may be given medicine to ease your discomfort.    In rare cases, rib fractures can cause a lung to collapse or lead to bleeding in the chest. In these cases, a tube will be inserted into the chest to reinflate the lung or drain the blood.  Follow-up  You are likely to heal in 6 to 8 weeks. Most rib fractures heal on their own with no lasting effects. Call your healthcare provider right away if you notice any of these symptoms:    Increased chest pain    Shortness of breath    Fever or chills    Coughing up blood  SLID last reviewed this educational content on 4/1/2018 2000-2021 [employer]. All rights reserved. This information is not intended as a substitute for professional medical care. Always follow your healthcare professional's instructions.
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
## 2021-09-17 ENCOUNTER — MYC MEDICAL ADVICE (OUTPATIENT)
Dept: FAMILY MEDICINE | Facility: CLINIC | Age: 61
End: 2021-09-17

## 2021-09-17 ENCOUNTER — NURSE TRIAGE (OUTPATIENT)
Dept: FAMILY MEDICINE | Facility: CLINIC | Age: 61
End: 2021-09-17
Payer: COMMERCIAL

## 2021-11-15 ENCOUNTER — TELEPHONE (OUTPATIENT)
Dept: FAMILY MEDICINE | Facility: CLINIC | Age: 61
End: 2021-11-15
Payer: COMMERCIAL

## 2021-11-15 NOTE — TELEPHONE ENCOUNTER
Patient called back to clinic. States he missed call from Dr. Zhang. Routing to provider to review/advise re: separate [email or web address] message about if he can receive COVID booster.

## 2021-11-22 ENCOUNTER — OFFICE VISIT (OUTPATIENT)
Dept: FAMILY MEDICINE | Facility: CLINIC | Age: 61
End: 2021-11-22
Payer: COMMERCIAL

## 2021-11-22 VITALS
SYSTOLIC BLOOD PRESSURE: 130 MMHG | BODY MASS INDEX: 27.46 KG/M2 | OXYGEN SATURATION: 99 % | WEIGHT: 214 LBS | DIASTOLIC BLOOD PRESSURE: 82 MMHG | RESPIRATION RATE: 16 BRPM | HEART RATE: 74 BPM | TEMPERATURE: 96.9 F | HEIGHT: 74 IN

## 2021-11-22 DIAGNOSIS — E78.5 HYPERLIPIDEMIA LDL GOAL <100: ICD-10-CM

## 2021-11-22 DIAGNOSIS — E03.9 HYPOTHYROIDISM, UNSPECIFIED TYPE: ICD-10-CM

## 2021-11-22 DIAGNOSIS — I10 BENIGN ESSENTIAL HYPERTENSION: ICD-10-CM

## 2021-11-22 DIAGNOSIS — Z23 HIGH PRIORITY FOR 2019-NCOV VACCINE: Primary | ICD-10-CM

## 2021-11-22 DIAGNOSIS — N52.9 ERECTILE DYSFUNCTION, UNSPECIFIED ERECTILE DYSFUNCTION TYPE: ICD-10-CM

## 2021-11-22 PROCEDURE — 91300 COVID-19,PF,PFIZER (12+ YRS): CPT | Performed by: INTERNAL MEDICINE

## 2021-11-22 PROCEDURE — 0004A COVID-19,PF,PFIZER (12+ YRS): CPT | Performed by: INTERNAL MEDICINE

## 2021-11-22 PROCEDURE — 99214 OFFICE O/P EST MOD 30 MIN: CPT | Performed by: INTERNAL MEDICINE

## 2021-11-22 ASSESSMENT — MIFFLIN-ST. JEOR: SCORE: 1837.04

## 2021-11-22 NOTE — PROGRESS NOTES
Subjective   Mikhail is a 61 year old who presents for the following health issues    HPI       Chief Complaint   Patient presents with     Follow Up     Imm/Inj     COVID-19 VACCINE     follow up on Hypertension, hyperlipidemia    HPI:   Patient Juan C Garcia is a very pleasant 61 year old male with history of alcohol dependence, hypertension, hyperlipidemia who presents to Internal Medicine clinic today for follow up of multiple concerns including hypertension, hyperlipidemia.  No chest pains, headaches, fever or chills. Patient's most recent lipid screening lab results are not at goal, patient is not currently on any statin medication and is willing to restart a trial of low dose Lipitor cholesterol medication at this time. the patient has also lost weight through his current diet, exercise weight loss program lately.      Current Medications:     Current Outpatient Medications   Medication Sig Dispense Refill     ALLERGY INJECTIONS        atorvastatin (LIPITOR) 10 MG tablet Take 0.5 tablets (5 mg) by mouth daily 45 tablet 3     fluticasone (ARNUITY ELLIPTA) 200 MCG/ACT inhaler Inhale 1 puff into the lungs daily       hydrochlorothiazide (HYDRODIURIL) 12.5 MG tablet Take 1 tablet (12.5 mg) by mouth daily 90 tablet 3     levothyroxine (SYNTHROID, LEVOTHROID) 137 MCG tablet TAKE 137 MCG (1 TABLET) BY MOUTH DAILY 90 tablet 3     losartan (COZAAR) 50 MG tablet Take 1 tablet (50 mg) by mouth daily 90 tablet 3     sildenafil (REVATIO) 20 MG tablet Take 1 tablet (20 mg) by mouth daily as needed (ED) 90 tablet 3     testosterone (ANDROGEL/TESTIM) 50 MG/5GM (1%) gel Place onto the skin daily           Allergies:      Allergies   Allergen Reactions     No Known Allergies             Past Medical History:     Past Medical History:   Diagnosis Date     Alcohol use      Dermatitis herpetiformis 2000    Dx by biopsy of rash on elbow per derm     Diastolic dysfunction with chronic heart failure (H)      HTN, goal below 140/90       Hyperlipidemia      Lung nodule Feb 2003    Benign; Left upper lobe     Multiple allergies     allergist with immunotherapy; Allergist is Dr. Santos in [place]     Primary hypogonadism in male     Low FSH, single left testicle - followed by Dr. Flores of endo     Pulmonary embolus (H) Feb 2003    Evaluated by Javier - unknown cause     Thyroid nodule Feb 2005    Benign per FNA --> taking Levothyroxine ever since     Uncomplicated asthma          Past Surgical History:     Past Surgical History:   Procedure Laterality Date     BACK SURGERY       COLONOSCOPY  April 2012     HEAD & NECK SURGERY       HERNIA REPAIR       ORCHIOPEXY CHILD Right     Age 10 due to undescended testicl     ORTHOPEDIC SURGERY       OTHER SURGICAL HISTORY  July 1997    Cervical laminectomy C4-5-6     OTHER SURGICAL HISTORY  Jan 2000    Right index tendon repair     OTHER SURGICAL HISTORY  August 2004    Ankle/wrist repair     OTHER SURGICAL HISTORY  April 2007    Lumbar microdiscectomy L5-S1     OTHER SURGICAL HISTORY  March 2010    Cervical fusion C5-6-7     OTHER SURGICAL HISTORY  March 2011    Cervical fusion C3-4-5     OTHER SURGICAL HISTORY  July 2011    Lumbar fusion L5-S1         Family Medical History:     Family History   Problem Relation Age of Onset     Hypertension Mother      Dementia Mother      Alcoholism Mother      Hypertension Father      Cerebrovascular Disease Father      Cancer Father         throat - was a smoker     Heart Disease Father         CABG     Diabetes Maternal Grandfather      Family History Negative Brother      Family History Negative Sister      Cerebrovascular Disease Paternal Grandmother      Cancer Son         Testicular cancer     Family History Negative Son          Social History:     Social History     Socioeconomic History     Marital status:      Spouse name: Not on file     Number of children: Not on file     Years of education: Not on file     Highest education level: Not on file  "  Occupational History     Not on file   Tobacco Use     Smoking status: Never Smoker     Smokeless tobacco: Never Used   Substance and Sexual Activity     Alcohol use: Yes     Alcohol/week: 0.0 standard drinks     Drug use: No     Sexual activity: Yes     Partners: Female     Birth control/protection: Pull-out method   Other Topics Concern     Parent/sibling w/ CABG, MI or angioplasty before 65F 55M? Yes     Comment: father bypass 60ish   Social History Narrative     Not on file     Social Determinants of Health     Financial Resource Strain: Not on file   Food Insecurity: Not on file   Transportation Needs: Not on file   Physical Activity: Not on file   Stress: Not on file   Social Connections: Not on file   Intimate Partner Violence: Not on file   Housing Stability: Not on file           Review of System:     Constitutional: Negative for fever or chills  Skin: Negative for rashes  Ears/Nose/Throat: Negative for nasal congestion, sore throat  Respiratory: No shortness of breath, dyspnea on exertion, cough, or hemoptysis  Cardiovascular: Negative for chest pain since hospital discharge  Gastrointestinal: Negative for nausea, vomiting, positive for chronic GERD  Genitourinary: Negative for dysuria, hematuria  Musculoskeletal: Negative for myalgias  Neurologic: Negative for headaches  Psychiatric: Negative for depression, anxiety  Hematologic/Lymphatic/Immunologic: Negative  Endocrine: positive for hypothyroidism  Behavioral: Negative for tobacco use      Physical Exam:   /82 (BP Location: Right arm, Patient Position: Sitting, Cuff Size: Adult Large)   Pulse 74   Temp 96.9  F (36.1  C) (Temporal)   Resp 16   Ht 1.882 m (6' 2.1\")   Wt 97.1 kg (214 lb)   SpO2 99%   BMI 27.40 kg/m      GENERAL: alert and no distress  EYES: eyes grossly normal to inspection, and conjunctivae and sclerae normal  HENT: Normocephalic atraumatic. Nose and mouth without ulcers or lesions  NECK: supple  RESP: lungs clear to " auscultation   CV: regular rate and rhythm, normal S1 S2  LYMPH: no peripheral edema   ABDOMEN: nondistended  MS: no gross musculoskeletal defects noted  SKIN: a dark brown color mole present on the left shoulder  NEURO: Alert & Oriented x 3.   PSYCH: mentation appears normal, affect normal        Diagnostic Test Results:     Recent Labs   Lab Test 06/03/21  0807 03/08/21  0809 02/02/15  1002 02/02/15  1002   CHOL 264* 187   < > 252*   HDL 61 54   < > 48   * 111*   < > 152*   TRIG 100 111   < > 259*   CHOLHDLRATIO  --   --   --  5.2*    < > = values in this interval not displayed.       Last Comprehensive Metabolic Panel:  Sodium   Date Value Ref Range Status   03/08/2021 137 133 - 144 mmol/L Final     Potassium   Date Value Ref Range Status   03/08/2021 4.5 3.4 - 5.3 mmol/L Final     Chloride   Date Value Ref Range Status   03/08/2021 105 94 - 109 mmol/L Final     Carbon Dioxide   Date Value Ref Range Status   03/08/2021 29 20 - 32 mmol/L Final     Anion Gap   Date Value Ref Range Status   03/08/2021 3 3 - 14 mmol/L Final     Glucose   Date Value Ref Range Status   03/08/2021 103 (H) 70 - 99 mg/dL Final     Comment:     Fasting specimen     Urea Nitrogen   Date Value Ref Range Status   03/08/2021 12 7 - 30 mg/dL Final     Creatinine   Date Value Ref Range Status   03/08/2021 0.80 0.66 - 1.25 mg/dL Final     GFR Estimate   Date Value Ref Range Status   03/08/2021 >90 >60 mL/min/[1.73_m2] Final     Comment:     Non  GFR Calc  Starting 12/18/2018, serum creatinine based estimated GFR (eGFR) will be   calculated using the Chronic Kidney Disease Epidemiology Collaboration   (CKD-EPI) equation.       Calcium   Date Value Ref Range Status   03/08/2021 10.0 8.5 - 10.1 mg/dL Final       ASSESSMENT/PLAN:     (Z23) High priority for 2019-nCoV vaccine  (primary encounter diagnosis)  Comment: patient is due for COVID vaccine booster  Plan: COVID-19,PF,PFIZER (12+ Yrs PURPLE LABEL)    (E78.5)  Hyperlipidemia, unspecified hyperlipidemia type  (primary encounter diagnosis)  Comment: stable  Plan: continue current therapy.  In the meantime, the patient is advised to continue his current diet, exercise weight loss program going forward.    (I10) HTN, goal below 140/90  Comment: BP is at goal, well controlled  Plan: continue current therapy    (N52.9) Erectile dysfunction, unspecified erectile dysfunction type  Comment: stable  Plan: continue current therapy    (E03.9) Hypothyroidism, unspecified type  Comment: stable  Plan: continue current therapy        Follow Up Plan:     Patient is instructed to return to Internal Medicine clinic for follow-up visit in 3 to 6 months.        Suzanne Zhang MD  Internal Medicine  [facility]

## 2021-12-01 ENCOUNTER — LAB (OUTPATIENT)
Dept: LAB | Facility: CLINIC | Age: 61
End: 2021-12-01
Payer: COMMERCIAL

## 2021-12-01 DIAGNOSIS — E78.5 HYPERLIPIDEMIA, UNSPECIFIED HYPERLIPIDEMIA TYPE: ICD-10-CM

## 2021-12-01 PROCEDURE — 80061 LIPID PANEL: CPT

## 2021-12-01 PROCEDURE — 36415 COLL VENOUS BLD VENIPUNCTURE: CPT

## 2021-12-02 LAB
CHOLEST SERPL-MCNC: 207 MG/DL
FASTING STATUS PATIENT QL REPORTED: YES
HDLC SERPL-MCNC: 72 MG/DL
LDLC SERPL CALC-MCNC: 117 MG/DL
NONHDLC SERPL-MCNC: 135 MG/DL
TRIGL SERPL-MCNC: 89 MG/DL

## 2022-03-20 DIAGNOSIS — N52.9 ERECTILE DYSFUNCTION, UNSPECIFIED ERECTILE DYSFUNCTION TYPE: ICD-10-CM

## 2022-03-23 RX ORDER — SILDENAFIL CITRATE 20 MG/1
TABLET ORAL
Qty: 90 TABLET | Refills: 1 | Status: SHIPPED | OUTPATIENT
Start: 2022-03-23 | End: 2024-03-08

## 2022-03-31 ENCOUNTER — TRANSFERRED RECORDS (OUTPATIENT)
Dept: HEALTH INFORMATION MANAGEMENT | Facility: CLINIC | Age: 62
End: 2022-03-31
Payer: COMMERCIAL

## 2022-04-16 ENCOUNTER — HEALTH MAINTENANCE LETTER (OUTPATIENT)
Age: 62
End: 2022-04-16

## 2022-04-17 ENCOUNTER — MYC MEDICAL ADVICE (OUTPATIENT)
Dept: FAMILY MEDICINE | Facility: CLINIC | Age: 62
End: 2022-04-17
Payer: COMMERCIAL

## 2022-04-20 NOTE — TELEPHONE ENCOUNTER
See MC from pt and please reply to pt or route back to triage or elsewhere as appropriate.     Bianca Muñiz, RN  [facility] RN Triage Team

## 2022-04-21 NOTE — TELEPHONE ENCOUNTER
Suzanne Zhang MD  [facility] Triage Yesterday (10:14 AM)     X    Please help pt schedule a virtual visit with me soon for further evaluation

## 2022-04-22 ENCOUNTER — E-VISIT (OUTPATIENT)
Dept: FAMILY MEDICINE | Facility: CLINIC | Age: 62
End: 2022-04-22
Payer: COMMERCIAL

## 2022-04-22 DIAGNOSIS — Z12.5 SCREENING FOR PROSTATE CANCER: ICD-10-CM

## 2022-04-22 DIAGNOSIS — E03.9 HYPOTHYROIDISM, UNSPECIFIED TYPE: ICD-10-CM

## 2022-04-22 DIAGNOSIS — Z12.11 SCREEN FOR COLON CANCER: Primary | ICD-10-CM

## 2022-04-22 DIAGNOSIS — Z13.1 SCREENING FOR DIABETES MELLITUS: ICD-10-CM

## 2022-04-22 DIAGNOSIS — E78.5 HYPERLIPIDEMIA LDL GOAL <100: ICD-10-CM

## 2022-04-22 PROCEDURE — 99422 OL DIG E/M SVC 11-20 MIN: CPT | Performed by: INTERNAL MEDICINE

## 2022-04-22 NOTE — TELEPHONE ENCOUNTER
Provider E-Visit time total (minutes): 15 minutes    Chief Complaint:     E-visit for multiple concerns    HPI:   Patient Juan C Garcia is a very pleasant 62 year old male with history of hypothyroidism today for E-visit for multiple concerns including colon cancer screening. I have also called and spoke to patient over the phone today.    Current Medications:     Current Outpatient Medications   Medication Sig Dispense Refill     ALLERGY INJECTIONS        atorvastatin (LIPITOR) 10 MG tablet Take 0.5 tablets (5 mg) by mouth daily 45 tablet 3     fluticasone (ARNUITY ELLIPTA) 200 MCG/ACT inhaler Inhale 1 puff into the lungs daily       hydrochlorothiazide (HYDRODIURIL) 12.5 MG tablet Take 1 tablet (12.5 mg) by mouth daily 90 tablet 3     levothyroxine (SYNTHROID, LEVOTHROID) 137 MCG tablet TAKE 137 MCG (1 TABLET) BY MOUTH DAILY 90 tablet 3     losartan (COZAAR) 50 MG tablet Take 1 tablet (50 mg) by mouth daily 90 tablet 3     sildenafil (REVATIO) 20 MG tablet TAKE 1 TABLET BY MOUTH ONCE DAILY AS NEEDED (ERECTILE  DYSFUNCTION) 90 tablet 1     testosterone (ANDROGEL/TESTIM) 50 MG/5GM (1%) gel Place onto the skin daily           Allergies:      Allergies   Allergen Reactions     No Known Allergies             Past Medical History:     Past Medical History:   Diagnosis Date     Alcohol use      Dermatitis herpetiformis 2000    Dx by biopsy of rash on elbow per derm     Diastolic dysfunction with chronic heart failure (H)      HTN, goal below 140/90      Hyperlipidemia      Lung nodule Feb 2003    Benign; Left upper lobe     Multiple allergies     allergist with immunotherapy; Allergist is Dr. Santos in [place]     Primary hypogonadism in male     Low FSH, single left testicle - followed by Dr. Flores of endo     Pulmonary embolus (H) Feb 2003    Evaluated by ?Proctor - unknown cause     Thyroid nodule Feb 2005    Benign per FNA --> taking Levothyroxine ever since     Uncomplicated asthma          Past Surgical  History:     Past Surgical History:   Procedure Laterality Date     BACK SURGERY       COLONOSCOPY  April 2012     HEAD & NECK SURGERY       HERNIA REPAIR       ORCHIOPEXY CHILD Right     Age 10 due to undescended testicl     ORTHOPEDIC SURGERY       OTHER SURGICAL HISTORY  July 1997    Cervical laminectomy C4-5-6     OTHER SURGICAL HISTORY  Jan 2000    Right index tendon repair     OTHER SURGICAL HISTORY  August 2004    Ankle/wrist repair     OTHER SURGICAL HISTORY  April 2007    Lumbar microdiscectomy L5-S1     OTHER SURGICAL HISTORY  March 2010    Cervical fusion C5-6-7     OTHER SURGICAL HISTORY  March 2011    Cervical fusion C3-4-5     OTHER SURGICAL HISTORY  July 2011    Lumbar fusion L5-S1         Family Medical History:     Family History   Problem Relation Age of Onset     Hypertension Mother      Dementia Mother      Alcoholism Mother      Hypertension Father      Cerebrovascular Disease Father      Cancer Father         throat - was a smoker     Heart Disease Father         CABG     Diabetes Maternal Grandfather      Family History Negative Brother      Family History Negative Sister      Cerebrovascular Disease Paternal Grandmother      Cancer Son         Testicular cancer     Family History Negative Son          Social History:     Social History     Socioeconomic History     Marital status:      Spouse name: Not on file     Number of children: Not on file     Years of education: Not on file     Highest education level: Not on file   Occupational History     Not on file   Tobacco Use     Smoking status: Never Smoker     Smokeless tobacco: Never Used   Substance and Sexual Activity     Alcohol use: Yes     Alcohol/week: 0.0 standard drinks     Drug use: No     Sexual activity: Yes     Partners: Female     Birth control/protection: Pull-out method   Other Topics Concern     Parent/sibling w/ CABG, MI or angioplasty before 65F 55M? Yes     Comment: father bypass 60ish   Social History Narrative      Not on file     Social Determinants of Health     Financial Resource Strain: Not on file   Food Insecurity: Not on file   Transportation Needs: Not on file   Physical Activity: Not on file   Stress: Not on file   Social Connections: Not on file   Intimate Partner Violence: Not on file   Housing Stability: Not on file           Review of System:     Constitutional: Negative for fever or chills  Skin: Negative for rashes  Ears/Nose/Throat: Negative for nasal congestion, sore throat  Respiratory: No shortness of breath, dyspnea on exertion, cough, or hemoptysis  Cardiovascular: Negative for chest pain  Gastrointestinal: Negative for nausea, vomiting  Genitourinary: Negative for dysuria, hematuria  Musculoskeletal: Negative for myalgias  Neurologic: Negative for headaches  Psychiatric: Negative for depression, anxiety  Hematologic/Lymphatic/Immunologic: Negative  Endocrine: positive for hypothyroidism  Behavioral: Negative for tobacco use       Physical Exam:   There were no vitals taken for this visit.          Diagnostic Test Results:     Diagnostic Test Results:  Labs reviewed in Epic    ASSESSMENT/PLAN:       Juan C was seen today for other.    Diagnoses and all orders for this visit:    Screen for colon cancer  -     Adult Gastro Ref - Procedure Only; Future    Hyperlipidemia LDL goal <100  -     Lipid panel reflex to direct LDL Fasting; Future  -     CBC with platelets and differential; Future  -     Basic metabolic panel  (Ca, Cl, CO2, Creat, Gluc, K, Na, BUN); Future    Screening for prostate cancer  -     PSA, screen; Future    Screening for diabetes mellitus  -     Hemoglobin A1c; Future    Hypothyroidism, unspecified type  -     TSH with free T4 reflex; Future            Follow Up Plan:     Patient is instructed to return to Internal Medicine clinic for follow-up visit in 1 month.        Suzanne Zhang MD  Internal Medicine  [facility]

## 2022-04-22 NOTE — TELEPHONE ENCOUNTER
Called Pt to help schedule appt with Dr. Zhang.  He was on another phone call and said he will schedule via [employer]

## 2022-04-24 DIAGNOSIS — I10 HTN, GOAL BELOW 140/90: Chronic | ICD-10-CM

## 2022-04-24 DIAGNOSIS — I10 ESSENTIAL HYPERTENSION: ICD-10-CM

## 2022-04-26 ENCOUNTER — TELEPHONE (OUTPATIENT)
Dept: GASTROENTEROLOGY | Facility: CLINIC | Age: 62
End: 2022-04-26
Payer: COMMERCIAL

## 2022-04-26 NOTE — TELEPHONE ENCOUNTER
Screening Questions  BlueKIND OF PREP RedLOCATION [review exclusion criteria] GreenSEDATION TYPE  1. Have you had a positive covid test in the last 90 days? N     2. Do you have a legal guardian or medical Power of ? N  Are you able to give consent for your medical care? Y (Sedation review/consideration needed)    3. Are you active on mychart? Y    4. What insurance is in the chart? BCBS     3.   Ordering/Referring Provider: 1st Attempt- LM to schedule, Mychart message sent, Defer 48 Hrs     4. BMI 27.5 [BMI OVER 40-EXTENDED PREP]  If greater than 40 review exclusion criteria [PAC APPT IF @ [facility]]        5.  Respiratory Screening :  [If yes to any of the following HOSPITAL setting only]     Do you use daily home oxygen? N    Do you have mod to severe Obstructive Sleep Apnea? Y  [OKAY @ [facility] SH PH RI]   Do you have Pulmonary Hypertension? N     Do you have UNCONTROLLED asthma? N        6.   Have you had a heart or lung transplant? N      7.   Are you currently on dialysis? N [ If yes, G-PREP & HOSPITAL setting only]     8.   Do you have chronic kidney disease? N [ If yes, G-PREP ]    9.   Have you had a stroke or Transient ischemic attack (TIA - aka  mini stroke ) within 6 months?  N (If yes, please review exclusion criteria)    10.   In the past 6 months, have you had any heart related issues including cardiomyopathy or heart attack? NA           If yes, did it require cardiac stenting or other implantable device? N      11.   Do you have any implantable devices in your body (pacemaker, defib, LVAD)? N (If yes, please review exclusion criteria)    12.   Do you take nitroglycerin? N           If yes, how often? NA  (if yes, HOSPITAL setting ONLY)    13.   Are you currently taking any blood thinners? N           [IF YES, INFORM PATIENT TO FOLLOW UP W/ ORDERING PROVIDER FOR BRIDGING INSTRUCTIONS]     14.   Do you have a diagnosis of diabetes? N   [ If yes, G-PREP ]    15.   [FEMALES] Are you currently  pregnant? NA    If yes, how many weeks? NA    16.   Are you taking any prescription pain medications on a routine schedule?  N  [ If yes, EXTENDED PREP.] [If yes, MAC]    17.   Do you have any chemical dependencies such as alcohol, street drugs, or methadone?  N [If yes, MAC]    18.   Do you have any history of post-traumatic stress syndrome, severe anxiety or history of psychosis?  N  [If yes, MAC]    19.   Do you transfer independently?  Y    20.  On a regular basis do you go 3-5 days between bowel movements? N   [ If yes, EXTENDED PREP.]    21.   Preferred LOCAL Pharmacy for Pre Prescription      [id number] - [place], [place] - [address] [facility]        Scheduling Details      Caller : Juan C Garcia  (Please ask for phone number if not scheduled by patient)    Type of Procedure Scheduled: Colon  Which Colonoscopy Prep was Sent?: Miralax  VALDEMAR CF PATIENTS & GROEN'S PATIENTS NEEDS EXTENDED PREP  Surgeon: Wally  Date of Procedure: 6/20  Location: [facility]      Sedation Type: CS  Conscious Sedation- Needs  for 6 hours after the procedure  MAC/General-Needs  for 24 hours after procedure    Pre-op Required at [facility], [place], [place] and OR for MAC sedation: N  (advise patient they will need a pre-op prior to procedure -)      Informed patient they will need an adult  Y  Cannot take any type of public or medical transportation alone    Pre-Procedure Covid test to be completed at [facility] or Externally: Yes 6/16 [place]    Confirmed Nurse will call to complete assessment Y    Additional comments:

## 2022-04-27 RX ORDER — HYDROCHLOROTHIAZIDE 12.5 MG/1
TABLET ORAL
Qty: 90 TABLET | Refills: 1 | Status: SHIPPED | OUTPATIENT
Start: 2022-04-27 | End: 2022-11-08

## 2022-04-27 RX ORDER — LOSARTAN POTASSIUM 50 MG/1
TABLET ORAL
Qty: 90 TABLET | Refills: 1 | Status: SHIPPED | OUTPATIENT
Start: 2022-04-27 | End: 2022-11-08

## 2022-05-13 ENCOUNTER — HOSPITAL ENCOUNTER (EMERGENCY)
Facility: CLINIC | Age: 62
Discharge: HOME | End: 2022-05-13
Attending: EMERGENCY MEDICINE | Admitting: EMERGENCY MEDICINE
Payer: COMMERCIAL

## 2022-05-13 ENCOUNTER — APPOINTMENT (OUTPATIENT)
Dept: CT IMAGING | Facility: CLINIC | Age: 62
End: 2022-05-13
Attending: EMERGENCY MEDICINE
Payer: COMMERCIAL

## 2022-05-13 ENCOUNTER — LAB (OUTPATIENT)
Dept: LAB | Facility: CLINIC | Age: 62
End: 2022-05-13
Payer: COMMERCIAL

## 2022-05-13 VITALS
TEMPERATURE: 97.7 F | OXYGEN SATURATION: 96 % | DIASTOLIC BLOOD PRESSURE: 87 MMHG | WEIGHT: 210 LBS | SYSTOLIC BLOOD PRESSURE: 132 MMHG | BODY MASS INDEX: 26.95 KG/M2 | RESPIRATION RATE: 16 BRPM | HEIGHT: 74 IN | HEART RATE: 70 BPM

## 2022-05-13 DIAGNOSIS — Z13.1 SCREENING FOR DIABETES MELLITUS: ICD-10-CM

## 2022-05-13 DIAGNOSIS — Z12.5 SCREENING FOR PROSTATE CANCER: ICD-10-CM

## 2022-05-13 DIAGNOSIS — E78.5 HYPERLIPIDEMIA LDL GOAL <100: ICD-10-CM

## 2022-05-13 DIAGNOSIS — R07.9 CHEST PAIN, UNSPECIFIED TYPE: ICD-10-CM

## 2022-05-13 DIAGNOSIS — E03.9 HYPOTHYROIDISM, UNSPECIFIED TYPE: ICD-10-CM

## 2022-05-13 LAB
ALBUMIN SERPL-MCNC: 4.2 G/DL (ref 3.4–5)
ALP SERPL-CCNC: 71 U/L (ref 40–150)
ALT SERPL W P-5'-P-CCNC: 33 U/L (ref 0–70)
ANION GAP SERPL CALCULATED.3IONS-SCNC: 7 MMOL/L (ref 3–14)
ANION GAP SERPL CALCULATED.3IONS-SCNC: 8 MMOL/L (ref 3–14)
AST SERPL W P-5'-P-CCNC: 29 U/L (ref 0–45)
ATRIAL RATE - MUSE: 74 BPM
BASOPHILS # BLD AUTO: 0 10E3/UL (ref 0–0.2)
BASOPHILS # BLD AUTO: 0.1 10E3/UL (ref 0–0.2)
BASOPHILS NFR BLD AUTO: 1 %
BASOPHILS NFR BLD AUTO: 1 %
BILIRUB SERPL-MCNC: 0.5 MG/DL (ref 0.2–1.3)
BUN SERPL-MCNC: 10 MG/DL (ref 7–30)
BUN SERPL-MCNC: 10 MG/DL (ref 7–30)
CALCIUM SERPL-MCNC: 9.3 MG/DL (ref 8.5–10.1)
CALCIUM SERPL-MCNC: 9.3 MG/DL (ref 8.5–10.1)
CHLORIDE BLD-SCNC: 105 MMOL/L (ref 94–109)
CHLORIDE BLD-SCNC: 107 MMOL/L (ref 94–109)
CHOLEST SERPL-MCNC: 226 MG/DL
CO2 SERPL-SCNC: 23 MMOL/L (ref 20–32)
CO2 SERPL-SCNC: 27 MMOL/L (ref 20–32)
CREAT BLD-MCNC: 0.9 MG/DL (ref 0.7–1.3)
CREAT SERPL-MCNC: 0.78 MG/DL (ref 0.66–1.25)
CREAT SERPL-MCNC: 0.83 MG/DL (ref 0.66–1.25)
DIASTOLIC BLOOD PRESSURE - MUSE: NORMAL MMHG
EOSINOPHIL # BLD AUTO: 0.5 10E3/UL (ref 0–0.7)
EOSINOPHIL # BLD AUTO: 0.6 10E3/UL (ref 0–0.7)
EOSINOPHIL NFR BLD AUTO: 8 %
EOSINOPHIL NFR BLD AUTO: 8 %
ERYTHROCYTE [DISTWIDTH] IN BLOOD BY AUTOMATED COUNT: 13.4 % (ref 10–15)
ERYTHROCYTE [DISTWIDTH] IN BLOOD BY AUTOMATED COUNT: 14 % (ref 10–15)
FASTING STATUS PATIENT QL REPORTED: YES
GFR SERPL CREATININE-BSD FRML MDRD: >60 ML/MIN/1.73M2
GFR SERPL CREATININE-BSD FRML MDRD: >90 ML/MIN/1.73M2
GFR SERPL CREATININE-BSD FRML MDRD: >90 ML/MIN/1.73M2
GLUCOSE BLD-MCNC: 95 MG/DL (ref 70–99)
GLUCOSE BLD-MCNC: 97 MG/DL (ref 70–99)
HBA1C MFR BLD: 5 % (ref 0–5.6)
HCT VFR BLD AUTO: 43.5 % (ref 40–53)
HCT VFR BLD AUTO: 46.3 % (ref 40–53)
HDLC SERPL-MCNC: 58 MG/DL
HGB BLD-MCNC: 14.6 G/DL (ref 13.3–17.7)
HGB BLD-MCNC: 15 G/DL (ref 13.3–17.7)
IMM GRANULOCYTES # BLD: 0.1 10E3/UL
IMM GRANULOCYTES NFR BLD: 1 %
INTERPRETATION ECG - MUSE: NORMAL
LDLC SERPL CALC-MCNC: 111 MG/DL
LYMPHOCYTES # BLD AUTO: 1.9 10E3/UL (ref 0.8–5.3)
LYMPHOCYTES # BLD AUTO: 2.1 10E3/UL (ref 0.8–5.3)
LYMPHOCYTES NFR BLD AUTO: 28 %
LYMPHOCYTES NFR BLD AUTO: 31 %
MCH RBC QN AUTO: 29.8 PG (ref 26.5–33)
MCH RBC QN AUTO: 30.9 PG (ref 26.5–33)
MCHC RBC AUTO-ENTMCNC: 32.4 G/DL (ref 31.5–36.5)
MCHC RBC AUTO-ENTMCNC: 33.6 G/DL (ref 31.5–36.5)
MCV RBC AUTO: 92 FL (ref 78–100)
MCV RBC AUTO: 92 FL (ref 78–100)
MONOCYTES # BLD AUTO: 0.6 10E3/UL (ref 0–1.3)
MONOCYTES # BLD AUTO: 0.6 10E3/UL (ref 0–1.3)
MONOCYTES NFR BLD AUTO: 9 %
MONOCYTES NFR BLD AUTO: 9 %
NEUTROPHILS # BLD AUTO: 3.4 10E3/UL (ref 1.6–8.3)
NEUTROPHILS # BLD AUTO: 3.5 10E3/UL (ref 1.6–8.3)
NEUTROPHILS NFR BLD AUTO: 51 %
NEUTROPHILS NFR BLD AUTO: 53 %
NONHDLC SERPL-MCNC: 168 MG/DL
NRBC # BLD AUTO: 0 10E3/UL
NRBC BLD AUTO-RTO: 0 /100
P AXIS - MUSE: 77 DEGREES
PLATELET # BLD AUTO: 265 10E3/UL (ref 150–450)
PLATELET # BLD AUTO: 281 10E3/UL (ref 150–450)
POTASSIUM BLD-SCNC: 3.8 MMOL/L (ref 3.4–5.3)
POTASSIUM BLD-SCNC: 4.4 MMOL/L (ref 3.4–5.3)
PR INTERVAL - MUSE: 160 MS
PROT SERPL-MCNC: 7.7 G/DL (ref 6.8–8.8)
PSA SERPL-MCNC: 0.93 UG/L (ref 0–4)
QRS DURATION - MUSE: 88 MS
QT - MUSE: 394 MS
QTC - MUSE: 437 MS
R AXIS - MUSE: 16 DEGREES
RBC # BLD AUTO: 4.72 10E6/UL (ref 4.4–5.9)
RBC # BLD AUTO: 5.03 10E6/UL (ref 4.4–5.9)
SODIUM SERPL-SCNC: 138 MMOL/L (ref 133–144)
SODIUM SERPL-SCNC: 139 MMOL/L (ref 133–144)
SYSTOLIC BLOOD PRESSURE - MUSE: NORMAL MMHG
T AXIS - MUSE: 44 DEGREES
T4 FREE SERPL-MCNC: 1.06 NG/DL (ref 0.76–1.46)
TRIGL SERPL-MCNC: 284 MG/DL
TROPONIN I SERPL HS-MCNC: 4 NG/L
TSH SERPL DL<=0.005 MIU/L-ACNC: 6.13 MU/L (ref 0.4–4)
VENTRICULAR RATE- MUSE: 74 BPM
WBC # BLD AUTO: 6.6 10E3/UL (ref 4–11)
WBC # BLD AUTO: 6.7 10E3/UL (ref 4–11)

## 2022-05-13 PROCEDURE — 84484 ASSAY OF TROPONIN QUANT: CPT | Performed by: EMERGENCY MEDICINE

## 2022-05-13 PROCEDURE — 80050 GENERAL HEALTH PANEL: CPT

## 2022-05-13 PROCEDURE — 71275 CT ANGIOGRAPHY CHEST: CPT

## 2022-05-13 PROCEDURE — 84439 ASSAY OF FREE THYROXINE: CPT

## 2022-05-13 PROCEDURE — 85025 COMPLETE CBC W/AUTO DIFF WBC: CPT | Performed by: EMERGENCY MEDICINE

## 2022-05-13 PROCEDURE — 250N000009 HC RX 250: Performed by: EMERGENCY MEDICINE

## 2022-05-13 PROCEDURE — 82565 ASSAY OF CREATININE: CPT

## 2022-05-13 PROCEDURE — 80053 COMPREHEN METABOLIC PANEL: CPT | Performed by: EMERGENCY MEDICINE

## 2022-05-13 PROCEDURE — 83036 HEMOGLOBIN GLYCOSYLATED A1C: CPT

## 2022-05-13 PROCEDURE — 80061 LIPID PANEL: CPT

## 2022-05-13 PROCEDURE — 36415 COLL VENOUS BLD VENIPUNCTURE: CPT

## 2022-05-13 PROCEDURE — 36415 COLL VENOUS BLD VENIPUNCTURE: CPT | Performed by: EMERGENCY MEDICINE

## 2022-05-13 PROCEDURE — 93005 ELECTROCARDIOGRAM TRACING: CPT

## 2022-05-13 PROCEDURE — G0103 PSA SCREENING: HCPCS

## 2022-05-13 PROCEDURE — 250N000011 HC RX IP 250 OP 636: Performed by: EMERGENCY MEDICINE

## 2022-05-13 PROCEDURE — 99285 EMERGENCY DEPT VISIT HI MDM: CPT | Mod: 25

## 2022-05-13 RX ORDER — IOPAMIDOL 755 MG/ML
75 INJECTION, SOLUTION INTRAVASCULAR ONCE
Status: COMPLETED | OUTPATIENT
Start: 2022-05-13 | End: 2022-05-13

## 2022-05-13 RX ADMIN — SODIUM CHLORIDE 96 ML: 900 INJECTION INTRAVENOUS at 09:15

## 2022-05-13 RX ADMIN — IOPAMIDOL 75 ML: 755 INJECTION, SOLUTION INTRAVENOUS at 09:15

## 2022-05-13 ASSESSMENT — ENCOUNTER SYMPTOMS
COUGH: 0
DYSURIA: 0
HEMATURIA: 0
SHORTNESS OF BREATH: 1
FEVER: 0

## 2022-05-13 NOTE — ED PROVIDER NOTES
History   Chief Complaint:  Chest Pain       The history is provided by the patient.      Juan C Garcia is a 62 year old male with history of pulmonary embolism and asthma who presents with a persistent pinching in his chest that has been constant over the last 2 weeks or so. Patient states that symptoms have gone into his back since time of onset. He also felt short of breath ~2 weeks ago after the discomfort first began and reports testing negative for COVID-19 at that time. The patient does liken symptoms to prior PE and states that his primary concern is to rule this out. Patient adds that no clear cause of his PE was reportedly identified and he was anticoagulated for about 6 months following the event. Here, he states that he is otherwise feeling fine and states that the persisting chest discomfort is mostly just irritating. Patient denies any leg swelling, dysuria, or hematuria. No fever or cough. No recent travel or surgery.    Review of Systems   Constitutional: Negative for fever.   Respiratory: Positive for shortness of breath (at onset of sx). Negative for cough.    Cardiovascular: Positive for chest pain (pinching). Negative for leg swelling.   Genitourinary: Negative for dysuria and hematuria.   All other systems reviewed and are negative.    Allergies:  No Known Drug Allergies    Medications:  Atorvastatin  Hydrochlorothiazide  Levothyroxine  Losartan  Sildenafil    Past Medical History:     Alcohol abuse  Hypertension  Hyperlipidemia  Hashimoto's thyroiditis  Celiac disease  Pulmonary embolism  Asthma  Lung nodule    Past Surgical History:    Back surgery  Colonoscopy  Head & neck surgery  Hernia repair  Right orchiopexy  Orthopedic surgery  Cervical laminectomy C4-6  Right index tendon repair  Ankle/wrist repair  Lumbar microdiscectomy L5-S1  Cervical fusion C5-7  Cervical fusion C3-5  Lumbar fusion L5-S1    Family History:    Father - hypertension, cerebrovascular disease, esophageal cancer,  "CAD s/p CABG  Mother - hypertension    Social History:  The patient was not accompanied to the ED.  Marital status:     Physical Exam     Patient Vitals for the past 24 hrs:   BP Temp Temp src Pulse Resp SpO2 Height Weight   05/13/22 0826 (!) 142/86 97.7  F (36.5  C) Temporal 76 16 96 % 1.88 m (6' 2\") 95.3 kg (210 lb)       Physical Exam  General: Alert, interactive in mild distress  Head:  Scalp is atraumatic  Eyes:  The pupils are equal, round, and reactive to light    EOM's intact    No scleral icterus  ENT:      Nose:  The external nose is normal  Ears:  External ears are normal  Mouth/Throat: The oropharynx is normal    Mucus membranes are moist      Neck:  Normal range of motion.      There is no rigidity.    Trachea is in the midline         CV:  Regular rate and rhythm    No murmur   Resp:  Breath sounds are clear bilaterally    Non-labored, no retractions or accessory muscle use      GI:  Abdomen is soft, no distension, no tenderness.       MS:  Normal strength in all 4 extremities.  No swelling or tenderness to the calves.  Skin:  Warm and dry, No rash or lesions noted.  Neuro:  GCS: 15  Psych:  Awake. Alert.  Normal affect.      Appropriate interactions.    Emergency Department Course   ECG:  Completed at 0828. Read at 0849.  Sinus rhythm with occasional Premature ventricular complexes  Agree with computer interpretation.  No significant change when compared to EKG dated 01/27/2020.  Rate 74 bpm. NM interval 160. QRS duration 88. QT/QTc 394/437. P-R-T axes 77 16 -44.    Imaging:  CT Chest Pulmonary Embolism w Contrast:  1.  No pulmonary emboli. No acute findings in the chest.  Report per radiology.    Laboratory:  CBC: WBC 6.6, HGB 15.0,   CMP: AWNL (Creatinine 0.78)  iStat Creatinine (collected 0905): 0.9    Troponin I high sensitivity (Collected 0904): 4    Emergency Department Course:     Reviewed:  I reviewed nursing notes, vitals, past medical history and Care " Everywhere.    Assessments:  0849 I obtained history and examined the patient in ED room 06 as noted above.   1024 I rechecked the patient and explained findings.     Disposition:  The patient was discharged to home.     Impression & Plan   Medical Decision Making:  Following presentation history and physical examination were performed, the above work-up was undertaken.  Given his previous history of pulmonary embolism and the recurrent symptoms CT imaging of the chest was performed thankfully demonstrating no signs of PE, pneumonia, pneumothorax or more concerning illness.  His EKG is nonischemic and troponin negative despite several weeks of symptoms and I doubt acute coronary syndrome, I do not believe he needs any further work-up for this.  There is no signs of an acute infectious etiology.  He will use nonsteroidal anti-inflammatories and Tylenol for pain management we will follow-up with his primary care provider and will return here if new symptoms develop.  Patient was in agreement this plan and subsequently discharged home.    Diagnosis:    ICD-10-CM    1. Chest pain, unspecified type  R07.9        Discharge Medications:  None.      Scribe Disclosure:  I, Nikole Lutz, am serving as a scribe at 8:49 AM on 5/13/2022 to document services personally performed by Daron Alcocer MD based on my observations and the provider's statements to me.     This note was completed in part using Dragon voice recognition software. Although reviewed after completion, some word and grammatical errors may occur.              Daron Alcocer MD  05/13/22 2902

## 2022-05-13 NOTE — ED TRIAGE NOTES
Pain to rt side of chest radiates to the back for 2wks, no injury, hx of PE years ago, not on blood thinners     Triage Assessment     Row Name 05/13/22 0831       Triage Assessment (Adult)    Airway WDL WDL       Respiratory WDL    Respiratory WDL WDL       Skin Circulation/Temperature WDL    Skin Circulation/Temperature WDL WDL       Cardiac WDL    Cardiac WDL chest pain       Cognitive/Neuro/Behavioral WDL    Cognitive/Neuro/Behavioral WDL WDL

## 2022-05-20 ENCOUNTER — OFFICE VISIT (OUTPATIENT)
Dept: FAMILY MEDICINE | Facility: CLINIC | Age: 62
End: 2022-05-20
Payer: COMMERCIAL

## 2022-05-20 VITALS
OXYGEN SATURATION: 99 % | TEMPERATURE: 97.1 F | BODY MASS INDEX: 27.72 KG/M2 | SYSTOLIC BLOOD PRESSURE: 142 MMHG | DIASTOLIC BLOOD PRESSURE: 86 MMHG | WEIGHT: 216 LBS | HEIGHT: 74 IN | RESPIRATION RATE: 18 BRPM | HEART RATE: 67 BPM

## 2022-05-20 DIAGNOSIS — E78.5 HYPERLIPIDEMIA, UNSPECIFIED HYPERLIPIDEMIA TYPE: ICD-10-CM

## 2022-05-20 DIAGNOSIS — Z00.00 ROUTINE HISTORY AND PHYSICAL EXAMINATION OF ADULT: Primary | ICD-10-CM

## 2022-05-20 DIAGNOSIS — Z23 HIGH PRIORITY FOR 2019-NCOV VACCINE: ICD-10-CM

## 2022-05-20 PROCEDURE — 99396 PREV VISIT EST AGE 40-64: CPT | Mod: 25 | Performed by: INTERNAL MEDICINE

## 2022-05-20 PROCEDURE — 0054A COVID-19,PF,PFIZER (12+ YRS): CPT | Performed by: INTERNAL MEDICINE

## 2022-05-20 PROCEDURE — 91305 COVID-19,PF,PFIZER (12+ YRS): CPT | Performed by: INTERNAL MEDICINE

## 2022-05-20 RX ORDER — ATORVASTATIN CALCIUM 10 MG/1
5 TABLET, FILM COATED ORAL DAILY
Qty: 45 TABLET | Refills: 3 | Status: SHIPPED | OUTPATIENT
Start: 2022-05-20 | End: 2022-09-14

## 2022-05-20 ASSESSMENT — PAIN SCALES - GENERAL: PAINLEVEL: NO PAIN (0)

## 2022-05-20 ASSESSMENT — ENCOUNTER SYMPTOMS
PARESTHESIAS: 0
EYE PAIN: 0
DIZZINESS: 0
HEADACHES: 0
FREQUENCY: 0
PALPITATIONS: 0
JOINT SWELLING: 0
ABDOMINAL PAIN: 0
SORE THROAT: 0
FEVER: 0
CHILLS: 0
DYSURIA: 0
ARTHRALGIAS: 1
NAUSEA: 0
HEARTBURN: 0
HEMATURIA: 0
CONSTIPATION: 0
HEMATOCHEZIA: 0
COUGH: 0
SHORTNESS OF BREATH: 0
NERVOUS/ANXIOUS: 1
WEAKNESS: 0
DIARRHEA: 0
MYALGIAS: 0

## 2022-05-20 NOTE — NURSING NOTE
"  Initial BP:  BP (!) 142/86 (BP Location: Left arm, Patient Position: Sitting, Cuff Size: Adult Large)   Pulse 67   Temp 97.1  F (36.2  C) (Temporal)   Resp 18   Ht 1.875 m (6' 1.8\")   Wt 98 kg (216 lb)   SpO2 99%   BMI 27.88 kg/m       67  Disposition: provider notified while patient in the clinic    Suki Villa CMA      "

## 2022-05-20 NOTE — PROGRESS NOTES
SUBJECTIVE:   CC: Juan C Garcia is an 62 year old male who presents for preventative health visit.       Patient has been advised of split billing requirements and indicates understanding: Yes  Healthy Habits:     Getting at least 3 servings of Calcium per day:  NO    Bi-annual eye exam:  NO    Dental care twice a year:  Yes    Sleep apnea or symptoms of sleep apnea:  Sleep apnea    Diet:  Gluten-free/reduced    Frequency of exercise:  2-3 days/week    Duration of exercise:  15-30 minutes    Taking medications regularly:  Yes    Barriers to taking medications:  None    PHQ-2 Total Score: 2    Additional concerns today:  No  Imm/Inj      Ability to successfully perform activities of daily living: Yes, no assistance needed  Home safety:  none identified   Hearing impairment: none      Today's PHQ-2 Score:   PHQ-2 ( 1999 [employer]) 5/20/2022   Q1: Little interest or pleasure in doing things 1   Q2: Feeling down, depressed or hopeless 1   PHQ-2 Score 2   PHQ-2 Total Score (12-17 Years)- Positive if 3 or more points; Administer PHQ-A if positive -   Q1: Little interest or pleasure in doing things Several days   Q2: Feeling down, depressed or hopeless Several days   PHQ-2 Score 2       Abuse: Current or Past(Physical, Sexual or Emotional)- No  Do you feel safe in your environment? Yes        Social History     Tobacco Use     Smoking status: Never Smoker     Smokeless tobacco: Never Used   Substance Use Topics     Alcohol use: Yes     If you drink alcohol do you typically have >3 drinks per day or >7 drinks per week? No    Alcohol Use 5/20/2022   Prescreen: >3 drinks/day or >7 drinks/week? Not Applicable   Prescreen: >3 drinks/day or >7 drinks/week? -     Last PSA:   PSA   Date Value Ref Range Status   03/08/2021 0.66 0 - 4 ug/L Final     Comment:     Assay Method:  Chemiluminescence using Siemens Vista analyzer     Prostate Specific Antigen Screen   Date Value Ref Range Status   05/13/2022 0.93 0.00 - 4.00 ug/L Final  "      Reviewed orders with patient. Reviewed health maintenance and updated orders accordingly - Yes  Labs reviewed in EPIC    Reviewed and updated as needed this visit by clinical staff    Allergies  Meds                Reviewed and updated as needed this visit by Provider                   Past Medical History:   Diagnosis Date     Alcohol use      Dermatitis herpetiformis 2000    Dx by biopsy of rash on elbow per derm     Diastolic dysfunction with chronic heart failure (H)      HTN, goal below 140/90      Hyperlipidemia      Lung nodule Feb 2003    Benign; Left upper lobe     Multiple allergies     allergist with immunotherapy; Allergist is Dr. Santos in [place]     Primary hypogonadism in male     Low FSH, single left testicle - followed by Dr. Flores of endo     Pulmonary embolus (H) Feb 2003    Evaluated by ?[place] - unknown cause     Thyroid nodule Feb 2005    Benign per FNA --> taking Levothyroxine ever since     Uncomplicated asthma         Review of Systems  CONSTITUTIONAL: NEGATIVE for fever, chills, change in weight  INTEGUMENTARY/SKIN: NEGATIVE for worrisome rashes, moles or lesions  EYES: NEGATIVE for vision changes or irritation  ENT: NEGATIVE for ear, mouth and throat problems  RESP: NEGATIVE for significant cough or SOB  CV: NEGATIVE for chest pain, palpitations or peripheral edema  GI: NEGATIVE for nausea, abdominal pain, heartburn, or change in bowel habits   male: negative for dysuria, hematuria, decreased urinary stream, erectile dysfunction, urethral discharge  MUSCULOSKELETAL: NEGATIVE for significant arthralgias or myalgia  NEURO: NEGATIVE for weakness, dizziness or paresthesias  PSYCHIATRIC: NEGATIVE for changes in mood or affect    OBJECTIVE:   BP (!) 142/86 (BP Location: Left arm, Patient Position: Sitting, Cuff Size: Adult Large)   Pulse 67   Temp 97.1  F (36.2  C) (Temporal)   Resp 18   Ht 1.875 m (6' 1.8\")   Wt 98 kg (216 lb)   SpO2 99%   BMI 27.88 kg/m      Physical " "Exam  GENERAL: alert and no distress  EYES: Eyes grossly normal to inspection, PERRL and conjunctivae and sclerae normal  HENT: ear canals and TM's normal, nose and mouth without ulcers or lesions  NECK: no adenopathy, no asymmetry, masses, or scars and thyroid normal to palpation  RESP: lungs clear to auscultation - no rales, rhonchi or wheezes  CV: regular rate and rhythm, normal S1 S2, no S3 or S4, no murmur, click or rub, no peripheral edema and peripheral pulses strong  ABDOMEN: soft, nontender, no hepatosplenomegaly, no masses and bowel sounds normal  MS: no gross musculoskeletal defects noted, no edema  SKIN: no suspicious lesions or rashes  NEURO: Normal strength and tone, mentation intact and speech normal  PSYCH: mentation appears normal, affect normal/bright    Diagnostic Test Results:  Labs reviewed in Epic    ASSESSMENT/PLAN:   Juan C was seen today for physical and imm/inj.    Diagnoses and all orders for this visit:    Routine history and physical examination of adult    Hyperlipidemia, unspecified hyperlipidemia type  -     atorvastatin (LIPITOR) 10 MG tablet; Take 0.5 tablets (5 mg) by mouth daily    High priority for 2019-nCoV vaccine  -     COVID-19,PF,PFIZER (12+ Yrs GRAY LABEL)    Other orders  -     REVIEW OF HEALTH MAINTENANCE PROTOCOL ORDERS        Patient has been advised of split billing requirements and indicates understanding: Yes    COUNSELING:   Reviewed preventive health counseling, as reflected in patient instructions  Special attention given to:        Regular exercise       Healthy diet/nutrition    Estimated body mass index is 26.96 kg/m  as calculated from the following:    Height as of 5/13/22: 1.88 m (6' 2\").    Weight as of 5/13/22: 95.3 kg (210 lb).     Weight management plan: Discussed healthy diet and exercise guidelines    He reports that he has never smoked. He has never used smokeless tobacco.      Counseling Resources:  ATP IV Guidelines  Pooled Cohorts Equation " Calculator  FRAX Risk Assessment  ICSI Preventive Guidelines  Dietary Guidelines for Americans, 2010  USDA's MyPlate  ASA Prophylaxis  Lung CA Screening    Suzanne Zhang MD  [facility]

## 2022-06-16 ENCOUNTER — LAB (OUTPATIENT)
Dept: LAB | Facility: CLINIC | Age: 62
End: 2022-06-16
Payer: COMMERCIAL

## 2022-06-16 DIAGNOSIS — Z20.822 ENCOUNTER FOR LABORATORY TESTING FOR COVID-19 VIRUS: ICD-10-CM

## 2022-06-16 PROCEDURE — U0003 INFECTIOUS AGENT DETECTION BY NUCLEIC ACID (DNA OR RNA); SEVERE ACUTE RESPIRATORY SYNDROME CORONAVIRUS 2 (SARS-COV-2) (CORONAVIRUS DISEASE [COVID-19]), AMPLIFIED PROBE TECHNIQUE, MAKING USE OF HIGH THROUGHPUT TECHNOLOGIES AS DESCRIBED BY CMS-2020-01-R: HCPCS

## 2022-06-16 PROCEDURE — U0005 INFEC AGEN DETEC AMPLI PROBE: HCPCS

## 2022-06-17 LAB — SARS-COV-2 RNA RESP QL NAA+PROBE: NEGATIVE

## 2022-06-20 ENCOUNTER — HOSPITAL ENCOUNTER (OUTPATIENT)
Facility: CLINIC | Age: 62
Discharge: HOME | End: 2022-06-20
Attending: COLON & RECTAL SURGERY | Admitting: COLON & RECTAL SURGERY
Payer: COMMERCIAL

## 2022-06-20 VITALS
HEART RATE: 60 BPM | SYSTOLIC BLOOD PRESSURE: 111 MMHG | OXYGEN SATURATION: 97 % | DIASTOLIC BLOOD PRESSURE: 84 MMHG | RESPIRATION RATE: 16 BRPM

## 2022-06-20 LAB — COLONOSCOPY: NORMAL

## 2022-06-20 PROCEDURE — G0500 MOD SEDAT ENDO SERVICE >5YRS: HCPCS | Performed by: COLON & RECTAL SURGERY

## 2022-06-20 PROCEDURE — 250N000011 HC RX IP 250 OP 636: Performed by: COLON & RECTAL SURGERY

## 2022-06-20 PROCEDURE — G0121 COLON CA SCRN NOT HI RSK IND: HCPCS | Performed by: COLON & RECTAL SURGERY

## 2022-06-20 PROCEDURE — 45378 DIAGNOSTIC COLONOSCOPY: CPT | Performed by: COLON & RECTAL SURGERY

## 2022-06-20 RX ORDER — ONDANSETRON 2 MG/ML
4 INJECTION INTRAMUSCULAR; INTRAVENOUS
Status: DISCONTINUED | OUTPATIENT
Start: 2022-06-20 | End: 2022-06-20 | Stop reason: HOSPADM

## 2022-06-20 RX ORDER — FENTANYL CITRATE 50 UG/ML
INJECTION, SOLUTION INTRAMUSCULAR; INTRAVENOUS PRN
Status: COMPLETED | OUTPATIENT
Start: 2022-06-20 | End: 2022-06-20

## 2022-06-20 RX ORDER — LIDOCAINE 40 MG/G
CREAM TOPICAL
Status: DISCONTINUED | OUTPATIENT
Start: 2022-06-20 | End: 2022-06-20 | Stop reason: HOSPADM

## 2022-06-20 RX ADMIN — FENTANYL CITRATE 100 MCG: 50 INJECTION, SOLUTION INTRAMUSCULAR; INTRAVENOUS at 09:36

## 2022-06-20 RX ADMIN — MIDAZOLAM 1 MG: 1 INJECTION INTRAMUSCULAR; INTRAVENOUS at 09:40

## 2022-06-20 RX ADMIN — MIDAZOLAM 2 MG: 1 INJECTION INTRAMUSCULAR; INTRAVENOUS at 09:36

## 2022-06-20 NOTE — H&P
Colon & Rectal Surgery History and Physical  Pre-Endoscopy Procedure Note    History of Present Illness   I have been asked by Dr. Zhang to evaluate this 62 year old male for colorectal cancer screening. He had a normal screening colonsocopy in April 2012 and currently denies any abdominal pain, weight loss, bleeding per rectum, or recent change in bowel habits.    Past Medical History  Diagnosis Date     Alcohol use      Dermatitis herpetiformis 2000     Diastolic dysfunction with chronic heart failure      HTN, goal below 140/90      Hyperlipidemia      Lung nodule Feb 2003    Benign; Left upper lobe     Primary hypogonadism in male     Low FSH, single left testicle - followed by Dr. Flores of endo     Pulmonary embolus Feb 2003     Thyroid nodule Feb 2005     Uncomplicated asthma        Past Surgical History  Procedure Laterality Date     BACK SURGERY       COLONOSCOPY  April 2012     HEAD & NECK SURGERY       HERNIA REPAIR       ORCHIOPEXY CHILD Right     Age 10 due to undescended testicl     CERVICAL LAMINECTOMY  July 1997    C4-5-6     ORTHOPEDIC SURGERY  Jan 2000    Right index tendon repair     ORTHOPEDIC SURGERY  August 2004    Ankle/wrist repair     SPINE SURGERY  April 2007; 7/ 2011    Lumbar microdiscectomy L5-S1     CERVICAL FUSION  March 2010    Cervical fusion C5-6-7     CERVICAL FUSION  March 2011    Cervical fusion C3-4-5        Medications  Medication Sig     atorvastatin (LIPITOR) 10 MG tablet Take 0.5 tablets (5 mg) by mouth daily     fluticasone (ARNUITY ELLIPTA) 200 MCG/ACT inhaler Inhale 1 puff into the lungs daily     hydrochlorothiazide (HYDRODIURIL) 12.5 MG tablet TAKE 1 TABLET BY MOUTH DAILY     levothyroxine (SYNTHROID, LEVOTHROID) 137 MCG tablet TAKE 137 MCG (1 TABLET) BY MOUTH DAILY     losartan (COZAAR) 50 MG tablet TAKE 1 TABLET BY MOUTH EVERY DAY     ALLERGY INJECTIONS      sildenafil (REVATIO) 20 MG tablet TAKE 1 TABLET BY MOUTH ONCE DAILY AS NEEDED (ERECTILE  DYSFUNCTION)      testosterone (ANDROGEL/TESTIM) 50 MG/5GM (1%) gel Place onto the skin daily       Allergies  Allergen Reactions     No Known Allergies         Family History   Family history includes Alcoholism in his mother; Cancer in his father and son; Cerebrovascular Disease in his father and paternal grandmother; Dementia in his mother; Diabetes in his maternal grandfather; Heart Disease in his father; Hypertension in his father and mother.     Social History   He reports that he has never smoked. He has never used smokeless tobacco. He reports current alcohol use. He reports that he does not use drugs.    Review of Systems   Constitutional:  No fever, weight change or fatigue.    Eyes:     No dry eyes or vision changes.   Ears/Nose/Throat/Neck:  No oral ulcers, sore throat or voice change.    Cardiovascular:   No palpitations, syncope, angina or edema.   Respiratory:    No chest pain, excessive sleepiness, shortness of breath or hemoptysis.    Gastrointestinal:   No abdominal pain, nausea, vomiting, diarrhea or heartburn.    Genitourinary:   No dysuria, hematuria, urinary retention or urinary frequency.   Musculoskeletal:  No joint swelling or arthralgias.    Dermatologic:  No skin rash or other skin changes.   Neurologic:    No focal weakness or numbness. No neuropathy.   Psychiatric:    No depression, anxiety, suicidal ideation, or paranoid ideation.   Endocrine:   No cold or heat intolerance, polydipsia, hirsutism, change in libido, or flushing.   Hematology/Lymphatic:  No bleeding or lymphadenopathy.    Allergy/Immunology:  No rhinitis or hives.     Physical Exam   Vitals:  Blood pressure 114/81, pulse 63, resp. rate 18, SpO2 98 %.    General:  Alert and oriented to person, place and time   Airway: Normal oropharyngeal airway and neck mobility   Lungs:  Clear bilaterally   Heart:  Regular rate and rhythm   Abdomen: Soft, NT, ND, no masses   Extremities: Warm, good capillary refill    ASA Grade: II (mild systemic  disease)    Impression: Cleared for use of conscious sedation for colorectal cancer screening    Plan: Proceed with colonoscopy     Diya Lo MD  [facility]  [phone]

## 2022-09-12 ENCOUNTER — TELEPHONE (OUTPATIENT)
Dept: FAMILY MEDICINE | Facility: CLINIC | Age: 62
End: 2022-09-12

## 2022-09-12 DIAGNOSIS — E78.5 HYPERLIPIDEMIA, UNSPECIFIED HYPERLIPIDEMIA TYPE: ICD-10-CM

## 2022-09-12 NOTE — TELEPHONE ENCOUNTER
Outpatient Medication Detail     Disp Refills Start End MELINDA   atorvastatin (LIPITOR) 10 MG tablet 45 tablet 3 5/20/2022  No   Sig - Route: Take 0.5 tablets (5 mg) by mouth daily - Oral     Per pharmacy: Patient states he was told several months ago to increase to full tab (10mg daily).  Pharmacy needs new Rx.    LOV 5- Vicente for physical.  No mention in OV notes about dose change.    Unclear who told patient to change dose?    Fasting labs completed 5/13/2022 (pre-visit) and no notes on labs.    Needs triage - who told patient to change to 10mg daily of atorvastatin?      Ngozi Servin, RT (R)

## 2022-09-14 NOTE — TELEPHONE ENCOUNTER
Dr. Zhang,   See message below. Did you advise pt to switch to 10 mg atorvastatin due to still elevated FLP results on 5/13?    IF so, please order.  Bianca Muñiz, STEPHANIE  [facility] RN Triage Team

## 2022-09-15 RX ORDER — ATORVASTATIN CALCIUM 10 MG/1
10 TABLET, FILM COATED ORAL DAILY
Qty: 90 TABLET | Refills: 3 | Status: SHIPPED | OUTPATIENT
Start: 2022-09-15 | End: 2022-12-28

## 2022-09-16 NOTE — TELEPHONE ENCOUNTER
Suzanne Zhang MD Zaffke, Erica, RN;  Triage Im Yesterday (7:57 AM)     X    Approved    Message text       Patient Contact     Attempt # 1     Was call answered?    No  Left non-detailed message to call the clinic back at [phone].      On call back:      -See message from Dr. Zhang.   -Routing to team for further assist, notify pt medication sent to pharmacy.     Tiffany LANCASTER RN  [facility]

## 2022-10-22 ENCOUNTER — HEALTH MAINTENANCE LETTER (OUTPATIENT)
Age: 62
End: 2022-10-22

## 2022-11-05 DIAGNOSIS — I10 HTN, GOAL BELOW 140/90: Chronic | ICD-10-CM

## 2022-11-05 DIAGNOSIS — I10 ESSENTIAL HYPERTENSION: ICD-10-CM

## 2022-11-08 RX ORDER — LOSARTAN POTASSIUM 50 MG/1
TABLET ORAL
Qty: 90 TABLET | Refills: 1 | Status: SHIPPED | OUTPATIENT
Start: 2022-11-08 | End: 2023-05-09

## 2022-11-08 RX ORDER — HYDROCHLOROTHIAZIDE 12.5 MG/1
TABLET ORAL
Qty: 90 TABLET | Refills: 1 | Status: SHIPPED | OUTPATIENT
Start: 2022-11-08 | End: 2023-05-09

## 2022-11-17 ENCOUNTER — ALLIED HEALTH/NURSE VISIT (OUTPATIENT)
Dept: FAMILY MEDICINE | Facility: CLINIC | Age: 62
End: 2022-11-17
Payer: COMMERCIAL

## 2022-11-17 DIAGNOSIS — Z23 HIGH PRIORITY FOR 2019-NCOV VACCINE: ICD-10-CM

## 2022-11-17 DIAGNOSIS — Z23 NEED FOR VACCINATION: Primary | ICD-10-CM

## 2022-11-17 PROCEDURE — 90715 TDAP VACCINE 7 YRS/> IM: CPT

## 2022-11-17 PROCEDURE — 91312 COVID-19,PF,PFIZER BOOSTER BIVALENT: CPT

## 2022-11-17 PROCEDURE — 99207 PR NO CHARGE NURSE ONLY: CPT

## 2022-11-17 PROCEDURE — 90471 IMMUNIZATION ADMIN: CPT

## 2022-11-17 PROCEDURE — 0124A COVID-19,PF,PFIZER BOOSTER BIVALENT: CPT

## 2022-12-05 ENCOUNTER — MYC MEDICAL ADVICE (OUTPATIENT)
Dept: FAMILY MEDICINE | Facility: CLINIC | Age: 62
End: 2022-12-05

## 2022-12-08 ASSESSMENT — PATIENT HEALTH QUESTIONNAIRE - PHQ9
10. IF YOU CHECKED OFF ANY PROBLEMS, HOW DIFFICULT HAVE THESE PROBLEMS MADE IT FOR YOU TO DO YOUR WORK, TAKE CARE OF THINGS AT HOME, OR GET ALONG WITH OTHER PEOPLE: SOMEWHAT DIFFICULT
SUM OF ALL RESPONSES TO PHQ QUESTIONS 1-9: 11
SUM OF ALL RESPONSES TO PHQ QUESTIONS 1-9: 11

## 2022-12-08 ASSESSMENT — ANXIETY QUESTIONNAIRES
1. FEELING NERVOUS, ANXIOUS, OR ON EDGE: MORE THAN HALF THE DAYS
3. WORRYING TOO MUCH ABOUT DIFFERENT THINGS: NEARLY EVERY DAY
2. NOT BEING ABLE TO STOP OR CONTROL WORRYING: NEARLY EVERY DAY
GAD7 TOTAL SCORE: 13
GAD7 TOTAL SCORE: 13
6. BECOMING EASILY ANNOYED OR IRRITABLE: MORE THAN HALF THE DAYS
7. FEELING AFRAID AS IF SOMETHING AWFUL MIGHT HAPPEN: SEVERAL DAYS
GAD7 TOTAL SCORE: 13
7. FEELING AFRAID AS IF SOMETHING AWFUL MIGHT HAPPEN: SEVERAL DAYS
8. IF YOU CHECKED OFF ANY PROBLEMS, HOW DIFFICULT HAVE THESE MADE IT FOR YOU TO DO YOUR WORK, TAKE CARE OF THINGS AT HOME, OR GET ALONG WITH OTHER PEOPLE?: SOMEWHAT DIFFICULT
4. TROUBLE RELAXING: SEVERAL DAYS
5. BEING SO RESTLESS THAT IT IS HARD TO SIT STILL: SEVERAL DAYS
IF YOU CHECKED OFF ANY PROBLEMS ON THIS QUESTIONNAIRE, HOW DIFFICULT HAVE THESE PROBLEMS MADE IT FOR YOU TO DO YOUR WORK, TAKE CARE OF THINGS AT HOME, OR GET ALONG WITH OTHER PEOPLE: SOMEWHAT DIFFICULT

## 2022-12-09 ENCOUNTER — VIRTUAL VISIT (OUTPATIENT)
Dept: FAMILY MEDICINE | Facility: CLINIC | Age: 62
End: 2022-12-09
Payer: COMMERCIAL

## 2022-12-09 DIAGNOSIS — F41.8 DEPRESSION WITH ANXIETY: Primary | ICD-10-CM

## 2022-12-09 DIAGNOSIS — I10 HTN, GOAL BELOW 140/90: ICD-10-CM

## 2022-12-09 DIAGNOSIS — E78.5 HYPERLIPIDEMIA, UNSPECIFIED HYPERLIPIDEMIA TYPE: ICD-10-CM

## 2022-12-09 PROCEDURE — 99214 OFFICE O/P EST MOD 30 MIN: CPT | Mod: 95 | Performed by: INTERNAL MEDICINE

## 2022-12-09 RX ORDER — ESCITALOPRAM OXALATE 20 MG/1
20 TABLET ORAL DAILY
Qty: 90 TABLET | Refills: 3 | Status: SHIPPED | OUTPATIENT
Start: 2022-12-09 | End: 2024-03-08

## 2022-12-09 ASSESSMENT — PATIENT HEALTH QUESTIONNAIRE - PHQ9
SUM OF ALL RESPONSES TO PHQ QUESTIONS 1-9: 11
10. IF YOU CHECKED OFF ANY PROBLEMS, HOW DIFFICULT HAVE THESE PROBLEMS MADE IT FOR YOU TO DO YOUR WORK, TAKE CARE OF THINGS AT HOME, OR GET ALONG WITH OTHER PEOPLE: SOMEWHAT DIFFICULT

## 2022-12-09 ASSESSMENT — ANXIETY QUESTIONNAIRES: GAD7 TOTAL SCORE: 13

## 2022-12-09 NOTE — PROGRESS NOTES
Mikhail is a 62 year old who is being evaluated via a billable telephone visit.      What phone number would you like to be contacted at? [phone]  How would you like to obtain your AVS? Gisell Ortiz   Mikhail is a 62 year old presenting for the following health issues:  Depression      History of Present Illness       Mental Health Follow-up:  Patient presents to follow-up on Depression & Anxiety.Patient's depression since last visit has been:  Medium  The patient is not having other symptoms associated with depression.  Patient's anxiety since last visit has been:  Medium  The patient is not having other symptoms associated with anxiety.  Any significant life events: relationship concerns, job concerns and housing concerns  Patient is feeling anxious or having panic attacks.  Patient has concerns about alcohol or drug use.    He eats 0-1 servings of fruits and vegetables daily.He consumes 0 sweetened beverage(s) daily.He exercises with enough effort to increase his heart rate 30 to 60 minutes per day.  He exercises with enough effort to increase his heart rate 3 or less days per week.   He is taking medications regularly.    Today's PHQ-9         PHQ-9 Total Score: 11    PHQ-9 Q9 Thoughts of better off dead/self-harm past 2 weeks :   Not at all    How difficult have these problems made it for you to do your work, take care of things at home, or get along with other people: Somewhat difficult  Today's JAYJAY-7 Score: 13         Current Medications:     Current Outpatient Medications   Medication Sig Dispense Refill     ALLERGY INJECTIONS        atorvastatin (LIPITOR) 10 MG tablet Take 1 tablet (10 mg) by mouth daily 90 tablet 3     dapsone (ACZONE) 25 MG tablet Take 1 tablet (25 mg) by mouth daily 90 tablet 0     fluticasone (ARNUITY ELLIPTA) 200 MCG/ACT inhaler Inhale 1 puff into the lungs daily       hydrochlorothiazide (HYDRODIURIL) 12.5 MG tablet TAKE 1 TABLET BY MOUTH EVERY DAY 90 tablet 1      levothyroxine (SYNTHROID, LEVOTHROID) 137 MCG tablet TAKE 137 MCG (1 TABLET) BY MOUTH DAILY 90 tablet 3     losartan (COZAAR) 50 MG tablet TAKE 1 TABLET BY MOUTH EVERY DAY 90 tablet 1     sildenafil (REVATIO) 20 MG tablet TAKE 1 TABLET BY MOUTH ONCE DAILY AS NEEDED (ERECTILE  DYSFUNCTION) 90 tablet 1     testosterone (ANDROGEL/TESTIM) 50 MG/5GM (1%) gel Place onto the skin daily           Allergies:      Allergies   Allergen Reactions     No Known Allergies             Past Medical History:     Past Medical History:   Diagnosis Date     Alcohol use      Dermatitis herpetiformis 2000    Dx by biopsy of rash on elbow per derm     Diastolic dysfunction with chronic heart failure (H)      HTN, goal below 140/90      Hyperlipidemia      Lung nodule Feb 2003    Benign; Left upper lobe     Multiple allergies     allergist with immunotherapy; Allergist is Dr. Santos in [place]     Primary hypogonadism in male     Low FSH, single left testicle - followed by Dr. Flores of endo     Pulmonary embolus (H) Feb 2003    Evaluated by ?[facility] - unknown cause     Thyroid nodule Feb 2005    Benign per FNA --> taking Levothyroxine ever since     Uncomplicated asthma          Past Surgical History:     Past Surgical History:   Procedure Laterality Date     BACK SURGERY       COLONOSCOPY  April 2012     COLONOSCOPY N/A 6/20/2022    Procedure: COLONOSCOPY;  Surgeon: Diya Lo MD;  Location:  GI     HEAD & NECK SURGERY       HERNIA REPAIR       ORCHIOPEXY CHILD Right     Age 10 due to undescended testicl     ORTHOPEDIC SURGERY       OTHER SURGICAL HISTORY  July 1997    Cervical laminectomy C4-5-6     OTHER SURGICAL HISTORY  Jan 2000    Right index tendon repair     OTHER SURGICAL HISTORY  August 2004    Ankle/wrist repair     OTHER SURGICAL HISTORY  April 2007    Lumbar microdiscectomy L5-S1     OTHER SURGICAL HISTORY  March 2010    Cervical fusion C5-6-7     OTHER SURGICAL HISTORY  March 2011    Cervical fusion C3-4-5      OTHER SURGICAL HISTORY  July 2011    Lumbar fusion L5-S1         Family Medical History:     Family History   Problem Relation Age of Onset     Hypertension Mother      Dementia Mother      Alcoholism Mother      Hypertension Father      Cerebrovascular Disease Father      Cancer Father         throat - was a smoker     Heart Disease Father         CABG     Diabetes Maternal Grandfather      Family History Negative Brother      Family History Negative Sister      Cerebrovascular Disease Paternal Grandmother      Cancer Son         Testicular cancer     Family History Negative Son          Social History:     Social History     Socioeconomic History     Marital status:      Spouse name: Not on file     Number of children: Not on file     Years of education: Not on file     Highest education level: Not on file   Occupational History     Not on file   Tobacco Use     Smoking status: Never     Smokeless tobacco: Never   Substance and Sexual Activity     Alcohol use: Yes     Drug use: No     Sexual activity: Yes     Partners: Female     Birth control/protection: Pull-out method   Other Topics Concern     Parent/sibling w/ CABG, MI or angioplasty before 65F 55M? Yes     Comment: father bypass 60ish   Social History Narrative     Not on file     Social Determinants of Health     Financial Resource Strain: Not on file   Food Insecurity: Not on file   Transportation Needs: Not on file   Physical Activity: Not on file   Stress: Not on file   Social Connections: Not on file   Intimate Partner Violence: Not on file   Housing Stability: Not on file           Review of System:     Constitutional: Negative for fever or chills  Skin: Negative for rashes  Ears/Nose/Throat: Negative for nasal congestion, sore throat  Respiratory: No shortness of breath, dyspnea on exertion, cough, or hemoptysis  Cardiovascular: Negative for chest pain since hospital discharge  Gastrointestinal: Negative for nausea, vomiting, positive for  chronic GERD  Genitourinary: Negative for dysuria, hematuria  Musculoskeletal: Negative for myalgias  Neurologic: Negative for headaches  Psychiatric: positive for depression, anxiety  Hematologic/Lymphatic/Immunologic: Negative  Endocrine: positive for hypothyroidism  Behavioral: Negative for tobacco use      Physical Exam:   There were no vitals taken for this visit.    RESP: no cough over the phone   NEURO: Alert & Oriented x 3.   PSYCH: mentation appears normal, affect normal        Diagnostic Test Results:     Recent Labs   Lab Test 06/03/21  0807 03/08/21  0809 02/02/15  1002 02/02/15  1002   CHOL 264* 187   < > 252*   HDL 61 54   < > 48   * 111*   < > 152*   TRIG 100 111   < > 259*   CHOLHDLRATIO  --   --   --  5.2*    < > = values in this interval not displayed.       Last Comprehensive Metabolic Panel:  Sodium   Date Value Ref Range Status   05/13/2022 138 133 - 144 mmol/L Final   03/08/2021 137 133 - 144 mmol/L Final     Potassium   Date Value Ref Range Status   05/13/2022 3.8 3.4 - 5.3 mmol/L Final   03/08/2021 4.5 3.4 - 5.3 mmol/L Final     Chloride   Date Value Ref Range Status   05/13/2022 107 94 - 109 mmol/L Final   03/08/2021 105 94 - 109 mmol/L Final     Carbon Dioxide   Date Value Ref Range Status   03/08/2021 29 20 - 32 mmol/L Final     Carbon Dioxide (CO2)   Date Value Ref Range Status   05/13/2022 23 20 - 32 mmol/L Final     Anion Gap   Date Value Ref Range Status   05/13/2022 8 3 - 14 mmol/L Final   03/08/2021 3 3 - 14 mmol/L Final     Glucose   Date Value Ref Range Status   05/13/2022 97 70 - 99 mg/dL Final   03/08/2021 103 (H) 70 - 99 mg/dL Final     Comment:     Fasting specimen     Urea Nitrogen   Date Value Ref Range Status   05/13/2022 10 7 - 30 mg/dL Final   03/08/2021 12 7 - 30 mg/dL Final     Creatinine   Date Value Ref Range Status   05/13/2022 0.78 0.66 - 1.25 mg/dL Final   03/08/2021 0.80 0.66 - 1.25 mg/dL Final     Creatinine POCT   Date Value Ref Range Status   05/13/2022  0.9 0.7 - 1.3 mg/dL Final     GFR Estimate   Date Value Ref Range Status   05/13/2022 >90 >60 mL/min/1.73m2 Final     Comment:     Effective December 21, 2021 eGFRcr in adults is calculated using the 2021 CKD-EPI creatinine equation which includes age and gender (Beba desai al., [place], DOI: 10.1056/AUMZpc6416384)   03/08/2021 >90 >60 mL/min/[1.73_m2] Final     Comment:     Non  GFR Calc  Starting 12/18/2018, serum creatinine based estimated GFR (eGFR) will be   calculated using the Chronic Kidney Disease Epidemiology Collaboration   (CKD-EPI) equation.       GFR, ESTIMATED POCT   Date Value Ref Range Status   05/13/2022 >60 >60 mL/min/1.73m2 Final     Calcium   Date Value Ref Range Status   05/13/2022 9.3 8.5 - 10.1 mg/dL Final   03/08/2021 10.0 8.5 - 10.1 mg/dL Final       ASSESSMENT/PLAN:     (F41.8) Depression with anxiety  (primary encounter diagnosis)  Comment: not well controlled  Plan: escitalopram (LEXAPRO) 20 MG tablet    (E78.5) Hyperlipidemia, unspecified hyperlipidemia type  (primary encounter diagnosis)  Comment: stable  Plan: continue current therapy.  In the meantime, the patient is advised to continue his current diet, exercise weight loss program going forward.    (I10) HTN, goal below 140/90  Comment: BP is at goal, well controlled  Plan: continue current therapy      Follow Up Plan:     Patient is instructed to return to Internal Medicine clinic for follow-up visit in 1 month.        Suzanne Zhang MD  Internal Medicine  [facility]      telephone visit duration including chart review, medication management: 30 minutes

## 2022-12-21 ENCOUNTER — VIRTUAL VISIT (OUTPATIENT)
Dept: FAMILY MEDICINE | Facility: CLINIC | Age: 62
End: 2022-12-21
Payer: COMMERCIAL

## 2022-12-21 DIAGNOSIS — R53.81 MALAISE: ICD-10-CM

## 2022-12-21 DIAGNOSIS — R50.9 FEVER AND CHILLS: ICD-10-CM

## 2022-12-21 DIAGNOSIS — B97.89 SORE THROAT (VIRAL): ICD-10-CM

## 2022-12-21 DIAGNOSIS — R09.81 NASAL CONGESTION: ICD-10-CM

## 2022-12-21 DIAGNOSIS — U07.1 INFECTION DUE TO 2019 NOVEL CORONAVIRUS: Primary | ICD-10-CM

## 2022-12-21 DIAGNOSIS — J02.8 SORE THROAT (VIRAL): ICD-10-CM

## 2022-12-21 DIAGNOSIS — R05.1 ACUTE COUGH: ICD-10-CM

## 2022-12-21 PROCEDURE — 99214 OFFICE O/P EST MOD 30 MIN: CPT | Mod: CS | Performed by: INTERNAL MEDICINE

## 2022-12-21 RX ORDER — GUAIFENESIN/DEXTROMETHORPHAN 100-10MG/5
10 SYRUP ORAL EVERY 4 HOURS PRN
Qty: 354 ML | Refills: 3 | Status: SHIPPED | OUTPATIENT
Start: 2022-12-21 | End: 2023-06-16

## 2022-12-21 NOTE — PROGRESS NOTES
Mikhail is a 62 year old who is being evaluated via a billable telephone visit.      What phone number would you like to be contacted at?   How would you like to obtain your AVS? Gisell  Distant Location (provider location):  On-site      Subjective   Mikhail is a 62 year old presenting for the following health issues:  Covid Concern      HPI       COVID-19 Symptom Review  How many days ago did these symptoms start? Started Monday 19th    Are any of the following symptoms significant for you?  New or worsening difficulty breathing? Yes    Please describe what kind of difficulty you are having breathing:Mild dyspnea (able to do ADLs without difficulty, mild shortness of breath with activities such as climbing one or two flights of stairs or walking briskly)    Worsening cough? Yes, it's a dry cough.     Fever or chills? No    Headache: YES    Sore throat: No    Chest pain: No    Diarrhea: No    Body aches? YES    What treatments has patient tried? none   Does patient live in a nursing home, group home, or shelter? No  Does patient have a way to get food/medications during quarantined? Yes, I have a friend or family member who can help me.    Current Medications:     Current Outpatient Medications   Medication Sig Dispense Refill     ALLERGY INJECTIONS        atorvastatin (LIPITOR) 10 MG tablet Take 1 tablet (10 mg) by mouth daily 90 tablet 3     dapsone (ACZONE) 25 MG tablet Take 1 tablet (25 mg) by mouth daily 90 tablet 0     escitalopram (LEXAPRO) 20 MG tablet Take 1 tablet (20 mg) by mouth daily 90 tablet 3     fluticasone (ARNUITY ELLIPTA) 200 MCG/ACT inhaler Inhale 1 puff into the lungs daily       hydrochlorothiazide (HYDRODIURIL) 12.5 MG tablet TAKE 1 TABLET BY MOUTH EVERY DAY 90 tablet 1     levothyroxine (SYNTHROID, LEVOTHROID) 137 MCG tablet TAKE 137 MCG (1 TABLET) BY MOUTH DAILY 90 tablet 3     losartan (COZAAR) 50 MG tablet TAKE 1 TABLET BY MOUTH EVERY DAY 90 tablet 1     molnupiravir (LAGEVRIO) 200 MG capsule  Take 4 capsules (800 mg) by mouth every 12 hours 40 each 0     sildenafil (REVATIO) 20 MG tablet TAKE 1 TABLET BY MOUTH ONCE DAILY AS NEEDED (ERECTILE  DYSFUNCTION) 90 tablet 1     testosterone (ANDROGEL/TESTIM) 50 MG/5GM (1%) gel Place onto the skin daily           Allergies:      Allergies   Allergen Reactions     No Known Allergies             Past Medical History:     Past Medical History:   Diagnosis Date     Alcohol use      Dermatitis herpetiformis 2000    Dx by biopsy of rash on elbow per derm     Diastolic dysfunction with chronic heart failure (H)      HTN, goal below 140/90      Hyperlipidemia      Lung nodule Feb 2003    Benign; Left upper lobe     Multiple allergies     allergist with immunotherapy; Allergist is Dr. Santos in [place]     Primary hypogonadism in male     Low FSH, single left testicle - followed by Dr. Flores of endo     Pulmonary embolus (H) Feb 2003    Evaluated by ?[facility] - unknown cause     Thyroid nodule Feb 2005    Benign per FNA --> taking Levothyroxine ever since     Uncomplicated asthma          Past Surgical History:     Past Surgical History:   Procedure Laterality Date     BACK SURGERY       COLONOSCOPY  April 2012     COLONOSCOPY N/A 6/20/2022    Procedure: COLONOSCOPY;  Surgeon: Diya Lo MD;  Location:  GI     HEAD & NECK SURGERY       HERNIA REPAIR       ORCHIOPEXY CHILD Right     Age 10 due to undescended testicl     ORTHOPEDIC SURGERY       OTHER SURGICAL HISTORY  July 1997    Cervical laminectomy C4-5-6     OTHER SURGICAL HISTORY  Jan 2000    Right index tendon repair     OTHER SURGICAL HISTORY  August 2004    Ankle/wrist repair     OTHER SURGICAL HISTORY  April 2007    Lumbar microdiscectomy L5-S1     OTHER SURGICAL HISTORY  March 2010    Cervical fusion C5-6-7     OTHER SURGICAL HISTORY  March 2011    Cervical fusion C3-4-5     OTHER SURGICAL HISTORY  July 2011    Lumbar fusion L5-S1         Family Medical History:     Family History   Problem Relation  Age of Onset     Hypertension Mother      Dementia Mother      Alcoholism Mother      Hypertension Father      Cerebrovascular Disease Father      Cancer Father         throat - was a smoker     Heart Disease Father         CABG     Diabetes Maternal Grandfather      Family History Negative Brother      Family History Negative Sister      Cerebrovascular Disease Paternal Grandmother      Cancer Son         Testicular cancer     Family History Negative Son          Social History:     Social History     Socioeconomic History     Marital status:      Spouse name: Not on file     Number of children: Not on file     Years of education: Not on file     Highest education level: Not on file   Occupational History     Not on file   Tobacco Use     Smoking status: Never     Smokeless tobacco: Never   Substance and Sexual Activity     Alcohol use: Yes     Drug use: No     Sexual activity: Yes     Partners: Female     Birth control/protection: Pull-out method   Other Topics Concern     Parent/sibling w/ CABG, MI or angioplasty before 65F 55M? Yes     Comment: father bypass 60ish   Social History Narrative     Not on file     Social Determinants of Health     Financial Resource Strain: Not on file   Food Insecurity: Not on file   Transportation Needs: Not on file   Physical Activity: Not on file   Stress: Not on file   Social Connections: Not on file   Intimate Partner Violence: Not on file   Housing Stability: Not on file           Review of System:     Constitutional: positive for fever or chills  Skin: Negative for rashes  Ears/Nose/Throat: positive for nasal congestion, sore throat  Respiratory: positive for cough, COVID infection  Cardiovascular: Negative for chest pain  Gastrointestinal: Negative for nausea, vomiting  Genitourinary: Negative for dysuria, hematuria  Musculoskeletal: Negative for myalgias  Neurologic: Negative for headaches  Psychiatric: Negative for depression,  anxiety  Hematologic/Lymphatic/Immunologic: Negative  Endocrine: Negative  Behavioral: Negative for tobacco use       Physical Exam:   There were no vitals taken for this visit.    RESP: cough present   NEURO: Alert & Oriented x 3.   PSYCH: mentation appears normal, affect normal        Diagnostic Test Results:     Diagnostic Test Results:  Labs reviewed in Epic    ASSESSMENT/PLAN:       Juan C was seen today for covid concern.    Diagnoses and all orders for this visit:    Infection due to 2019 novel coronavirus  -     molnupiravir (LAGEVRIO) 200 MG capsule; Take 4 capsules (800 mg) by mouth every 12 hours  -     guaiFENesin-dextromethorphan (ROBITUSSIN DM) 100-10 MG/5ML syrup; Take 10 mLs by mouth every 4 hours as needed for cough or congestion    Acute cough  -     guaiFENesin-dextromethorphan (ROBITUSSIN DM) 100-10 MG/5ML syrup; Take 10 mLs by mouth every 4 hours as needed for cough or congestion    Malaise    Nasal congestion    Sore throat (viral)    Fever and chills            Follow Up Plan:     Patient is instructed to return to Internal Medicine clinic for follow-up visit in 1 month.        Suzanne Zhang MD  Internal Medicine  [facility]      telephone visit duration including chart review, medication management: 30 minutes

## 2022-12-21 NOTE — PROGRESS NOTES
"Mikhail is a 62 year old who is being evaluated via a billable video visit.      How would you like to obtain your AVS? MyChart  If the video visit is dropped, the invitation should be resent by: Text to cell phone: [phone]  Will anyone else be joining your video visit? No  {If patient encounters technical issues they should call [phone] :930213}        {PROVIDER CHARTING PREFERENCE:085073}    Subjective   Mikhail is a 62 year old{ACCOMPANIED BY STATEMENT (Optional):224794}, presenting for the following health issues:  No chief complaint on file.      HPI     {SUPERLIST (Optional):159859}  {additonal problems for provider to add (Optional):002316}    Review of Systems   {ROS COMP (Optional):516497}      Objective           Vitals:  No vitals were obtained today due to virtual visit.    Physical Exam   {video visit exam brief selected:334422::\"GENERAL: Healthy, alert and no distress\",\"EYES: Eyes grossly normal to inspection.  No discharge or erythema, or obvious scleral/conjunctival abnormalities.\",\"RESP: No audible wheeze, cough, or visible cyanosis.  No visible retractions or increased work of breathing.  \",\"SKIN: Visible skin clear. No significant rash, abnormal pigmentation or lesions.\",\"NEURO: Cranial nerves grossly intact.  Mentation and speech appropriate for age.\",\"PSYCH: Mentation appears normal, affect normal/bright, judgement and insight intact, normal speech and appearance well-groomed.\"}    {Diagnostic Test Results (Optional):603228}    {AMBULATORY ATTESTATION (Optional):737881}        Video-Visit Details    Type of service:  Video Visit     Originating Location (pt. Location): {video visit patient location:964162::\"Home\"}  {PROVIDER LOCATION On-site should be selected for visits conducted from your clinic location or adjoining [facility] hospital, academic office, or other nearby [facility] building. Off-site should be selected for all other provider locations, including home:796574}  Distant Location (provider " "location):  {virtual location provider:335742}  Platform used for Video Visit: {Virtual Visit Platforms:270977::\"[facility]\"}    "

## 2022-12-28 DIAGNOSIS — E78.5 HYPERLIPIDEMIA, UNSPECIFIED HYPERLIPIDEMIA TYPE: ICD-10-CM

## 2022-12-28 RX ORDER — ATORVASTATIN CALCIUM 10 MG/1
10 TABLET, FILM COATED ORAL DAILY
Qty: 90 TABLET | Refills: 3 | Status: SHIPPED | OUTPATIENT
Start: 2022-12-28 | End: 2024-02-05

## 2022-12-28 NOTE — TELEPHONE ENCOUNTER
Prescription approved per [facility] [place].  Bianca Muñiz RN  [facility]

## 2023-01-02 ASSESSMENT — ANXIETY QUESTIONNAIRES
6. BECOMING EASILY ANNOYED OR IRRITABLE: SEVERAL DAYS
2. NOT BEING ABLE TO STOP OR CONTROL WORRYING: SEVERAL DAYS
GAD7 TOTAL SCORE: 7
4. TROUBLE RELAXING: SEVERAL DAYS
7. FEELING AFRAID AS IF SOMETHING AWFUL MIGHT HAPPEN: SEVERAL DAYS
GAD7 TOTAL SCORE: 7
7. FEELING AFRAID AS IF SOMETHING AWFUL MIGHT HAPPEN: SEVERAL DAYS
1. FEELING NERVOUS, ANXIOUS, OR ON EDGE: SEVERAL DAYS
5. BEING SO RESTLESS THAT IT IS HARD TO SIT STILL: SEVERAL DAYS
GAD7 TOTAL SCORE: 7
IF YOU CHECKED OFF ANY PROBLEMS ON THIS QUESTIONNAIRE, HOW DIFFICULT HAVE THESE PROBLEMS MADE IT FOR YOU TO DO YOUR WORK, TAKE CARE OF THINGS AT HOME, OR GET ALONG WITH OTHER PEOPLE: SOMEWHAT DIFFICULT
8. IF YOU CHECKED OFF ANY PROBLEMS, HOW DIFFICULT HAVE THESE MADE IT FOR YOU TO DO YOUR WORK, TAKE CARE OF THINGS AT HOME, OR GET ALONG WITH OTHER PEOPLE?: SOMEWHAT DIFFICULT
3. WORRYING TOO MUCH ABOUT DIFFERENT THINGS: SEVERAL DAYS

## 2023-01-02 ASSESSMENT — PATIENT HEALTH QUESTIONNAIRE - PHQ9
SUM OF ALL RESPONSES TO PHQ QUESTIONS 1-9: 7
SUM OF ALL RESPONSES TO PHQ QUESTIONS 1-9: 7
10. IF YOU CHECKED OFF ANY PROBLEMS, HOW DIFFICULT HAVE THESE PROBLEMS MADE IT FOR YOU TO DO YOUR WORK, TAKE CARE OF THINGS AT HOME, OR GET ALONG WITH OTHER PEOPLE: SOMEWHAT DIFFICULT

## 2023-01-06 ENCOUNTER — VIRTUAL VISIT (OUTPATIENT)
Dept: FAMILY MEDICINE | Facility: CLINIC | Age: 63
End: 2023-01-06
Payer: COMMERCIAL

## 2023-01-06 DIAGNOSIS — I10 ESSENTIAL HYPERTENSION: ICD-10-CM

## 2023-01-06 DIAGNOSIS — F41.8 DEPRESSION WITH ANXIETY: Primary | ICD-10-CM

## 2023-01-06 DIAGNOSIS — E78.5 HYPERLIPIDEMIA, UNSPECIFIED HYPERLIPIDEMIA TYPE: ICD-10-CM

## 2023-01-06 DIAGNOSIS — E03.9 HYPOTHYROIDISM, UNSPECIFIED TYPE: ICD-10-CM

## 2023-01-06 PROBLEM — E06.3 HASHIMOTO'S THYROIDITIS: Status: ACTIVE | Noted: 2023-01-06

## 2023-01-06 PROBLEM — K90.0 CELIAC DISEASE: Status: ACTIVE | Noted: 2023-01-06

## 2023-01-06 PROCEDURE — 99214 OFFICE O/P EST MOD 30 MIN: CPT | Mod: 95 | Performed by: INTERNAL MEDICINE

## 2023-01-06 ASSESSMENT — PATIENT HEALTH QUESTIONNAIRE - PHQ9
SUM OF ALL RESPONSES TO PHQ QUESTIONS 1-9: 7
10. IF YOU CHECKED OFF ANY PROBLEMS, HOW DIFFICULT HAVE THESE PROBLEMS MADE IT FOR YOU TO DO YOUR WORK, TAKE CARE OF THINGS AT HOME, OR GET ALONG WITH OTHER PEOPLE: SOMEWHAT DIFFICULT

## 2023-01-06 ASSESSMENT — ANXIETY QUESTIONNAIRES: GAD7 TOTAL SCORE: 7

## 2023-01-06 NOTE — PROGRESS NOTES
Mikhail is a 62 year old who is being evaluated via a billable telephone visit.      What phone number would you like to be contacted at? [phone]  How would you like to obtain your AVS? Gisell  Distant Location (provider location):  On-site      Subjective   Mikhail is a 62 year old presenting for the following health issues:  Follow Up (Depression)      History of Present Illness       Mental Health Follow-up:  Patient presents to follow-up on Depression & Anxiety.Patient's depression since last visit has been:  Medium  The patient is not having other symptoms associated with depression.  Patient's anxiety since last visit has been:  Medium  The patient is not having other symptoms associated with anxiety.  Any significant life events: relationship concerns, job concerns and housing concerns  Patient is feeling anxious or having panic attacks.  Patient has no concerns about alcohol or drug use.He consumes 0 sweetened beverage(s) daily.He exercises with enough effort to increase his heart rate 20 to 29 minutes per day.  He exercises with enough effort to increase his heart rate 5 days per week.   He is taking medications regularly.    Today's PHQ-9         PHQ-9 Total Score: 7    PHQ-9 Q9 Thoughts of better off dead/self-harm past 2 weeks :   Not at all    How difficult have these problems made it for you to do your work, take care of things at home, or get along with other people: Somewhat difficult  Today's JAYJAY-7 Score: 7         Current Medications:     Current Outpatient Medications   Medication Sig Dispense Refill     ALLERGY INJECTIONS        atorvastatin (LIPITOR) 10 MG tablet TAKE 1 TABLET (10 MG) BY MOUTH DAILY. 90 tablet 3     dapsone (ACZONE) 25 MG tablet Take 1 tablet (25 mg) by mouth daily 90 tablet 0     escitalopram (LEXAPRO) 20 MG tablet Take 1 tablet (20 mg) by mouth daily 90 tablet 3     fluticasone (ARNUITY ELLIPTA) 200 MCG/ACT inhaler Inhale 1 puff into the lungs daily        guaiFENesin-dextromethorphan (ROBITUSSIN DM) 100-10 MG/5ML syrup Take 10 mLs by mouth every 4 hours as needed for cough or congestion 354 mL 3     hydrochlorothiazide (HYDRODIURIL) 12.5 MG tablet TAKE 1 TABLET BY MOUTH EVERY DAY 90 tablet 1     levothyroxine (SYNTHROID, LEVOTHROID) 137 MCG tablet TAKE 137 MCG (1 TABLET) BY MOUTH DAILY 90 tablet 3     losartan (COZAAR) 50 MG tablet TAKE 1 TABLET BY MOUTH EVERY DAY 90 tablet 1     molnupiravir (LAGEVRIO) 200 MG capsule Take 4 capsules (800 mg) by mouth every 12 hours 40 each 0     sildenafil (REVATIO) 20 MG tablet TAKE 1 TABLET BY MOUTH ONCE DAILY AS NEEDED (ERECTILE  DYSFUNCTION) 90 tablet 1     testosterone (ANDROGEL/TESTIM) 50 MG/5GM (1%) gel Place onto the skin daily           Allergies:      Allergies   Allergen Reactions     No Known Allergies             Past Medical History:     Past Medical History:   Diagnosis Date     Alcohol use      Dermatitis herpetiformis 2000    Dx by biopsy of rash on elbow per derm     Diastolic dysfunction with chronic heart failure (H)      HTN, goal below 140/90      Hyperlipidemia      Lung nodule Feb 2003    Benign; Left upper lobe     Multiple allergies     allergist with immunotherapy; Allergist is Dr. Santos in [place]     Primary hypogonadism in male     Low FSH, single left testicle - followed by Dr. Flores of endo     Pulmonary embolus (H) Feb 2003    Evaluated by ?[place] - unknown cause     Thyroid nodule Feb 2005    Benign per FNA --> taking Levothyroxine ever since     Uncomplicated asthma          Past Surgical History:     Past Surgical History:   Procedure Laterality Date     BACK SURGERY       COLONOSCOPY  April 2012     COLONOSCOPY N/A 6/20/2022    Procedure: COLONOSCOPY;  Surgeon: Diya Lo MD;  Location:  GI     HEAD & NECK SURGERY       HERNIA REPAIR       ORCHIOPEXY CHILD Right     Age 10 due to undescended testicl     ORTHOPEDIC SURGERY       OTHER SURGICAL HISTORY  July 1997    Cervical  laminectomy C4-5-6     OTHER SURGICAL HISTORY  Jan 2000    Right index tendon repair     OTHER SURGICAL HISTORY  August 2004    Ankle/wrist repair     OTHER SURGICAL HISTORY  April 2007    Lumbar microdiscectomy L5-S1     OTHER SURGICAL HISTORY  March 2010    Cervical fusion C5-6-7     OTHER SURGICAL HISTORY  March 2011    Cervical fusion C3-4-5     OTHER SURGICAL HISTORY  July 2011    Lumbar fusion L5-S1         Family Medical History:     Family History   Problem Relation Age of Onset     Hypertension Mother      Dementia Mother      Alcoholism Mother      Hypertension Father      Cerebrovascular Disease Father      Cancer Father         throat - was a smoker     Heart Disease Father         CABG     Diabetes Maternal Grandfather      Family History Negative Brother      Family History Negative Sister      Cerebrovascular Disease Paternal Grandmother      Cancer Son         Testicular cancer     Family History Negative Son          Social History:     Social History     Socioeconomic History     Marital status:      Spouse name: Not on file     Number of children: Not on file     Years of education: Not on file     Highest education level: Not on file   Occupational History     Not on file   Tobacco Use     Smoking status: Never     Smokeless tobacco: Never   Substance and Sexual Activity     Alcohol use: Yes     Drug use: No     Sexual activity: Yes     Partners: Female     Birth control/protection: Pull-out method   Other Topics Concern     Parent/sibling w/ CABG, MI or angioplasty before 65F 55M? Yes     Comment: father bypass 60ish   Social History Narrative     Not on file     Social Determinants of Health     Financial Resource Strain: Not on file   Food Insecurity: Not on file   Transportation Needs: Not on file   Physical Activity: Not on file   Stress: Not on file   Social Connections: Not on file   Intimate Partner Violence: Not on file   Housing Stability: Not on file           Review of System:      Constitutional: positive for fever or chills  Skin: Negative for rashes  Ears/Nose/Throat: positive for nasal congestion, sore throat  Respiratory: negative for cough  Cardiovascular: Negative for chest pain  Gastrointestinal: Negative for nausea, vomiting  Genitourinary: Negative for dysuria, hematuria  Musculoskeletal: Negative for myalgias  Neurologic: Negative for headaches  Psychiatric: positive for depression, anxiety  Hematologic/Lymphatic/Immunologic: Negative  Endocrine: Negative  Behavioral: Negative for tobacco use       Physical Exam:   There were no vitals taken for this visit.    RESP: no cough present   NEURO: Alert & Oriented x 3.   PSYCH: mentation appears normal, affect normal        Diagnostic Test Results:     Diagnostic Test Results:  Labs reviewed in Epic    ASSESSMENT/PLAN:       Juan C was seen today for follow up.    Diagnoses and all orders for this visit:    Depression with anxiety  - stable, continue current Lexapro therapy    Hyperlipidemia, unspecified hyperlipidemia type  - stable, continue current therapy    Essential hypertension  - stable, continue current therapy    Hypothyroidism, unspecified type  - stable, continue current therapy      Follow Up Plan:     Patient is instructed to return to Internal Medicine clinic for follow-up visit in 1 month.        Suzanne Zhang MD  Internal Medicine  [facility]      telephone visit duration including chart review, medication management: 30 minutes

## 2023-03-03 ENCOUNTER — TRANSFERRED RECORDS (OUTPATIENT)
Dept: HEALTH INFORMATION MANAGEMENT | Facility: CLINIC | Age: 63
End: 2023-03-03
Payer: COMMERCIAL

## 2023-04-10 ENCOUNTER — HOSPITAL ENCOUNTER (OUTPATIENT)
Dept: ULTRASOUND IMAGING | Facility: CLINIC | Age: 63
Discharge: HOME | End: 2023-04-10
Attending: INTERNAL MEDICINE | Admitting: INTERNAL MEDICINE
Payer: COMMERCIAL

## 2023-04-10 DIAGNOSIS — E05.10 TOXIC THYROID NODULE: ICD-10-CM

## 2023-04-10 PROCEDURE — 76536 US EXAM OF HEAD AND NECK: CPT

## 2023-05-01 ENCOUNTER — OFFICE VISIT (OUTPATIENT)
Dept: URGENT CARE | Facility: URGENT CARE | Age: 63
End: 2023-05-01
Payer: COMMERCIAL

## 2023-05-01 VITALS
SYSTOLIC BLOOD PRESSURE: 124 MMHG | HEART RATE: 62 BPM | TEMPERATURE: 97.8 F | DIASTOLIC BLOOD PRESSURE: 84 MMHG | OXYGEN SATURATION: 99 % | RESPIRATION RATE: 18 BRPM

## 2023-05-01 DIAGNOSIS — J01.00 ACUTE NON-RECURRENT MAXILLARY SINUSITIS: Primary | ICD-10-CM

## 2023-05-01 PROCEDURE — 99213 OFFICE O/P EST LOW 20 MIN: CPT | Performed by: PHYSICIAN ASSISTANT

## 2023-05-01 RX ORDER — AMOXICILLIN 500 MG/1
1000 CAPSULE ORAL 2 TIMES DAILY
Qty: 28 CAPSULE | Refills: 0 | Status: SHIPPED | OUTPATIENT
Start: 2023-05-01 | End: 2023-05-08

## 2023-05-01 NOTE — PROGRESS NOTES
URGENT CARE VISIT:    SUBJECTIVE:   Juan C Garcia is a 63 year old male presenting with a chief complaint of right sided facial pain/pressure.  Onset was 2 day(s) ago.   He denies the following symptoms: fever, stuffy nose and cough - non-productive  Course of illness is worsening.    Treatment measures tried include Tylenol/Ibuprofen with some relief of symptoms.  Predisposing factors include none.  He made appointment with dentist in a few days as he is not sure if it is dental related.    PMH:   Past Medical History:   Diagnosis Date     Alcohol use      Dermatitis herpetiformis 2000    Dx by biopsy of rash on elbow per derm     Diastolic dysfunction with chronic heart failure (H)      HTN, goal below 140/90      Hyperlipidemia      Lung nodule Feb 2003    Benign; Left upper lobe     Multiple allergies     allergist with immunotherapy; Allergist is Dr. Santos in [place]     Primary hypogonadism in male     Low FSH, single left testicle - followed by Dr. Flores of endo     Pulmonary embolus (H) Feb 2003    Evaluated by ?[place] - unknown cause     Thyroid nodule Feb 2005    Benign per FNA --> taking Levothyroxine ever since     Uncomplicated asthma      Allergies: No known allergies   Medications:   Current Outpatient Medications   Medication Sig Dispense Refill     amoxicillin (AMOXIL) 500 MG capsule Take 2 capsules (1,000 mg) by mouth 2 times daily for 7 days 28 capsule 0     ALLERGY INJECTIONS        atorvastatin (LIPITOR) 10 MG tablet TAKE 1 TABLET (10 MG) BY MOUTH DAILY. 90 tablet 3     dapsone (ACZONE) 25 MG tablet Take 1 tablet (25 mg) by mouth daily 90 tablet 0     escitalopram (LEXAPRO) 20 MG tablet Take 1 tablet (20 mg) by mouth daily 90 tablet 3     fluticasone (ARNUITY ELLIPTA) 200 MCG/ACT inhaler Inhale 1 puff into the lungs daily       guaiFENesin-dextromethorphan (ROBITUSSIN DM) 100-10 MG/5ML syrup Take 10 mLs by mouth every 4 hours as needed for cough or congestion 354 mL 3      hydrochlorothiazide (HYDRODIURIL) 12.5 MG tablet TAKE 1 TABLET BY MOUTH EVERY DAY 90 tablet 1     levothyroxine (SYNTHROID, LEVOTHROID) 137 MCG tablet TAKE 137 MCG (1 TABLET) BY MOUTH DAILY 90 tablet 3     losartan (COZAAR) 50 MG tablet TAKE 1 TABLET BY MOUTH EVERY DAY 90 tablet 1     molnupiravir (LAGEVRIO) 200 MG capsule Take 4 capsules (800 mg) by mouth every 12 hours 40 each 0     sildenafil (REVATIO) 20 MG tablet TAKE 1 TABLET BY MOUTH ONCE DAILY AS NEEDED (ERECTILE  DYSFUNCTION) 90 tablet 1     testosterone (ANDROGEL/TESTIM) 50 MG/5GM (1%) gel Place onto the skin daily       Social History:   Social History     Tobacco Use     Smoking status: Never     Smokeless tobacco: Never   Vaping Use     Vaping status: Not on file   Substance Use Topics     Alcohol use: Yes       ROS:  Review of systems negative except as stated above.    OBJECTIVE:  /84 (BP Location: Right arm, Patient Position: Sitting, Cuff Size: Adult Regular)   Pulse 62   Temp 97.8  F (36.6  C) (Oral)   Resp 18   SpO2 99%   GENERAL APPEARANCE: healthy, alert and no distress  EYES: EOMI,  PERRL, conjunctiva clear  HENT: ear canals and TM's normal.  Nose and mouth without ulcers, erythema or lesions. Mild left sinus TTP.  NECK: supple, nontender, no lymphadenopathy  RESP: lungs clear to auscultation - no rales, rhonchi or wheezes  CV: regular rates and rhythm, normal S1 S2, no murmur noted  SKIN: no suspicious lesions or rashes      ASSESSMENT:    ICD-10-CM    1. Acute non-recurrent maxillary sinusitis  J01.00 amoxicillin (AMOXIL) 500 MG capsule          PLAN:  Patient was educated on the natural course of condition.  Ddx includes sinusitis vs dental infection. Trial of amoxicillin. Take medications as prescribed. Side effects may include stomachache or diarrhea. Conservative measures discussed including increased fluids, nasal saline irrigation (neti pot), warm steamy shower, cough suppressants, expectorants (Mucinex), decongestants  (Pseudofed), and analgesics (Tylenol and/or Ibuprofen). See your primary care provider if symptoms worsen or do not improve in 7 days. Keep the dentist appointment this week for dental check up. Seek emergency care if you develop fever over 103 or shortness of breath. Patient verbalized understanding and is agreeable to plan. The patient was discharged ambulatory and in stable condition.    Ana Marmolejo PA-C ....................  5/1/2023   11:33 AM

## 2023-05-08 DIAGNOSIS — I10 ESSENTIAL HYPERTENSION: ICD-10-CM

## 2023-05-08 DIAGNOSIS — I10 HTN, GOAL BELOW 140/90: Chronic | ICD-10-CM

## 2023-05-09 RX ORDER — HYDROCHLOROTHIAZIDE 12.5 MG/1
TABLET ORAL
Qty: 90 TABLET | Refills: 1 | Status: SHIPPED | OUTPATIENT
Start: 2023-05-09 | End: 2023-11-22

## 2023-05-09 RX ORDER — LOSARTAN POTASSIUM 50 MG/1
TABLET ORAL
Qty: 90 TABLET | Refills: 1 | Status: SHIPPED | OUTPATIENT
Start: 2023-05-09 | End: 2023-10-30

## 2023-06-16 ENCOUNTER — OFFICE VISIT (OUTPATIENT)
Dept: FAMILY MEDICINE | Facility: CLINIC | Age: 63
End: 2023-06-16
Payer: COMMERCIAL

## 2023-06-16 VITALS
HEIGHT: 74 IN | HEART RATE: 63 BPM | RESPIRATION RATE: 18 BRPM | SYSTOLIC BLOOD PRESSURE: 160 MMHG | TEMPERATURE: 97.8 F | OXYGEN SATURATION: 98 % | BODY MASS INDEX: 29.39 KG/M2 | WEIGHT: 229 LBS | DIASTOLIC BLOOD PRESSURE: 92 MMHG

## 2023-06-16 DIAGNOSIS — Z11.4 SCREENING FOR HIV (HUMAN IMMUNODEFICIENCY VIRUS): ICD-10-CM

## 2023-06-16 DIAGNOSIS — E78.5 HYPERLIPIDEMIA LDL GOAL <100: ICD-10-CM

## 2023-06-16 DIAGNOSIS — Z00.00 ROUTINE HISTORY AND PHYSICAL EXAMINATION OF ADULT: Primary | ICD-10-CM

## 2023-06-16 DIAGNOSIS — E06.3 HASHIMOTO'S THYROIDITIS: ICD-10-CM

## 2023-06-16 DIAGNOSIS — R79.89 LOW TESTOSTERONE IN MALE: ICD-10-CM

## 2023-06-16 LAB
CHOLEST SERPL-MCNC: 248 MG/DL
HDLC SERPL-MCNC: 59 MG/DL
HIV 1+2 AB+HIV1 P24 AG SERPL QL IA: NONREACTIVE
LDLC SERPL CALC-MCNC: 148 MG/DL
NONHDLC SERPL-MCNC: 189 MG/DL
PSA SERPL DL<=0.01 NG/ML-MCNC: 0.91 NG/ML (ref 0–4.5)
SHBG SERPL-SCNC: 32 NMOL/L (ref 11–80)
T4 FREE SERPL-MCNC: 1.05 NG/DL (ref 0.9–1.7)
TRIGL SERPL-MCNC: 205 MG/DL
TSH SERPL DL<=0.005 MIU/L-ACNC: 11.3 UIU/ML (ref 0.3–4.2)

## 2023-06-16 PROCEDURE — 87389 HIV-1 AG W/HIV-1&-2 AB AG IA: CPT | Performed by: INTERNAL MEDICINE

## 2023-06-16 PROCEDURE — 84439 ASSAY OF FREE THYROXINE: CPT | Performed by: INTERNAL MEDICINE

## 2023-06-16 PROCEDURE — 84403 ASSAY OF TOTAL TESTOSTERONE: CPT | Performed by: INTERNAL MEDICINE

## 2023-06-16 PROCEDURE — 84270 ASSAY OF SEX HORMONE GLOBUL: CPT | Performed by: INTERNAL MEDICINE

## 2023-06-16 PROCEDURE — 99397 PER PM REEVAL EST PAT 65+ YR: CPT | Performed by: INTERNAL MEDICINE

## 2023-06-16 PROCEDURE — 80061 LIPID PANEL: CPT | Performed by: INTERNAL MEDICINE

## 2023-06-16 PROCEDURE — 36415 COLL VENOUS BLD VENIPUNCTURE: CPT | Performed by: INTERNAL MEDICINE

## 2023-06-16 PROCEDURE — G0103 PSA SCREENING: HCPCS | Performed by: INTERNAL MEDICINE

## 2023-06-16 PROCEDURE — 84443 ASSAY THYROID STIM HORMONE: CPT | Performed by: INTERNAL MEDICINE

## 2023-06-16 RX ORDER — TESTOSTERONE 1.62 MG/G
GEL TRANSDERMAL
COMMUNITY
Start: 2023-05-08 | End: 2023-09-27

## 2023-06-16 RX ORDER — ASPIRIN 81 MG/1
81 TABLET, CHEWABLE ORAL
COMMUNITY
Start: 2022-01-15 | End: 2023-09-27

## 2023-06-16 RX ORDER — BECLOMETHASONE DIPROPIONATE HFA 80 UG/1
AEROSOL, METERED RESPIRATORY (INHALATION)
COMMUNITY
Start: 2023-03-04 | End: 2023-09-27

## 2023-06-16 ASSESSMENT — ACTIVITIES OF DAILY LIVING (ADL): CURRENT_FUNCTION: NO ASSISTANCE NEEDED

## 2023-06-16 ASSESSMENT — ENCOUNTER SYMPTOMS
DIARRHEA: 0
FREQUENCY: 0
FEVER: 0
HEMATOCHEZIA: 0
HEARTBURN: 0
WEAKNESS: 0
ARTHRALGIAS: 1
HEMATURIA: 0
JOINT SWELLING: 0
DIZZINESS: 0
MYALGIAS: 0
EYE PAIN: 0
ABDOMINAL PAIN: 0
SORE THROAT: 0
NAUSEA: 0
CHILLS: 0
HEADACHES: 0
SHORTNESS OF BREATH: 0
NERVOUS/ANXIOUS: 1
DYSURIA: 0
PARESTHESIAS: 0
PALPITATIONS: 0
COUGH: 0
CONSTIPATION: 0

## 2023-06-16 ASSESSMENT — PAIN SCALES - GENERAL: PAINLEVEL: NO PAIN (0)

## 2023-06-16 NOTE — PROGRESS NOTES
"SUBJECTIVE:   Mikhail is a 63 year old who presents for Preventive Visit.  Are you in the first 12 months of your Medicare coverage?  No    Healthy Habits:     In general, how would you rate your overall health?  Fair    Frequency of exercise:  1 day/week    Duration of exercise:  30-45 minutes    Do you usually eat at least 4 servings of fruit and vegetables a day, include whole grains    & fiber and avoid regularly eating high fat or \"junk\" foods?  Yes    Taking medications regularly:  Yes    Medication side effects:  None    Ability to successfully perform activities of daily living:  No assistance needed    Home Safety:  No safety concerns identified    Hearing Impairment:  Need to ask people to speak up or repeat themselves    In the past 6 months, have you been bothered by leaking of urine?  No    In general, how would you rate your overall mental or emotional health?  Poor      PHQ-2 Total Score: 1    Additional concerns today:  No        Have you ever done Advance Care Planning? (For example, a Health Directive, POLST, or a discussion with a medical provider or your loved ones about your wishes): Yes, patient states has an Advance Care Planning document and will bring a copy to the clinic.       Fall risk  Fallen 2 or more times in the past year?: No  Any fall with injury in the past year?: No    Cognitive Screening   1) Repeat 3 items (Leader, Season, Table)    2) Clock draw: NORMAL  3) 3 item recall: Recalls 3 objects  Results: 3 items recalled: COGNITIVE IMPAIRMENT LESS LIKELY    Mini-CogTM Copyright MARTHA Mckeon. Licensed by the author for use in [facility]; reprinted with permission (osmany@.[facility]). All rights reserved.      Do you have sleep apnea, excessive snoring or daytime drowsiness?: yes    Reviewed and updated as needed this visit by clinical staff    Allergies  Meds              Reviewed and updated as needed this visit by Provider                 Social History     Tobacco Use     " Smoking status: Never     Smokeless tobacco: Never   Vaping Use     Vaping status: Not on file   Substance Use Topics     Alcohol use: Yes             6/16/2023     8:39 AM   Alcohol Use   Prescreen: >3 drinks/day or >7 drinks/week? Yes   AUDIT SCORE  19         6/16/2023     8:39 AM   AUDIT - Alcohol Use Disorders Identification Test - Reproduced from the World Health Organization Audit 2001 (Second Edition)   1.  How often do you have a drink containing alcohol? 2 to 3 times a week   2.  How many drinks containing alcohol do you have on a typical day when you are drinking? 3 or 4   3.  How often do you have five or more drinks on one occasion? Monthly   4.  How often during the last year have you found that you were not able to stop drinking once you had started? Monthly   5.  How often during the last year have you failed to do what was normally expected of you because of drinking? Never   6.  How often during the last year have you needed a first drink in the morning to get yourself going after a heavy drinking session? Monthly   7.  How often during the last year have you had a feeling of guilt or remorse after drinking? Weekly   8.  How often during the last year have you been unable to remember what happened the night before because of your drinking? Monthly   9.  Have you or someone else been injured because of your drinking? No   10. Has a relative, friend, doctor or other health care worker been concerned about your drinking or suggested you cut down? Yes, during the last year   TOTAL SCORE 19     Do you have a current opioid prescription? No  Do you use any other controlled substances or medications that are not prescribed by a provider? Alcohol      Current providers sharing in care for this patient include:   Patient Care Team:  Suzanne Zhang MD as PCP - General (Internal Medicine)  Suzanne Zhang MD as Assigned PCP    The following health maintenance items are reviewed in Epic and correct as  "of today:  Health Maintenance   Topic Date Due     HIV SCREENING  Never done     AORTIC ANEURYSM SCREENING (SYSTEM ASSIGNED)  Never done     COVID-19 Vaccine (6 - Pfizer series) 03/18/2023     LIPID  05/13/2023     TSH W/FREE T4 REFLEX  05/13/2023     ANNUAL REVIEW OF HM ORDERS  05/20/2023     Pneumococcal Vaccine: 65+ Years (1 - PCV) 03/18/2023     MEDICARE ANNUAL WELLNESS VISIT  05/20/2023     FALL RISK ASSESSMENT  06/16/2024     ADVANCE CARE PLANNING  03/15/2026     COLORECTAL CANCER SCREENING  06/20/2032     DTAP/TDAP/TD IMMUNIZATION (3 - Td or Tdap) 11/17/2032     HEPATITIS C SCREENING  Completed     PHQ-2 (once per calendar year)  Completed     INFLUENZA VACCINE  Completed     ZOSTER IMMUNIZATION  Completed     IPV IMMUNIZATION  Aged Out     MENINGITIS IMMUNIZATION  Aged Out     Labs reviewed in EPIC      Review of systems  Constitutional, HEENT, cardiovascular, pulmonary, GI, , musculoskeletal, neuro, skin, endocrine and psych systems are negative, except as otherwise noted.    OBJECTIVE:   BP (!) 160/92 (BP Location: Right arm, Patient Position: Sitting, Cuff Size: Adult Large)   Pulse 63   Temp 97.8  F (36.6  C) (Oral)   Resp 18   Ht 1.88 m (6' 2\")   Wt 103.9 kg (229 lb)   SpO2 98%   BMI 29.40 kg/m   Estimated body mass index is 29.4 kg/m  as calculated from the following:    Height as of this encounter: 1.88 m (6' 2\").    Weight as of this encounter: 103.9 kg (229 lb).  Physical Exam  GENERAL: alert and no distress  EYES: Eyes grossly normal to inspection, PERRL and conjunctivae and sclerae normal  HENT: ear canals and TM's normal, nose and mouth without ulcers or lesions  NECK: no adenopathy, no asymmetry, masses, or scars and thyroid normal to palpation  RESP: lungs clear to auscultation  CV: regular rate and rhythm  ABDOMEN: soft, nontender, no hepatosplenomegaly, no masses and bowel sounds normal  MS: no gross musculoskeletal defects noted, no edema  SKIN: no suspicious lesions or " rashes  NEURO: Normal strength and tone, mentation intact and speech normal  PSYCH: mentation appears normal, mildly depressed affect    Diagnostic Test Results:  Labs reviewed in Epic    ASSESSMENT / PLAN:   Juan C was seen today for physical.    Diagnoses and all orders for this visit:    Routine history and physical examination of adult  -     REVIEW OF HEALTH MAINTENANCE PROTOCOL ORDERS  -     PSA, screen; Future  -     PSA, screen    Screening for HIV (human immunodeficiency virus)  -     HIV Antigen Antibody Combo; Future  -     HIV Antigen Antibody Combo    Hashimoto's thyroiditis  -     TSH WITH FREE T4 REFLEX; Future  -     TSH WITH FREE T4 REFLEX  -     T4 free  -     levothyroxine (SYNTHROID/LEVOTHROID) 150 MCG tablet; Take 1 tablet (150 mcg) by mouth daily    Low testosterone in male  -     Testosterone Free and Total; Future  -     Testosterone Free and Total    Hyperlipidemia LDL goal <100  -     Lipid panel reflex to direct LDL Non-fasting; Future  -     Lipid panel reflex to direct LDL Non-fasting        Patient has been advised of split billing requirements and indicates understanding: Yes      COUNSELING:  Reviewed preventive health counseling, as reflected in patient instructions  Special attention given to:       Consider AAA screening for ages 65-75 and smoking history       Regular exercise       Alcohol Use         He reports that he has never smoked. He has never used smokeless tobacco.      Appropriate preventive services were discussed with this patient, including applicable screening as appropriate for cardiovascular disease, diabetes, osteopenia/osteoporosis, and glaucoma.  As appropriate for age/gender, discussed screening for colorectal cancer, prostate cancer, breast cancer, and cervical cancer. Checklist reviewing preventive services available has been given to the patient.    Reviewed patients plan of care and provided an AVS. The Basic Care Plan (routine screening as documented in  Health Maintenance) for Juan C meets the Care Plan requirement. This Care Plan has been established and reviewed with the Patient.      Suzanne Zhang MD  [facility]    Identified Health Risks:    I have reviewed Opioid Use Disorder and Substance Use Disorder risk factors and made any needed referrals.

## 2023-06-18 LAB
TESTOST FREE SERPL-MCNC: 9.14 NG/DL
TESTOST SERPL-MCNC: 436 NG/DL (ref 240–950)

## 2023-06-22 RX ORDER — LEVOTHYROXINE SODIUM 150 UG/1
150 TABLET ORAL DAILY
Qty: 90 TABLET | Refills: 3 | Status: SHIPPED | OUTPATIENT
Start: 2023-06-22 | End: 2024-06-14

## 2023-07-21 ENCOUNTER — TRANSFERRED RECORDS (OUTPATIENT)
Dept: HEALTH INFORMATION MANAGEMENT | Facility: CLINIC | Age: 63
End: 2023-07-21

## 2023-09-26 ENCOUNTER — E-VISIT (OUTPATIENT)
Dept: FAMILY MEDICINE | Facility: CLINIC | Age: 63
End: 2023-09-26
Payer: COMMERCIAL

## 2023-09-26 DIAGNOSIS — R79.89 LOW TESTOSTERONE IN MALE: Primary | ICD-10-CM

## 2023-09-26 DIAGNOSIS — Z76.0 ENCOUNTER FOR MEDICATION REFILL: ICD-10-CM

## 2023-09-26 PROCEDURE — 99423 OL DIG E/M SVC 21+ MIN: CPT | Performed by: INTERNAL MEDICINE

## 2023-09-27 RX ORDER — TESTOSTERONE 1.62 MG/G
GEL TRANSDERMAL
Qty: 75 G | Refills: 0 | Status: SHIPPED | OUTPATIENT
Start: 2023-09-27 | End: 2023-11-22

## 2023-09-27 NOTE — TELEPHONE ENCOUNTER
Provider E-Visit time total (minutes): 22 minutes    Chief Complaint:     E-visit for testosterone medication refill    HPI:   Patient Juan C Garcia is a very pleasant 63 year old male with history of low testosterone who presents to Internal Medicine clinic today for E-visit for testosterone medication refill.         Current Medications:     Current Outpatient Medications   Medication Sig Dispense Refill    testosterone (ANDROGEL 1.62 % PUMP) 20.25 MG/ACT gel APPLY 3 PUMP PRESSES (5GM) ONE TIME DAILY AS DIRECTED 75 g 0    ALLERGY INJECTIONS       atorvastatin (LIPITOR) 10 MG tablet TAKE 1 TABLET (10 MG) BY MOUTH DAILY. 90 tablet 3    dapsone (ACZONE) 25 MG tablet Take 1 tablet (25 mg) by mouth daily 90 tablet 0    escitalopram (LEXAPRO) 20 MG tablet Take 1 tablet (20 mg) by mouth daily 90 tablet 3    hydrochlorothiazide (HYDRODIURIL) 12.5 MG tablet TAKE 1 TABLET BY MOUTH EVERY DAY 90 tablet 1    levothyroxine (SYNTHROID/LEVOTHROID) 150 MCG tablet Take 1 tablet (150 mcg) by mouth daily 90 tablet 3    losartan (COZAAR) 50 MG tablet TAKE 1 TABLET BY MOUTH EVERY DAY 90 tablet 1    sildenafil (REVATIO) 20 MG tablet TAKE 1 TABLET BY MOUTH ONCE DAILY AS NEEDED (ERECTILE  DYSFUNCTION) 90 tablet 1         Allergies:      Allergies   Allergen Reactions    No Known Allergies             Past Medical History:     Past Medical History:   Diagnosis Date    Alcohol use     Dermatitis herpetiformis 2000    Dx by biopsy of rash on elbow per derm    Diastolic dysfunction with chronic heart failure (H)     HTN, goal below 140/90     Hyperlipidemia     Lung nodule Feb 2003    Benign; Left upper lobe    Multiple allergies     allergist with immunotherapy; Allergist is Dr. Santos in [place]    Primary hypogonadism in male     Low FSH, single left testicle - followed by Dr. Flores of endo    Pulmonary embolus (H) Feb 2003    Evaluated by ?[place] - unknown cause    Thyroid nodule Feb 2005    Benign per FNA --> taking Levothyroxine  ever since    Uncomplicated asthma          Past Surgical History:     Past Surgical History:   Procedure Laterality Date    BACK SURGERY      COLONOSCOPY  April 2012    COLONOSCOPY N/A 6/20/2022    Procedure: COLONOSCOPY;  Surgeon: Diya Lo MD;  Location:  GI    HEAD & NECK SURGERY      HERNIA REPAIR      ORCHIOPEXY CHILD Right     Age 10 due to undescended testicl    ORTHOPEDIC SURGERY      OTHER SURGICAL HISTORY  July 1997    Cervical laminectomy C4-5-6    OTHER SURGICAL HISTORY  Jan 2000    Right index tendon repair    OTHER SURGICAL HISTORY  August 2004    Ankle/wrist repair    OTHER SURGICAL HISTORY  April 2007    Lumbar microdiscectomy L5-S1    OTHER SURGICAL HISTORY  March 2010    Cervical fusion C5-6-7    OTHER SURGICAL HISTORY  March 2011    Cervical fusion C3-4-5    OTHER SURGICAL HISTORY  July 2011    Lumbar fusion L5-S1         Family Medical History:     Family History   Problem Relation Age of Onset    Hypertension Mother     Dementia Mother     Alcoholism Mother     Hypertension Father     Cerebrovascular Disease Father     Cancer Father         throat - was a smoker    Heart Disease Father         CABG    Diabetes Maternal Grandfather     Family History Negative Brother     Family History Negative Sister     Cerebrovascular Disease Paternal Grandmother     Cancer Son         Testicular cancer    Family History Negative Son          Social History:     Social History     Socioeconomic History    Marital status:      Spouse name: Not on file    Number of children: Not on file    Years of education: Not on file    Highest education level: Not on file   Occupational History    Not on file   Tobacco Use    Smoking status: Never    Smokeless tobacco: Never   Substance and Sexual Activity    Alcohol use: Yes    Drug use: No    Sexual activity: Yes     Partners: Female     Birth control/protection: Pull-out method   Other Topics Concern    Parent/sibling w/ CABG, MI or angioplasty  before 65F 55M? Yes     Comment: father bypass 60ish   Social History Narrative    Not on file     Social Determinants of Health     Financial Resource Strain: Not on file   Food Insecurity: Not on file   Transportation Needs: Not on file   Physical Activity: Not on file   Stress: Not on file   Social Connections: Not on file   Interpersonal Safety: Not on file   Housing Stability: Not on file           Review of System:     Constitutional: Negative for fever or chills  Endocrine: positive for low testosterone  Behavioral: Negative for tobacco use       Physical Exam:   There were no vitals taken for this visit.      Diagnostic Test Results:     Diagnostic Test Results:  Labs reviewed in Epic    ASSESSMENT/PLAN:       Juan C was seen today for other.    Diagnoses and all orders for this visit:    Low testosterone in male  -     testosterone (ANDROGEL 1.62 % PUMP) 20.25 MG/ACT gel; APPLY 3 PUMP PRESSES (5GM) ONE TIME DAILY AS DIRECTED    Encounter for medication refill  -     testosterone (ANDROGEL 1.62 % PUMP) 20.25 MG/ACT gel; APPLY 3 PUMP PRESSES (5GM) ONE TIME DAILY AS DIRECTED            Follow Up Plan:     Patient is instructed to return to Internal Medicine clinic for follow-up visit in 1 month.        Suzanne Zhang MD  Internal Medicine  [facility]

## 2023-10-28 DIAGNOSIS — I10 ESSENTIAL HYPERTENSION: ICD-10-CM

## 2023-10-30 RX ORDER — LOSARTAN POTASSIUM 50 MG/1
TABLET ORAL
Qty: 90 TABLET | Refills: 1 | Status: SHIPPED | OUTPATIENT
Start: 2023-10-30 | End: 2024-04-29

## 2023-11-22 ENCOUNTER — MYC REFILL (OUTPATIENT)
Dept: FAMILY MEDICINE | Facility: CLINIC | Age: 63
End: 2023-11-22
Payer: COMMERCIAL

## 2023-11-22 DIAGNOSIS — Z76.0 ENCOUNTER FOR MEDICATION REFILL: ICD-10-CM

## 2023-11-22 DIAGNOSIS — I10 HTN, GOAL BELOW 140/90: Chronic | ICD-10-CM

## 2023-11-22 DIAGNOSIS — R79.89 LOW TESTOSTERONE IN MALE: ICD-10-CM

## 2023-11-22 RX ORDER — HYDROCHLOROTHIAZIDE 12.5 MG/1
12.5 TABLET ORAL DAILY
Qty: 90 TABLET | Refills: 1 | Status: SHIPPED | OUTPATIENT
Start: 2023-11-22 | End: 2024-05-03

## 2023-11-22 RX ORDER — TESTOSTERONE 1.62 MG/G
GEL TRANSDERMAL
Qty: 75 G | Refills: 0 | Status: SHIPPED | OUTPATIENT
Start: 2023-11-22 | End: 2024-02-06

## 2023-11-28 ENCOUNTER — TELEPHONE (OUTPATIENT)
Dept: FAMILY MEDICINE | Facility: CLINIC | Age: 63
End: 2023-11-28
Payer: COMMERCIAL

## 2023-11-28 NOTE — TELEPHONE ENCOUNTER
Order/Referral Request    Who is requesting: Patient    Orders being requested: At Home Sleep Study    Reason service is needed/diagnosis: PT saw Dr Madsen back in 10/18/2017 and was supposed to do an at home study, pt would like Dr Madsen to review his chart and see if at home study is still appropriate and if so can he place orders?    When are orders needed by: Whenever    Has this been discussed with Provider: Yes    Does patient have a preference on a Group/Provider/Facility?[facility]    Does patient have an appointment scheduled?: No    Where to send orders: Place orders within Epic    Could we send this information to you in [employer] or would you prefer to receive a phone call?:   Patient would like to be contacted via [employer]

## 2023-12-01 ENCOUNTER — IMMUNIZATION (OUTPATIENT)
Dept: FAMILY MEDICINE | Facility: CLINIC | Age: 63
End: 2023-12-01
Payer: COMMERCIAL

## 2023-12-01 DIAGNOSIS — Z23 HIGH PRIORITY FOR 2019-NCOV VACCINE: Primary | ICD-10-CM

## 2023-12-01 PROCEDURE — 91320 SARSCV2 VAC 30MCG TRS-SUC IM: CPT

## 2023-12-01 PROCEDURE — 90480 ADMN SARSCOV2 VAC 1/ONLY CMP: CPT

## 2023-12-05 ENCOUNTER — APPOINTMENT (OUTPATIENT)
Dept: GENERAL RADIOLOGY | Facility: CLINIC | Age: 63
End: 2023-12-05
Attending: EMERGENCY MEDICINE
Payer: COMMERCIAL

## 2023-12-05 ENCOUNTER — HOSPITAL ENCOUNTER (EMERGENCY)
Facility: CLINIC | Age: 63
Discharge: HOME | End: 2023-12-05
Admitting: EMERGENCY MEDICINE
Payer: COMMERCIAL

## 2023-12-05 VITALS
DIASTOLIC BLOOD PRESSURE: 101 MMHG | OXYGEN SATURATION: 100 % | RESPIRATION RATE: 16 BRPM | HEART RATE: 70 BPM | SYSTOLIC BLOOD PRESSURE: 144 MMHG | TEMPERATURE: 97.5 F

## 2023-12-05 DIAGNOSIS — R07.9 CHEST PAIN, UNSPECIFIED TYPE: ICD-10-CM

## 2023-12-05 LAB
ANION GAP SERPL CALCULATED.3IONS-SCNC: 11 MMOL/L (ref 7–15)
ATRIAL RATE - MUSE: 73 BPM
BASOPHILS # BLD AUTO: 0.1 10E3/UL (ref 0–0.2)
BASOPHILS NFR BLD AUTO: 1 %
BUN SERPL-MCNC: 11.2 MG/DL (ref 8–23)
CALCIUM SERPL-MCNC: 8.9 MG/DL (ref 8.8–10.2)
CHLORIDE SERPL-SCNC: 105 MMOL/L (ref 98–107)
CREAT SERPL-MCNC: 0.8 MG/DL (ref 0.67–1.17)
D DIMER PPP FEU-MCNC: 0.28 UG/ML FEU (ref 0–0.5)
DEPRECATED HCO3 PLAS-SCNC: 24 MMOL/L (ref 22–29)
DIASTOLIC BLOOD PRESSURE - MUSE: NORMAL MMHG
EGFRCR SERPLBLD CKD-EPI 2021: >90 ML/MIN/1.73M2
EOSINOPHIL # BLD AUTO: 0.6 10E3/UL (ref 0–0.7)
EOSINOPHIL NFR BLD AUTO: 9 %
ERYTHROCYTE [DISTWIDTH] IN BLOOD BY AUTOMATED COUNT: 12.7 % (ref 10–15)
GLUCOSE SERPL-MCNC: 95 MG/DL (ref 70–99)
HCT VFR BLD AUTO: 44 % (ref 40–53)
HGB BLD-MCNC: 14.1 G/DL (ref 13.3–17.7)
HOLD SPECIMEN: NORMAL
IMM GRANULOCYTES # BLD: 0 10E3/UL
IMM GRANULOCYTES NFR BLD: 1 %
INTERPRETATION ECG - MUSE: NORMAL
LYMPHOCYTES # BLD AUTO: 2 10E3/UL (ref 0.8–5.3)
LYMPHOCYTES NFR BLD AUTO: 29 %
MCH RBC QN AUTO: 29.6 PG (ref 26.5–33)
MCHC RBC AUTO-ENTMCNC: 32 G/DL (ref 31.5–36.5)
MCV RBC AUTO: 92 FL (ref 78–100)
MONOCYTES # BLD AUTO: 0.7 10E3/UL (ref 0–1.3)
MONOCYTES NFR BLD AUTO: 9 %
NEUTROPHILS # BLD AUTO: 3.6 10E3/UL (ref 1.6–8.3)
NEUTROPHILS NFR BLD AUTO: 51 %
NRBC # BLD AUTO: 0 10E3/UL
NRBC BLD AUTO-RTO: 0 /100
P AXIS - MUSE: 66 DEGREES
PLATELET # BLD AUTO: 258 10E3/UL (ref 150–450)
POTASSIUM SERPL-SCNC: 4.1 MMOL/L (ref 3.4–5.3)
PR INTERVAL - MUSE: 172 MS
QRS DURATION - MUSE: 92 MS
QT - MUSE: 396 MS
QTC - MUSE: 436 MS
R AXIS - MUSE: 8 DEGREES
RBC # BLD AUTO: 4.76 10E6/UL (ref 4.4–5.9)
SODIUM SERPL-SCNC: 140 MMOL/L (ref 135–145)
SYSTOLIC BLOOD PRESSURE - MUSE: NORMAL MMHG
T AXIS - MUSE: 37 DEGREES
TROPONIN T SERPL HS-MCNC: 8 NG/L
VENTRICULAR RATE- MUSE: 73 BPM
WBC # BLD AUTO: 6.9 10E3/UL (ref 4–11)

## 2023-12-05 PROCEDURE — 84484 ASSAY OF TROPONIN QUANT: CPT | Performed by: EMERGENCY MEDICINE

## 2023-12-05 PROCEDURE — 36415 COLL VENOUS BLD VENIPUNCTURE: CPT | Performed by: EMERGENCY MEDICINE

## 2023-12-05 PROCEDURE — 85025 COMPLETE CBC W/AUTO DIFF WBC: CPT | Performed by: EMERGENCY MEDICINE

## 2023-12-05 PROCEDURE — 85379 FIBRIN DEGRADATION QUANT: CPT | Performed by: EMERGENCY MEDICINE

## 2023-12-05 PROCEDURE — 71046 X-RAY EXAM CHEST 2 VIEWS: CPT

## 2023-12-05 PROCEDURE — 93005 ELECTROCARDIOGRAM TRACING: CPT

## 2023-12-05 PROCEDURE — 99285 EMERGENCY DEPT VISIT HI MDM: CPT | Mod: 25

## 2023-12-05 PROCEDURE — 82310 ASSAY OF CALCIUM: CPT | Performed by: EMERGENCY MEDICINE

## 2023-12-05 ASSESSMENT — ACTIVITIES OF DAILY LIVING (ADL): ADLS_ACUITY_SCORE: 35

## 2023-12-05 NOTE — ED PROVIDER NOTES
History     Chief Complaint:  Chest Pain       HPI   Juan C Garcia is a 63 year old male who presents to the ED with 2 days of chest pain.  The patient states that initially he had perceived left-sided chest discomfort though today he notes this is on the right.  He states there has been some shortness of breath and that seems to be his predominant symptom today.  He notes that he had his allergy shots, influenza vaccination, and COVID vaccination on Wednesday, Thursday, and Friday of last week.  He has noted some stress at home and wondered if that could be part of his symptoms as well and additionally notes a history of pulmonary embolism for which she was on anticoagulation for about 6 months but has been off ever since.      Independent Historian:    None - Patient Only    Review of External Notes:  Reviewed PCP note from 6/16/2023 and telephone encounters from 11/28/2023.  No pertinent information for today's visit    Allergies:  No Known Allergies     Physical Exam   Patient Vitals for the past 24 hrs:   BP Temp Temp src Pulse Resp SpO2   12/05/23 1344 -- -- -- 70 16 100 %   12/05/23 1123 (!) 144/101 97.5  F (36.4  C) Oral 72 17 100 %        Physical Exam  Constitutional: Vital signs reviewed as above.   Head: No external signs of trauma noted.  Eyes: Pupils are equal, round, and reactive to light.   Neck: No JVD noted  Cardiovascular: normal rate, Regular rhythm and normal heart sounds.  No murmur heard. Equal B/L peripheral pulses.  Pulmonary/Chest: Effort normal and breath sounds normal. No respiratory distress. Patient has no wheezes. Patient has no rales.   Gastrointestinal: Soft. There is no tenderness.   Musculoskeletal/Extremities: No pitting edema noted. Normal tone.  Neurological: Patient is alert and oriented to person, place, and time.   Skin: Skin is warm and dry. There is no diaphoresis noted.   Psychiatric: The patient appears calm.      Emergency Department Course   ECG  ECG results from  12/05/23   EKG 12 lead     Value    Systolic Blood Pressure     Diastolic Blood Pressure     Ventricular Rate 73    Atrial Rate 73    MS Interval 172    QRS Duration 92        QTc 436    P Axis 66    R AXIS 8    T Axis 37    Interpretation ECG      Sinus rhythm  Normal ECG  When compared with ECG of 13-MAY-2022 08:28,  Premature ventricular complexes are no longer Present  Interpreted by me at 1153             Laboratory: Imaging:   Labs Ordered and Resulted from Time of ED Arrival to Time of ED Departure   TROPONIN T, HIGH SENSITIVITY - Normal       Result Value    Troponin T, High Sensitivity 8     BASIC METABOLIC PANEL - Normal    Sodium 140      Potassium 4.1      Chloride 105      Carbon Dioxide (CO2) 24      Anion Gap 11      Urea Nitrogen 11.2      Creatinine 0.80      GFR Estimate >90      Calcium 8.9      Glucose 95     D DIMER QUANTITATIVE - Normal    D-Dimer Quantitative 0.28     CBC WITH PLATELETS AND DIFFERENTIAL    WBC Count 6.9      RBC Count 4.76      Hemoglobin 14.1      Hematocrit 44.0      MCV 92      MCH 29.6      MCHC 32.0      RDW 12.7      Platelet Count 258      % Neutrophils 51      % Lymphocytes 29      % Monocytes 9      % Eosinophils 9      % Basophils 1      % Immature Granulocytes 1      NRBCs per 100 WBC 0      Absolute Neutrophils 3.6      Absolute Lymphocytes 2.0      Absolute Monocytes 0.7      Absolute Eosinophils 0.6      Absolute Basophils 0.1      Absolute Immature Granulocytes 0.0      Absolute NRBCs 0.0       Chest XR,  PA & LAT   Final Result   IMPRESSION: Cardiac silhouette is within normal limits. Asymmetric   nodular opacity at the right lung base may represent a nipple shadow   but should be followed up with outpatient repeat chest radiograph as   an infectious inflammatory focus or pulmonary nodule is possible. No   pleural effusion or pneumothorax.      HUGO RIVERS MD            SYSTEM ID:  [id number]              Procedures       Emergency Department Course &  Assessments:             Interventions:  Medications - No data to display     Assessments, Independent Interpretation, Consult/Discussion of ManagementTests:  ED Course as of 12/05/23 1831   Tue Dec 05, 2023   1315 I evaluated the patient.       Social Determinants of Health affecting care:  None    Disposition:  The patient was discharged to home.     Impression & Plan    CMS Diagnoses: None    Code Status: Prior    Medical Decision Making:    Juan C Garcia is a 63 year old male presented to the Emergency Department with a complaint of chest pain for 2 days. Fortunately the workup in the ED has been unremarkable and at this time I am not concerned for ACS. The EKG shows NSR. The troponin is negative. Based on the [facility] high sensitivity troponin pathway, this should rule the patient out for myocardial injury    I considered other possible causes of chest pain including PE (negative d-dimer), infection, pneumothorax, aortic dissection, and even more benign causes such as reflux and esophageal motility issues. The physical exam, laboratory, and radiological findings listed above make life threatening conditions less likely. At this time I believe the patient is safe for discharge. I have encouraged close outpatient follow up.     Anticipatory guidance given prior to discharge.      Critical Care:  None.    Diagnosis:    ICD-10-CM    1. Chest pain, unspecified type  R07.9            Discharge Medications:  Discharge Medication List as of 12/5/2023  1:39 PM             12/5/2023   No att. providers found          Salo Flores DO  12/05/23 1831

## 2023-12-05 NOTE — ED NOTES
Tele-PIT/Intake Evaluation      Video-Visit Details    Type of service:  Video Visit    Video Start Time (time video started): 11:32 AM  Video End Time (time video stopped): 11:36 AM   Originating Location (pt. Location): [facility]  Distant Location (provider location):  [facility]  Mode of Communication:  Video Conference via [employer]  Patient verbally consented to [employer] televisit.    History:  Pressure in left chest, thought maybe stress related.  Going through a lot right now.  This morning moved to right side with new shortness of breath.  Tried to go to work.  Three to four days now.  Last week had allergy, COVID, and influenza shots.  No nausea, vomiting, dizziness.   History of PE 10 years ago, this does not feel the same as that was severe pain.    Exam:  Resp:  Non-labored  Neuro:  Alert and cooperative  MSkel:  Moving all extremities  Skin:  No rash    Patient Vitals for the past 24 hrs:   BP Temp Temp src Pulse Resp SpO2   12/05/23 1123 (!) 144/101 97.5  F (36.4  C) Oral 72 17 100 %       Appropriate interventions for symptom management were initiated if applicable.  Appropriate diagnostic tests were initiated if indicated.    Important information for subsequent clinician:  Ddimer pending    I briefly evaluated the patient and developed an initial plan of care. I discussed this plan and explained that this brief interaction does not constitute a full evaluation. Patient/family understands that they should wait to be fully evaluated and discuss any test results with another clinician prior to leaving the hospital.       Alyssa Humphrey MD  12/05/23 [id number]

## 2023-12-05 NOTE — ED TRIAGE NOTES
Pt complains of constant chest pain from past 3 days. Pt states chest pain started on left side and has now moved to right side. Pt complains of sob. Pt has hx of pulmonary embolism 10 years ago, pt states this does not feel the same. No thinners. No recent long travels.

## 2023-12-05 NOTE — DISCHARGE INSTRUCTIONS
What do you do next:   Continue your home medications unless we have specifically changed them  If medications were prescribed today, take these as directed.  You can use over-the-counter acetaminophen (Tylenol ) and ibuprofen for fever or pain control as applicable to your visit today.  Acetaminophen (Tylenol): Take 500 to 1000 mg by mouth every 6 hours as needed for fever or pain.  Do not take more than 4000 total milligrams of acetaminophen-containing products in a 24-hour timeframe.  Ibuprofen: Take 600 milligrams by mouth every 6-8 hours as needed for fever or pain.  Take this with food or milk to avoid stomach upset.  Follow up as indicated below    When do you return: If you have uncontrolled pain, severe shortness of breath, or any other symptoms that concern you, please return to the ED for reevaluation.    Thank you for allowing us to care for you today.

## 2024-01-10 ENCOUNTER — MYC MEDICAL ADVICE (OUTPATIENT)
Dept: FAMILY MEDICINE | Facility: CLINIC | Age: 64
End: 2024-01-10
Payer: COMMERCIAL

## 2024-01-10 DIAGNOSIS — E78.5 HYPERLIPIDEMIA, UNSPECIFIED HYPERLIPIDEMIA TYPE: ICD-10-CM

## 2024-02-06 ENCOUNTER — MYC REFILL (OUTPATIENT)
Dept: FAMILY MEDICINE | Facility: CLINIC | Age: 64
End: 2024-02-06
Payer: COMMERCIAL

## 2024-02-06 DIAGNOSIS — E78.5 HYPERLIPIDEMIA, UNSPECIFIED HYPERLIPIDEMIA TYPE: ICD-10-CM

## 2024-02-06 DIAGNOSIS — R79.89 LOW TESTOSTERONE IN MALE: ICD-10-CM

## 2024-02-06 DIAGNOSIS — Z76.0 ENCOUNTER FOR MEDICATION REFILL: ICD-10-CM

## 2024-02-06 RX ORDER — ATORVASTATIN CALCIUM 10 MG/1
10 TABLET, FILM COATED ORAL DAILY
Qty: 90 TABLET | Refills: 0 | Status: SHIPPED | OUTPATIENT
Start: 2024-02-06 | End: 2024-03-21

## 2024-02-07 RX ORDER — ATORVASTATIN CALCIUM 10 MG/1
10 TABLET, FILM COATED ORAL DAILY
Qty: 90 TABLET | Refills: 3 | Status: SHIPPED | OUTPATIENT
Start: 2024-02-07 | End: 2024-03-26

## 2024-02-07 RX ORDER — TESTOSTERONE 1.62 MG/G
GEL TRANSDERMAL
Qty: 75 G | Refills: 0 | Status: SHIPPED | OUTPATIENT
Start: 2024-02-07 | End: 2024-03-21
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
## 2024-03-08 ENCOUNTER — VIRTUAL VISIT (OUTPATIENT)
Dept: SLEEP MEDICINE | Facility: CLINIC | Age: 64
End: 2024-03-08
Payer: COMMERCIAL

## 2024-03-08 VITALS
DIASTOLIC BLOOD PRESSURE: 75 MMHG | BODY MASS INDEX: 28.23 KG/M2 | HEIGHT: 74 IN | SYSTOLIC BLOOD PRESSURE: 125 MMHG | WEIGHT: 220 LBS

## 2024-03-08 DIAGNOSIS — R25.2 JERKING MOVEMENTS OF EXTREMITIES: ICD-10-CM

## 2024-03-08 DIAGNOSIS — G47.33 OSA (OBSTRUCTIVE SLEEP APNEA): Primary | ICD-10-CM

## 2024-03-08 PROCEDURE — 99205 OFFICE O/P NEW HI 60 MIN: CPT | Mod: 95 | Performed by: PHYSICIAN ASSISTANT

## 2024-03-08 ASSESSMENT — SLEEP AND FATIGUE QUESTIONNAIRES
HOW LIKELY ARE YOU TO NOD OFF OR FALL ASLEEP IN A CAR, WHILE STOPPED FOR A FEW MINUTES IN TRAFFIC: WOULD NEVER DOZE
HOW LIKELY ARE YOU TO NOD OFF OR FALL ASLEEP WHILE WATCHING TV: SLIGHT CHANCE OF DOZING
HOW LIKELY ARE YOU TO NOD OFF OR FALL ASLEEP WHILE SITTING AND TALKING TO SOMEONE: WOULD NEVER DOZE
HOW LIKELY ARE YOU TO NOD OFF OR FALL ASLEEP WHILE SITTING INACTIVE IN A PUBLIC PLACE: WOULD NEVER DOZE
HOW LIKELY ARE YOU TO NOD OFF OR FALL ASLEEP WHILE SITTING AND READING: WOULD NEVER DOZE
HOW LIKELY ARE YOU TO NOD OFF OR FALL ASLEEP WHEN YOU ARE A PASSENGER IN A CAR FOR AN HOUR WITHOUT A BREAK: WOULD NEVER DOZE
HOW LIKELY ARE YOU TO NOD OFF OR FALL ASLEEP WHILE SITTING QUIETLY AFTER LUNCH WITHOUT ALCOHOL: WOULD NEVER DOZE
HOW LIKELY ARE YOU TO NOD OFF OR FALL ASLEEP WHILE LYING DOWN TO REST IN THE AFTERNOON WHEN CIRCUMSTANCES PERMIT: SLIGHT CHANCE OF DOZING

## 2024-03-08 ASSESSMENT — PAIN SCALES - GENERAL: PAINLEVEL: NO PAIN (1)

## 2024-03-08 NOTE — PATIENT INSTRUCTIONS
"          MY TREATMENT INFORMATION FOR SLEEP APNEA-  Juan C Garcia    DOCTOR : Bennett Ezra Goltz, PA-C, KAREN    Am I having a sleep study at a sleep center?  --->Due to normal delays, you will be contacted within 2-4 weeks to schedule    Am I having a home sleep study?  --->Watch the video for the device you are using:    -/drop off device-   https://www.[employer].com/watch?v=yGGFBdELGhk    -Disposable device sent out require phone/computer application-   https://www.[employer].com/watch?v=BCce_vbiwxE      Frequently asked questions:  1. What is Obstructive Sleep Apnea (BARRY)? BARRY is the most common type of sleep apnea. Apnea means, \"without breath.\"  Apnea is most often caused by narrowing or collapse of the upper airway as muscles relax during sleep.   Almost everyone has occasional apneas. Most people with sleep apnea have had brief interruptions at night frequently for many years.  The severity of sleep apnea is related to how frequent and severe the events are.   2. What are the consequences of BARRY? Symptoms include: feeling sleepy during the day, snoring loudly, gasping or stopping of breathing, trouble sleeping, and occasionally morning headaches or heartburn at night.  Sleepiness can be serious and even increase the risk of falling asleep while driving. Other health consequences may include development of high blood pressure and other cardiovascular disease in persons who are susceptible. Untreated BARRY  can contribute to heart disease, stroke and diabetes.   3. What are the treatment options? In most situations, sleep apnea is a lifelong disease that must be managed with daily therapy. Medications are not effective for sleep apnea and surgery is generally not considered until other therapies have been tried. Your treatment is your choice . Continuous Positive Airway (CPAP) works right away and is the therapy that is effective in nearly everyone. An oral device to hold your jaw forward is usually the " next most reliable option. Other options include postioning devices (to keep you off your back), weight loss, and surgery including a tongue pacing device. There is more detail about some of these options below.  4. Are my sleep studies covered by insurance? Although we will request verification of coverage, we advise you also check in advance of the study to ensure there is coverage.    Important tips for those choosing CPAP and similar devices  For new devices, sign up for device GOMEZ to monitor your device for your followup visits  We encourage you to utilize the [employer] gomez or website (myAir web (resmed.com) ) to monitor your therapy progress and share the data with your healthcare team when you discuss your sleep apnea.                                                    Know your equipment:  CPAP is continuous positive airway pressure that prevents obstructive sleep apnea by keeping the throat from collapsing while you are sleeping. In most cases, the device is  smart  and can slowly self-adjusts if your throat collapses and keeps a record every day of how well you are treated-this information is available to you and your care team.  BPAP is bilevel positive airway pressure that keeps your throat open and also assists each breath with a pressure boost to maintain adequate breathing.  Special kinds of BPAP are used in patients who have inadequate breathing from lung or heart disease. In most cases, the device is  smart  and can slowly self-adjusts to assist breathing. Like CPAP, the device keeps a record of how well you are treated.  Your mask is your connection to the device. You get to choose what feels most comfortable and the staff will help to make sure if fits. Here: are some examples of the different masks that are available: Magnetic mask aids may assist with use but there are safety issues that should be addressed when considering with magnets* ( see end of discussion).       Key points to  remember on your journey with sleep apnea:  Sleep study.  PAP devices often need to be adjusted during a sleep study to show that they are effective and adjusted right.  Good tips to remember: Try wearing just the mask during a quiet time during the day so your body adapts to wearing it. A humidifier is recommended for comfort in most cases to prevent drying of your nose and throat. Allergy medication from your provider may help you if you are having nasal congestion.  Getting settled-in. It takes more than one night for most of us to get used to wearing a mask. Try wearing just the mask during a quiet time during the day so your body adapts to wearing it. A humidifier is recommended for comfort in most cases. Our team will work with you carefully on the first day and will be in contact within 4 days and again at 2 and 4 weeks for advice and remote device adjustments. Your therapy is evaluated by the device each day.   Use it every night. The more you are able to sleep naturally for 7-8 hours, the more likely you will have good sleep and to prevent health risks or symptoms from sleep apnea. Even if you use it 4 hours it helps. Occasionally all of us are unable to use a medical therapy, in sleep apnea, it is not dangerous to miss one night.   Communicate. Call our skilled team on the number provided on the first day if your visit for problems that make it difficult to wear the device. Over 2 out of 3 patients can learn to wear the device long-term with help from our team. Remember to call our team or your sleep providers if you are unable to wear the device as we may have other solutions for those who cannot adapt to mask CPAP therapy. It is recommended that you sleep your sleep provider within the first 3 months and yearly after that if you are not having problems.   Use it for your health. We encourage use of CPAP masks during daytime quiet periods to allow your face and brain to adapt to the sensation of CPAP so  that it will be a more natural sensation to awaken to at night or during naps. This can be very useful during the first few weeks or months of adapting to CPAP though it does not help medically to wear CPAP during wakefulness and  should not be used as a strategy just to meet guidelines.  Take care of your equipment. Make sure you clean your mask and tubing using directions every day and that your filter and mask are replaced as recommended or if they are not working.     *Masks with magnets:  Updated Contraindications  Masks with magnetic components are contraindicated for use by patients where they, or anyone in close physical contact while using the mask, have the following:   Active medical implants that interact with magnets (i.e., pacemakers, implantable cardioverter defibrillators (ICD), neurostimulators, cerebrospinal fluid (CSF) shunts, insulin/infusion pumps)   Metallic implants/objects containing ferromagnetic material (i.e., aneurysm clips/flow disruption devices, embolic coils, stents, valves, electrodes, implants to restore hearing or balance with implanted magnets, ocular implants, metallic splinters in the eye)  Updated Warning  Keep the mask magnets at a safe distance of at least 6 inches (150 mm) away from implants or medical devices that may be adversely affected by magnetic interference. This warning applies to you or anyone in close physical contact with your mask. The magnets are in the frame and lower headgear clips, with a magnetic field strength of up to 400mT. When worn, they connect to secure the mask but may inadvertently detach while asleep.  Implants/medical devices, including those listed within contraindications, may be adversely affected if they change function under external magnetic fields or contain ferromagnetic materials that attract/repel to magnetic fields (some metallic implants, e.g., contact lenses with metal, dental implants, metallic cranial plates, screws, gal hole  covers, and bone substitute devices). Consult your physician and  of your implant / other medical device for information on the potential adverse effects of magnetic fields.    BESIDES CPAP, WHAT OTHER THERAPIES ARE THERE?    Positioning Device  Positioning devices are generally used when sleep apnea is mild and only occurs on your back.This example shows a pillow that straps around the waist. It may be appropriate for those whose sleep study shows milder sleep apnea that occurs primarily when lying flat on one's back. Preliminary studies have shown benefit but effectiveness at home may need to be verified by a home sleep test. These devices are generally not covered by medical insurance.  Examples of devices that maintain sleeping on the back to prevent snoring and mild sleep apnea.    Belt type body positioner  http://[employer]/    Electronic reminder  http://nightshifttherapy.com/            Oral Appliance  What is oral appliance therapy?  An oral appliance device fits on your teeth at night like a retainer used after having braces. The device is made by a specialized dentist and requires several visits over 1-2 months before a manufactured device is made to fit your teeth and is adjusted to prevent your sleep apnea. Once an oral device is working properly, snoring should be improved. A home sleep test may be recommended at that time if to determine whether the sleep apnea is adequately treated.       Some things to remember:  -Oral devices are often, but not always, covered by your medical insurance. Be sure to check with your insurance provider.   -If you are referred for oral therapy, you will be given a list of specialized dentists to consider or you may choose to visit the Web site of the American Academy of Dental Sleep Medicine  -Oral devices are less likely to work if you have severe sleep apnea or are extremely overweight.     More detailed information  An oral appliance is a small acrylic  device that fits over the upper and lower teeth  (similar to a retainer or a mouth guard). This device slightly moves jaw forward, which moves the base of the tongue forward, opens the airway, improves breathing for effective treat snoring and obstructive sleep apnea in perhaps 7 out of 10 people .  The best working devices are custom-made by a dental device  after a mold is made of the teeth 1, 2, 3.  When is an oral appliance indicated?  Oral appliance therapy is recommended as a first-line treatment for patients with primary snoring, mild sleep apnea, and for patients with moderate sleep apnea who prefer appliance therapy to use of CPAP4, 5. Severity of sleep apnea is determined by sleep testing and is based on the number of respiratory events per hour of sleep.   How successful is oral appliance therapy?  The success rate of oral appliance therapy in patients with mild sleep apnea is 75-80% while in patients with moderate sleep apnea it is 50-70%. The chance of success in patients with severe sleep apnea is 40-50%. The research also shows that oral appliances have a beneficial effect on the cardiovascular health of BARRY patients at the same magnitude as CPAP therapy7.  Oral appliances should be a second-line treatment in cases of severe sleep apnea, but if not completely successful then a combination therapy utilizing CPAP plus oral appliance therapy may be effective. Oral appliances tend to be effective in a broad range of patients although studies show that the patients who have the highest success are females, younger patients, those with milder disease, and less severe obesity. 3, 6.   Finding a dentist that practices dental sleep medicine  Specific training is available through the American Academy of Dental Sleep Medicine for dentists interested in working in the field of sleep. To find a dentist who is educated in the field of sleep and the use of oral appliances, near you, visit the Web site  of the American Academy of Dental Sleep Medicine.    References  1. Hai et al. Objectively measured vs self-reported compliance during oral appliance therapy for sleep-disordered breathing. Chest 2013; 144(5): 9051-0725.  2. Ivy et al. Objective measurement of compliance during oral appliance therapy for sleep-disordered breathing. Thorax 2013; 68(1): 91-96.  3. Nahomy et al. Mandibular advancement devices in 620 men and women with BARRY and snoring: tolerability and predictors of treatment success. Chest 2004; 125: 7956-2906.  4. Timothy et al. Oral appliances for snoring and BARRY: a review. Sleep 2006; 29: 244-262.  5. Rodrick et al. Oral appliance treatment for BARRY: an update. J Clin Sleep Med 2014; 10(2): 215-227.  6. Shannan et al. Predictors of OSAH treatment outcome. J Dent Res 2007; 86: 2429-5090.      Weight Loss:   Your Body mass index is 28.25 kg/m .    Being overweight does not necessarily mean you will have health consequences.  Those who have BMI over 35 or over 27 with existing medical conditions carries greater risk.   Weight loss decreases severity of sleep apnea in most people with obesity. For those with mild obesity who have developed snoring with weight gain, even 15-30 pound weight loss can improve and occasionally milder eliminate sleep apnea.  Structured and life-long dietary and health habits are necessary to lose weight and keep healthier weight levels.     The Comprehensive Weight loss program offers all aspects of weight loss strategies including two Non-Surgical Weight Loss Programs: Medical Weight Management and our 24 Week Healthy Lifestyle Program:    Medical Weight Management: You will meet with a Medical Weight Management Provider, as well as a Registered Dietician. The program may include medication therapy, dietary education, recommended exercise and physical therapy programs, monthly support group meetings, and possible psychological counseling. Follow up  visits with the provider or dietician are scheduled based on your progress and needs.    24 Week Healthy Lifestyle Program: This unique program is designed to give you the support of weekly appointments and activities thru a 24-week period. It may include all of the components of the basic program (above), with the addition of 11 individual Health  Visits, 24-week access to the [employer] website for over 700 online classes, and monthly support group meetings. This program has an out-of-pocket expense of $499 to cover the items that can not be billed to insurance (health coaches and [employer] access), and is non-refundable/non-transferable (you may be able to use a Health Savings Account; ask your HSA provider). There may be an optional meal replacement plan prescribed as well.   Surgical management achieves meaningful long-term weight loss and improvement in health risks in most patients with more severe obesity.      Sleep Apnea Surgery:    Surgery for obstructive sleep apnea is considered generally only when other therapies fail to work. Surgery may be discussed with you if you are having a difficult time tolerating CPAP and or when there is an abnormal structure that requires surgical correction.  Nose and throat surgeries often enlarge the airway to prevent collapse.  Most of these surgeries create pain for 1-2 weeks and up to half of the most common surgeries are not effective throughout life.  You should carefully discuss the benefits and drawbacks to surgery with your sleep provider and surgeon to determine if it is the best solution for you.   More information  Surgery for BARRY is directed at areas that are responsible for narrowing or complete obstruction of the airway during sleep.  There are a wide range of procedures available to enlarge and/or stabilize the airway to prevent blockage of breathing in the three major areas where it can occur: the palate, tongue, and nasal regions.  Successful surgical  treatment depends on the accurate identification of the factors responsible for obstructive sleep apnea in each person.  A personalized approach is required because there is no single treatment that works well for everyone.  Because of anatomic variation, consultation with an examination by a sleep surgeon is a critical first step in determining what surgical options are best for each patient.  In some cases, examination during sedation may be recommended in order to guide the selection of procedures.  Patients will be counseled about risks and benefits as well as the typical recovery course after surgery. Surgery is typically not a cure for a person s BARRY.  However, surgery will often significantly improve one s BARRY severity (termed  success rate ).  Even in the absence of a cure, surgery will decrease the cardiovascular risk associated with OSA7; improve overall quality of life8 (sleepiness, functionality, sleep quality, etc).  Palate Procedures:  Patients with BARRY often have narrowing of their airway in the region of their tonsils and uvula.  The goals of palate procedures are to widen the airway in this region as well as to help the tissues resist collapse.  Modern palate procedure techniques focus on tissue conservation and soft tissue rearrangement, rather than tissue removal.  Often the uvula is preserved in this procedure. Residual sleep apnea is common in patient after pharyngoplasty with an average reduction in sleep apnea events of 33%2.    Tongue Procedures:  ExamWhile patients are awake, the muscles that surround the throat are active and keep this region open for breathing. These muscles relax during sleep, allowing the tongue and other structures to collapse and block breathing.  There are several different tongue procedures available.  Selection of a tongue base procedure depends on characteristics seen on physical exam.  Generally, procedures are aimed at removing bulky tissues in this area or  preventing the back of the tongue from falling back during sleep.  Success rates for tongue surgery range from 50-62%3.  Hypoglossal Nerve Stimulation:  Hypoglossal nerve stimulation has recently received approval from the [place] Food and Drug Administration for the treatment of obstructive sleep apnea.  This is based on research showing that the system was safe and effective in treating sleep apnea6.  Results showed that the median AHI score decreased 68%, from 29.3 to 9.0. This therapy uses an implant system that senses breathing patterns and delivers mild stimulation to airway muscles, which keeps the airway open during sleep.  The system consists of three fully implanted components: a small generator (similar in size to a pacemaker), a breathing sensor, and a stimulation lead.  Using a small handheld remote, a patient turns the therapy on before bed and off upon awakening.    Candidates for this device must be greater than 18 years of age, have moderate to severe obstructive sleep apnea with less than 25% central events  (AHI between 15-65), BMI less than 35, have tried CPAP/oral appliance for at least 8 weeks without success, and have appropriate upper airway anatomy (determined by a sleep endoscopy performed by Dr. Magdy Chandra or Dr. Graham Holden).  Nasal Procedures:  Nasal obstruction can interfere with nasal breathing during the day and night.  Studies have shown that relief of nasal obstruction can improve the ability of some patients to tolerate positive airway pressure therapy for obstructive sleep apnea1.  Treatment options include medications such as nasal saline, topical corticosteroid and antihistamine sprays, and oral medications such as antihistamines or decongestants. Non-surgical treatments can include external nasal dilators for selected patients. If these are not successful by themselves, surgery can improve the nasal airway either alone or in combination with these other  options.        Combination Procedures:  Combination of surgical procedures and other treatments may be recommended, particularly if patients have more than one area of narrowing or persistent positional disease.  The success rate of combination surgery ranges from 66-80%2,3.    References  Yonatan AGUILAR. The Role of the Nose in Snoring and Obstructive Sleep Apnoea: An Update.  Eur Arch Otorhinolaryngol. 2011; 268: 1365-73.   Kervin SM; Nahun JA; Jigna JR; Pallanch JF; Sneha MB; Juanis SG; Jc DRIVER. Surgical modifications of the upper airway for obstructive sleep apnea in adults: a systematic review and meta-analysis. SLEEP 2010;33(10):6510-9310. Carlos THOMSON. Hypopharyngeal surgery in obstructive sleep apnea: an evidence-based medicine review.  Arch Otolaryngol Head Neck Surg. 2006 Feb;132(2):206-13.  Mauro YH1, Amador Y, Adam MIGUEL. The efficacy of anatomically based multilevel surgery for obstructive sleep apnea. Otolaryngol Head Neck Surg. 2003 Oct;129(4):327-35.  Kezirian E, Goldberg A. Hypopharyngeal Surgery in Obstructive Sleep Apnea: An Evidence-Based Medicine Review. Arch Otolaryngol Head Neck Surg. 2006 Feb;132(2):206-13.  Nichole VALERA et al. Upper-Airway Stimulation for Obstructive Sleep Apnea.  N Engl J Med. 2014 Jan 9;370(2):139-49.  Nelsy Y et al. Increased Incidence of Cardiovascular Disease in Middle-aged Men with Obstructive Sleep Apnea. Am J Respir Crit Care Med; 2002 166: 159-165  Ford EM et al. Studying Life Effects and Effectiveness of Palatopharyngoplasty (SLEEP) study: Subjective Outcomes of Isolated Uvulopalatopharyngoplasty. Otolaryngol Head Neck Surg. 2011; 144: 623-631.    WHAT IF I ONLY HAVE SNORING?  Mandibular advancement devices, lateral sleep positioning, long-term weight loss and treatment of nasal allergies have been shown to improve snoring.  Exercising tongue muscles with a game (https://apps.[employer]/us/gomez/soundly-reduce-snoring/tg5974247570) or stimulating the tongue during the  day with a device (https://doi.org/10.3390/wzv17850653) have improved snoring in some individuals.  https://www.[employer]/  https://www.sleepfoundation.org/best-anti-snoring-mouthpieces-and-mouthguards    Remember to Drive Safe... Drive Alive     Sleep health profoundly affects your health, mood, and your safety.  Thirty three percent of the population (one in three of us) is not getting enough sleep and many have a sleep disorder. Not getting enough sleep or having an untreated / undertreated sleep condition may make us sleepy without even knowing it. In fact, our driving could be dramatically impaired due to our sleep health. As your provider, here are some things I would like you to know about driving:     Here are some warning signs for impairment and dangerous drowsy driving:              -Having been awake more than 16 hours               -Looking tired               -Eyelid drooping              -Head nodding (it could be too late at this point)              -Driving for more than 30 minutes     Some things you could do to make the driving safer if you are experiencing some drowsiness:              -Stop driving and rest              -Call for transportation              -Make sure your sleep disorder is adequately treated     Some things that have been shown NOT to work when experiencing drowsiness while driving:              -Turning on the radio              -Opening windows              -Eating any  distracting  /  entertaining  foods (e.g., sunflower seeds, candy, or any other)              -Talking on the phone      Your decision may not only impact your life, but also the life of others. Please, remember to drive safe for yourself and all of us.

## 2024-03-19 DIAGNOSIS — Z76.0 ENCOUNTER FOR MEDICATION REFILL: ICD-10-CM

## 2024-03-19 DIAGNOSIS — R79.89 LOW TESTOSTERONE IN MALE: ICD-10-CM

## 2024-03-21 ENCOUNTER — OFFICE VISIT (OUTPATIENT)
Dept: FAMILY MEDICINE | Facility: CLINIC | Age: 64
End: 2024-03-21
Payer: COMMERCIAL

## 2024-03-21 VITALS
DIASTOLIC BLOOD PRESSURE: 84 MMHG | HEART RATE: 73 BPM | SYSTOLIC BLOOD PRESSURE: 133 MMHG | WEIGHT: 227 LBS | OXYGEN SATURATION: 98 % | BODY MASS INDEX: 29.13 KG/M2 | HEIGHT: 74 IN | RESPIRATION RATE: 16 BRPM | TEMPERATURE: 97.7 F

## 2024-03-21 DIAGNOSIS — I10 HTN, GOAL BELOW 140/90: ICD-10-CM

## 2024-03-21 DIAGNOSIS — F41.8 DEPRESSION WITH ANXIETY: ICD-10-CM

## 2024-03-21 DIAGNOSIS — Z82.49 FAMILY HISTORY OF ISCHEMIC HEART DISEASE: ICD-10-CM

## 2024-03-21 DIAGNOSIS — E06.3 HASHIMOTO'S THYROIDITIS: ICD-10-CM

## 2024-03-21 DIAGNOSIS — E78.5 HYPERLIPIDEMIA LDL GOAL <100: ICD-10-CM

## 2024-03-21 DIAGNOSIS — E03.9 HYPOTHYROIDISM, UNSPECIFIED TYPE: ICD-10-CM

## 2024-03-21 DIAGNOSIS — R79.89 LOW TESTOSTERONE IN MALE: ICD-10-CM

## 2024-03-21 DIAGNOSIS — R63.8 UNABLE TO LOSE WEIGHT: ICD-10-CM

## 2024-03-21 PROCEDURE — 99214 OFFICE O/P EST MOD 30 MIN: CPT | Performed by: INTERNAL MEDICINE

## 2024-03-21 RX ORDER — TESTOSTERONE 1.62 MG/G
GEL TRANSDERMAL
Qty: 75 G | Refills: 0 | Status: SHIPPED | OUTPATIENT
Start: 2024-03-21 | End: 2024-04-22

## 2024-03-21 RX ORDER — FLUTICASONE PROPIONATE AND SALMETEROL 250; 50 UG/1; UG/1
POWDER RESPIRATORY (INHALATION)
COMMUNITY
Start: 2023-06-30

## 2024-03-21 ASSESSMENT — PAIN SCALES - GENERAL: PAINLEVEL: NO PAIN (0)

## 2024-03-21 NOTE — PROGRESS NOTES
Subjective   Mikhail is a 64 year old who presents for the following health issues    HPI       Chief Complaint   Patient presents with    Recheck Medication     follow up on Hypertension, hyperlipidemia    HPI:   Patient Juan C Garcia is a very pleasant 64 year old male with history of alcohol dependence, hypertension, hyperlipidemia who presents to Internal Medicine clinic today for follow up of multiple concerns including hypertension, hyperlipidemia.  No chest pains, headaches, fever or chills. Patient's most recent lipid screening lab results are not at goal.      Current Medications:     Current Outpatient Medications   Medication Sig Dispense Refill    fluticasone-salmeterol (ADVAIR) 250-50 MCG/ACT inhaler       semaglutide (OZEMPIC) 2 MG/3ML pen Inject 0.25 mg Subcutaneous every 7 days 3 mL 1    ALLERGY INJECTIONS       atorvastatin (LIPITOR) 10 MG tablet Take 1 tablet (10 mg) by mouth daily 90 tablet 3    dapsone (ACZONE) 25 MG tablet Take 1 tablet (25 mg) by mouth daily (Patient not taking: Reported on 3/8/2024) 90 tablet 0    hydrochlorothiazide (HYDRODIURIL) 12.5 MG tablet Take 1 tablet (12.5 mg) by mouth daily 90 tablet 1    levothyroxine (SYNTHROID/LEVOTHROID) 150 MCG tablet Take 1 tablet (150 mcg) by mouth daily 90 tablet 3    losartan (COZAAR) 50 MG tablet TAKE 1 TABLET BY MOUTH EVERY DAY 90 tablet 1    testosterone (ANDROGEL 1.62 % PUMP) 20.25 MG/ACT gel APPLY 3 PUMP PRESSES (5GM) ONE TIME DAILY AS DIRECTED 75 g 0         Allergies:      Allergies   Allergen Reactions    No Known Allergies             Past Medical History:     Past Medical History:   Diagnosis Date    Alcohol use     Dermatitis herpetiformis 2000    Dx by biopsy of rash on elbow per derm    Diastolic dysfunction with chronic heart failure (H)     HTN, goal below 140/90     Hyperlipidemia     Lung nodule Feb 2003    Benign; Left upper lobe    Multiple allergies     allergist with immunotherapy; Allergist is Dr. Santos in [place]     Primary hypogonadism in male     Low FSH, single left testicle - followed by Dr. Flores of endo    Pulmonary embolus (H) Feb 2003    Evaluated by ?[place] - unknown cause    Thyroid nodule Feb 2005    Benign per FNA --> taking Levothyroxine ever since    Uncomplicated asthma          Past Surgical History:     Past Surgical History:   Procedure Laterality Date    BACK SURGERY      COLONOSCOPY  April 2012    COLONOSCOPY N/A 6/20/2022    Procedure: COLONOSCOPY;  Surgeon: Diya Lo MD;  Location:  GI    HEAD & NECK SURGERY      HERNIA REPAIR      ORCHIOPEXY CHILD Right     Age 10 due to undescended testicl    ORTHOPEDIC SURGERY      OTHER SURGICAL HISTORY  July 1997    Cervical laminectomy C4-5-6    OTHER SURGICAL HISTORY  Jan 2000    Right index tendon repair    OTHER SURGICAL HISTORY  August 2004    Ankle/wrist repair    OTHER SURGICAL HISTORY  April 2007    Lumbar microdiscectomy L5-S1    OTHER SURGICAL HISTORY  March 2010    Cervical fusion C5-6-7    OTHER SURGICAL HISTORY  March 2011    Cervical fusion C3-4-5    OTHER SURGICAL HISTORY  July 2011    Lumbar fusion L5-S1         Family Medical History:     Family History   Problem Relation Age of Onset    Hypertension Mother     Dementia Mother     Alcoholism Mother     Hypertension Father     Cerebrovascular Disease Father     Cancer Father         throat - was a smoker    Heart Disease Father         CABG    Diabetes Maternal Grandfather     Family History Negative Brother     Family History Negative Sister     Cerebrovascular Disease Paternal Grandmother     Cancer Son         Testicular cancer    Family History Negative Son          Social History:     Social History     Socioeconomic History    Marital status:      Spouse name: Not on file    Number of children: Not on file    Years of education: Not on file    Highest education level: Not on file   Occupational History    Not on file   Tobacco Use    Smoking status: Never    Smokeless  "tobacco: Never   Substance and Sexual Activity    Alcohol use: Not Currently    Drug use: No    Sexual activity: Yes     Partners: Female     Birth control/protection: Pull-out method   Other Topics Concern    Parent/sibling w/ CABG, MI or angioplasty before 65F 55M? Yes     Comment: father bypass 60ish   Social History Narrative    Not on file     Social Determinants of Health     Financial Resource Strain: Not on file   Food Insecurity: Not on file   Transportation Needs: Not on file   Physical Activity: Not on file   Stress: Not on file   Social Connections: Not on file   Interpersonal Safety: Low Risk  (3/21/2024)    Interpersonal Safety     Do you feel physically and emotionally safe where you currently live?: Yes     Within the past 12 months, have you been hit, slapped, kicked or otherwise physically hurt by someone?: No     Within the past 12 months, have you been humiliated or emotionally abused in other ways by your partner or ex-partner?: No   Housing Stability: Not on file           Review of System:     Constitutional: Negative for fever or chills  Skin: Negative for rashes  Ears/Nose/Throat: Negative for nasal congestion, sore throat  Respiratory: No shortness of breath, dyspnea on exertion, cough, or hemoptysis  Cardiovascular: Negative for chest pain since hospital discharge  Gastrointestinal: Negative for nausea, vomiting, positive for chronic GERD  Genitourinary: positive for low testosterone  Musculoskeletal: Negative for myalgias  Neurologic: Negative for headaches  Psychiatric: Negative for depression, anxiety  Hematologic/Lymphatic/Immunologic: Negative  Endocrine: positive for hypothyroidism  Behavioral: Negative for tobacco use      Physical Exam:   /84 (BP Location: Left arm, Patient Position: Sitting, Cuff Size: Adult Large)   Pulse 73   Temp 97.7  F (36.5  C) (Oral)   Resp 16   Ht 1.88 m (6' 2\")   Wt 103 kg (227 lb)   SpO2 98%   BMI 29.15 kg/m      GENERAL: alert and no " distress  EYES: eyes grossly normal to inspection, and conjunctivae and sclerae normal  HENT: Normocephalic atraumatic. Nose and mouth without ulcers or lesions  NECK: supple  RESP: lungs clear to auscultation   CV: regular rate and rhythm, normal S1 S2  LYMPH: no peripheral edema   ABDOMEN: nondistended  MS: no gross musculoskeletal defects noted  SKIN: a dark brown color mole present on the left shoulder  NEURO: Alert & Oriented x 3.   PSYCH: mentation appears normal, affect normal        Diagnostic Test Results:     Recent Labs   Lab Test 06/03/21  0807 03/08/21  0809 02/02/15  1002 02/02/15  1002   CHOL 264* 187   < > 252*   HDL 61 54   < > 48   * 111*   < > 152*   TRIG 100 111   < > 259*   CHOLHDLRATIO  --   --   --  5.2*    < > = values in this interval not displayed.       Last Comprehensive Metabolic Panel:  Sodium   Date Value Ref Range Status   12/05/2023 140 135 - 145 mmol/L Final     Comment:     Reference intervals for this test were updated on 09/26/2023 to more accurately reflect our healthy population. There may be differences in the flagging of prior results with similar values performed with this method. Interpretation of those prior results can be made in the context of the updated reference intervals.    03/08/2021 137 133 - 144 mmol/L Final     Potassium   Date Value Ref Range Status   12/05/2023 4.1 3.4 - 5.3 mmol/L Final   05/13/2022 3.8 3.4 - 5.3 mmol/L Final   03/08/2021 4.5 3.4 - 5.3 mmol/L Final     Chloride   Date Value Ref Range Status   12/05/2023 105 98 - 107 mmol/L Final   05/13/2022 107 94 - 109 mmol/L Final   03/08/2021 105 94 - 109 mmol/L Final     Carbon Dioxide   Date Value Ref Range Status   03/08/2021 29 20 - 32 mmol/L Final     Carbon Dioxide (CO2)   Date Value Ref Range Status   12/05/2023 24 22 - 29 mmol/L Final   05/13/2022 23 20 - 32 mmol/L Final     Anion Gap   Date Value Ref Range Status   12/05/2023 11 7 - 15 mmol/L Final   05/13/2022 8 3 - 14 mmol/L Final    03/08/2021 3 3 - 14 mmol/L Final     Glucose   Date Value Ref Range Status   12/05/2023 95 70 - 99 mg/dL Final   05/13/2022 97 70 - 99 mg/dL Final   03/08/2021 103 (H) 70 - 99 mg/dL Final     Comment:     Fasting specimen     Urea Nitrogen   Date Value Ref Range Status   12/05/2023 11.2 8.0 - 23.0 mg/dL Final   05/13/2022 10 7 - 30 mg/dL Final   03/08/2021 12 7 - 30 mg/dL Final     Creatinine   Date Value Ref Range Status   12/05/2023 0.80 0.67 - 1.17 mg/dL Final   03/08/2021 0.80 0.66 - 1.25 mg/dL Final     GFR Estimate   Date Value Ref Range Status   12/05/2023 >90 >60 mL/min/1.73m2 Final   03/08/2021 >90 >60 mL/min/[1.73_m2] Final     Comment:     Non  GFR Calc  Starting 12/18/2018, serum creatinine based estimated GFR (eGFR) will be   calculated using the Chronic Kidney Disease Epidemiology Collaboration   (CKD-EPI) equation.       GFR, ESTIMATED POCT   Date Value Ref Range Status   05/13/2022 >60 >60 mL/min/1.73m2 Final     Calcium   Date Value Ref Range Status   12/05/2023 8.9 8.8 - 10.2 mg/dL Final   03/08/2021 10.0 8.5 - 10.1 mg/dL Final       ASSESSMENT/PLAN:     (Z82.49) Family of ischemic heart disease  (E78.5) Hyperlipidemia, unspecified hyperlipidemia type   Comment: stable  Plan: continue current therapy.  In the meantime, the patient is advised to continue his current diet, exercise weight loss program going forward.  Cardiology specialist referral     (I10) HTN, goal below 140/90  Comment: BP is at goal, well controlled  Plan: continue current therapy    (N52.9) Erectile dysfunction, unspecified erectile dysfunction type  Comment: stable  Plan: continue current therapy    (E03.9) Hypothyroidism, unspecified type  Comment: stable  Plan: continue current therapy      (Z68.29) BMI 29.0-29.9,adult  (primary encounter diagnosis)  (R63.8) Unable to lose weight  Comment: unable to lose weight  Plan: semaglutide (OZEMPIC) 2 MG/3ML pen    (F41.8) Depression with anxiety  Comment:  stable  Plan: continue current therapy    (R79.89) Low testosterone in male  Comment: chronic low testosterone  Plan: Adult Endocrinology  Referral    (E03.9) Hypothyroidism, unspecified type  (E06.3) Hashimoto's thyroiditis  Comment: stable  Plan: Adult Endocrinology  Referral      Follow Up Plan:     Patient is instructed to return to Internal Medicine clinic for follow-up visit in 3 months.        Suzanne Zhang MD  Internal Medicine  [facility]

## 2024-03-25 ENCOUNTER — TELEPHONE (OUTPATIENT)
Dept: FAMILY MEDICINE | Facility: CLINIC | Age: 64
End: 2024-03-25
Payer: COMMERCIAL

## 2024-03-25 DIAGNOSIS — R63.8 UNABLE TO LOSE WEIGHT: ICD-10-CM

## 2024-03-25 NOTE — TELEPHONE ENCOUNTER
[facility] Prior Authorization Team   Phone: [phone]      PRIOR AUTHORIZATION DENIED    Medication: OZEMPIC (0.25 OR 0.5 MG/DOSE) 2 [facility]  Insurance Company: [employer] Non-Specialty PA's - Phone [phone] Fax [phone]  Denial Date: 3/25/2024  Denial Reason(s): COVERAGE IS PROVIDED FOR TYPE 2 DIABETES DIAGNOSISES.          Appeal Information: If an appeal is wanted from the Patient/Provider, a letter of medical necessity would need to be provided with documentation to support why Ozempic should be used for an off-label use.      Patient Notified: No. The [employer] PA Team does not notify of the denial outcomes as the patient often will ask what their provider will prescribe in place of the denied medication, or additional information in regards to other therapies they can take in place of the denied medication.  This is not something we can advise on in our department.

## 2024-03-26 ENCOUNTER — MYC MEDICAL ADVICE (OUTPATIENT)
Dept: FAMILY MEDICINE | Facility: CLINIC | Age: 64
End: 2024-03-26

## 2024-03-26 ENCOUNTER — OFFICE VISIT (OUTPATIENT)
Dept: CARDIOLOGY | Facility: CLINIC | Age: 64
End: 2024-03-26
Attending: INTERNAL MEDICINE
Payer: COMMERCIAL

## 2024-03-26 VITALS
SYSTOLIC BLOOD PRESSURE: 128 MMHG | HEIGHT: 74 IN | DIASTOLIC BLOOD PRESSURE: 80 MMHG | BODY MASS INDEX: 28.83 KG/M2 | HEART RATE: 72 BPM | WEIGHT: 224.6 LBS | OXYGEN SATURATION: 98 %

## 2024-03-26 DIAGNOSIS — E78.5 HYPERLIPIDEMIA, UNSPECIFIED HYPERLIPIDEMIA TYPE: ICD-10-CM

## 2024-03-26 DIAGNOSIS — E78.5 HYPERLIPIDEMIA LDL GOAL <100: ICD-10-CM

## 2024-03-26 DIAGNOSIS — Z82.49 FAMILY HISTORY OF ISCHEMIC HEART DISEASE: ICD-10-CM

## 2024-03-26 DIAGNOSIS — R63.8 UNABLE TO LOSE WEIGHT: ICD-10-CM

## 2024-03-26 PROCEDURE — 99203 OFFICE O/P NEW LOW 30 MIN: CPT | Performed by: INTERNAL MEDICINE

## 2024-03-26 PROCEDURE — G2211 COMPLEX E/M VISIT ADD ON: HCPCS | Performed by: INTERNAL MEDICINE

## 2024-03-26 RX ORDER — ATORVASTATIN CALCIUM 80 MG/1
80 TABLET, FILM COATED ORAL DAILY
Qty: 90 TABLET | Refills: 3 | Status: SHIPPED | OUTPATIENT
Start: 2024-03-26

## 2024-03-26 NOTE — LETTER
3/26/2024    Suzanne Zhang MD  [address]  [facility] [address]    RE: Juan C Garcia       Dear Colleague,     I had the pleasure of seeing Juan C Garcia in the [facility].  HISTORY:    Juan C Garcia is a pleasant 64-year-old male with a history of hypertension hyperlipidemia and previous pulmonary embolism as well as a history of Hashimoto's thyroiditis and celiac disease.  He has known mild obstructive sleep apnea.  He was asked to see cardiology for reasons which are somewhat unclear.    Juan C has had elevated cholesterol, last checked in June of last year.   At that time his cholesterol was 248 with  HDL 59 and triglycerides 205.  He has been using atorvastatin 10 mg daily for many years.    Last fall Juan C reports that he was checking his blood pressure regularly and it was usually 150 or 140 systolic.  He was using a lot of alcohol at the time and gave that up entirely.  His blood pressure is now consistently in the 120 range at home and similar in the office today.    Juan C denies any cardiac symptoms.  He specifically denies exertional chest arm neck shoulder or jaw discomfort, palpitations, PND/orthopnea, syncope or near syncope, strokelike symptoms, peripheral edema, or claudication.  He feels that his energy and tolerance are normal.    ECG done 3 months ago was normal.    Juan C reports that he is here today because he wants a checkup of his heart.  He has no specific cardiovascular concerns but wants to make sure he is doing the right things to stay healthy.  He is trying to exercise more regularly and follow a more healthy diet in recent months, along with giving up alcohol.      ASSESSMENT/PLAN:    1.  Hyperlipidemia.  The patient has been taking low-dose atorvastatin and has never been tried on a higher dose.  We will increase that to 80 mg and recheck lipids in 2 months.  I will also order a LP(a) at that time.  2.  Hypertension.  Well-controlled  using losartan 50 mg/day.  He reports he has been on that for quite some time but his blood pressure really dropped when he gave up alcohol.  I congratulated him for success and encouraged him to continue to avoid alcohol on a regular basis.  3.  For screening purposes I talked to him about the possibility of doing a calcium scoring exam and he seems interested.  If that gives us a very high number we may consider doing a stress test but he has no symptoms to suggest ischemia.    Thank you for inviting me to participate in the care of your patient.  Please don't hesitate to call if I can be of further assistance.  40minutes were spent today reviewing the chart and other records, interviewing and examining the patient, and documenting our visit.    The longitudinal plan of care for the diagnosis(es)/condition(s) as documented were addressed during this visit. Due to the added complexity in care, I will continue to support Mikahil in the subsequent management and with ongoing continuity of care.     Chart documentation was completed, in part, with [employer] voice-recognition software. Even though reviewed, some grammatical, spelling, and word errors may remain.       Orders Placed This Encounter   Procedures    Lipid Profile    ALT    Lipoprotein (a)     Orders Placed This Encounter   Medications    atorvastatin (LIPITOR) 80 MG tablet     Sig: Take 1 tablet (80 mg) by mouth daily     Dispense:  90 tablet     Refill:  3     Medications Discontinued During This Encounter   Medication Reason    atorvastatin (LIPITOR) 10 MG tablet        10 year ASCVD risk: The 10-year ASCVD risk score (Isis WOLFE, et al., 2019) is: 16.7%    Values used to calculate the score:      Age: 64 years      Sex: Male      Is Non- : No      Diabetic: No      Tobacco smoker: No      Systolic Blood Pressure: 128 mmHg      Is BP treated: Yes      HDL Cholesterol: 59 mg/dL      Total Cholesterol: 248 mg/dL    Encounter Diagnoses    Name Primary?    Hyperlipidemia LDL goal <100     Family history of ischemic heart disease     Hyperlipidemia, unspecified hyperlipidemia type        CURRENT MEDICATIONS:  Current Outpatient Medications   Medication Sig Dispense Refill    ALLERGY INJECTIONS       atorvastatin (LIPITOR) 80 MG tablet Take 1 tablet (80 mg) by mouth daily 90 tablet 3    fluticasone-salmeterol (ADVAIR) 250-50 MCG/ACT inhaler       hydrochlorothiazide (HYDRODIURIL) 12.5 MG tablet Take 1 tablet (12.5 mg) by mouth daily 90 tablet 1    levothyroxine (SYNTHROID/LEVOTHROID) 150 MCG tablet Take 1 tablet (150 mcg) by mouth daily 90 tablet 3    losartan (COZAAR) 50 MG tablet TAKE 1 TABLET BY MOUTH EVERY DAY 90 tablet 1    semaglutide (OZEMPIC) 2 MG/3ML pen Inject 0.25 mg Subcutaneous every 7 days 3 mL 1    Semaglutide-Weight Management (WEGOVY) 0.25 MG/0.5ML pen Inject 0.25 mg Subcutaneous once a week 2 mL 0    testosterone (ANDROGEL 1.62 % PUMP) 20.25 MG/ACT gel APPLY 3 PUMP PRESSES (5GM) ONE TIME DAILY AS DIRECTED 75 g 0    dapsone (ACZONE) 25 MG tablet Take 1 tablet (25 mg) by mouth daily (Patient not taking: Reported on 3/8/2024) 90 tablet 0       ALLERGIES     Allergies   Allergen Reactions    No Known Allergies        PAST MEDICAL HISTORY:  Past Medical History:   Diagnosis Date    Alcohol use     Dermatitis herpetiformis 2000    Dx by biopsy of rash on elbow per derm    Diastolic dysfunction with chronic heart failure (H)     HTN, goal below 140/90     Hyperlipidemia     Lung nodule Feb 2003    Benign; Left upper lobe    Multiple allergies     allergist with immunotherapy; Allergist is Dr. Santos in [place]    Primary hypogonadism in male     Low FSH, single left testicle - followed by Dr. Flores of endo    Pulmonary embolus (H) Feb 2003    Evaluated by ?[place] - unknown cause    Thyroid nodule Feb 2005    Benign per FNA --> taking Levothyroxine ever since    Uncomplicated asthma        PAST SURGICAL HISTORY:  Past Surgical History:    Procedure Laterality Date    BACK SURGERY      COLONOSCOPY  April 2012    COLONOSCOPY N/A 6/20/2022    Procedure: COLONOSCOPY;  Surgeon: Diya Lo MD;  Location:  GI    HEAD & NECK SURGERY      HERNIA REPAIR      ORCHIOPEXY CHILD Right     Age 10 due to undescended testicl    ORTHOPEDIC SURGERY      OTHER SURGICAL HISTORY  July 1997    Cervical laminectomy C4-5-6    OTHER SURGICAL HISTORY  Jan 2000    Right index tendon repair    OTHER SURGICAL HISTORY  August 2004    Ankle/wrist repair    OTHER SURGICAL HISTORY  April 2007    Lumbar microdiscectomy L5-S1    OTHER SURGICAL HISTORY  March 2010    Cervical fusion C5-6-7    OTHER SURGICAL HISTORY  March 2011    Cervical fusion C3-4-5    OTHER SURGICAL HISTORY  July 2011    Lumbar fusion L5-S1       FAMILY HISTORY:  Family History   Problem Relation Age of Onset    Hypertension Mother     Dementia Mother     Alcoholism Mother     Hypertension Father     Cerebrovascular Disease Father     Cancer Father         throat - was a smoker    Heart Disease Father         CABG    Diabetes Maternal Grandfather     Family History Negative Brother     Family History Negative Sister     Cerebrovascular Disease Paternal Grandmother     Cancer Son         Testicular cancer    Family History Negative Son        SOCIAL HISTORY:  Social History     Socioeconomic History    Marital status:      Spouse name: None    Number of children: None    Years of education: None    Highest education level: None   Tobacco Use    Smoking status: Never    Smokeless tobacco: Never   Substance and Sexual Activity    Alcohol use: Not Currently    Drug use: No    Sexual activity: Yes     Partners: Female     Birth control/protection: Pull-out method   Other Topics Concern    Parent/sibling w/ CABG, MI or angioplasty before 65F 55M? Yes     Comment: father bypass 60ish     Social Determinants of Health     Interpersonal Safety: Low Risk  (3/21/2024)    Interpersonal Safety     Do you  "feel physically and emotionally safe where you currently live?: Yes     Within the past 12 months, have you been hit, slapped, kicked or otherwise physically hurt by someone?: No     Within the past 12 months, have you been humiliated or emotionally abused in other ways by your partner or ex-partner?: No       Review of Systems:  Skin:        Eyes:       ENT:       Respiratory:  Positive for sleep apnea  Cardiovascular:    Positive for  Gastroenterology:      Genitourinary:       Musculoskeletal:       Neurologic:       Psychiatric:       Heme/Lymph/Imm:       Endocrine:         Physical Exam:  Vitals: /80 (BP Location: Right arm, Patient Position: Sitting, Cuff Size: Adult Regular)   Pulse 72   Ht 1.88 m (6' 2\")   Wt 101.9 kg (224 lb 9.6 oz)   SpO2 98%   BMI 28.84 kg/m      Constitutional:  cooperative, alert and oriented, well developed, well nourished, in no acute distress        Skin:  warm and dry to the touch, no apparent skin lesions or masses noted        Head:  normocephalic, no masses or lesions        Eyes:  pupils equal and round, conjunctivae and lids unremarkable, sclera white, no xanthalasma, EOMS intact, no nystagmus        ENT:  no pallor or cyanosis, dentition good        Neck:  carotid pulses are full and equal bilaterally, JVP normal, no carotid bruit        Chest:  normal breath sounds, clear to auscultation, normal A-P diameter, normal symmetry, normal respiratory excursion, no use of accessory muscles        Cardiac: regular rhythm, normal S1/S2, no S3 or S4, apical impulse not displaced, no murmurs, gallops or rubs                  Abdomen:  abdomen soft;BS normoactive        Vascular: pulses full and equal, no bruits auscultated                                      Extremities and Muscular Skeletal:  no edema           Neurological:  no gross motor deficits        Psych:  affect appropriate, oriented to time, person and place     Recent Lab Results:  LIPID RESULTS:  Lab Results "   Component Value Date    CHOL 248 (H) 06/16/2023    CHOL 264 (H) 06/03/2021    HDL 59 06/16/2023    HDL 61 06/03/2021     (H) 06/16/2023     (H) 06/03/2021    TRIG 205 (H) 06/16/2023    TRIG 100 06/03/2021    CHOLHDLRATIO 5.2 (H) 02/02/2015       LIVER ENZYME RESULTS:  Lab Results   Component Value Date    AST 29 05/13/2022    AST 24 01/27/2020    ALT 33 05/13/2022    ALT 33 01/27/2020       CBC RESULTS:  Lab Results   Component Value Date    WBC 6.9 12/05/2023    WBC 6.4 03/08/2021    RBC 4.76 12/05/2023    RBC 4.77 03/08/2021    HGB 14.1 12/05/2023    HGB 14.2 03/08/2021    HCT 44.0 12/05/2023    HCT 43.8 03/08/2021    MCV 92 12/05/2023    MCV 92 03/08/2021    MCH 29.6 12/05/2023    MCH 29.8 03/08/2021    MCHC 32.0 12/05/2023    MCHC 32.4 03/08/2021    RDW 12.7 12/05/2023    RDW 13.4 03/08/2021     12/05/2023     03/08/2021       BMP RESULTS:  Lab Results   Component Value Date     12/05/2023     03/08/2021    POTASSIUM 4.1 12/05/2023    POTASSIUM 3.8 05/13/2022    POTASSIUM 4.5 03/08/2021    CHLORIDE 105 12/05/2023    CHLORIDE 107 05/13/2022    CHLORIDE 105 03/08/2021    CO2 24 12/05/2023    CO2 23 05/13/2022    CO2 29 03/08/2021    ANIONGAP 11 12/05/2023    ANIONGAP 8 05/13/2022    ANIONGAP 3 03/08/2021    GLC 95 12/05/2023    GLC 97 05/13/2022     (H) 03/08/2021    BUN 11.2 12/05/2023    BUN 10 05/13/2022    BUN 12 03/08/2021    CR 0.80 12/05/2023    CR 0.80 03/08/2021    GFRESTIMATED >90 12/05/2023    GFRESTIMATED >60 05/13/2022    GFRESTIMATED >90 03/08/2021    GFRESTBLACK >90 03/08/2021    JOSÉ MIGUEL 8.9 12/05/2023    JOSÉ MIGUEL 10.0 03/08/2021        A1C RESULTS:  Lab Results   Component Value Date    A1C 5.0 05/13/2022    A1C 4.8 03/08/2021       INR RESULTS:  Lab Results   Component Value Date    INR 0.97 06/25/2016    INR 0.93 03/17/2016         Juan C Wood MD, FACC    CC  Suzanne Zhang MD  [address] [place] [address]        Thank you for  allowing me to participate in the care of your patient.      Sincerely,     Juan C Wood MD     [facility]

## 2024-03-26 NOTE — PROGRESS NOTES
HISTORY:    Juan C Garcia is a pleasant 64-year-old male with a history of hypertension hyperlipidemia and previous pulmonary embolism as well as a history of Hashimoto's thyroiditis and celiac disease.  He has known mild obstructive sleep apnea.  He was asked to see cardiology for reasons which are somewhat unclear.    Juna C has had elevated cholesterol, last checked in June of last year.   At that time his cholesterol was 248 with  HDL 59 and triglycerides 205.  He has been using atorvastatin 10 mg daily for many years.    Last fall Juan C reports that he was checking his blood pressure regularly and it was usually 150 or 140 systolic.  He was using a lot of alcohol at the time and gave that up entirely.  His blood pressure is now consistently in the 120 range at home and similar in the office today.    Juan C denies any cardiac symptoms.  He specifically denies exertional chest arm neck shoulder or jaw discomfort, palpitations, PND/orthopnea, syncope or near syncope, strokelike symptoms, peripheral edema, or claudication.  He feels that his energy and tolerance are normal.    ECG done 3 months ago was normal.    Juan C reports that he is here today because he wants a checkup of his heart.  He has no specific cardiovascular concerns but wants to make sure he is doing the right things to stay healthy.  He is trying to exercise more regularly and follow a more healthy diet in recent months, along with giving up alcohol.      ASSESSMENT/PLAN:    1.  Hyperlipidemia.  The patient has been taking low-dose atorvastatin and has never been tried on a higher dose.  We will increase that to 80 mg and recheck lipids in 2 months.  I will also order a LP(a) at that time.  2.  Hypertension.  Well-controlled using losartan 50 mg/day.  He reports he has been on that for quite some time but his blood pressure really dropped when he gave up alcohol.  I congratulated him for success and encouraged him to continue to  avoid alcohol on a regular basis.  3.  For screening purposes I talked to him about the possibility of doing a calcium scoring exam and he seems interested.  If that gives us a very high number we may consider doing a stress test but he has no symptoms to suggest ischemia.    Thank you for inviting me to participate in the care of your patient.  Please don't hesitate to call if I can be of further assistance.  40minutes were spent today reviewing the chart and other records, interviewing and examining the patient, and documenting our visit.    The longitudinal plan of care for the diagnosis(es)/condition(s) as documented were addressed during this visit. Due to the added complexity in care, I will continue to support Mikhail in the subsequent management and with ongoing continuity of care.     Chart documentation was completed, in part, with [employer] voice-recognition software. Even though reviewed, some grammatical, spelling, and word errors may remain.       Orders Placed This Encounter   Procedures    Lipid Profile    ALT    Lipoprotein (a)     Orders Placed This Encounter   Medications    atorvastatin (LIPITOR) 80 MG tablet     Sig: Take 1 tablet (80 mg) by mouth daily     Dispense:  90 tablet     Refill:  3     Medications Discontinued During This Encounter   Medication Reason    atorvastatin (LIPITOR) 10 MG tablet        10 year ASCVD risk: The 10-year ASCVD risk score (Isis WOLFE, et al., 2019) is: 16.7%    Values used to calculate the score:      Age: 64 years      Sex: Male      Is Non- : No      Diabetic: No      Tobacco smoker: No      Systolic Blood Pressure: 128 mmHg      Is BP treated: Yes      HDL Cholesterol: 59 mg/dL      Total Cholesterol: 248 mg/dL    Encounter Diagnoses   Name Primary?    Hyperlipidemia LDL goal <100     Family history of ischemic heart disease     Hyperlipidemia, unspecified hyperlipidemia type        CURRENT MEDICATIONS:  Current Outpatient Medications    Medication Sig Dispense Refill    ALLERGY INJECTIONS       atorvastatin (LIPITOR) 80 MG tablet Take 1 tablet (80 mg) by mouth daily 90 tablet 3    fluticasone-salmeterol (ADVAIR) 250-50 MCG/ACT inhaler       hydrochlorothiazide (HYDRODIURIL) 12.5 MG tablet Take 1 tablet (12.5 mg) by mouth daily 90 tablet 1    levothyroxine (SYNTHROID/LEVOTHROID) 150 MCG tablet Take 1 tablet (150 mcg) by mouth daily 90 tablet 3    losartan (COZAAR) 50 MG tablet TAKE 1 TABLET BY MOUTH EVERY DAY 90 tablet 1    semaglutide (OZEMPIC) 2 MG/3ML pen Inject 0.25 mg Subcutaneous every 7 days 3 mL 1    Semaglutide-Weight Management (WEGOVY) 0.25 MG/0.5ML pen Inject 0.25 mg Subcutaneous once a week 2 mL 0    testosterone (ANDROGEL 1.62 % PUMP) 20.25 MG/ACT gel APPLY 3 PUMP PRESSES (5GM) ONE TIME DAILY AS DIRECTED 75 g 0    dapsone (ACZONE) 25 MG tablet Take 1 tablet (25 mg) by mouth daily (Patient not taking: Reported on 3/8/2024) 90 tablet 0       ALLERGIES     Allergies   Allergen Reactions    No Known Allergies        PAST MEDICAL HISTORY:  Past Medical History:   Diagnosis Date    Alcohol use     Dermatitis herpetiformis 2000    Dx by biopsy of rash on elbow per derm    Diastolic dysfunction with chronic heart failure (H)     HTN, goal below 140/90     Hyperlipidemia     Lung nodule Feb 2003    Benign; Left upper lobe    Multiple allergies     allergist with immunotherapy; Allergist is Dr. Santos in [place]    Primary hypogonadism in male     Low FSH, single left testicle - followed by Dr. Flores of endo    Pulmonary embolus (H) Feb 2003    Evaluated by ?[facility] - unknown cause    Thyroid nodule Feb 2005    Benign per [facility] --> taking Levothyroxine ever since    Uncomplicated asthma        PAST SURGICAL HISTORY:  Past Surgical History:   Procedure Laterality Date    BACK SURGERY      COLONOSCOPY  April 2012    COLONOSCOPY N/A 6/20/2022    Procedure: COLONOSCOPY;  Surgeon: Diya Lo MD;  Location:  GI    HEAD & NECK SURGERY       HERNIA REPAIR      ORCHIOPEXY CHILD Right     Age 10 due to undescended testicl    ORTHOPEDIC SURGERY      OTHER SURGICAL HISTORY  July 1997    Cervical laminectomy C4-5-6    OTHER SURGICAL HISTORY  Jan 2000    Right index tendon repair    OTHER SURGICAL HISTORY  August 2004    Ankle/wrist repair    OTHER SURGICAL HISTORY  April 2007    Lumbar microdiscectomy L5-S1    OTHER SURGICAL HISTORY  March 2010    Cervical fusion C5-6-7    OTHER SURGICAL HISTORY  March 2011    Cervical fusion C3-4-5    OTHER SURGICAL HISTORY  July 2011    Lumbar fusion L5-S1       FAMILY HISTORY:  Family History   Problem Relation Age of Onset    Hypertension Mother     Dementia Mother     Alcoholism Mother     Hypertension Father     Cerebrovascular Disease Father     Cancer Father         throat - was a smoker    Heart Disease Father         CABG    Diabetes Maternal Grandfather     Family History Negative Brother     Family History Negative Sister     Cerebrovascular Disease Paternal Grandmother     Cancer Son         Testicular cancer    Family History Negative Son        SOCIAL HISTORY:  Social History     Socioeconomic History    Marital status:      Spouse name: None    Number of children: None    Years of education: None    Highest education level: None   Tobacco Use    Smoking status: Never    Smokeless tobacco: Never   Substance and Sexual Activity    Alcohol use: Not Currently    Drug use: No    Sexual activity: Yes     Partners: Female     Birth control/protection: Pull-out method   Other Topics Concern    Parent/sibling w/ CABG, MI or angioplasty before 65F 55M? Yes     Comment: father bypass 60ish     Social Determinants of Health     Interpersonal Safety: Low Risk  (3/21/2024)    Interpersonal Safety     Do you feel physically and emotionally safe where you currently live?: Yes     Within the past 12 months, have you been hit, slapped, kicked or otherwise physically hurt by someone?: No     Within the past 12 months,  "have you been humiliated or emotionally abused in other ways by your partner or ex-partner?: No       Review of Systems:  Skin:        Eyes:       ENT:       Respiratory:  Positive for sleep apnea  Cardiovascular:    Positive for  Gastroenterology:      Genitourinary:       Musculoskeletal:       Neurologic:       Psychiatric:       Heme/Lymph/Imm:       Endocrine:         Physical Exam:  Vitals: /80 (BP Location: Right arm, Patient Position: Sitting, Cuff Size: Adult Regular)   Pulse 72   Ht 1.88 m (6' 2\")   Wt 101.9 kg (224 lb 9.6 oz)   SpO2 98%   BMI 28.84 kg/m      Constitutional:  cooperative, alert and oriented, well developed, well nourished, in no acute distress        Skin:  warm and dry to the touch, no apparent skin lesions or masses noted        Head:  normocephalic, no masses or lesions        Eyes:  pupils equal and round, conjunctivae and lids unremarkable, sclera white, no xanthalasma, EOMS intact, no nystagmus        ENT:  no pallor or cyanosis, dentition good        Neck:  carotid pulses are full and equal bilaterally, JVP normal, no carotid bruit        Chest:  normal breath sounds, clear to auscultation, normal A-P diameter, normal symmetry, normal respiratory excursion, no use of accessory muscles        Cardiac: regular rhythm, normal S1/S2, no S3 or S4, apical impulse not displaced, no murmurs, gallops or rubs                  Abdomen:  abdomen soft;BS normoactive        Vascular: pulses full and equal, no bruits auscultated                                      Extremities and Muscular Skeletal:  no edema           Neurological:  no gross motor deficits        Psych:  affect appropriate, oriented to time, person and place     Recent Lab Results:  LIPID RESULTS:  Lab Results   Component Value Date    CHOL 248 (H) 06/16/2023    CHOL 264 (H) 06/03/2021    HDL 59 06/16/2023    HDL 61 06/03/2021     (H) 06/16/2023     (H) 06/03/2021    TRIG 205 (H) 06/16/2023    TRIG 100 " 06/03/2021    CHOLHDLRATIO 5.2 (H) 02/02/2015       LIVER ENZYME RESULTS:  Lab Results   Component Value Date    AST 29 05/13/2022    AST 24 01/27/2020    ALT 33 05/13/2022    ALT 33 01/27/2020       CBC RESULTS:  Lab Results   Component Value Date    WBC 6.9 12/05/2023    WBC 6.4 03/08/2021    RBC 4.76 12/05/2023    RBC 4.77 03/08/2021    HGB 14.1 12/05/2023    HGB 14.2 03/08/2021    HCT 44.0 12/05/2023    HCT 43.8 03/08/2021    MCV 92 12/05/2023    MCV 92 03/08/2021    MCH 29.6 12/05/2023    MCH 29.8 03/08/2021    MCHC 32.0 12/05/2023    MCHC 32.4 03/08/2021    RDW 12.7 12/05/2023    RDW 13.4 03/08/2021     12/05/2023     03/08/2021       BMP RESULTS:  Lab Results   Component Value Date     12/05/2023     03/08/2021    POTASSIUM 4.1 12/05/2023    POTASSIUM 3.8 05/13/2022    POTASSIUM 4.5 03/08/2021    CHLORIDE 105 12/05/2023    CHLORIDE 107 05/13/2022    CHLORIDE 105 03/08/2021    CO2 24 12/05/2023    CO2 23 05/13/2022    CO2 29 03/08/2021    ANIONGAP 11 12/05/2023    ANIONGAP 8 05/13/2022    ANIONGAP 3 03/08/2021    GLC 95 12/05/2023    GLC 97 05/13/2022     (H) 03/08/2021    BUN 11.2 12/05/2023    BUN 10 05/13/2022    BUN 12 03/08/2021    CR 0.80 12/05/2023    CR 0.80 03/08/2021    GFRESTIMATED >90 12/05/2023    GFRESTIMATED >60 05/13/2022    GFRESTIMATED >90 03/08/2021    GFRESTBLACK >90 03/08/2021    JOSÉ MIGUEL 8.9 12/05/2023    JOSÉ MIGUEL 10.0 03/08/2021        A1C RESULTS:  Lab Results   Component Value Date    A1C 5.0 05/13/2022    A1C 4.8 03/08/2021       INR RESULTS:  Lab Results   Component Value Date    INR 0.97 06/25/2016    INR 0.93 03/17/2016         Juan C Wood MD, FACC    CC  Suzanne Zhang MD  [address]  [place] [address]

## 2024-03-27 NOTE — TELEPHONE ENCOUNTER
Prior authorization was attempted for Ozempic and was denied due to patient not being a Type 2 Diabetic. If medication was prescribed for weight, please send prescription for Celsa.    Oscar Vasquez CMA on 3/27/2024 at 5:22 PM

## 2024-03-28 ENCOUNTER — TELEPHONE (OUTPATIENT)
Dept: FAMILY MEDICINE | Facility: CLINIC | Age: 64
End: 2024-03-28
Payer: COMMERCIAL

## 2024-03-28 DIAGNOSIS — R63.8 UNABLE TO LOSE WEIGHT: ICD-10-CM

## 2024-03-28 NOTE — TELEPHONE ENCOUNTER
Disp Refills Start End MELINDA   Semaglutide-Weight Management (WEGOVY) 0.25 MG/0.5ML pen 2 mL 0 3/28/2024 -- No   Sig - Route: Inject 0.25 mg Subcutaneous once a week - Subcutaneous     Fax received from pharmacy, above med has been unavailable since last May. Alternative is needed.

## 2024-03-30 ENCOUNTER — MYC REFILL (OUTPATIENT)
Dept: FAMILY MEDICINE | Facility: CLINIC | Age: 64
End: 2024-03-30
Payer: COMMERCIAL

## 2024-03-30 DIAGNOSIS — R63.8 UNABLE TO LOSE WEIGHT: ICD-10-CM

## 2024-04-01 RX ORDER — TIRZEPATIDE 2.5 MG/.5ML
2.5 INJECTION, SOLUTION SUBCUTANEOUS
Qty: 0.5 ML | Refills: 1 | Status: SHIPPED | OUTPATIENT
Start: 2024-04-01 | End: 2024-06-21

## 2024-04-02 ENCOUNTER — TELEPHONE (OUTPATIENT)
Dept: CARDIOLOGY | Facility: CLINIC | Age: 64
End: 2024-04-02
Payer: COMMERCIAL

## 2024-04-02 NOTE — TELEPHONE ENCOUNTER
[facility] Call Center    Phone Message    May a detailed message be left on voicemail: yes     Reason for Call: Other: Mikhail would like a call back regarding the recent increase in the atorvastatin.  He is concerned about the jump from 10 mg to 80 mg.     Action Taken: Other: Cardiology    Travel Screening: Not Applicable      Thank you!  [facility]

## 2024-04-03 NOTE — TELEPHONE ENCOUNTER
Nina, Juan C Zhu MD  You; Ru [facility] Heart Nursing Team12 minutes ago (12:44 PM)     MT  Increasing the Lipitor from 10 mg to 80 mg was not an accident  We often start at 80 mg with Lipitor so the increased dose is not unusual.  We want maximal effect from this medication and there is no real benefit in doing this stepwise.  He will get less long-term atherosclerosis progression with the higher dose versus lower dose.     Contacted patient to review recommendations from Dr. Wood. Patient verbalized understanding and agreed with plan of care. Patient will take 80 mg atorvastatin and get follow up labs in 2 months. No further questions.

## 2024-04-03 NOTE — TELEPHONE ENCOUNTER
Spoke with patient. Patient states that he is questioning the increase in his atorvastatin dose. Patient states that he went to  the prescription at the pharmacy, and the pharmacist made a comment to him that he is going from the lowest dose to the maximum dose, and the pharmacist was questioning if this is accurate. Patient is wondering about starting out with a more mild increase in his atorvastatin instead of increasing from 10 to 80 mg, and then seeing what his blood work shows in a few months. Will route to Dr. Wood for review.

## 2024-04-11 NOTE — TELEPHONE ENCOUNTER
Chief Complaint: Chest Pain, Unspecified Type, Alcohol Use,  TUE 28-JAN-2020 1 / 0    [phone] (home)      Detail Level: Generalized Detail Level: Zone Detail Level: Detailed

## 2024-04-12 ENCOUNTER — TELEPHONE (OUTPATIENT)
Dept: FAMILY MEDICINE | Facility: CLINIC | Age: 64
End: 2024-04-12
Payer: COMMERCIAL

## 2024-04-12 DIAGNOSIS — R63.8 UNABLE TO LOSE WEIGHT: ICD-10-CM

## 2024-04-12 NOTE — TELEPHONE ENCOUNTER
Patient states that he checked with his pharmacy and they now have Ozempic in stock.    Patient has not yet started any medication. He needs Ozempic reordered.    He is hoping for a new prescription as soon as possible due to pharmacy no longer has an active prescription.    Medication and pharmacy pended.   Lena Martínez RN

## 2024-04-15 ENCOUNTER — OFFICE VISIT (OUTPATIENT)
Dept: SLEEP MEDICINE | Facility: CLINIC | Age: 64
End: 2024-04-15
Attending: PHYSICIAN ASSISTANT
Payer: COMMERCIAL

## 2024-04-15 DIAGNOSIS — G47.33 OSA (OBSTRUCTIVE SLEEP APNEA): ICD-10-CM

## 2024-04-15 PROCEDURE — 95800 SLP STDY UNATTENDED: CPT | Performed by: INTERNAL MEDICINE

## 2024-04-15 NOTE — PROGRESS NOTES
Pt is completing a home sleep test. Pt was instructed on how to put on the Noxturnal T3 device and associated equipment before going to bed and given the opportunity to practice putting it on before leaving the sleep center. Pt was reminded to bring the home sleep test kit back to the center tomorrow, at agreed upon time for download and reporting.   Antonina Nelson CMA on 4/15/2024 at 2:27 PM

## 2024-04-16 ENCOUNTER — DOCUMENTATION ONLY (OUTPATIENT)
Dept: SLEEP MEDICINE | Facility: CLINIC | Age: 64
End: 2024-04-16
Attending: PHYSICIAN ASSISTANT
Payer: COMMERCIAL

## 2024-04-16 DIAGNOSIS — G47.33 OSA (OBSTRUCTIVE SLEEP APNEA): Primary | ICD-10-CM

## 2024-04-16 NOTE — PROGRESS NOTES
HST POST-STUDY QUESTIONNAIRE    What time did you go to bed?  9:45  How long do you think it took to fall asleep?  15 min  What time did you wake up to start the day?  5:00 am  Did you get up during the night at all?  Twice  If you woke up, do you remember approximately what time(s)? ~ 11:45 and 4:00?  Did you have any difficulty with the equipment?  No  Did you us any type of treatment with this study?  None  Was the head of the bed elevated? No  Did you sleep in a recliner?  No  Did you stop using CPAP at least 3 days before this test?  NA  Any other information you'd like us to know?

## 2024-04-16 NOTE — NURSING NOTE
Pt returned HST device. It was downloaded and forwarded data to the clinical specialist for scoring.  Antonina Nelson CMA on 4/16/2024 at 8:09 AM

## 2024-04-17 ENCOUNTER — MYC MEDICAL ADVICE (OUTPATIENT)
Dept: ENDOCRINOLOGY | Facility: CLINIC | Age: 64
End: 2024-04-17
Payer: COMMERCIAL

## 2024-04-17 DIAGNOSIS — R63.8 UNABLE TO LOSE WEIGHT: ICD-10-CM

## 2024-04-17 DIAGNOSIS — I10 HTN, GOAL BELOW 140/90: Chronic | ICD-10-CM

## 2024-04-17 DIAGNOSIS — E78.5 HYPERLIPIDEMIA, UNSPECIFIED HYPERLIPIDEMIA TYPE: Primary | Chronic | ICD-10-CM

## 2024-04-18 NOTE — PROGRESS NOTES
This HSAT was performed using a Noxturnal T3 device which recorded snore, sound, movement activity, body position, nasal pressure, oronasal thermal airflow, pulse, oximetry and both chest and abdominal respiratory effort. HSAT data was restricted to the time patient states they were in bed.     HSAT was scored using 1B 4% hypopnea rule.     AHI: 19.3  Snoring was reported as loud.  Time with SpO2 below 89% was 17.7 minutes.   Overall signal quality was good     Pt will follow up with sleep provider to determine appropriate therapy.     Ordering Provider, Goltz, Bennett Ezra, PA-C C. Oyugi, BA, RPSGT, RST System Clinical Specialist/ 4/18/2024

## 2024-04-18 NOTE — TELEPHONE ENCOUNTER
Patient sent MyChart requesting new rx for Ozempic be sent to [facility]. Chart reviewed. Patient had called on 4/12 and made the same request. Dr. Zhang did approved the prescription.     Writer called [facility] and spoke with Gopal Pharmacy Technician who informed that prescription was received and they are waiting for Prior Authorization. Prior authorization was denied on 3/25/24. An appeal has not been initiated.     Gopal, Pharmacy Technician states that they only have 1 box left of semaglutide at this time.    Please advise if appeal or next steps. [employer] reply sent to the patient informing the above information.    Lena Martínez RN

## 2024-04-19 NOTE — PROCEDURES
"HOME SLEEP STUDY INTERPRETATION        Patient: Juan C Garcia  MRN: [id number]  YOB: 1960  Study Date: 4/15/2024  PCP/Referring Provider: Suzanne Zhang;   Ordering Provider: Bennett Goltz, PA-C       Indications for Home Study: Juan C Garcia is a 64 year old male who presents with symptoms suggestive of obstructive sleep apnea.    Estimated body mass index is 28.84 kg/m  as calculated from the following:    Height as of 3/26/24: 1.88 m (6' 2\").    Weight as of 3/26/24: 101.9 kg (224 lb 9.6 oz).  Total score - Devers: 2 (3/8/2024 10:40 AM)        Data: A full night home sleep study was performed recording the standard physiologic parameters including body position, movement, sound, nasal pressure, thermal oral airflow, chest and abdominal movements with respiratory inductance plethysmography, and oxygen saturation by pulse oximetry. Pulse rate was estimated by oximetry recording. This study was considered adequate based on > 4 hours of quality oximetry and respiratory recording. As specified by the AASM Manual for the Scoring of Sleep and Associated events, version 2.3, Rule VIII.D 1B, 4% oxygen desaturation scoring for hypopneas is used as a standard of care on all home sleep apnea testing.        Analysis Time:  439 minutes        Respiration:   Sleep Associated Hypoxemia: sustained hypoxemia was present. Baseline oxygen saturation was 93%.  Time with saturation less than or equal to 88% was 11.7 minutes. The lowest oxygen saturation was 80%.   Snoring: Snoring was present.  Respiratory events: The home study revealed a presence of 1 obstructive apneas and 12 mixed and central apneas. There were 128 hypopneas resulting in a combined apnea/hypopnea index [AHI] of 19.3 events per hour.  AHI was 44.6 per hour supine, N/A per hour prone, 12.4 per hour on left side, and 9.4 per hour on right side.   Pattern: Excluding events noted above, respiratory rate and pattern was Normal.      Position: " Percent of time spent: supine - 23.3%, prone - 0%, on left - 56.3%, on right - 20.4%.      Heart Rate: By pulse oximetry normal rate was noted.       Assessment:   Moderate obstructive sleep apnea.  Sleep associated hypoxemia was present.    Recommendations:  CPAP therapy is the treatment of choice for moderate obstructive sleep apnea. Treatment can be initiated with auto titrating CPAP therapy with a pressure range of 5 to 15 cm H2O.  Recommend appropriate clinical follow-up in 1-3 months after starting treatment to monitor response, compliance and CPAP download data.  If CPAP therapy is not tolerated or accepted, patient can consider oral appliance therapy through referral to dental sleep medicine.   If devices are not accepted, patient can consider evaluation of  surgical options through referral to specialized ENT surgery.    Suggest optimizing sleep hygiene and avoiding sleep deprivation.  Weight management.      Diagnosis Code(s): Obstructive Sleep Apnea G47.33, Hypoxemia G47.36    Electronically signed by: Edgardo Madsen MD, April 19, 2024   Diplomate, American Board of Psychiatry and Neurology, Sleep Medicine

## 2024-04-21 DIAGNOSIS — R79.89 LOW TESTOSTERONE IN MALE: ICD-10-CM

## 2024-04-21 DIAGNOSIS — Z76.0 ENCOUNTER FOR MEDICATION REFILL: ICD-10-CM

## 2024-04-22 RX ORDER — TESTOSTERONE 1.62 MG/G
GEL TRANSDERMAL
Qty: 75 G | Refills: 0 | Status: SHIPPED | OUTPATIENT
Start: 2024-04-22 | End: 2024-05-27

## 2024-04-24 NOTE — TELEPHONE ENCOUNTER
Prior authorization for Wegovy started in separate telephone encounter.    Oscar Vasquez CMA on 4/24/2024 at 4:46 PM

## 2024-04-25 ENCOUNTER — TELEPHONE (OUTPATIENT)
Dept: FAMILY MEDICINE | Facility: CLINIC | Age: 64
End: 2024-04-25
Payer: COMMERCIAL

## 2024-04-25 NOTE — TELEPHONE ENCOUNTER
Disp Refills Start End MELINDA     Semaglutide-Weight Management (WEGOVY) 0.25 MG/0.5ML pen 2 mL 0 4/22/2024 -- --   Sig - Route: Inject 0.25 mg Subcutaneous once a week - Subcutaneous      Fax received from pharmacy, above med is backordered/unavailable

## 2024-04-25 NOTE — TELEPHONE ENCOUNTER
Disp Refills Start End MELINDA    Semaglutide-Weight Management (WEGOVY) 0.25 MG/0.5ML pen 2 mL 0 4/22/2024 -- --   Sig - Route: Inject 0.25 mg Subcutaneous once a week - Subcutaneous     Fax received from pharmacy, above med is backordered/unavailable. Appears PA is also being completed for this med per encounter from 4/24/24.

## 2024-04-29 DIAGNOSIS — I10 ESSENTIAL HYPERTENSION: ICD-10-CM

## 2024-04-29 RX ORDER — LOSARTAN POTASSIUM 50 MG/1
TABLET ORAL
Qty: 90 TABLET | Refills: 1 | Status: SHIPPED | OUTPATIENT
Start: 2024-04-29

## 2024-04-29 NOTE — TELEPHONE ENCOUNTER
Prescription approved per [facility] Refill Protocol.  Bianca Muñiz RN  [facility]

## 2024-05-01 ENCOUNTER — HOSPITAL ENCOUNTER (OUTPATIENT)
Dept: CT IMAGING | Facility: CLINIC | Age: 64
Discharge: HOME | End: 2024-05-01
Attending: INTERNAL MEDICINE | Admitting: INTERNAL MEDICINE
Payer: COMMERCIAL

## 2024-05-01 DIAGNOSIS — E78.5 HYPERLIPIDEMIA LDL GOAL <100: ICD-10-CM

## 2024-05-01 DIAGNOSIS — Z82.49 FAMILY HISTORY OF ISCHEMIC HEART DISEASE: ICD-10-CM

## 2024-05-01 PROCEDURE — 75571 CT HRT W/O DYE W/CA TEST: CPT

## 2024-05-01 PROCEDURE — 75571 CT HRT W/O DYE W/CA TEST: CPT | Mod: 26 | Performed by: INTERNAL MEDICINE

## 2024-05-03 ENCOUNTER — MYC MEDICAL ADVICE (OUTPATIENT)
Dept: SLEEP MEDICINE | Facility: CLINIC | Age: 64
End: 2024-05-03
Payer: COMMERCIAL

## 2024-05-03 DIAGNOSIS — I25.10 CORONARY ARTERY CALCIFICATION: Primary | ICD-10-CM

## 2024-05-03 DIAGNOSIS — G47.33 OSA (OBSTRUCTIVE SLEEP APNEA): Primary | ICD-10-CM

## 2024-05-03 DIAGNOSIS — I10 HTN, GOAL BELOW 140/90: Chronic | ICD-10-CM

## 2024-05-03 RX ORDER — ASPIRIN 81 MG/1
81 TABLET ORAL DAILY
COMMUNITY
Start: 2024-05-03

## 2024-05-03 RX ORDER — HYDROCHLOROTHIAZIDE 12.5 MG/1
12.5 TABLET ORAL DAILY
Qty: 90 TABLET | Refills: 2 | Status: SHIPPED | OUTPATIENT
Start: 2024-05-03

## 2024-05-03 NOTE — RESULT ENCOUNTER NOTE
"Per Dr. Wood, \"With calcium scores this high, I would recommend that he have a stress echo to exclude silent ischemia, and that he begin 81 mg aspirin daily.  We are already pretty aggressive on lipid management, await follow up lab studies.\"    Results and recommendations communicated to patient via MyChart. Orders placed for stress echo and ASA 81 mg daily. "

## 2024-05-03 NOTE — TELEPHONE ENCOUNTER
Prescription approved per [facility] Refill Protocol.  Dang Milligan, RN  [facility] Triage Nurse

## 2024-05-09 NOTE — TELEPHONE ENCOUNTER
I am prescribing auto CPAP 5-15 cm, heated humidifier and compliance meter. The patient was informed of the mask exchange policy and the compliance goals. They were also informed that they would be followed by the sleep therapy management team during the first month of CPAP use.  Bennett Goltz, PA-C

## 2024-05-09 NOTE — TELEPHONE ENCOUNTER
"Patient wanting to \"investigate\" CPAP, requesting a script.  Pended for provider consideration.    "

## 2024-05-10 ENCOUNTER — MYC MEDICAL ADVICE (OUTPATIENT)
Dept: CARDIOLOGY | Facility: CLINIC | Age: 64
End: 2024-05-10
Payer: COMMERCIAL

## 2024-05-21 ENCOUNTER — TRANSFERRED RECORDS (OUTPATIENT)
Dept: HEALTH INFORMATION MANAGEMENT | Facility: CLINIC | Age: 64
End: 2024-05-21
Payer: COMMERCIAL

## 2024-05-22 ENCOUNTER — DOCUMENTATION ONLY (OUTPATIENT)
Dept: SLEEP MEDICINE | Facility: CLINIC | Age: 64
End: 2024-05-22
Payer: COMMERCIAL

## 2024-05-22 DIAGNOSIS — I10 PRIMARY HYPERTENSION: ICD-10-CM

## 2024-05-22 DIAGNOSIS — G47.33 OBSTRUCTIVE SLEEP APNEA (ADULT) (PEDIATRIC): Primary | ICD-10-CM

## 2024-05-22 DIAGNOSIS — F41.9 ANXIETY: ICD-10-CM

## 2024-05-22 NOTE — PROGRESS NOTES
Patient was offered choice of vendor and chose [facility].  Patient Juan C Garcia was set up at [facility] on May 22, 2024. Patient received a Resmed Airsense 10 Pressures were set at  5-15 cm H2O.   Patient s ramp is 5 cm H2O for Auto and FLEX/EPR is EPR, 2.  Patient received a Resmed Mask name: AIRFIT [id number]  Nasal mask size Standard, heated tubing and heated humidifier.  Patient has the following compliance requirements: usage only  Patient has a follow up on 9/9/2024 with Bennett Goltz, PA-C. Tracy L Jackson

## 2024-05-25 DIAGNOSIS — R79.89 LOW TESTOSTERONE IN MALE: ICD-10-CM

## 2024-05-25 DIAGNOSIS — Z76.0 ENCOUNTER FOR MEDICATION REFILL: ICD-10-CM

## 2024-05-27 RX ORDER — TESTOSTERONE 1.62 MG/G
GEL TRANSDERMAL
Qty: 75 G | Refills: 0 | Status: SHIPPED | OUTPATIENT
Start: 2024-05-27 | End: 2024-07-02

## 2024-05-28 ENCOUNTER — DOCUMENTATION ONLY (OUTPATIENT)
Dept: SLEEP MEDICINE | Facility: CLINIC | Age: 64
End: 2024-05-28
Payer: COMMERCIAL

## 2024-05-28 DIAGNOSIS — G47.33 OSA (OBSTRUCTIVE SLEEP APNEA): Primary | ICD-10-CM

## 2024-05-28 NOTE — PROGRESS NOTES
3 day Sleep therapy management telephone visit    Diagnostic AHI: HST: 19.3        Confirmed with patient at time of call- Yes Patient is still interested in STM service       Subjective measures:  Patient struggling with his CPAP mask to make it comfortable. He goes back and forth between the pillow mask and nasal mask. Turned EPR to 3 and recommended a nasal saline gel AYR for comfort relief.  Patient having more quality sleep.          Objective data     Order Settings for PAP  CPAP min     CPAP max              Device settings from machine CPAP min 5.0     CPAP max 15.0           EPR Setting TWO    RESMED soft response  OFF     Assessment: Nightly usage most nights under four hours       Patient has the following upcoming sleep appts:  Future Sleep Appointments         Provider Department    9/9/2024 3:00 PM (Arrive by 2:45 PM) Goltz, Bennett Ezra, PA-C [facility] [place]            Replacement device: No  STM ordered by provider: Yes     Total time spent on accessing and  interpreting remote patient PAP therapy data  10 minutes    Total time spent counseling, coaching  and reviewing PAP therapy data with patient  6 minutes    [id number] no

## 2024-05-31 ENCOUNTER — LAB (OUTPATIENT)
Dept: LAB | Facility: CLINIC | Age: 64
End: 2024-05-31
Payer: COMMERCIAL

## 2024-05-31 ENCOUNTER — HOSPITAL ENCOUNTER (OUTPATIENT)
Dept: CARDIOLOGY | Facility: CLINIC | Age: 64
Discharge: HOME | End: 2024-05-31
Attending: INTERNAL MEDICINE | Admitting: INTERNAL MEDICINE
Payer: COMMERCIAL

## 2024-05-31 DIAGNOSIS — E78.5 HYPERLIPIDEMIA, UNSPECIFIED HYPERLIPIDEMIA TYPE: ICD-10-CM

## 2024-05-31 DIAGNOSIS — Z82.49 FAMILY HISTORY OF ISCHEMIC HEART DISEASE: ICD-10-CM

## 2024-05-31 DIAGNOSIS — I25.10 CORONARY ARTERY CALCIFICATION: ICD-10-CM

## 2024-05-31 DIAGNOSIS — E78.5 HYPERLIPIDEMIA LDL GOAL <100: ICD-10-CM

## 2024-05-31 LAB
ALT SERPL W P-5'-P-CCNC: 23 U/L (ref 0–70)
APO A-I SERPL-MCNC: <6 MG/DL
CHOLEST SERPL-MCNC: 145 MG/DL
FASTING STATUS PATIENT QL REPORTED: YES
HDLC SERPL-MCNC: 49 MG/DL
LDLC SERPL CALC-MCNC: 77 MG/DL
NONHDLC SERPL-MCNC: 96 MG/DL
TRIGL SERPL-MCNC: 93 MG/DL

## 2024-05-31 PROCEDURE — 93325 DOPPLER ECHO COLOR FLOW MAPG: CPT | Mod: TC

## 2024-05-31 PROCEDURE — 80061 LIPID PANEL: CPT | Performed by: INTERNAL MEDICINE

## 2024-05-31 PROCEDURE — 36415 COLL VENOUS BLD VENIPUNCTURE: CPT | Performed by: INTERNAL MEDICINE

## 2024-05-31 PROCEDURE — 83695 ASSAY OF LIPOPROTEIN(A): CPT | Performed by: INTERNAL MEDICINE

## 2024-05-31 PROCEDURE — 84460 ALANINE AMINO (ALT) (SGPT): CPT | Performed by: INTERNAL MEDICINE

## 2024-05-31 PROCEDURE — 93321 DOPPLER ECHO F-UP/LMTD STD: CPT | Mod: 26 | Performed by: INTERNAL MEDICINE

## 2024-05-31 PROCEDURE — 255N000002 HC RX 255 OP 636: Performed by: INTERNAL MEDICINE

## 2024-05-31 PROCEDURE — 93325 DOPPLER ECHO COLOR FLOW MAPG: CPT | Mod: 26 | Performed by: INTERNAL MEDICINE

## 2024-05-31 PROCEDURE — 93016 CV STRESS TEST SUPVJ ONLY: CPT | Performed by: INTERNAL MEDICINE

## 2024-05-31 PROCEDURE — 93350 STRESS TTE ONLY: CPT | Mod: 26 | Performed by: INTERNAL MEDICINE

## 2024-05-31 PROCEDURE — 93018 CV STRESS TEST I&R ONLY: CPT | Performed by: INTERNAL MEDICINE

## 2024-05-31 RX ADMIN — HUMAN ALBUMIN MICROSPHERES AND PERFLUTREN 4 ML: 10; .22 INJECTION, SOLUTION INTRAVENOUS at 09:06

## 2024-06-03 ENCOUNTER — TELEPHONE (OUTPATIENT)
Dept: CARDIOLOGY | Facility: CLINIC | Age: 64
End: 2024-06-03
Payer: COMMERCIAL

## 2024-06-03 NOTE — TELEPHONE ENCOUNTER
Stress echocardiogram results noted:    Interpretation Summary  Target Heart Rate was achieved.  Normal left ventricular function and wall motion at rest and post-stress.  Global LV systolic function augments with exercise. The visual ejection  fraction is >70%.  Left ventricular cavity size decreases with exercise.  The patient exhibited no chest pain during exercise.  There was a normal BP response to exercise.  Borderline normal stress ECG, at peak stress there are 1-2 mm upsloping ST  depressions in leads II, III, aVF, V5-6, returning to baseline by 00:48 in  Recovery. No arrhythmia noted.     On routine screening 2D echocardiogram and Doppler examination, normal  biventricular function, no significant valvular dysfunction, aortic root  normal in size.    Labs noted:    Component      Latest Ref Rng 5/31/2024  7:54 AM   Cholesterol      <200 mg/dL 145    Triglycerides      <150 mg/dL 93    HDL Cholesterol      >=40 mg/dL 49    LDL Cholesterol Calculated      <=100 mg/dL 77    Non HDL Cholesterol      <130 mg/dL 96    Patient Fasting? Yes    ALT      0 - 70 U/L 23    Lipoprotein (a)      <30 mg/dL <6      Stress echo ordered due to high calcium score. Labs ordered to be done 2 months after increasing atorvastatin to 80 gm daily. No current follow up orders. Will route to Dr. Wood for review.

## 2024-06-04 NOTE — TELEPHONE ENCOUNTER
Normal stress echo.  Labs look good. He does not require cardiology follow up,  His primary care should refill future statin RX.        Left detailed message Via My Chart and voice mail with call back number if any other questions or concerns. Francisca Lopez RN on 6/4/2024 at 10:19 AM

## 2024-06-04 NOTE — RESULT ENCOUNTER NOTE
Results and recommendations routed Via My Chart with call back information if needed.   Normal stress echo.  Labs look good. He does not require cardiology follow up,  His primary care should refill future statin RX.

## 2024-06-06 ENCOUNTER — DOCUMENTATION ONLY (OUTPATIENT)
Dept: SLEEP MEDICINE | Facility: CLINIC | Age: 64
End: 2024-06-06
Payer: COMMERCIAL

## 2024-06-06 DIAGNOSIS — G47.33 OSA (OBSTRUCTIVE SLEEP APNEA): Primary | ICD-10-CM

## 2024-06-06 NOTE — PROGRESS NOTES
14  DAY STM VISIT    Diagnostic AHI:  HST: 19.3        Subjective measures: Spoke with pt. He is wokrking at tolerating CPAP. His complaint is the mask rather than the pressure. He is considering mask swap.  Pt was given my contact information and instructed to reach out with any questions or concerns.       Assessment: Pt not meeting objective benchmarks for AHI and compliance  Patient failing following subjective benchmarks: mask discomfort     Action plan: pt to have 30 day STM visit.      Device type: Auto-CPAP    PAP settings:  DEVICE TYPE CPAP MIN CPAP MAX 95TH % PRESSURE EPR MASK DISPENSED   Auto-CPAP    5.0 cm  H20 15.0 cm  H20 8.4 cm  H20  TWO      Mask type:      Objective measures: 14 day rolling measures   COMPLIANCE LEAK AHI AVERAGE USE IN MINUTES   42 % 22.32 1.11 211   GOAL >70% GOAL < 24 LPM GOAL <5 GOAL >240      Patient has the following upcoming sleep appts:  Future Sleep Appointments         Provider Department    9/9/2024 3:00 PM (Arrive by 2:45 PM) Goltz, Bennett Ezra, PA-C [facility]            Total time spent on accessing and interpreting remote patient PAP therapy data  10 minutes    Total time spent counseling, coaching  and reviewing PAP therapy data with patient   minutes    [id number]  [id number]  no (3 day STM)

## 2024-06-07 DIAGNOSIS — E78.5 HYPERLIPIDEMIA, UNSPECIFIED HYPERLIPIDEMIA TYPE: Chronic | ICD-10-CM

## 2024-06-07 DIAGNOSIS — R63.8 UNABLE TO LOSE WEIGHT: ICD-10-CM

## 2024-06-07 DIAGNOSIS — I10 HTN, GOAL BELOW 140/90: Chronic | ICD-10-CM

## 2024-06-07 RX ORDER — SEMAGLUTIDE 0.25 MG/.5ML
0.25 INJECTION, SOLUTION SUBCUTANEOUS WEEKLY
Qty: 2 ML | Refills: 0 | Status: SHIPPED | OUTPATIENT
Start: 2024-06-07 | End: 2024-08-26 | Stop reason: DRUGHIGH

## 2024-06-14 DIAGNOSIS — E06.3 HASHIMOTO'S THYROIDITIS: ICD-10-CM

## 2024-06-14 RX ORDER — LEVOTHYROXINE SODIUM 150 UG/1
150 TABLET ORAL DAILY
Qty: 90 TABLET | Refills: 3 | Status: SHIPPED | OUTPATIENT
Start: 2024-06-14 | End: 2024-06-25

## 2024-06-21 ENCOUNTER — OFFICE VISIT (OUTPATIENT)
Dept: FAMILY MEDICINE | Facility: CLINIC | Age: 64
End: 2024-06-21
Payer: COMMERCIAL

## 2024-06-21 VITALS
OXYGEN SATURATION: 98 % | SYSTOLIC BLOOD PRESSURE: 120 MMHG | TEMPERATURE: 96.9 F | RESPIRATION RATE: 16 BRPM | HEIGHT: 74 IN | DIASTOLIC BLOOD PRESSURE: 76 MMHG | BODY MASS INDEX: 28.36 KG/M2 | HEART RATE: 66 BPM | WEIGHT: 221 LBS

## 2024-06-21 DIAGNOSIS — E06.3 HASHIMOTO'S THYROIDITIS: ICD-10-CM

## 2024-06-21 DIAGNOSIS — I10 HTN, GOAL BELOW 140/90: ICD-10-CM

## 2024-06-21 DIAGNOSIS — E78.5 HYPERLIPIDEMIA, UNSPECIFIED HYPERLIPIDEMIA TYPE: ICD-10-CM

## 2024-06-21 DIAGNOSIS — R63.8 UNABLE TO LOSE WEIGHT: ICD-10-CM

## 2024-06-21 DIAGNOSIS — E03.9 HYPOTHYROIDISM, UNSPECIFIED TYPE: ICD-10-CM

## 2024-06-21 DIAGNOSIS — Z12.5 SCREENING FOR PROSTATE CANCER: ICD-10-CM

## 2024-06-21 DIAGNOSIS — Z00.00 ROUTINE HISTORY AND PHYSICAL EXAMINATION OF ADULT: Primary | ICD-10-CM

## 2024-06-21 LAB
PSA SERPL DL<=0.01 NG/ML-MCNC: 1.16 NG/ML (ref 0–4.5)
TSH SERPL DL<=0.005 MIU/L-ACNC: 0.32 UIU/ML (ref 0.3–4.2)

## 2024-06-21 PROCEDURE — 84443 ASSAY THYROID STIM HORMONE: CPT | Performed by: INTERNAL MEDICINE

## 2024-06-21 PROCEDURE — 36415 COLL VENOUS BLD VENIPUNCTURE: CPT | Performed by: INTERNAL MEDICINE

## 2024-06-21 PROCEDURE — G0103 PSA SCREENING: HCPCS | Performed by: INTERNAL MEDICINE

## 2024-06-21 PROCEDURE — 99397 PER PM REEVAL EST PAT 65+ YR: CPT | Performed by: INTERNAL MEDICINE

## 2024-06-21 SDOH — HEALTH STABILITY: PHYSICAL HEALTH: ON AVERAGE, HOW MANY DAYS PER WEEK DO YOU ENGAGE IN MODERATE TO STRENUOUS EXERCISE (LIKE A BRISK WALK)?: 3 DAYS

## 2024-06-21 ASSESSMENT — PAIN SCALES - GENERAL: PAINLEVEL: NO PAIN (0)

## 2024-06-21 ASSESSMENT — SOCIAL DETERMINANTS OF HEALTH (SDOH)
HOW OFTEN DO YOU GET TOGETHER WITH FRIENDS OR RELATIVES?: THREE TIMES A WEEK
HOW OFTEN DO YOU GET TOGETHER WITH FRIENDS OR RELATIVES?: THREE TIMES A WEEK

## 2024-06-21 NOTE — PROGRESS NOTES
"Preventive Care Visit  [facility]  Suzanne Zhang MD, Internal Medicine  Jun 21, 2024      Assessment & Plan     Routine history and physical examination of adult      Unable to lose weight  - Semaglutide-Weight Management (WEGOVY) 0.5 MG/0.5ML pen  Dispense: 2 mL; Refill: 0    BMI 29.0-29.9,adult  - Semaglutide-Weight Management (WEGOVY) 0.5 MG/0.5ML pen  Dispense: 2 mL; Refill: 0    Screening for prostate cancer  - PSA, screen    Hypothyroidism, unspecified type  - TSH with free T4 reflex      Patient has been advised of split billing requirements and indicates understanding: Yes        BMI  Estimated body mass index is 28.37 kg/m  as calculated from the following:    Height as of this encounter: 1.88 m (6' 2\").    Weight as of this encounter: 100.2 kg (221 lb).   Weight management plan: Discussed healthy diet and exercise guidelines    Counseling  Appropriate preventive services were discussed with this patient, including applicable screening as appropriate for fall prevention, nutrition, physical activity, Tobacco-use cessation, weight loss and cognition.  Checklist reviewing preventive services available has been given to the patient.  Reviewed patient's diet, addressing concerns and/or questions.   He is at risk for lack of exercise and has been provided with information to increase physical activity for the benefit of his well-being.   Discussed possible causes of fatigue.     FUTURE APPOINTMENTS:       - Follow-up visit in 1 year    Angel Elizondo is a 64 year old, presenting for the following:  Physical (Fasting)      Health Care Directive  Patient does not have a Health Care Directive or Living Will: Discussed advance care planning with patient; information given to patient to review.    HPI        6/21/2024   General Health   How would you rate your overall physical health? Good   Feel stress (tense, anxious, or unable to sleep) To some extent      (!) STRESS CONCERN      6/21/2024 "   Nutrition   Diet: Gluten-free/reduced            6/21/2024   Exercise   Days per week of moderate/strenous exercise 3 days            6/21/2024   Social Factors   Frequency of gathering with friends or relatives Three times a week   Worry food won't last until get money to buy more No   Food not last or not have enough money for food? No   Do you have housing? (Housing is defined as stable permanent housing and does not include staying ouside in a car, in a tent, in an abandoned building, in an overnight shelter, or couch-surfing.) Yes   Are you worried about losing your housing? No   Lack of transportation? No   Unable to get utilities (heat,electricity)? No            6/21/2024   Fall Risk   Fallen 2 or more times in the past year? No    No    No   Trouble with walking or balance? No    No    No       Multiple values from one day are sorted in reverse-chronological order          6/21/2024   Activities of Daily Living- Home Safety   Needs help with the following daily activites None of the above   Safety concerns in the home None of the above            6/21/2024   Dental   Dentist two times every year? Yes            6/21/2024   Hearing Screening   Hearing concerns? None of the above            6/21/2024   Driving Risk Screening   Patient/family members have concerns about driving No            6/21/2024   General Alertness/Fatigue Screening   Have you been more tired than usual lately? (!) YES            6/21/2024   Urinary Incontinence Screening   Bothered by leaking urine in past 6 months No            6/21/2024   TB Screening   Were you born outside of the [place]? No            Today's PHQ-2 Score:       6/21/2024    10:10 AM   PHQ-2 ( 1999 [employer])   Q1: Little interest or pleasure in doing things 0   Q2: Feeling down, depressed or hopeless 0   PHQ-2 Score 0   Q1: Little interest or pleasure in doing things Not at all   Q2: Feeling down, depressed or hopeless Not at all   PHQ-2 Score 0           6/21/2024    Substance Use   Alcohol more than 3/day or more than 7/wk Not Applicable   Do you have a current opioid prescription? No   How severe/bad is pain from 1 to 10? 0/10 (No Pain)   Do you use any other substances recreationally? No        Social History     Tobacco Use    Smoking status: Never    Smokeless tobacco: Never   Vaping Use    Vaping status: Never Used   Substance Use Topics    Alcohol use: Not Currently    Drug use: No           6/21/2024   AAA Screening   Family history of Abdominal Aortic Aneurysm (AAA)? No      Last PSA:   PSA   Date Value Ref Range Status   03/08/2021 0.66 0 - 4 ug/L Final     Comment:     Assay Method:  Chemiluminescence using Siemens Vista analyzer     Prostate Specific Antigen Screen   Date Value Ref Range Status   06/16/2023 0.91 0.00 - 4.50 ng/mL Final   05/13/2022 0.93 0.00 - 4.00 ug/L Final     ASCVD Risk   The 10-year ASCVD risk score (Isis WOLFE, et al., 2019) is: 13.1%    Values used to calculate the score:      Age: 64 years      Sex: Male      Is Non- : No      Diabetic: No      Tobacco smoker: No      Systolic Blood Pressure: 128 mmHg      Is BP treated: Yes      HDL Cholesterol: 49 mg/dL      Total Cholesterol: 145 mg/dL        Reviewed and updated as needed this visit by Provider                    Past Medical History:   Diagnosis Date    Alcohol use     Dermatitis herpetiformis 2000    Dx by biopsy of rash on elbow per derm    Diastolic dysfunction with chronic heart failure (H)     HTN, goal below 140/90     Hyperlipidemia     Lung nodule Feb 2003    Benign; Left upper lobe    Multiple allergies     allergist with immunotherapy; Allergist is Dr. Santos in [place]    Primary hypogonadism in male     Low FSH, single left testicle - followed by Dr. Flores of endo    Pulmonary embolus (H) Feb 2003    Evaluated by ?[place] - unknown cause    Thyroid nodule Feb 2005    Benign per FNA --> taking Levothyroxine ever since    Uncomplicated asthma   "    Current providers sharing in care for this patient include:  Patient Care Team:  Suzanne Zhang MD as PCP - General (Internal Medicine)  Suzanne Zhang MD as Assigned PCP  Goltz, Bennett Ezra, PA-C as Assigned Neuroscience Provider  Juan C Wood MD as MD (Cardiovascular Disease)  Marium Davies PA-C as Physician Assistant (Endocrinology, Diabetes, and Metabolism)  Juan C Wood MD as Assigned Heart and Vascular Provider    The following health maintenance items are reviewed in Epic and correct as of today:  Health Maintenance   Topic Date Due    HF ACTION PLAN  Never done    Pneumococcal Vaccine: 65+ Years (1 of 2 - PCV) Never done    RSV VACCINE (Pregnancy & 60+) (1 - 1-dose 60+ series) Never done    COVID-19 Vaccine (7 - 2023-24 season) 04/01/2024    BMP  06/05/2024    ANNUAL REVIEW OF HM ORDERS  06/16/2024    MEDICARE ANNUAL WELLNESS VISIT  06/16/2024    CBC  12/05/2024    ALT  05/31/2025    LIPID  05/31/2025    FALL RISK ASSESSMENT  06/21/2025    GLUCOSE  12/05/2026    ADVANCE CARE PLANNING  06/22/2028    COLORECTAL CANCER SCREENING  06/20/2032    DTAP/TDAP/TD IMMUNIZATION (3 - Td or Tdap) 11/17/2032    TSH W/FREE T4 REFLEX  Completed    HEPATITIS C SCREENING  Completed    PHQ-2 (once per calendar year)  Completed    INFLUENZA VACCINE  Completed    ZOSTER IMMUNIZATION  Completed    IPV IMMUNIZATION  Aged Out    HPV IMMUNIZATION  Aged Out    MENINGITIS IMMUNIZATION  Aged Out    RSV MONOCLONAL ANTIBODY  Aged Out         Review of Systems  Constitutional, HEENT, cardiovascular, pulmonary, GI, , musculoskeletal, neuro, skin, endocrine and psych systems are negative, except as otherwise noted.     Objective    Exam  /76 (BP Location: Left arm, Patient Position: Sitting, Cuff Size: Adult Large)   Pulse 66   Temp 96.9  F (36.1  C) (Temporal)   Resp 16   Ht 1.88 m (6' 2\")   Wt 100.2 kg (221 lb)   SpO2 98%   BMI 28.37 kg/m     Estimated body mass index is 28.84 kg/m  as " "calculated from the following:    Height as of 3/26/24: 1.88 m (6' 2\").    Weight as of 3/26/24: 101.9 kg (224 lb 9.6 oz).    Physical Exam  GENERAL: alert and no distress  EYES: Eyes grossly normal to inspection, conjunctivae and sclerae normal  HENT: normocephalic atraumatic  NECK: no asymmetry  RESP: lungs clear to auscultation - no rales, rhonchi or wheezes  CV: regular rate and rhythm, normal S1 S2  ABDOMEN: soft, nontender, bowel sounds normal  MS: no gross musculoskeletal defects noted, no edema  SKIN: no suspicious lesions or rashes  NEURO: Normal strength and tone, mentation intact and speech normal  PSYCH: mentation appears normal, affect normal/bright         6/21/2024   Mini Cog   Clock Draw Score 2 Normal   3 Item Recall 2 objects recalled   Mini Cog Total Score 4          Signed Electronically by: Suzanne Zhang MD    "

## 2024-06-24 ENCOUNTER — MYC MEDICAL ADVICE (OUTPATIENT)
Dept: FAMILY MEDICINE | Facility: CLINIC | Age: 64
End: 2024-06-24
Payer: COMMERCIAL

## 2024-06-25 RX ORDER — LEVOTHYROXINE SODIUM 137 UG/1
137 TABLET ORAL DAILY
Qty: 90 TABLET | Refills: 3 | Status: SHIPPED | OUTPATIENT
Start: 2024-06-25 | End: 2024-09-05

## 2024-07-02 DIAGNOSIS — R79.89 LOW TESTOSTERONE IN MALE: ICD-10-CM

## 2024-07-02 DIAGNOSIS — Z76.0 ENCOUNTER FOR MEDICATION REFILL: ICD-10-CM

## 2024-07-02 RX ORDER — TESTOSTERONE 1.62 MG/G
GEL TRANSDERMAL
Qty: 75 G | Refills: 0 | Status: SHIPPED | OUTPATIENT
Start: 2024-07-02 | End: 2024-08-01

## 2024-07-17 ENCOUNTER — DOCUMENTATION ONLY (OUTPATIENT)
Dept: SLEEP MEDICINE | Facility: CLINIC | Age: 64
End: 2024-07-17
Payer: COMMERCIAL

## 2024-07-17 DIAGNOSIS — G47.33 OSA (OBSTRUCTIVE SLEEP APNEA): Primary | ICD-10-CM

## 2024-07-17 NOTE — PROGRESS NOTES
DATE: 7/17/24  LOCATION OF MASK FITTING: [facility]  REASON FOR MASK FITTING: DISCOMFORT WITH TUBING AT TOP  DISCUSSED/VIEWED THE FOLLOWING MASKS: AIRFIT P10, NUANCE PRO GEL    MASK AND SIZE SELECTED: AIRFIT P10  MASK CLARIFICATION NEEDED: N    DOES PATIENT WEAR A MEDICAL DEVICE THAT WILL BE AFFECTED BY THE RESPIRONICS OR RESMED MAGNETIC MASK CONTRAINDICATION (IF YES, SELECT A MASK THAT DOES NOT HAVE MAGNETS)? N    - ROSIE ESQUIVEL, 7/17/24

## 2024-08-01 DIAGNOSIS — R79.89 LOW TESTOSTERONE IN MALE: ICD-10-CM

## 2024-08-01 DIAGNOSIS — Z76.0 ENCOUNTER FOR MEDICATION REFILL: ICD-10-CM

## 2024-08-01 RX ORDER — TESTOSTERONE 1.62 MG/G
GEL TRANSDERMAL
Qty: 75 G | Refills: 0 | Status: SHIPPED | OUTPATIENT
Start: 2024-08-01 | End: 2024-09-05 | Stop reason: ALTCHOICE

## 2024-08-09 DIAGNOSIS — R63.8 UNABLE TO LOSE WEIGHT: ICD-10-CM

## 2024-08-09 RX ORDER — SEMAGLUTIDE 0.5 MG/.5ML
0.5 INJECTION, SOLUTION SUBCUTANEOUS
Qty: 3 ML | Refills: 0 | Status: SHIPPED | OUTPATIENT
Start: 2024-08-09 | End: 2024-09-06

## 2024-08-26 ENCOUNTER — OFFICE VISIT (OUTPATIENT)
Dept: ENDOCRINOLOGY | Facility: CLINIC | Age: 64
End: 2024-08-26
Attending: INTERNAL MEDICINE
Payer: COMMERCIAL

## 2024-08-26 VITALS
WEIGHT: 218.7 LBS | DIASTOLIC BLOOD PRESSURE: 70 MMHG | RESPIRATION RATE: 16 BRPM | HEIGHT: 74 IN | TEMPERATURE: 97.2 F | OXYGEN SATURATION: 96 % | HEART RATE: 90 BPM | SYSTOLIC BLOOD PRESSURE: 112 MMHG | BODY MASS INDEX: 28.07 KG/M2

## 2024-08-26 DIAGNOSIS — R79.89 LOW TESTOSTERONE IN MALE: ICD-10-CM

## 2024-08-26 DIAGNOSIS — E03.9 HYPOTHYROIDISM, UNSPECIFIED TYPE: ICD-10-CM

## 2024-08-26 DIAGNOSIS — E06.3 HASHIMOTO'S THYROIDITIS: ICD-10-CM

## 2024-08-26 DIAGNOSIS — E88.810 METABOLIC SYNDROME X: Primary | ICD-10-CM

## 2024-08-26 LAB
ALBUMIN SERPL BCG-MCNC: 4.7 G/DL (ref 3.5–5.2)
ALP SERPL-CCNC: 83 U/L (ref 40–150)
ALT SERPL W P-5'-P-CCNC: 34 U/L (ref 0–70)
ANION GAP SERPL CALCULATED.3IONS-SCNC: 15 MMOL/L (ref 7–15)
AST SERPL W P-5'-P-CCNC: 38 U/L (ref 0–45)
BILIRUB SERPL-MCNC: 0.7 MG/DL
BUN SERPL-MCNC: 12.9 MG/DL (ref 8–23)
CALCIUM SERPL-MCNC: 10 MG/DL (ref 8.8–10.4)
CHLORIDE SERPL-SCNC: 103 MMOL/L (ref 98–107)
CREAT SERPL-MCNC: 0.94 MG/DL (ref 0.67–1.17)
EGFRCR SERPLBLD CKD-EPI 2021: 89 ML/MIN/1.73M2
ERYTHROCYTE [DISTWIDTH] IN BLOOD BY AUTOMATED COUNT: 12.9 % (ref 10–15)
GLUCOSE SERPL-MCNC: 86 MG/DL (ref 70–99)
HBA1C MFR BLD: 5.5 % (ref 0–5.6)
HCO3 SERPL-SCNC: 22 MMOL/L (ref 22–29)
HCT VFR BLD AUTO: 42.4 % (ref 40–53)
HGB BLD-MCNC: 14.6 G/DL (ref 13.3–17.7)
MCH RBC QN AUTO: 29.4 PG (ref 26.5–33)
MCHC RBC AUTO-ENTMCNC: 34.4 G/DL (ref 31.5–36.5)
MCV RBC AUTO: 85 FL (ref 78–100)
PLATELET # BLD AUTO: 282 10E3/UL (ref 150–450)
POTASSIUM SERPL-SCNC: 4.4 MMOL/L (ref 3.4–5.3)
PROT SERPL-MCNC: 7.5 G/DL (ref 6.4–8.3)
RBC # BLD AUTO: 4.97 10E6/UL (ref 4.4–5.9)
SHBG SERPL-SCNC: 52 NMOL/L (ref 11–80)
SODIUM SERPL-SCNC: 140 MMOL/L (ref 135–145)
T4 FREE SERPL-MCNC: 1.85 NG/DL (ref 0.9–1.7)
TSH SERPL DL<=0.005 MIU/L-ACNC: 0.22 UIU/ML (ref 0.3–4.2)
WBC # BLD AUTO: 8.3 10E3/UL (ref 4–11)

## 2024-08-26 PROCEDURE — 84403 ASSAY OF TOTAL TESTOSTERONE: CPT | Performed by: PHYSICIAN ASSISTANT

## 2024-08-26 PROCEDURE — 85027 COMPLETE CBC AUTOMATED: CPT | Performed by: PHYSICIAN ASSISTANT

## 2024-08-26 PROCEDURE — 99204 OFFICE O/P NEW MOD 45 MIN: CPT | Performed by: PHYSICIAN ASSISTANT

## 2024-08-26 PROCEDURE — 84439 ASSAY OF FREE THYROXINE: CPT | Performed by: PHYSICIAN ASSISTANT

## 2024-08-26 PROCEDURE — 36415 COLL VENOUS BLD VENIPUNCTURE: CPT | Performed by: PHYSICIAN ASSISTANT

## 2024-08-26 PROCEDURE — 84270 ASSAY OF SEX HORMONE GLOBUL: CPT | Performed by: PHYSICIAN ASSISTANT

## 2024-08-26 PROCEDURE — 84443 ASSAY THYROID STIM HORMONE: CPT | Performed by: PHYSICIAN ASSISTANT

## 2024-08-26 PROCEDURE — 83036 HEMOGLOBIN GLYCOSYLATED A1C: CPT | Performed by: PHYSICIAN ASSISTANT

## 2024-08-26 PROCEDURE — 80053 COMPREHEN METABOLIC PANEL: CPT | Performed by: PHYSICIAN ASSISTANT

## 2024-08-26 NOTE — PROGRESS NOTES
Assessment/Plan :   Hypothyroidism. We reviewed the signs and symptoms of low and excess thyroid hormone. We also discussed how this can affect his overall metabolism. We also reviewed his recent thyroid results and his levels look great. I would like to repeat thyroid testing today and I will contact him with the results.   Low testosterone. Mikhail has been stable on Androgel topical therapy for many years. His primary care provider was concerned about his overall cardiac risk due to his recent CT calcium score. We discussed the possible adverse effects associated with testosterone therapy and the importance of consistent monitoring. We will repeat a free and total testosterone and I will contact him with the results. We also reviewed the different forms of testosterone replacement and he may benefit from Xyosted. It is a subcutaneous injection that has been found to have more consistent absorption pattern. He struggled with consistent administration of testosterone cypionate and he has had skin irritation with the topical Androgel. This would contribute to inconsistency with therapy. We will repeat laboratory testing today and I will contact him with the results and possibly discuss switching to Xyosted.       I have independently reviewed and interpreted labs, imaging as indicated.      Chief complaint:  Juan C is a 64 year old male seen in consultation at the request of Dr. Zhang for thyroid nodule and low testosterone.     I have reviewed Care Everywhere including [facility], [facility], [facility], [facility], [facility] , , [facility] lab reports, imaging reports and provider notes as indicated.      HISTORY OF PRESENT ILLNESS  Juan C comes to our clinic for help with his general metabolic health. Over the last few years, he was diagnosed with HTN and hyperlipidemia. He recently underwent a CT calcium score and he was found to have severe coronary calcifications. He feels like these  problems stem from a metabolism imbalance. He knows that he has thyroid disease but he is not sure that he is on the correct dose.     Juan C was found to have a thyroid nodule in 2014 or 2015 after undergoing a CT scan for a pulmonary embolism. He underwent a thyroid biopsy and the nodule was benign. He was later diagnosed with hypothyroidism, as well. He currently takes 137 mcg of levothyroxine daily. He has not had any problems with heart palpitations or worsening anxiousness. He does have a lot of stress related to work and he feels like that contributes to overall irritability and fatigue.    He also has low testosterone that he currently manages with topical Androgel. He was being followed closely at the endocrinology clinic of [place]. He states that he was originally started on IM testosterone injections but he struggled to administer the injections. He also felt like his hormone levels were constantly up and down. He later switched to topical gel and it seems to work well. He has had some issues with skin irritation, so he will try and rotate sites.     Endocrine relevant labs are as follows:   Latest Reference Range & Units 06/21/24 10:41   TSH 0.30 - 4.20 uIU/mL 0.32      Latest Reference Range & Units 06/21/24 10:41   TSH 0.30 - 4.20 uIU/mL 0.32      Latest Reference Range & Units 06/21/24 10:41   PSA Tumor Marker 0.00 - 4.50 ng/mL 1.16     REVIEW OF SYSTEMS    Endocrine: positive for thyroid disorder, low testosterone, and metabolic syndrome  Skin: negative  Eyes: negative for, visual blurring, redness, tearing  Ears/Nose/Throat: negative for, hoarseness, feeling of fullness  Respiratory: No shortness of breath, dyspnea on exertion, cough, or hemoptysis  Cardiovascular: negative for, chest pain, lower extremity edema, and exercise intolerance  Gastrointestinal: negative for, nausea, vomiting, constipation, and diarrhea  Genitourinary: negative for, nocturia, dysuria, frequency, and  urgency  Musculoskeletal: negative for, muscular weakness, nocturnal cramping, and foot pain  Neurologic: negative for, local weakness, numbness or tingling of hands, and numbness or tingling of feet  Psychiatric: negative  Hematologic/Lymphatic/Immunologic: negative    Past Medical History  Past Medical History:   Diagnosis Date    Alcohol use     Dermatitis herpetiformis 2000    Dx by biopsy of rash on elbow per derm    Diastolic dysfunction with chronic heart failure (H)     HTN, goal below 140/90     Hyperlipidemia     Lung nodule Feb 2003    Benign; Left upper lobe    Multiple allergies     allergist with immunotherapy; Allergist is Dr. Santos in [place]    Primary hypogonadism in male     Low FSH, single left testicle - followed by Dr. Flores of endo    Pulmonary embolus (H) Feb 2003    Evaluated by ?[place] - unknown cause    Thyroid nodule Feb 2005    Benign per FNA --> taking Levothyroxine ever since    Uncomplicated asthma        Medications  Current Outpatient Medications   Medication Sig Dispense Refill    ALLERGY INJECTIONS       aspirin 81 MG EC tablet Take 1 tablet (81 mg) by mouth daily      atorvastatin (LIPITOR) 80 MG tablet Take 1 tablet (80 mg) by mouth daily 90 tablet 3    fluticasone-salmeterol (ADVAIR) 250-50 MCG/ACT inhaler       hydroCHLOROthiazide 12.5 MG tablet TAKE 1 TABLET BY MOUTH EVERY DAY 90 tablet 2    levothyroxine (SYNTHROID/LEVOTHROID) 137 MCG tablet Take 1 tablet (137 mcg) by mouth daily 90 tablet 3    losartan (COZAAR) 50 MG tablet TAKE 1 TABLET BY MOUTH EVERY DAY 90 tablet 1    Semaglutide-Weight Management (WEGOVY) 0.5 MG/0.5ML pen INJECT 0.5 MG SUBCUTANEOUS ONCE A WEEK 3 mL 0    testosterone (ANDROGEL 1.62 % PUMP) 20.25 MG/ACT gel APPLY 3 PUMP PRESSES (5GM) ONE TIME DAILY AS DIRECTED 75 g 0       Allergies  Allergies   Allergen Reactions    No Known Allergies          Family History  family history includes Alcoholism in his mother; Cancer in his father and son;  "Cerebrovascular Disease in his father and paternal grandmother; Dementia in his mother; Diabetes in his maternal grandfather; Family History Negative in his brother, sister, and son; Heart Disease in his father; Hypertension in his father and mother.    Social History  Social History     Tobacco Use    Smoking status: Never    Smokeless tobacco: Never   Vaping Use    Vaping status: Never Used   Substance Use Topics    Alcohol use: Not Currently    Drug use: No       Physical Exam  /70 (BP Location: Left arm, Patient Position: Chair, Cuff Size: Adult Regular)   Pulse 90   Temp 97.2  F (36.2  C) (Tympanic)   Resp 16   Ht 1.88 m (6' 2.02\")   Wt 99.2 kg (218 lb 11.2 oz)   SpO2 96%   BMI 28.07 kg/m    Body mass index is 28.07 kg/m .  GENERAL :  In no apparent distress  SKIN: Normal color, normal temperature, texture.  No hirsutism, alopecia or purple striae.     EYES: PERRL, EOMI, No scleral icterus,  No proptosis, conjunctival redness, stare, retraction  NECK: No visible masses. No palpable adenopathy, or masses. No carotid bruits.   THYROID:  Normal, nontender, smooth / firm texture,  no nodules, no Bruit   RESP: Lungs clear to auscultation bilaterally  CARDIAC: Regular rate and rhythm, normal S1 S2, without murmurs, rubs or gallops    NEURO: awake, alert, responds appropriately to questions.  Cranial nerves intact.   Moves all extremities; Gait normal.  No tremor of the outstretched hand.    EXTREMITIES: No clubbing, cyanosis or edema.          "

## 2024-08-26 NOTE — PATIENT INSTRUCTIONS
[facility]  Dr Osorio, Endocrinology Department    [place] [address] [facility] [id number]  [place] [address]  Appointment [phone]  Fax: [phone]  [place]: Monday - Thursday      Please check the cost coverage and copay with insurance before recommended tests, services and medications (especially if new medications are prescribed).     If ordered, please get blood work done 1 week prior to your next appointment so they will be available to Dr. Osorio at your visit.

## 2024-08-26 NOTE — LETTER
8/26/2024      Juan C Garcia  [address]  [place] [address]      Dear Colleague,    Thank you for referring your patient, Juan C Garcia, to the [facility]. Please see a copy of my visit note below.    Assessment/Plan :   Hypothyroidism. We reviewed the signs and symptoms of low and excess thyroid hormone. We also discussed how this can affect his overall metabolism. We also reviewed his recent thyroid results and his levels look great. I would like to repeat thyroid testing today and I will contact him with the results.   Low testosterone. Mikhail has been stable on Androgel topical therapy for many years. His primary care provider was concerned about his overall cardiac risk due to his recent CT calcium score. We discussed the possible adverse effects associated with testosterone therapy and the importance of consistent monitoring. We will repeat a free and total testosterone and I will contact him with the results. We also reviewed the different forms of testosterone replacement and he may benefit from Xyosted. It is a subcutaneous injection that has been found to have more consistent absorption pattern. He struggled with consistent administration of testosterone cypionate and he has had skin irritation with the topical Androgel. This would contribute to inconsistency with therapy. We will repeat laboratory testing today and I will contact him with the results and possibly discuss switching to Xyosted.       I have independently reviewed and interpreted labs, imaging as indicated.      Chief complaint:  Juan C is a 64 year old male seen in consultation at the request of Dr. Zhang for thyroid nodule and low testosterone.     I have reviewed Care Everywhere including [facility], [facility], [facility], [facility], [place], [facility], [facility] , [facility], [place] lab reports, imaging reports and provider notes as indicated.      HISTORY OF PRESENT ILLNESS  Juna C  comes to our clinic for help with his general metabolic health. Over the last few years, he was diagnosed with HTN and hyperlipidemia. He recently underwent a CT calcium score and he was found to have severe coronary calcifications. He feels like these problems stem from a metabolism imbalance. He knows that he has thyroid disease but he is not sure that he is on the correct dose.     Juan C was found to have a thyroid nodule in 2014 or 2015 after undergoing a CT scan for a pulmonary embolism. He underwent a thyroid biopsy and the nodule was benign. He was later diagnosed with hypothyroidism, as well. He currently takes 137 mcg of levothyroxine daily. He has not had any problems with heart palpitations or worsening anxiousness. He does have a lot of stress related to work and he feels like that contributes to overall irritability and fatigue.    He also has low testosterone that he currently manages with topical Androgel. He was being followed closely at the endocrinology clinic of [place]. He states that he was originally started on IM testosterone injections but he struggled to administer the injections. He also felt like his hormone levels were constantly up and down. He later switched to topical gel and it seems to work well. He has had some issues with skin irritation, so he will try and rotate sites.     Endocrine relevant labs are as follows:   Latest Reference Range & Units 06/21/24 10:41   TSH 0.30 - 4.20 uIU/mL 0.32      Latest Reference Range & Units 06/21/24 10:41   TSH 0.30 - 4.20 uIU/mL 0.32      Latest Reference Range & Units 06/21/24 10:41   PSA Tumor Marker 0.00 - 4.50 ng/mL 1.16     REVIEW OF SYSTEMS    Endocrine: positive for thyroid disorder, low testosterone, and metabolic syndrome  Skin: negative  Eyes: negative for, visual blurring, redness, tearing  Ears/Nose/Throat: negative for, hoarseness, feeling of fullness  Respiratory: No shortness of breath, dyspnea on exertion, cough, or  hemoptysis  Cardiovascular: negative for, chest pain, lower extremity edema, and exercise intolerance  Gastrointestinal: negative for, nausea, vomiting, constipation, and diarrhea  Genitourinary: negative for, nocturia, dysuria, frequency, and urgency  Musculoskeletal: negative for, muscular weakness, nocturnal cramping, and foot pain  Neurologic: negative for, local weakness, numbness or tingling of hands, and numbness or tingling of feet  Psychiatric: negative  Hematologic/Lymphatic/Immunologic: negative    Past Medical History  Past Medical History:   Diagnosis Date     Alcohol use      Dermatitis herpetiformis 2000    Dx by biopsy of rash on elbow per derm     Diastolic dysfunction with chronic heart failure (H)      HTN, goal below 140/90      Hyperlipidemia      Lung nodule Feb 2003    Benign; Left upper lobe     Multiple allergies     allergist with immunotherapy; Allergist is Dr. Santos in [place]     Primary hypogonadism in male     Low FSH, single left testicle - followed by Dr. Flores of endo     Pulmonary embolus (H) Feb 2003    Evaluated by ?[facility]ness - unknown cause     Thyroid nodule Feb 2005    Benign per FNA --> taking Levothyroxine ever since     Uncomplicated asthma        Medications  Current Outpatient Medications   Medication Sig Dispense Refill     ALLERGY INJECTIONS        aspirin 81 MG EC tablet Take 1 tablet (81 mg) by mouth daily       atorvastatin (LIPITOR) 80 MG tablet Take 1 tablet (80 mg) by mouth daily 90 tablet 3     fluticasone-salmeterol (ADVAIR) 250-50 MCG/ACT inhaler        hydroCHLOROthiazide 12.5 MG tablet TAKE 1 TABLET BY MOUTH EVERY DAY 90 tablet 2     levothyroxine (SYNTHROID/LEVOTHROID) 137 MCG tablet Take 1 tablet (137 mcg) by mouth daily 90 tablet 3     losartan (COZAAR) 50 MG tablet TAKE 1 TABLET BY MOUTH EVERY DAY 90 tablet 1     Semaglutide-Weight Management (WEGOVY) 0.5 MG/0.5ML pen INJECT 0.5 MG SUBCUTANEOUS ONCE A WEEK 3 mL 0     testosterone (ANDROGEL 1.62 % PUMP)  "20.25 MG/ACT gel APPLY 3 PUMP PRESSES (5GM) ONE TIME DAILY AS DIRECTED 75 g 0       Allergies  Allergies   Allergen Reactions     No Known Allergies          Family History  family history includes Alcoholism in his mother; Cancer in his father and son; Cerebrovascular Disease in his father and paternal grandmother; Dementia in his mother; Diabetes in his maternal grandfather; Family History Negative in his brother, sister, and son; Heart Disease in his father; Hypertension in his father and mother.    Social History  Social History     Tobacco Use     Smoking status: Never     Smokeless tobacco: Never   Vaping Use     Vaping status: Never Used   Substance Use Topics     Alcohol use: Not Currently     Drug use: No       Physical Exam  /70 (BP Location: Left arm, Patient Position: Chair, Cuff Size: Adult Regular)   Pulse 90   Temp 97.2  F (36.2  C) (Tympanic)   Resp 16   Ht 1.88 m (6' 2.02\")   Wt 99.2 kg (218 lb 11.2 oz)   SpO2 96%   BMI 28.07 kg/m    Body mass index is 28.07 kg/m .  GENERAL :  In no apparent distress  SKIN: Normal color, normal temperature, texture.  No hirsutism, alopecia or purple striae.     EYES: PERRL, EOMI, No scleral icterus,  No proptosis, conjunctival redness, stare, retraction  NECK: No visible masses. No palpable adenopathy, or masses. No carotid bruits.   THYROID:  Normal, nontender, smooth / firm texture,  no nodules, no Bruit   RESP: Lungs clear to auscultation bilaterally  CARDIAC: Regular rate and rhythm, normal S1 S2, without murmurs, rubs or gallops    NEURO: awake, alert, responds appropriately to questions.  Cranial nerves intact.   Moves all extremities; Gait normal.  No tremor of the outstretched hand.    EXTREMITIES: No clubbing, cyanosis or edema.            Again, thank you for allowing me to participate in the care of your patient.        Sincerely,        Marium Davies PA-C  "

## 2024-08-29 LAB
TESTOST FREE SERPL-MCNC: 5.82 NG/DL
TESTOST SERPL-MCNC: 388 NG/DL (ref 240–950)

## 2024-08-30 NOTE — TELEPHONE ENCOUNTER
Routing refill request to provider for review/approval because:  Drug not on the [facility] refill protocol   Added in pharm comments- Due for annual physical in March  Please authorize if appropriate.  Thanks,  Racheal Taylor RN      aczone      Last Written Prescription Date:  9/4/19  Last Fill Quantity: 60,   # refills: 2  Last Office Visit: 3/8/19  Future Office visit:           Requested Prescriptions   Pending Prescriptions Disp Refills     dapsone (ACZONE) 25 MG tablet [Pharmacy Med Name: DAPSONE 25 MG TABLET] 60 tablet 2     Sig: TAKE 2 TABLETS (50 MG) BY MOUTH DAILY       There is no refill protocol information for this order           negative

## 2024-09-05 DIAGNOSIS — E06.3 HASHIMOTO'S THYROIDITIS: ICD-10-CM

## 2024-09-05 DIAGNOSIS — R79.89 LOW TESTOSTERONE IN MALE: Primary | ICD-10-CM

## 2024-09-05 RX ORDER — LEVOTHYROXINE SODIUM 125 UG/1
125 TABLET ORAL DAILY
Qty: 90 TABLET | Refills: 0 | Status: SHIPPED | OUTPATIENT
Start: 2024-09-05

## 2024-09-05 RX ORDER — TESTOSTERONE ENANTHATE 75 MG/.5ML
75 INJECTION SUBCUTANEOUS WEEKLY
Qty: 2 ML | Refills: 2 | Status: SHIPPED | OUTPATIENT
Start: 2024-09-05

## 2024-09-06 DIAGNOSIS — R63.8 UNABLE TO LOSE WEIGHT: ICD-10-CM

## 2024-09-06 RX ORDER — SEMAGLUTIDE 0.5 MG/.5ML
0.5 INJECTION, SOLUTION SUBCUTANEOUS
Qty: 3 ML | Refills: 0 | Status: SHIPPED | OUTPATIENT
Start: 2024-09-06

## 2024-09-09 ENCOUNTER — VIRTUAL VISIT (OUTPATIENT)
Dept: SLEEP MEDICINE | Facility: CLINIC | Age: 64
End: 2024-09-09
Payer: COMMERCIAL

## 2024-09-09 VITALS — BODY MASS INDEX: 27.59 KG/M2 | HEIGHT: 74 IN | WEIGHT: 215 LBS

## 2024-09-09 DIAGNOSIS — G47.33 OSA (OBSTRUCTIVE SLEEP APNEA): Primary | ICD-10-CM

## 2024-09-09 PROCEDURE — 99442 PR PHYSICIAN TELEPHONE EVALUATION 11-20 MIN: CPT | Mod: 95 | Performed by: PHYSICIAN ASSISTANT

## 2024-09-09 ASSESSMENT — PAIN SCALES - GENERAL: PAINLEVEL: NO PAIN (0)

## 2024-09-09 NOTE — PROGRESS NOTES
Virtual Visit Details    Type of service:  Video Visit - switched to phone visit  Start Time: 3:31 PM   End Time: 3:46 PM    Originating Location (pt. Location): Home    Distant Location (provider location):  On-site  Platform used for Video Visit: [facility]      CPAP Follow-Up Visit:    Date on this visit: 9/9/2024    Juan C Garcia has a follow-up visit today to review his CPAP use for BARRY. He was initially seen for snoring, witnessed apneas.      Previous Study Results:    Home sleep test from 04/18/2017 showed an apnea-hypopnea index of 6.8 events per hour with a supine dependence. AHI was 15.2 per hour in the supine position, 2.6 on the left side and 0.7 on the right side. There was evidence for sleep-related hypoxemia with a total of 49 minutes with a saturation of 88%. Wt: 213#    He opted for treatment with mandibular advancement device.   Oximetry on dental appliance showed KAIT is 6.8 which is not a significant improvement from baseline AHI of 6.8 on home sleep test from April 2017. Time below 88 is 20 minutes compared to 49 minutes.        An HST was repeated on 4/15/24 to assess efficacy of positional restriction.  Wt: 224#  AHI 19.3/hr. Supine AHI 44.6/hr. Non-supine AHI was 12.4/hr on left and 9.4/hr on right.   O2 deisi was 80% with 11.7 minutes below 89%.     [employer] (5/22/24)  Auto-PAP 5.0 - 15.0 cmH2O 30 day usage data:    13% of days with > 4 hours of use. 15/30 days with no use.   Average use 95 minutes per day.   95%ile Leak 20.88 L/min.   CPAP 95% pressure 7.9 cm.   AHI 2 events per hour.       He feels he is having difficulty adjusting to CPAP. He throws it off if he is able to get to sleep with it. He is comfortable with the pressures but the mask bothered him. The p10 has been better than the N30i. He felt tethered with the one at the top. The p10 comes off because it is not on firmly. He turns side to side, has a hard time sleeping on his back.     He has been alternating between 2 masks. He  thinks it does help. He has difficulty sleeping because of his spine issues. The profound kicking or jerking that he had described at his consult has reduced. He has had PT for his hip and is going to a chiropractor every 2 weeks. His ferritin was 126 in 6/2016. He does not think he needs gabapentin.  He is sleeping alone now, so not sure how much he kicks in his sleep.            No specialty comments available.    Do you use a CPAP Machine at home: Yes  Overall, on a scale of 0-10 how would you rate your CPAP (0 poor, 10 great): 3    What type of mask do you use: Nasal Pillow N30i bothered him at the top of his head. He switched to the P10.  Is your mask comfortable: No  If not, why: Na  How often do you replace supplies:    Is your mask leaking: No  If yes, where do you feel it:    How many night per week does the mask leak (0-7):      Do you notice snoring with mask on: No  Do you notice gasping arousals with mask on: No  Are you having significant oral or nasal dryness: No  Are you using the humidifier: yes  Does the water chamber run out before the night is over:no  Do you get condensation in the mask or hose:no  Is the pressure setting comfortable: Yes  If not, why:      Typical bedtime: 10  Sleep latency on PAP therapy: Immediate  Typical wake time: 6am  Wakes 5 times per night for 1 minutes. Reason for waking: restroom  How many hours on average per night are you using PAP therapy: X  How many hours are you sleeping per night: X  Do you feel well rested in the morning: No    Naps: 0. He says he would love to. His energy is getting better.     He feels he needs to retire. His hours are ridiculous. He plans to retire at the end of the year.       Weight change since sleep study: 215 lbs, on Wegovy, down about 9# since his study.       Past medical/surgical history, family history, social history, medications and allergies were reviewed.      Problem List:  Patient Active Problem List    Diagnosis Date Noted     Hashimoto's thyroiditis 01/06/2023     Priority: Medium    Elevated BP 01/06/2023     Priority: Medium    Celiac disease 01/06/2023     Priority: Medium    Hypoxemia 03/15/2021     Priority: Medium    Diastolic dysfunction with chronic heart failure (H) 03/15/2021     Priority: Medium    BARRY (obstructive sleep apnea) 04/28/2017     Priority: Medium     Mild BARRY      Anxiety 10/22/2016     Priority: Medium    Anemia, unspecified anemia type 03/20/2016     Priority: Medium     With elevated reticulocyte count - likely r/t Dapsone      Primary hypogonadism in male      Priority: Medium     Low FSH, single left testicle - followed by Dr. Flores of endo      Hypothyroidism 02/02/2015     Priority: Medium    Dermatitis herpetiformis      Priority: Medium     Dx by biopsy of rash on elbow per derm      HTN, goal below 140/90      Priority: Medium    Hyperlipidemia      Priority: Medium    Alcohol use      Priority: Medium    Allergic rhinitis 10/13/2014     Priority: Medium     Problem list name updated by automated process. Provider to review      Pulmonary embolism (H) 02/01/2003     Priority: Medium        Impression/Plan:  (G47.33) BARRY (obstructive sleep apnea)  (primary encounter diagnosis)  Comment: Mikhail has been using CPAP sporadically. He has had difficulty finding a mask that is comfortable but stays in place. His sleep is disrupted from back issues. He does feel a benefit to using CPAP and admits he just needs to make an effort to be more consistent. He does sleep laterally and his apnea was in the mild range when sleeping laterally. The note from when he obtained his CPAP states he needs to meet usage goals, but he recalls recently being told by a [facility] staff that his insurance was not going to require that.   Plan: Continue auto CPAP 5-15 cm. A prescription was written for new supplies. We reviewed recommendations for cleaning and replacing supplies. We talked about a couple of other nasal pillows masks that  may stay in place better than the P10, such as the Nuance Pro or Amin FX.          He will follow up with me in about 6 month(s).     Phone visit duration: 15 min     Bennett Goltz, PA-C    CC: No ref. provider found

## 2024-09-09 NOTE — NURSING NOTE
Current patient location: Patient declined to provide  work     Is the patient currently in the state of [place]? YES    Visit mode:VIDEO    If the visit is dropped, the patient can be reconnected by: VIDEO VISIT: Text to cell phone:   Telephone Information:   Mobile [phone]       Will anyone else be joining the visit? NO  (If patient encounters technical issues they should call [phone]    How would you like to obtain your AVS? MyChart    Are changes needed to the allergy or medication list? Pt stated no med changes    Are refills needed on medications prescribed by this physician? NO    Rooming Documentation:  Questionnaire(s) completed      Reason for visit: RECHECK    Maci VERDINF

## 2024-10-11 DIAGNOSIS — R63.8 UNABLE TO LOSE WEIGHT: ICD-10-CM

## 2024-10-11 RX ORDER — SEMAGLUTIDE 0.5 MG/.5ML
0.5 INJECTION, SOLUTION SUBCUTANEOUS
Qty: 3 ML | Refills: 0 | Status: SHIPPED | OUTPATIENT
Start: 2024-10-11

## 2024-10-23 ENCOUNTER — TRANSFERRED RECORDS (OUTPATIENT)
Dept: HEALTH INFORMATION MANAGEMENT | Facility: CLINIC | Age: 64
End: 2024-10-23
Payer: COMMERCIAL

## 2024-10-25 ENCOUNTER — MYC REFILL (OUTPATIENT)
Dept: FAMILY MEDICINE | Facility: CLINIC | Age: 64
End: 2024-10-25
Payer: COMMERCIAL

## 2024-10-25 DIAGNOSIS — I10 ESSENTIAL HYPERTENSION: ICD-10-CM

## 2024-10-25 RX ORDER — LOSARTAN POTASSIUM 50 MG/1
50 TABLET ORAL DAILY
Qty: 90 TABLET | Refills: 1 | Status: SHIPPED | OUTPATIENT
Start: 2024-10-25

## 2024-10-25 RX ORDER — LOSARTAN POTASSIUM 50 MG/1
TABLET ORAL
Qty: 90 TABLET | Refills: 1 | OUTPATIENT
Start: 2024-10-25

## 2024-12-04 DIAGNOSIS — E06.3 HASHIMOTO'S THYROIDITIS: ICD-10-CM

## 2024-12-11 RX ORDER — LEVOTHYROXINE SODIUM 125 UG/1
125 TABLET ORAL DAILY
Qty: 90 TABLET | Refills: 0 | OUTPATIENT
Start: 2024-12-11

## 2024-12-30 DIAGNOSIS — E03.9 HYPOTHYROIDISM, UNSPECIFIED TYPE: ICD-10-CM

## 2024-12-30 DIAGNOSIS — R79.89 LOW TESTOSTERONE IN MALE: Primary | ICD-10-CM

## 2025-01-02 ENCOUNTER — LAB (OUTPATIENT)
Dept: LAB | Facility: CLINIC | Age: 65
End: 2025-01-02

## 2025-01-02 DIAGNOSIS — R79.89 LOW TESTOSTERONE IN MALE: ICD-10-CM

## 2025-01-02 DIAGNOSIS — E03.9 HYPOTHYROIDISM, UNSPECIFIED TYPE: ICD-10-CM

## 2025-01-02 LAB
ANION GAP SERPL CALCULATED.3IONS-SCNC: 11 MMOL/L (ref 7–15)
BUN SERPL-MCNC: 15.4 MG/DL (ref 8–23)
CALCIUM SERPL-MCNC: 9.6 MG/DL (ref 8.8–10.4)
CHLORIDE SERPL-SCNC: 102 MMOL/L (ref 98–107)
CREAT SERPL-MCNC: 0.87 MG/DL (ref 0.67–1.17)
EGFRCR SERPLBLD CKD-EPI 2021: >90 ML/MIN/1.73M2
GLUCOSE SERPL-MCNC: 99 MG/DL (ref 70–99)
HCO3 SERPL-SCNC: 25 MMOL/L (ref 22–29)
POTASSIUM SERPL-SCNC: 4.1 MMOL/L (ref 3.4–5.3)
SHBG SERPL-SCNC: 52 NMOL/L (ref 11–80)
SODIUM SERPL-SCNC: 138 MMOL/L (ref 135–145)
T4 FREE SERPL-MCNC: 1.41 NG/DL (ref 0.9–1.7)
TSH SERPL DL<=0.005 MIU/L-ACNC: 3.09 UIU/ML (ref 0.3–4.2)

## 2025-01-05 LAB
TESTOST FREE SERPL-MCNC: 6.16 NG/DL
TESTOST SERPL-MCNC: 408 NG/DL (ref 240–950)

## 2025-01-14 ENCOUNTER — MYC REFILL (OUTPATIENT)
Dept: FAMILY MEDICINE | Facility: CLINIC | Age: 65
End: 2025-01-14

## 2025-01-14 DIAGNOSIS — R63.8 UNABLE TO LOSE WEIGHT: ICD-10-CM

## 2025-01-14 RX ORDER — SEMAGLUTIDE 0.5 MG/.5ML
0.5 INJECTION, SOLUTION SUBCUTANEOUS
Qty: 3 ML | Refills: 0 | Status: CANCELLED | OUTPATIENT
Start: 2025-01-14

## 2025-02-09 ENCOUNTER — ANCILLARY PROCEDURE (OUTPATIENT)
Dept: GENERAL RADIOLOGY | Facility: CLINIC | Age: 65
End: 2025-02-09
Attending: FAMILY MEDICINE
Payer: COMMERCIAL

## 2025-02-09 ENCOUNTER — OFFICE VISIT (OUTPATIENT)
Dept: URGENT CARE | Facility: URGENT CARE | Age: 65
End: 2025-02-09
Payer: COMMERCIAL

## 2025-02-09 VITALS
TEMPERATURE: 97.5 F | WEIGHT: 228.4 LBS | HEART RATE: 75 BPM | SYSTOLIC BLOOD PRESSURE: 108 MMHG | DIASTOLIC BLOOD PRESSURE: 66 MMHG | OXYGEN SATURATION: 100 % | RESPIRATION RATE: 22 BRPM | BODY MASS INDEX: 29.32 KG/M2

## 2025-02-09 DIAGNOSIS — R05.1 ACUTE COUGH: Primary | ICD-10-CM

## 2025-02-09 DIAGNOSIS — J45.40 MODERATE PERSISTENT ASTHMA WITHOUT COMPLICATION: ICD-10-CM

## 2025-02-09 DIAGNOSIS — R05.1 ACUTE COUGH: ICD-10-CM

## 2025-02-09 PROCEDURE — 71046 X-RAY EXAM CHEST 2 VIEWS: CPT | Mod: TC | Performed by: RADIOLOGY

## 2025-02-09 PROCEDURE — 99214 OFFICE O/P EST MOD 30 MIN: CPT | Performed by: FAMILY MEDICINE

## 2025-02-09 RX ORDER — AZITHROMYCIN 250 MG/1
TABLET, FILM COATED ORAL
Qty: 6 TABLET | Refills: 0 | Status: SHIPPED | OUTPATIENT
Start: 2025-02-09 | End: 2025-02-14

## 2025-02-10 ENCOUNTER — TRANSFERRED RECORDS (OUTPATIENT)
Dept: HEALTH INFORMATION MANAGEMENT | Facility: CLINIC | Age: 65
End: 2025-02-10
Payer: COMMERCIAL

## 2025-02-10 NOTE — PROGRESS NOTES
(R05.1) Acute cough  (primary encounter diagnosis)  Comment: likely mild atypical pneumonia.  Symptomatic therapy and follow up discussed. Use of OTC  meds. discussed   Plan: XR Chest 2 Views, azithromycin (ZITHROMAX) 250         MG tablet             (J45.40) Moderate persistent asthma without complication  Comment: no sign exacerbation at present.   Plan: continue inhalers.   -------------------------------  Juan C Garcia with presents with about 30 days symptoms including cold symptoms initialy but now with cough and mild shortness of breath and feels run down for the past week plus.    The patient has a history of asthma/allergies.       Exposures--no  Recent travel--[place]    Current Outpatient Medications   Medication Sig Dispense Refill    ALLERGY INJECTIONS       aspirin 81 MG EC tablet Take 1 tablet (81 mg) by mouth daily      atorvastatin (LIPITOR) 80 MG tablet Take 1 tablet (80 mg) by mouth daily 90 tablet 3    fluticasone-salmeterol (ADVAIR) 250-50 MCG/ACT inhaler       hydroCHLOROthiazide 12.5 MG tablet TAKE 1 TABLET BY MOUTH EVERY DAY 90 tablet 2    levothyroxine (SYNTHROID/LEVOTHROID) 125 MCG tablet Take 1 tablet (125 mcg) by mouth daily. 90 tablet 0    losartan (COZAAR) 50 MG tablet Take 1 tablet (50 mg) by mouth daily. 90 tablet 1    Testosterone Enanthate (XYOSTED) 75 MG/0.5ML SOAJ Inject 75 mg subcutaneously once a week. 2 mL 2       ROS otherwise negative for resp., ID,  HEENT symptoms.    Objective: /66   Pulse 75   Temp 97.5  F (36.4  C) (Tympanic)   Resp 22   Wt 103.6 kg (228 lb 6.4 oz)   SpO2 100%   BMI 29.32 kg/m    Exam:  GENERAL APPEARANCE: healthy, alert and no distress  EYES: Eyes grossly normal to inspection  HENT: ear canals and TM's normal and nose and mouth without ulcers or lesions  NECK: no adenopathy, no asymmetry, masses, or scars and thyroid normal to palpation  RESP: lungs clear to auscultation - no rales, rhonchi or wheezes  CV: regular rates and rhythm, no  murmur    No results found for any visits on 02/09/25.    Cxr Per my interpretation. leg length discrepancy mild infiltrate.

## 2025-02-21 ENCOUNTER — DOCUMENTATION ONLY (OUTPATIENT)
Dept: LAB | Facility: CLINIC | Age: 65
End: 2025-02-21

## 2025-02-21 NOTE — PROGRESS NOTES
Juan C Garcia has an upcoming lab appointment:    Future Appointments   Date Time Provider Department [place]   2/26/2025  7:30 AM CS [facility]    3/5/2025 10:00 AM Marium Davies PA-C [facility] [place]   6/26/2025  8:30 AM Suzanne Zhang MD [facility]     Patient has an upcoming lab appt but there are no orders in their chart. Just seeing if you need anything from him at this time. Thank you.     There is no order available. Please review and place either future orders or HMPO (Review of Health Maintenance Protocol Orders), as appropriate.    Health Maintenance Due   Topic    ANNUAL REVIEW OF HM ORDERS      Christine Villarreal

## 2025-02-24 ENCOUNTER — TRANSFERRED RECORDS (OUTPATIENT)
Dept: HEALTH INFORMATION MANAGEMENT | Facility: CLINIC | Age: 65
End: 2025-02-24
Payer: COMMERCIAL

## 2025-02-26 ENCOUNTER — LAB (OUTPATIENT)
Dept: LAB | Facility: CLINIC | Age: 65
End: 2025-02-26
Payer: COMMERCIAL

## 2025-02-26 DIAGNOSIS — E06.3 HASHIMOTO'S THYROIDITIS: Primary | ICD-10-CM

## 2025-02-26 DIAGNOSIS — E06.3 HASHIMOTO'S THYROIDITIS: ICD-10-CM

## 2025-02-26 DIAGNOSIS — R79.89 LOW TESTOSTERONE IN MALE: ICD-10-CM

## 2025-02-26 LAB
HOLD SPECIMEN: NORMAL
T4 FREE SERPL-MCNC: 1.36 NG/DL (ref 0.9–1.7)
TSH SERPL DL<=0.005 MIU/L-ACNC: 3.34 UIU/ML (ref 0.3–4.2)

## 2025-02-26 PROCEDURE — 36415 COLL VENOUS BLD VENIPUNCTURE: CPT

## 2025-02-26 PROCEDURE — 84443 ASSAY THYROID STIM HORMONE: CPT

## 2025-02-26 PROCEDURE — 84439 ASSAY OF FREE THYROXINE: CPT

## 2025-03-01 LAB
TESTOST FREE SERPL-MCNC: 3.89 NG/DL
TESTOST SERPL-MCNC: 259 NG/DL (ref 240–950)

## 2025-03-05 ENCOUNTER — VIRTUAL VISIT (OUTPATIENT)
Dept: ENDOCRINOLOGY | Facility: CLINIC | Age: 65
End: 2025-03-05
Payer: COMMERCIAL

## 2025-03-05 DIAGNOSIS — E06.3 HASHIMOTO'S THYROIDITIS: ICD-10-CM

## 2025-03-05 DIAGNOSIS — R79.89 LOW TESTOSTERONE IN MALE: Primary | ICD-10-CM

## 2025-03-05 DIAGNOSIS — I10 HTN, GOAL BELOW 140/90: Chronic | ICD-10-CM

## 2025-03-05 DIAGNOSIS — E03.9 HYPOTHYROIDISM, UNSPECIFIED TYPE: ICD-10-CM

## 2025-03-05 PROCEDURE — 1126F AMNT PAIN NOTED NONE PRSNT: CPT | Performed by: PHYSICIAN ASSISTANT

## 2025-03-05 PROCEDURE — 98005 SYNCH AUDIO-VIDEO EST LOW 20: CPT | Performed by: PHYSICIAN ASSISTANT

## 2025-03-05 RX ORDER — LEVOTHYROXINE SODIUM 125 UG/1
125 TABLET ORAL DAILY
Qty: 90 TABLET | Refills: 0 | Status: SHIPPED | OUTPATIENT
Start: 2025-03-05

## 2025-03-05 RX ORDER — HYDROCHLOROTHIAZIDE 12.5 MG/1
12.5 TABLET ORAL DAILY
Qty: 90 TABLET | Refills: 0 | Status: SHIPPED | OUTPATIENT
Start: 2025-03-05

## 2025-03-05 RX ORDER — TESTOSTERONE 1.62 MG/G
2 GEL TRANSDERMAL DAILY
Qty: 75 G | Refills: 1 | Status: SHIPPED | OUTPATIENT
Start: 2025-03-05

## 2025-03-05 ASSESSMENT — PAIN SCALES - GENERAL: PAINLEVEL_OUTOF10: NO PAIN (0)

## 2025-03-05 NOTE — PROGRESS NOTES
Virtual Visit Details    Type of service:  Video Visit     Originating Location (pt. Location): Home    Distant Location (provider location):  Off-site  Platform used for Video Visit: Esme

## 2025-03-05 NOTE — NURSING NOTE
Current patient location: [address]  [place] [address]    Is the patient currently in the state of [place]? YES    Visit mode: VIDEO    If the visit is dropped, the patient can be reconnected by:VIDEO VISIT: Text to cell phone:   Telephone Information:   Mobile [phone]       Will anyone else be joining the visit? NO  (If patient encounters technical issues they should call [phone])    Are changes needed to the allergy or medication list? No    Are refills needed on medications prescribed by this physician? NO    Rooming Documentation:  Questionnaire(s) not done per department protocol    Reason for visit: RECHECK Shelby Kocher VVF

## 2025-03-05 NOTE — LETTER
3/5/2025      Juan C Garcia  [address]  [place] [address]      Dear Colleague,    Thank you for referring your patient, Juan C Garcia, to the [facility]. Please see a copy of my visit note below.    Virtual Visit Details    Type of service:  Video Visit     Originating Location (pt. Location): Home    Distant Location (provider location):  Off-site  Platform used for Video Visit: [employer]    Assessment/Plan :   Hypogonadism. Mikhail was worried about his overall testosterone levels and happy to see that they are still within normal range, despite stopping the Xyosted. He was considering switching to Medicare this month but he is going to keep working for a bit longer. We discussed options and we will go back to topical Androgel. He did have some skin irritation with the Androgel, in the past. If this becomes an issue, he will contact my office. We will go back on 2 pumps or 40.5 mg daily. We will follow-up in 6 mos.  Hypothyroidism. Mikhail feels better since decreasing the dose down to 125 mcg daily. We reviewed his recent laboratory results and the levels look great. I will send in a refill today and we will follow-up in 6 mos.     Due to the COVID 19 pandemic this visit was a telephone/video visit in order to help prevent spread of infection in this patient and the general population. The patient gave verbal consent for the visit today. I have independently reviewed and interpreted labs, imaging as indicated.       Distant Location (provider location):  Off-site  Mode of Communication:  Video Conference via [employer]  Chart review/prep time 5    Joined the call at 3/5/2025, 9:58:57 am.  Left the call at 3/5/2025, 10:10:16 am.  You were on the call for 11 minutes 19 seconds .  20 minutes spent on the date of the encounter doing chart review, history and exam, documentation and further activities as noted above.      Chief complaint:  Juan C is a 64 year old male who  returns for follow-up of hypothyroidism and low testosterone.    I have reviewed Care Everywhere including [facility], [facility], [facility], [facility], [place], [facility], [facility] , [facility], [place] lab reports, imaging reports and provider notes as indicated.      HISTORY OF PRESENT ILLNESS  Mikhail feels like things are going okay. His insurance did change at the first of the year and the Xyosted is no longer covered. He was considering residential but he is going to stick with his job for a few more months. He has been out of the Xyosted since the end of January. So far, he feels okay but he is worried about his levels dropping further. He has noticed an improvement since decreasing the levothyroxine to 125 mcg daily. He is not having nearly as much irritability.     Juan C was found to have a thyroid nodule in 2014 or 2015 after undergoing a CT scan for a pulmonary embolism. He underwent a thyroid biopsy and the nodule was benign. He was later diagnosed with hypothyroidism. He has taken levothyroxine since diagnosis. He also has low testosterone that he currently manages with topical Androgel. He was being followed closely at the endocrinology clinic of [place]. He states that he was originally started on IM testosterone injections but he struggled to administer the injections. He also felt like his hormone levels were constantly up and down. He later switched to topical gel and it seems to work well, until he developed some issues with skin irritation. We switched him to Xyosted in August of 2024 and that seemed to stabilize his levels.    Endocrine relevant labs are as follows:   Latest Reference Range & Units 02/26/25 07:45   TSH 0.30 - 4.20 uIU/mL 3.34      Latest Reference Range & Units 02/26/25 07:45   Testosterone Total 240 - 950 ng/dL 259      Latest Reference Range & Units 02/26/25 07:45   Free Testosterone Calculated ng/dL 3.89      Latest Reference Range & Units 01/02/25 10:06   TSH 0.30 -  4.20 uIU/mL 3.09      Latest Reference Range & Units 01/02/25 10:06   Testosterone Total 240 - 950 ng/dL 408      Latest Reference Range & Units 01/02/25 10:06   Free Testosterone Calculated ng/dL 6.16     REVIEW OF SYSTEMS    Endocrine: positive for thyroid disorder and hypogonadism  Skin: negative  Eyes: negative for, redness, tearing  Ears/Nose/Throat: negative for, hoarseness, feeling of fullness  Respiratory: No shortness of breath, dyspnea on exertion, cough, or hemoptysis  Cardiovascular: negative for, chest pain, dyspnea on exertion, lower extremity edema, and exercise intolerance  Gastrointestinal: negative for, nausea, vomiting, constipation, and diarrhea  Genitourinary: negative for, nocturia, dysuria, frequency, and urgency  Musculoskeletal: negative for, muscular weakness, nocturnal cramping, and foot pain  Neurologic: negative for, local weakness, numbness or tingling of hands, and numbness or tingling of feet  Psychiatric: positive for excessive stress  Hematologic/Lymphatic/Immunologic: negative    Past Medical History  Past Medical History:   Diagnosis Date     Alcohol use      Dermatitis herpetiformis 2000    Dx by biopsy of rash on elbow per derm     Diastolic dysfunction with chronic heart failure (H)      HTN, goal below 140/90      Hyperlipidemia      Lung nodule Feb 2003    Benign; Left upper lobe     Multiple allergies     allergist with immunotherapy; Allergist is Dr. Santos in [place]     Primary hypogonadism in male     Low FSH, single left testicle - followed by Dr. Flores of endo     Pulmonary embolus (H) Feb 2003    Evaluated by ?[place] - unknown cause     Thyroid nodule Feb 2005    Benign per FNA --> taking Levothyroxine ever since     Uncomplicated asthma        Medications  Current Outpatient Medications   Medication Sig Dispense Refill     ALLERGY INJECTIONS        aspirin 81 MG EC tablet Take 1 tablet (81 mg) by mouth daily       atorvastatin (LIPITOR) 80 MG tablet Take 1 tablet  (80 mg) by mouth daily 90 tablet 3     fluticasone-salmeterol (ADVAIR) 250-50 MCG/ACT inhaler        hydroCHLOROthiazide 12.5 MG tablet TAKE 1 TABLET BY MOUTH EVERY DAY 90 tablet 2     levothyroxine (SYNTHROID/LEVOTHROID) 125 MCG tablet Take 1 tablet (125 mcg) by mouth daily. 90 tablet 0     losartan (COZAAR) 50 MG tablet Take 1 tablet (50 mg) by mouth daily. 90 tablet 1     Testosterone Enanthate (XYOSTED) 75 MG/0.5ML SOAJ Inject 75 mg subcutaneously once a week. 2 mL 2       Allergies  Allergies   Allergen Reactions     No Known Allergies        Family History  family history includes Alcoholism in his mother; Cancer in his father and son; Cerebrovascular Disease in his father and paternal grandmother; Dementia in his mother; Diabetes in his maternal grandfather; Family History Negative in his brother, sister, and son; Heart Disease in his father; Hypertension in his father and mother.    Social History  Social History     Tobacco Use     Smoking status: Never     Smokeless tobacco: Never   Vaping Use     Vaping status: Never Used   Substance Use Topics     Alcohol use: Not Currently     Drug use: No       Physical Exam  There is no height or weight on file to calculate BMI.  GENERAL: no distress  SKIN: Visible skin clear. No significant rash, abnormal pigmentation or lesions.  EYES: Eyes grossly normal to inspection.  No discharge or erythema, or obvious scleral/conjunctival abnormalities.  NECK: visible goiter is not present; no visible neck masses  RESP: No audible wheeze, cough, or visible cyanosis.  No visible retractions or increased work of breathing.    NEURO: Awake, alert, responds appropriately to questions.  Mentation and speech fluent.  PSYCH:affect normal and appearance well-groomed.            Again, thank you for allowing me to participate in the care of your patient.        Sincerely,        Marium Davies PA-C    Electronically signed

## 2025-03-05 NOTE — PROGRESS NOTES
Assessment/Plan :   Hypogonadism. Mikhail was worried about his overall testosterone levels and happy to see that they are still within normal range, despite stopping the Xyosted. He was considering switching to Medicare this month but he is going to keep working for a bit longer. We discussed options and we will go back to topical Androgel. He did have some skin irritation with the Androgel, in the past. If this becomes an issue, he will contact my office. We will go back on 2 pumps or 40.5 mg daily. We will follow-up in 6 mos.  Hypothyroidism. Mikhail feels better since decreasing the dose down to 125 mcg daily. We reviewed his recent laboratory results and the levels look great. I will send in a refill today and we will follow-up in 6 mos.     Due to the COVID 19 pandemic this visit was a telephone/video visit in order to help prevent spread of infection in this patient and the general population. The patient gave verbal consent for the visit today. I have independently reviewed and interpreted labs, imaging as indicated.       Distant Location (provider location):  Off-site  Mode of Communication:  Video Conference via [employer]  Chart review/prep time 5    Joined the call at 3/5/2025, 9:58:57 am.  Left the call at 3/5/2025, 10:10:16 am.  You were on the call for 11 minutes 19 seconds .  20 minutes spent on the date of the encounter doing chart review, history and exam, documentation and further activities as noted above.      Chief complaint:  Juan C is a 64 year old male who returns for follow-up of hypothyroidism and low testosterone.    I have reviewed Care Everywhere including [facility], [facility], [facility], [facility], [place], [facility], [facility] , [facility], [place] lab reports, imaging reports and provider notes as indicated.      HISTORY OF PRESENT ILLNESS  Mikhail feels like things are going okay. His insurance did change at the first of the year and the Xyosted is no longer covered. He was  considering residential but he is going to stick with his job for a few more months. He has been out of the Xyosted since the end of January. So far, he feels okay but he is worried about his levels dropping further. He has noticed an improvement since decreasing the levothyroxine to 125 mcg daily. He is not having nearly as much irritability.     Juan C was found to have a thyroid nodule in 2014 or 2015 after undergoing a CT scan for a pulmonary embolism. He underwent a thyroid biopsy and the nodule was benign. He was later diagnosed with hypothyroidism. He has taken levothyroxine since diagnosis. He also has low testosterone that he currently manages with topical Androgel. He was being followed closely at the endocrinology clinic of [place]. He states that he was originally started on IM testosterone injections but he struggled to administer the injections. He also felt like his hormone levels were constantly up and down. He later switched to topical gel and it seems to work well, until he developed some issues with skin irritation. We switched him to Xyosted in August of 2024 and that seemed to stabilize his levels.    Endocrine relevant labs are as follows:   Latest Reference Range & Units 02/26/25 07:45   TSH 0.30 - 4.20 uIU/mL 3.34      Latest Reference Range & Units 02/26/25 07:45   Testosterone Total 240 - 950 ng/dL 259      Latest Reference Range & Units 02/26/25 07:45   Free Testosterone Calculated ng/dL 3.89      Latest Reference Range & Units 01/02/25 10:06   TSH 0.30 - 4.20 uIU/mL 3.09      Latest Reference Range & Units 01/02/25 10:06   Testosterone Total 240 - 950 ng/dL 408      Latest Reference Range & Units 01/02/25 10:06   Free Testosterone Calculated ng/dL 6.16     REVIEW OF SYSTEMS    Endocrine: positive for thyroid disorder and hypogonadism  Skin: negative  Eyes: negative for, redness, tearing  Ears/Nose/Throat: negative for, hoarseness, feeling of fullness  Respiratory: No shortness of  breath, dyspnea on exertion, cough, or hemoptysis  Cardiovascular: negative for, chest pain, dyspnea on exertion, lower extremity edema, and exercise intolerance  Gastrointestinal: negative for, nausea, vomiting, constipation, and diarrhea  Genitourinary: negative for, nocturia, dysuria, frequency, and urgency  Musculoskeletal: negative for, muscular weakness, nocturnal cramping, and foot pain  Neurologic: negative for, local weakness, numbness or tingling of hands, and numbness or tingling of feet  Psychiatric: positive for excessive stress  Hematologic/Lymphatic/Immunologic: negative    Past Medical History  Past Medical History:   Diagnosis Date    Alcohol use     Dermatitis herpetiformis 2000    Dx by biopsy of rash on elbow per derm    Diastolic dysfunction with chronic heart failure (H)     HTN, goal below 140/90     Hyperlipidemia     Lung nodule Feb 2003    Benign; Left upper lobe    Multiple allergies     allergist with immunotherapy; Allergist is Dr. Santos in [place]    Primary hypogonadism in male     Low FSH, single left testicle - followed by Dr. Flores of endo    Pulmonary embolus (H) Feb 2003    Evaluated by ?[place] - unknown cause    Thyroid nodule Feb 2005    Benign per FNA --> taking Levothyroxine ever since    Uncomplicated asthma        Medications  Current Outpatient Medications   Medication Sig Dispense Refill    ALLERGY INJECTIONS       aspirin 81 MG EC tablet Take 1 tablet (81 mg) by mouth daily      atorvastatin (LIPITOR) 80 MG tablet Take 1 tablet (80 mg) by mouth daily 90 tablet 3    fluticasone-salmeterol (ADVAIR) 250-50 MCG/ACT inhaler       hydroCHLOROthiazide 12.5 MG tablet TAKE 1 TABLET BY MOUTH EVERY DAY 90 tablet 2    levothyroxine (SYNTHROID/LEVOTHROID) 125 MCG tablet Take 1 tablet (125 mcg) by mouth daily. 90 tablet 0    losartan (COZAAR) 50 MG tablet Take 1 tablet (50 mg) by mouth daily. 90 tablet 1    Testosterone Enanthate (XYOSTED) 75 MG/0.5ML SOAJ Inject 75 mg  subcutaneously once a week. 2 mL 2       Allergies  Allergies   Allergen Reactions    No Known Allergies        Family History  family history includes Alcoholism in his mother; Cancer in his father and son; Cerebrovascular Disease in his father and paternal grandmother; Dementia in his mother; Diabetes in his maternal grandfather; Family History Negative in his brother, sister, and son; Heart Disease in his father; Hypertension in his father and mother.    Social History  Social History     Tobacco Use    Smoking status: Never    Smokeless tobacco: Never   Vaping Use    Vaping status: Never Used   Substance Use Topics    Alcohol use: Not Currently    Drug use: No       Physical Exam  There is no height or weight on file to calculate BMI.  GENERAL: no distress  SKIN: Visible skin clear. No significant rash, abnormal pigmentation or lesions.  EYES: Eyes grossly normal to inspection.  No discharge or erythema, or obvious scleral/conjunctival abnormalities.  NECK: visible goiter is not present; no visible neck masses  RESP: No audible wheeze, cough, or visible cyanosis.  No visible retractions or increased work of breathing.    NEURO: Awake, alert, responds appropriately to questions.  Mentation and speech fluent.  PSYCH:affect normal and appearance well-groomed.

## 2025-03-24 ENCOUNTER — MYC MEDICAL ADVICE (OUTPATIENT)
Dept: FAMILY MEDICINE | Facility: CLINIC | Age: 65
End: 2025-03-24
Payer: COMMERCIAL

## 2025-03-24 DIAGNOSIS — M54.16 LUMBAR RADICULOPATHY: Primary | ICD-10-CM

## 2025-03-27 ENCOUNTER — ANCILLARY ORDERS (OUTPATIENT)
Dept: NEUROSURGERY | Facility: CLINIC | Age: 65
End: 2025-03-27
Payer: MEDICARE

## 2025-03-27 DIAGNOSIS — E78.5 HYPERLIPIDEMIA, UNSPECIFIED HYPERLIPIDEMIA TYPE: ICD-10-CM

## 2025-03-27 RX ORDER — ATORVASTATIN CALCIUM 80 MG/1
80 TABLET, FILM COATED ORAL DAILY
Qty: 90 TABLET | Refills: 0 | Status: SHIPPED | OUTPATIENT
Start: 2025-03-27

## 2025-03-27 NOTE — LETTER
March 27, 2025       TO: Juan C Garcia  [address]  [place] [address]       Dear Juan C Garcia,    We recently received a call from your pharmacy requesting a refill of your medication(s).    Our records indicate that you are due for follow-up with your Heart Care Provider. We will refill your medications for 3 months which will allow you enough time to be seen.    Please call [phone] to schedule your appointment.    Thank you for allowing [facility] to be a part of your health care team and we look forward to seeing you soon.    Thank you,    [facility]

## 2025-04-03 ENCOUNTER — TELEPHONE (OUTPATIENT)
Dept: FAMILY MEDICINE | Facility: CLINIC | Age: 65
End: 2025-04-03

## 2025-04-03 ENCOUNTER — HOSPITAL ENCOUNTER (OUTPATIENT)
Dept: GENERAL RADIOLOGY | Facility: HOSPITAL | Age: 65
Discharge: HOME | End: 2025-04-03
Attending: SURGERY
Payer: MEDICARE

## 2025-04-03 ENCOUNTER — OFFICE VISIT (OUTPATIENT)
Dept: NEUROSURGERY | Facility: CLINIC | Age: 65
End: 2025-04-03
Attending: INTERNAL MEDICINE
Payer: MEDICARE

## 2025-04-03 ENCOUNTER — PREP FOR PROCEDURE (OUTPATIENT)
Dept: NEUROLOGY | Facility: CLINIC | Age: 65
End: 2025-04-03

## 2025-04-03 VITALS
SYSTOLIC BLOOD PRESSURE: 138 MMHG | HEART RATE: 74 BPM | OXYGEN SATURATION: 97 % | WEIGHT: 230.8 LBS | HEIGHT: 74 IN | BODY MASS INDEX: 29.62 KG/M2 | DIASTOLIC BLOOD PRESSURE: 67 MMHG

## 2025-04-03 DIAGNOSIS — M54.16 LUMBAR RADICULOPATHY: ICD-10-CM

## 2025-04-03 DIAGNOSIS — M48.061 SPINAL STENOSIS OF LUMBAR REGION, UNSPECIFIED WHETHER NEUROGENIC CLAUDICATION PRESENT: Primary | ICD-10-CM

## 2025-04-03 DIAGNOSIS — Z98.1 S/P LUMBAR FUSION: ICD-10-CM

## 2025-04-03 DIAGNOSIS — M48.062 SPINAL STENOSIS OF LUMBAR REGION WITH NEUROGENIC CLAUDICATION: Primary | ICD-10-CM

## 2025-04-03 PROCEDURE — 3078F DIAST BP <80 MM HG: CPT | Performed by: SURGERY

## 2025-04-03 PROCEDURE — 99204 OFFICE O/P NEW MOD 45 MIN: CPT | Performed by: SURGERY

## 2025-04-03 PROCEDURE — 1125F AMNT PAIN NOTED PAIN PRSNT: CPT | Performed by: SURGERY

## 2025-04-03 PROCEDURE — 3075F SYST BP GE 130 - 139MM HG: CPT | Performed by: SURGERY

## 2025-04-03 PROCEDURE — 72110 X-RAY EXAM L-2 SPINE 4/>VWS: CPT

## 2025-04-03 ASSESSMENT — PAIN SCALES - GENERAL: PAINLEVEL_OUTOF10: MILD PAIN (2)

## 2025-04-03 NOTE — PROGRESS NOTES
NEUROSURGERY CONSULTATION NOTE      Neurosurgery was asked to see this patient by Suzanne Zhang MD for evaluation of lumbar stenosis.       CONSULTATION ASSESSMENT AND PLAN:    66 yo male who presents with history of lumbar discectomy and lumbar fusion at lumbar 5-sacral 1 remotely as well as cervical 5-6 and 6 and cervical 6-7 ACDF in 2020 and foraminotomies on right cervical 3-4 and cervical 4-5 all in [place] low back and leg pain, right leg weakness, urinary frequency, sexual dysfunction, neck pain, numbness in his fingertips.  We reviewed lumbar x-ray which shows prior hardware at lumbar 5-sacral 1 with no significant subluxations or instability.  MRI of his lumbar spine was also reviewed which shows severe central and subarticular stenosis at lumbar 3-4 and lumbar 4-5 with redundancy of the cauda equina above this level.  Also noted to have moderate bilateral foraminal narrowing at lumbar 4-5. symptoms appear related to his cauda equina compression.  Anticipate greater than 50% facet resection to fully decompress the thecal sac and nerves therefore recommend extension of prior lumbar 5-sacral 1 fusion to lumbar 3 with lumbar 3-lumbar 5 laminectomies, medial facetectomies and foraminotomies and posterolateral arthrodesis with right sided discectomies and use of stealth.  Risks and benefits were discussed in detail including but not limited to infection, hematoma, nerve damage including paralysis, post op radiculitis, durotomy, lack of a sold bone fusion, hardware malfunction, adjacent segment disease, risks associated with the use of general anesthesia.    No further conservative care is indicated and the surgery is medically necessary for the following reasons:further physical therapy is not indicated in this case as it would only prolong the patient s suffering without providing any benefit and the delay would increase the risk of neurologic decline and/or symptoms worsening unnecessarily, with no  benefit to the patient and possible harm.     There are no untreated, underlying mental health conditions (including but not limited to psychological conditions or drug or alcohol abuse) that will preclude an appropriate recovery from this surgery or that are contraindications to proceeding with surgery.       Kavya Lopez MD      HISTORY OF PRESENTING ILLNESS:    64 yo male who presents with history of lumbar discectomy and lumbar fusion at lumbar 5-sacral 1 remotely as well as cervical 5-6 and 6 and cervical 6-7 ACDF in 2020 and foraminotomies on right cervical 3-4 and cervical 4-5 all in [place] low back and leg pain, right leg weakness, urinary frequency, sexual dysfunction, neck pain, numbness in his fingertips. Did very well from last lumbar surgery 14 years. He played golf a lot last summer. He then began to have symptoms. Last 6-8 months having problems sleeping. Neck and low back pain. Most prominent at night. 3-4 hours a night of sleep. Cannot get comfortable. Cannot lay flat on his back. Laying on his sides bother his shoulders. Low back pain shooting down to his right knee. Pain is traveling down his anterior thigh. Mostly in his buttocks on the right. Numbness in his fingers no paresthesias in his legs. He also notes his right leg will want to give out with walking. Slower walking. Not at his normal speed but keeps symptoms down. Feels ok with walking. Also has worsening symptoms form getting up from a lying position. Some clicking in his leg with internal rotation of the leg. Urinary frequency. No bowel dysfunction. Some groin pain last summer. This is better but still notes some radiation into that area.  No saddle anesthesia. Sexual dysfunction.      Chiropractic care- worsening symptoms. Did help his shoulder pain.   Physical therapy- started with hip therapy and transitioned for shoulder as well. No change in symptoms. Aggravates lower extremity symptoms.   Hip injection- did help however  ortho evaluated the hips and did not think it was hips.     S/p cervical 5-6 and cervical 6-7 anterior cervical decompression and fusion in 2020   Foraminotomies at R cervical 3-4 and cervical 4-5  Lumbar discectomy in 1997 and previous lumbar 5-sacral 1 fusion  All in [place]     Past Medical History:   Diagnosis Date    Alcohol use     Dermatitis herpetiformis 2000    Dx by biopsy of rash on elbow per derm    Diastolic dysfunction with chronic heart failure (H)     HTN, goal below 140/90     Hyperlipidemia     Lung nodule Feb 2003    Benign; Left upper lobe    Multiple allergies     allergist with immunotherapy; Allergist is Dr. Santos in [place]    Primary hypogonadism in male     Low FSH, single left testicle - followed by Dr. Flores of endo    Pulmonary embolus (H) Feb 2003    Evaluated by ?[facility] - unknown cause    Thyroid nodule Feb 2005    Benign per FNA --> taking Levothyroxine ever since    Uncomplicated asthma        Past Surgical History:   Procedure Laterality Date    BACK SURGERY      COLONOSCOPY  April 2012    COLONOSCOPY N/A 6/20/2022    Procedure: COLONOSCOPY;  Surgeon: Diya Lo MD;  Location:  GI    HEAD & NECK SURGERY      HERNIA REPAIR      ORCHIOPEXY CHILD Right     Age 10 due to undescended testicl    ORTHOPEDIC SURGERY      OTHER SURGICAL HISTORY  July 1997    Cervical laminectomy C4-5-6    OTHER SURGICAL HISTORY  Jan 2000    Right index tendon repair    OTHER SURGICAL HISTORY  August 2004    Ankle/wrist repair    OTHER SURGICAL HISTORY  April 2007    Lumbar microdiscectomy L5-S1    OTHER SURGICAL HISTORY  March 2010    Cervical fusion C5-6-7    OTHER SURGICAL HISTORY  March 2011    Cervical fusion C3-4-5    OTHER SURGICAL HISTORY  July 2011    Lumbar fusion L5-S1       REVIEW OF SYSTEMS:  See ROS form under media     MEDICATIONS:    Current Outpatient Medications   Medication Sig Dispense Refill    ALLERGY INJECTIONS       aspirin 81 MG EC tablet Take 1 tablet (81 mg) by mouth  daily      atorvastatin (LIPITOR) 80 MG tablet Take 1 tablet (80 mg) by mouth daily. Appointment needed for additional refills. Please call [phone] to schedule. 90 tablet 0    fluticasone-salmeterol (ADVAIR) 250-50 MCG/ACT inhaler       hydroCHLOROthiazide 12.5 MG tablet TAKE 1 TABLET BY MOUTH EVERY DAY 90 tablet 0    levothyroxine (SYNTHROID/LEVOTHROID) 125 MCG tablet Take 1 tablet (125 mcg) by mouth daily. 90 tablet 0    testosterone (ANDROGEL 1.62 % PUMP) 20.25 MG/ACT gel Place 2 Pump (40.5 mg) onto the skin daily. Apply from dispenser to clean, dry, intact skin of the upper arms and shoulders. 75 g 1    losartan (COZAAR) 50 MG tablet Take 1 tablet (50 mg) by mouth daily. 90 tablet 1         ALLERGIES/SENSITIVITIES:     Allergies   Allergen Reactions    No Known Allergies        PERTINENT SOCIAL HISTORY:   Social History     Socioeconomic History    Marital status:      Spouse name: None    Number of children: None    Years of education: None    Highest education level: None   Tobacco Use    Smoking status: Never    Smokeless tobacco: Never   Vaping Use    Vaping status: Never Used   Substance and Sexual Activity    Alcohol use: Not Currently    Drug use: No    Sexual activity: Yes     Partners: Female     Birth control/protection: Pull-out method   Other Topics Concern    Parent/sibling w/ CABG, MI or angioplasty before 65F 55M? Yes     Comment: father bypass 60ish     Social Drivers of Health     Financial Resource Strain: Low Risk  (6/21/2024)    Financial Resource Strain     Within the past 12 months, have you or your family members you live with been unable to get utilities (heat, electricity) when it was really needed?: No   Food Insecurity: Low Risk  (6/21/2024)    Food Insecurity     Within the past 12 months, did you worry that your food would run out before you got money to buy more?: No     Within the past 12 months, did the food you bought just not last and you didn t have money to get  "more?: No   Transportation Needs: Low Risk  (6/21/2024)    Transportation Needs     Within the past 12 months, has lack of transportation kept you from medical appointments, getting your medicines, non-medical meetings or appointments, work, or from getting things that you need?: No   Physical Activity: Unknown (6/21/2024)    Exercise Vital Sign     Days of Exercise per Week: 3 days   Stress: Stress Concern Present (6/21/2024)    Citizen of [place] [employer] Occupational [employer] - Occupational Stress Questionnaire     Feeling of Stress : To some extent   Social Connections: Unknown (6/21/2024)    Social Connection and Isolation Panel [NHANES]     Frequency of Social Gatherings with Friends and Family: Three times a week   Interpersonal Safety: Low Risk  (3/21/2024)    Interpersonal Safety     Do you feel physically and emotionally safe where you currently live?: Yes     Within the past 12 months, have you been hit, slapped, kicked or otherwise physically hurt by someone?: No     Within the past 12 months, have you been humiliated or emotionally abused in other ways by your partner or ex-partner?: No   Housing Stability: Low Risk  (6/21/2024)    Housing Stability     Do you have housing? : Yes     Are you worried about losing your housing?: No         FAMILY HISTORY:  Family History   Problem Relation Age of Onset    Hypertension Mother     Dementia Mother     Alcoholism Mother     Hypertension Father     Cerebrovascular Disease Father     Cancer Father         throat - was a smoker    Heart Disease Father         CABG    Diabetes Maternal Grandfather     Family History Negative Brother     Family History Negative Sister     Cerebrovascular Disease Paternal Grandmother     Cancer Son         Testicular cancer    Family History Negative Son         PHYSICAL EXAM:   Constitutional: /67   Pulse 74   Ht 1.88 m (6' 2\")   Wt 104.7 kg (230 lb 12.8 oz)   SpO2 97%   BMI 29.63 kg/m       Mental Status: A & O in no acute " distress.  Affect is appropriate.  Speech is fluent.  Recent and remote memory are intact.  Attention span and concentration are normal.     Motor: Normal bulk and tone all muscle groups of upper and lower extremities.    Strength: 5/5 in LE    Sensory: Sensation intact bilaterally to light touch in LE     Coordination; Heel/toe gait intact.  Normal gait and station.     Reflexes;  knee/ ankle jerk 1+    IMAGING:  I personally reviewed all radiographic images         Cc:   Suzanne Zhang

## 2025-04-03 NOTE — TELEPHONE ENCOUNTER
Reason for Call:  Appointment Request    Patient requesting this type of appt: Pre-op    Requested provider: Suzanne Zhang    Reason patient unable to be scheduled: Not within requested timeframe    When does patient want to be seen/preferred time:  4/11     Comments: pre-op: spinal fusion, 4/25, [facility], Dr. Lopez  Pre-op needs to be scheduled at least one week prior to procedure, no more than 2 weeks prior to procedure.    Could we send this information to you in [employer] or would you prefer to receive a phone call?:   Patient would like to be contacted via [employer]    Call taken on 4/3/2025 at 10:11 AM by Josh Taylor

## 2025-04-03 NOTE — LETTER
4/3/2025      Juan C Garcia  [address]  [place] [address]      Dear Colleague,    Thank you for referring your patient, Juan C Garcia, to the [facility]. Please see a copy of my visit note below.    NEUROSURGERY CONSULTATION NOTE      Neurosurgery was asked to see this patient by Suzanne Zhang MD for evaluation of lumbar stenosis.       CONSULTATION ASSESSMENT AND PLAN:    66 yo male who presents with history of lumbar discectomy and lumbar fusion at lumbar 5-sacral 1 remotely as well as cervical 5-6 and 6 and cervical 6-7 ACDF in 2020 and foraminotomies on right cervical 3-4 and cervical 4-5 all in [place] low back and leg pain, right leg weakness, urinary frequency, sexual dysfunction, neck pain, numbness in his fingertips.  We reviewed lumbar x-ray which shows prior hardware at lumbar 5-sacral 1 with no significant subluxations or instability.  MRI of his lumbar spine was also reviewed which shows severe central and subarticular stenosis at lumbar 3-4 and lumbar 4-5 with redundancy of the cauda equina above this level.  Also noted to have moderate bilateral foraminal narrowing at lumbar 4-5. symptoms appear related to his cauda equina compression.  Anticipate greater than 50% facet resection to fully decompress the thecal sac and nerves therefore recommend extension of prior lumbar 5-sacral 1 fusion to lumbar 3 with lumbar 3-lumbar 5 laminectomies, medial facetectomies and foraminotomies and posterolateral arthrodesis with right sided discectomies and use of stealth.  Risks and benefits were discussed in detail including but not limited to infection, hematoma, nerve damage including paralysis, post op radiculitis, durotomy, lack of a sold bone fusion, hardware malfunction, adjacent segment disease, risks associated with the use of general anesthesia.    No further conservative care is indicated and the surgery is medically necessary for the following  reasons:further physical therapy is not indicated in this case as it would only prolong the patient s suffering without providing any benefit and the delay would increase the risk of neurologic decline and/or symptoms worsening unnecessarily, with no benefit to the patient and possible harm.     There are no untreated, underlying mental health conditions (including but not limited to psychological conditions or drug or alcohol abuse) that will preclude an appropriate recovery from this surgery or that are contraindications to proceeding with surgery.       Kavya Lopez MD      HISTORY OF PRESENTING ILLNESS:    66 yo male who presents with history of lumbar discectomy and lumbar fusion at lumbar 5-sacral 1 remotely as well as cervical 5-6 and 6 and cervical 6-7 ACDF in 2020 and foraminotomies on right cervical 3-4 and cervical 4-5 all in [place] low back and leg pain, right leg weakness, urinary frequency, sexual dysfunction, neck pain, numbness in his fingertips. Did very well from last lumbar surgery 14 years. He played golf a lot last summer. He then began to have symptoms. Last 6-8 months having problems sleeping. Neck and low back pain. Most prominent at night. 3-4 hours a night of sleep. Cannot get comfortable. Cannot lay flat on his back. Laying on his sides bother his shoulders. Low back pain shooting down to his right knee. Pain is traveling down his anterior thigh. Mostly in his buttocks on the right. Numbness in his fingers no paresthesias in his legs. He also notes his right leg will want to give out with walking. Slower walking. Not at his normal speed but keeps symptoms down. Feels ok with walking. Also has worsening symptoms form getting up from a lying position. Some clicking in his leg with internal rotation of the leg. Urinary frequency. No bowel dysfunction. Some groin pain last summer. This is better but still notes some radiation into that area.  No saddle anesthesia. Sexual dysfunction.       Chiropractic care- worsening symptoms. Did help his shoulder pain.   Physical therapy- started with hip therapy and transitioned for shoulder as well. No change in symptoms. Aggravates lower extremity symptoms.   Hip injection- did help however ortho evaluated the hips and did not think it was hips.     S/p cervical 5-6 and cervical 6-7 anterior cervical decompression and fusion in 2020   Foraminotomies at R cervical 3-4 and cervical 4-5  Lumbar discectomy in 1997 and previous lumbar 5-sacral 1 fusion  All in [place]     Past Medical History:   Diagnosis Date     Alcohol use      Dermatitis herpetiformis 2000    Dx by biopsy of rash on elbow per derm     Diastolic dysfunction with chronic heart failure (H)      HTN, goal below 140/90      Hyperlipidemia      Lung nodule Feb 2003    Benign; Left upper lobe     Multiple allergies     allergist with immunotherapy; Allergist is Dr. Santos in [place]     Primary hypogonadism in male     Low FSH, single left testicle - followed by Dr. Flores of endo     Pulmonary embolus (H) Feb 2003    Evaluated by ?[facility] - unknown cause     Thyroid nodule Feb 2005    Benign per FNA --> taking Levothyroxine ever since     Uncomplicated asthma        Past Surgical History:   Procedure Laterality Date     BACK SURGERY       COLONOSCOPY  April 2012     COLONOSCOPY N/A 6/20/2022    Procedure: COLONOSCOPY;  Surgeon: Diya Lo MD;  Location:  GI     HEAD & NECK SURGERY       HERNIA REPAIR       ORCHIOPEXY CHILD Right     Age 10 due to undescended testicl     ORTHOPEDIC SURGERY       OTHER SURGICAL HISTORY  July 1997    Cervical laminectomy C4-5-6     OTHER SURGICAL HISTORY  Jan 2000    Right index tendon repair     OTHER SURGICAL HISTORY  August 2004    Ankle/wrist repair     OTHER SURGICAL HISTORY  April 2007    Lumbar microdiscectomy L5-S1     OTHER SURGICAL HISTORY  March 2010    Cervical fusion C5-6-7     OTHER SURGICAL HISTORY  March 2011    Cervical fusion C3-4-5      OTHER SURGICAL HISTORY  July 2011    Lumbar fusion L5-S1       REVIEW OF SYSTEMS:  See ROS form under media     MEDICATIONS:    Current Outpatient Medications   Medication Sig Dispense Refill     ALLERGY INJECTIONS        aspirin 81 MG EC tablet Take 1 tablet (81 mg) by mouth daily       atorvastatin (LIPITOR) 80 MG tablet Take 1 tablet (80 mg) by mouth daily. Appointment needed for additional refills. Please call [phone] to schedule. 90 tablet 0     fluticasone-salmeterol (ADVAIR) 250-50 MCG/ACT inhaler        hydroCHLOROthiazide 12.5 MG tablet TAKE 1 TABLET BY MOUTH EVERY DAY 90 tablet 0     levothyroxine (SYNTHROID/LEVOTHROID) 125 MCG tablet Take 1 tablet (125 mcg) by mouth daily. 90 tablet 0     testosterone (ANDROGEL 1.62 % PUMP) 20.25 MG/ACT gel Place 2 Pump (40.5 mg) onto the skin daily. Apply from dispenser to clean, dry, intact skin of the upper arms and shoulders. 75 g 1     losartan (COZAAR) 50 MG tablet Take 1 tablet (50 mg) by mouth daily. 90 tablet 1         ALLERGIES/SENSITIVITIES:     Allergies   Allergen Reactions     No Known Allergies        PERTINENT SOCIAL HISTORY:   Social History     Socioeconomic History     Marital status:      Spouse name: None     Number of children: None     Years of education: None     Highest education level: None   Tobacco Use     Smoking status: Never     Smokeless tobacco: Never   Vaping Use     Vaping status: Never Used   Substance and Sexual Activity     Alcohol use: Not Currently     Drug use: No     Sexual activity: Yes     Partners: Female     Birth control/protection: Pull-out method   Other Topics Concern     Parent/sibling w/ CABG, MI or angioplasty before 65F 55M? Yes     Comment: father bypass 60ish     Social Drivers of Health     Financial Resource Strain: Low Risk  (6/21/2024)    Financial Resource Strain      Within the past 12 months, have you or your family members you live with been unable to get utilities (heat, electricity) when it was  really needed?: No   Food Insecurity: Low Risk  (6/21/2024)    Food Insecurity      Within the past 12 months, did you worry that your food would run out before you got money to buy more?: No      Within the past 12 months, did the food you bought just not last and you didn t have money to get more?: No   Transportation Needs: Low Risk  (6/21/2024)    Transportation Needs      Within the past 12 months, has lack of transportation kept you from medical appointments, getting your medicines, non-medical meetings or appointments, work, or from getting things that you need?: No   Physical Activity: Unknown (6/21/2024)    Exercise Vital Sign      Days of Exercise per Week: 3 days   Stress: Stress Concern Present (6/21/2024)    [place] citizen [place] of Occupational Health - Occupational Stress Questionnaire      Feeling of Stress : To some extent   Social Connections: Unknown (6/21/2024)    Social Connection and Isolation Panel [NHANES]      Frequency of Social Gatherings with Friends and Family: Three times a week   Interpersonal Safety: Low Risk  (3/21/2024)    Interpersonal Safety      Do you feel physically and emotionally safe where you currently live?: Yes      Within the past 12 months, have you been hit, slapped, kicked or otherwise physically hurt by someone?: No      Within the past 12 months, have you been humiliated or emotionally abused in other ways by your partner or ex-partner?: No   Housing Stability: Low Risk  (6/21/2024)    Housing Stability      Do you have housing? : Yes      Are you worried about losing your housing?: No         FAMILY HISTORY:  Family History   Problem Relation Age of Onset     Hypertension Mother      Dementia Mother      Alcoholism Mother      Hypertension Father      Cerebrovascular Disease Father      Cancer Father         throat - was a smoker     Heart Disease Father         CABG     Diabetes Maternal Grandfather      Family History Negative Brother      Family History Negative  "Sister      Cerebrovascular Disease Paternal Grandmother      Cancer Son         Testicular cancer     Family History Negative Son         PHYSICAL EXAM:   Constitutional: /67   Pulse 74   Ht 1.88 m (6' 2\")   Wt 104.7 kg (230 lb 12.8 oz)   SpO2 97%   BMI 29.63 kg/m       Mental Status: A & O in no acute distress.  Affect is appropriate.  Speech is fluent.  Recent and remote memory are intact.  Attention span and concentration are normal.     Motor: Normal bulk and tone all muscle groups of upper and lower extremities.    Strength: 5/5 in LE    Sensory: Sensation intact bilaterally to light touch in LE     Coordination; Heel/toe gait intact.  Normal gait and station.     Reflexes;  knee/ ankle jerk 1+    IMAGING:  I personally reviewed all radiographic images         Cc:   Suzanne Zhang             Again, thank you for allowing me to participate in the care of your patient.        Sincerely,        Kavya Lopez MD    Electronically signed"

## 2025-04-03 NOTE — NURSING NOTE
"Juan C Garcia is a 65 year old male who presents for:  Chief Complaint   Patient presents with    Consult     Patient has pain in the lower back runs down into the right leg to the knee, worse when sleeping. Also has pain in the neck runs into the shoulder and has numbness in the hands. Lvl 2.        Initial Vitals:  /67   Pulse 74   Ht 6' 2\" (1.88 m)   Wt 230 lb 12.8 oz (104.7 kg)   SpO2 97%   BMI 29.63 kg/m   Estimated body mass index is 29.63 kg/m  as calculated from the following:    Height as of this encounter: 6' 2\" (1.88 m).    Weight as of this encounter: 230 lb 12.8 oz (104.7 kg).. Body surface area is 2.34 meters squared. BP completed using cuff size: large  Mild Pain (2)    Nursing Comments:       Christine Marcus    "

## 2025-04-03 NOTE — PATIENT INSTRUCTIONS
Recommend extension of prior lumbar 5-sacral 1 fusion to lumbar 3 with lumbar 3-lumbar 5 laminectomies, medial facetectomies and foraminotomies and posterolateral arthrodesis with right sided discectomies and use of stealth.  Risks and benefits were discussed in detail including but not limited to infection, hematoma, nerve damage including paralysis, post op radiculitis, durotomy, lack of a sold bone fusion, hardware malfunction, adjacent segment disease, risks associated with the use of general anesthesia.      Please review COMPLETE information about your proposed surgery, pre-operative requirements, post-operative course and expectations - available in a folder provided to you in clinic!    Your surgery scheduler will call you within 3 business days to begin the process of scheduling your surgery and appointments.     Pre-Operative    Pre-operative physical / Lab work with primary care physician within 30 days of surgical date. We prefer the pre-op exam to be done 2 weeks prior to surgery.    If all pre-op appointments/test are not completed prior to your surgery date, you will be asked to reschedule your surgery.           As part of preparation for your upcoming procedure your primary care doctor may order a test to rule out a COVID-19 infection. This is no longer a requirement prior to surgery.     Readiness Visits    Prior to surgery, you may have a telephone or in person readiness visit with our RN team to discuss your upcomming surgery, results of your pre-op physical, and lab work.   If you will require a collar/neck brace after surgery, you will be fitted for one at your readiness visit prior to surgery (scheduled by the surgery scheduler).     Shower procedure    Hibiclens shower: Use one packet the night before surgery and one packet the morning of surgery for a whole body shower. Avoid face, hair, and genitals.      Eating/Drinking    Stop all solid foods 8 hours before surgery.  Stop all clear  liquids 2 hours prior to arrival time     Clear liquids include water, clear juice, black coffee, or clear tea without milk, Gatorade, clear soda.     Medications - please refer to the pre-operative medication instructions sheet in your folder    Hold Aspirin, NSAIDs (Advil/Ibuprofen, Indocin, Naproxen,Nuprin,Relafen/Nabumetone, Diclofenac,Meloxicam, Aleve, Celebrex) and all vitamins and supplements x 7-10 days prior to surgical date  You can take Tylenol (Acetaminophen) for pain up until the date of your surgery   Do not exceed 3,000 mg per day   Any other medications prescribed, please discuss with your primary care provider at your pre-operative physical. Please discuss when/if it is safe for you to stop taking blood thinners with your primary care provider.   We will NOT provide pain medications prior to surgery. We will prescribe post-op pain medications for up to 6 weeks after surgery.       FMLA/Short-term disability    If you are currently employed, you will likely need to be off work for 4-6 weeks for post-op recovery and healing.  Please fax any FMLA/short term disability paperwork to [phone], mail it into the clinic, drop it off in person, or send via a [employer] message.   You may also call our clinic with the date in which you'd like to return to work, and we can provide a work letter to your employer  We will support Short-Term Disability up to 12 weeks, beginning the date of your surgery. We do NOT support Long-Term Disability/Social Security Benefits.     Pain Management after surgery    Dr. Lopez will refill pain medications for up to 6 weeks after the date of surgery. If you still need pain medication at that point you will have to go through your primary care provider.    Dealing with pain    As your body heals, you might feel a stabbing, burning, or aching pain. You may also have some numbness.  Everyone feels pain differently, we may ask you to rate your pain using a pain scale. This will  let us know how much pain you feel.   Keep in mind that medicine won't take away all of your pain. It helps to try other ways to relax and ease pain.     Things to help with pain    After surgery, we will give you medicine for your pain. These medications work well, but they can make you drowsy, itchy, or sick to your stomach, and constipated. Try to take narcotics with food if they cause nausea.   For mild to moderate pain, you can take medication such as Tylenol or Ibuprofen. These can be used with narcotics and muscle relaxants. *If you have had a fusion: do NOT use NSAIDs for 6 months after surgery.   Do NOT drive while taking narcotic pain medication  Do NOT drink alcohol while using narcotic pain medication  You can utilize ice as needed (no longer than 20 minutes at one time) you may apply this over your covered incision  Utilize heat for muscle spasms, do not apply heat over your incision  If a muscle relaxer is prescribed, please do NOT take it at the same time as your narcotic pain medication. Take them at least 90 minutes apart.   You may also use pain cream/patches on sore muscles. Do NOT apply these on your incision. Patches may be cut in 1/2 if needed.     *After your surgery, if you will be staying in-patient, a nursing team will be monitoring you closely throughout your stay and communicate your health status to your surgeon and other providers.  You will be seen by Advanced Practice Providers (e.g., nurse practitioners, clinic nurse specialists, and physician assistants) who will check on you regularly to assess the status of your surgical recovery.     Incision Care    Look at your incision site every day. You  may need a mirror, or family member to help you.   Do not submerge your incision in water such as pools, hot tubs, baths for at least 6 weeks or until incision is healed  You may get your incision wet in the shower. Allow water and soap to run over incision, and gently pat dry.   Remove the  dressing the day after you are discharged from the hospital. Keep the incision clean and dry at all times. This may require several bandage changes.   Contact us right away if you have:   Fever over 101 degrees farenheit  Green or yellow drainage (pus) from your incision or increased bloody drainage   Redness, swelling, or warmth at your surgery site   Notify the clinic [phone].    Activity Restrictions    For the first 6 weeks, no lifting,pushing, or pulling > 5-10 pounds, no bending, twisting.  Use the stairs in moderation   Walking: Walking is the best way to start exercise after surgery. Take short frequent walks. You may gradually increase the distance as tolerated. If you feel pain, decrease your activity, but DO NOT stop walking. Walking will help you regain strength.  Avoid prolonged positioning for longer than 30 minutes (change positions frequently while awake)  No contact sports until after follow up visit  No high impact activities such as; running/jogging, snowmobile or 4 fair riding or any other recreational vehicles until deemed safe by your surgeon/care team.   Please call the clinic if you develop any of the following symptoms:  Swelling and/or warmth in one or both legs  Pain or tenderness in your leg, ankle, foot, or arm   Red or discolored/pale skin     Post-Op Follow Up Appointments    We will call you to schedule these appointments after your discharge from the hospital.   Incision assessment within 2 weeks with a Registered Nurse   6 week post-op with a Nurse Practitioner/Physician Assistant. Your surgeon will be available on this day.

## 2025-04-10 ENCOUNTER — TELEPHONE (OUTPATIENT)
Dept: NEUROSURGERY | Facility: CLINIC | Age: 65
End: 2025-04-10
Payer: MEDICARE

## 2025-04-10 ENCOUNTER — TRANSFERRED RECORDS (OUTPATIENT)
Dept: HEALTH INFORMATION MANAGEMENT | Facility: CLINIC | Age: 65
End: 2025-04-10
Payer: MEDICARE

## 2025-04-10 NOTE — TELEPHONE ENCOUNTER
Scheduled surgery with: SURGEONS NAME: DR. LOPEZ    Spoke with:  PATIENT       OTHER:   N/A    Date of Surgery:   4/25/2025    Estimated Arrival time Discussed with Patient:    Yes    Location of surgery:    [facility]    Pre-Op H&P:    YES- notified patient to complete pre-op within 30 days, preferably 1 week prior to surgery date.       OTHER :     N/A    Post-Op Appts:    YES-  nurse visit, 6 week & 3 months    OTHER:     N/A     Does patient need Images prior:  No -                 Discussed with patient pre-op RN will call 2-3 days prior to surgery with arrival time and instructions:  Yes     Did patient receive surgery folder : Yes    Method/Via: Received in clinic by RN     Surgical soap: Receiving via Received in clinic by RN       Additional Comments:  Contacted patient and schedule surgery for 4/25/2025 w/ Dr. Lopez.   Discussed surgery details and post-op appointments patient verbalized understanding.       All patients questions were answered and patient was instructed to review surgical packet and call back with any questions or concerns.       Marleny Carrillo 4/10/2025 10:44 AM

## 2025-04-10 NOTE — TELEPHONE ENCOUNTER
Date: April 10, 2025    Provider: Dr. John Pearson MD     Provider/Other: N/A    Reason for out-going call: SURGERY SCHEDULED/ NEEDS TO SCHEDULE       Detailed message: Patient was driving at the time of the call and patient stated that he will call back after doctors appt and will discuss.           Number provider for patient: IBIS STRONG : [phone]

## 2025-04-11 ENCOUNTER — MYC MEDICAL ADVICE (OUTPATIENT)
Dept: ENDOCRINOLOGY | Facility: CLINIC | Age: 65
End: 2025-04-11

## 2025-04-13 LAB
ABO + RH BLD: NORMAL
BLD GP AB SCN SERPL QL: NEGATIVE
SPECIMEN EXP DATE BLD: NORMAL

## 2025-04-14 ENCOUNTER — OFFICE VISIT (OUTPATIENT)
Dept: FAMILY MEDICINE | Facility: CLINIC | Age: 65
End: 2025-04-14
Payer: MEDICARE

## 2025-04-14 ENCOUNTER — TELEPHONE (OUTPATIENT)
Dept: ENDOCRINOLOGY | Facility: CLINIC | Age: 65
End: 2025-04-14

## 2025-04-14 VITALS
SYSTOLIC BLOOD PRESSURE: 115 MMHG | HEIGHT: 74 IN | TEMPERATURE: 96.9 F | BODY MASS INDEX: 29.65 KG/M2 | HEART RATE: 68 BPM | RESPIRATION RATE: 16 BRPM | OXYGEN SATURATION: 97 % | WEIGHT: 231 LBS | DIASTOLIC BLOOD PRESSURE: 80 MMHG

## 2025-04-14 DIAGNOSIS — M54.16 LUMBAR RADICULOPATHY: ICD-10-CM

## 2025-04-14 DIAGNOSIS — Z01.810 PRE-OPERATIVE CARDIOVASCULAR EXAMINATION: Primary | ICD-10-CM

## 2025-04-14 PROBLEM — I50.32 DIASTOLIC DYSFUNCTION WITH CHRONIC HEART FAILURE (H): Status: RESOLVED | Noted: 2021-03-15 | Resolved: 2025-04-14

## 2025-04-14 LAB
HGB BLD-MCNC: 14.2 G/DL (ref 13.3–17.7)
HOLD SPECIMEN: NORMAL

## 2025-04-14 PROCEDURE — 3079F DIAST BP 80-89 MM HG: CPT | Performed by: INTERNAL MEDICINE

## 2025-04-14 PROCEDURE — 36415 COLL VENOUS BLD VENIPUNCTURE: CPT | Performed by: INTERNAL MEDICINE

## 2025-04-14 PROCEDURE — 86850 RBC ANTIBODY SCREEN: CPT | Performed by: INTERNAL MEDICINE

## 2025-04-14 PROCEDURE — 99214 OFFICE O/P EST MOD 30 MIN: CPT | Performed by: INTERNAL MEDICINE

## 2025-04-14 PROCEDURE — 85018 HEMOGLOBIN: CPT | Performed by: INTERNAL MEDICINE

## 2025-04-14 PROCEDURE — 1126F AMNT PAIN NOTED NONE PRSNT: CPT | Performed by: INTERNAL MEDICINE

## 2025-04-14 PROCEDURE — 86900 BLOOD TYPING SEROLOGIC ABO: CPT | Performed by: INTERNAL MEDICINE

## 2025-04-14 PROCEDURE — 3074F SYST BP LT 130 MM HG: CPT | Performed by: INTERNAL MEDICINE

## 2025-04-14 PROCEDURE — G2211 COMPLEX E/M VISIT ADD ON: HCPCS | Performed by: INTERNAL MEDICINE

## 2025-04-14 PROCEDURE — 86901 BLOOD TYPING SEROLOGIC RH(D): CPT | Performed by: INTERNAL MEDICINE

## 2025-04-14 ASSESSMENT — ASTHMA QUESTIONNAIRES
ACT_TOTALSCORE: 23
QUESTION_3 LAST FOUR WEEKS HOW OFTEN DID YOUR ASTHMA SYMPTOMS (WHEEZING, COUGHING, SHORTNESS OF BREATH, CHEST TIGHTNESS OR PAIN) WAKE YOU UP AT NIGHT OR EARLIER THAN USUAL IN THE MORNING: NOT AT ALL
QUESTION_2 LAST FOUR WEEKS HOW OFTEN HAVE YOU HAD SHORTNESS OF BREATH: NOT AT ALL
QUESTION_5 LAST FOUR WEEKS HOW WOULD YOU RATE YOUR ASTHMA CONTROL: WELL CONTROLLED
QUESTION_4 LAST FOUR WEEKS HOW OFTEN HAVE YOU USED YOUR RESCUE INHALER OR NEBULIZER MEDICATION (SUCH AS ALBUTEROL): ONCE A WEEK OR LESS
QUESTION_1 LAST FOUR WEEKS HOW MUCH OF THE TIME DID YOUR ASTHMA KEEP YOU FROM GETTING AS MUCH DONE AT WORK, SCHOOL OR AT HOME: NONE OF THE TIME

## 2025-04-14 ASSESSMENT — PAIN SCALES - GENERAL: PAINLEVEL_OUTOF10: NO PAIN (0)

## 2025-04-14 NOTE — PROGRESS NOTES
Preoperative Evaluation  [address], [address]  [facility] [address]  Phone: [phone]  Primary Provider: Suzanne Zhang MD  Pre-op Performing Provider: Suzanne Zhang MD  Apr 14, 2025 4/14/2025   Surgical Information   What procedure is being done? Lumbar 5-sacral 1 fusion to lumbar 3 with lumbar 3-lumbar 5 laminectomies, medial facetectomies and foraminotomies and posterolateral arthrodesis with right sided discectomies and use of stealth.    Facility or Hospital where procedure/surgery will be performed: [facility] OR    Who is doing the procedure / surgery? Kavya Dickey    Date of surgery / procedure: 04/25/2025   Time of surgery / procedure: 10am   Where do you plan to recover after surgery? at home alone     Fax number for surgical facility: Note does not need to be faxed, will be available electronically in Epic.    Assessment & Plan     The proposed surgical procedure is considered INTERMEDIATE risk.    Pre-operative cardiovascular examination  Lumbar radiculopathy  - REVIEW OF HEALTH MAINTENANCE PROTOCOL ORDERS  - ABO and Rh  - Hemoglobin            - No identified additional risk factors other than previously addressed    Antiplatelet or Anticoagulation Medication Instructions   - aspirin: Discontinue aspirin 7 days prior to procedure to reduce bleeding risk. It should be resumed postoperatively.     Additional Medication Instructions  Take all scheduled medications on the day of surgery EXCEPT for modifications listed below:   - ibuprofen (Advil, Motrin): DO NOT TAKE 1 day before surgery.     Recommendation  Approval given to proceed with proposed procedure, without further diagnostic evaluation.        Angel Elizondo is a 65 year old, presenting for the following:  Pre-Op Exam          4/14/2025     9:25 AM   Additional Questions   Roomed by Suki     HPI: patient presents to internal medicine clinic for pre op cardiac evaluation  for upcoming lumbar spine surgery.          4/14/2025   Pre-Op Questionnaire   Have you ever had a heart attack or stroke? No   Have you ever had surgery on your heart or blood vessels, such as a stent placement, a coronary artery bypass, or surgery on an artery in your head, neck, heart, or legs? No   Do you have chest pain with activity? No   Do you have a history of heart failure? No   Do you currently have a cold, bronchitis or symptoms of other infection? No   Do you have a cough, shortness of breath, or wheezing? No   Do you or anyone in your family have previous history of blood clots? (!) YES    Do you or does anyone in your family have a serious bleeding problem such as prolonged bleeding following surgeries or cuts? No   Have you ever had problems with anemia or been told to take iron pills? No   Have you had any abnormal blood loss such as black, tarry or bloody stools? No   Have you ever had a blood transfusion? No   Are you willing to have a blood transfusion if it is medically needed before, during, or after your surgery? Yes   Have you or any of your relatives ever had problems with anesthesia? No   Do you have sleep apnea, excessive snoring or daytime drowsiness? (!) YES   Do you have a CPAP machine? Yes   Do you have any artifical heart valves or other implanted medical devices like a pacemaker, defibrillator, or continuous glucose monitor? No   Do you have artificial joints? No   Are you allergic to latex? No     Health Care Directive  Patient does not have a Health Care Directive: Discussed advance care planning with patient; information given to patient to review.        Patient Active Problem List    Diagnosis Date Noted    Hashimoto's thyroiditis 01/06/2023     Priority: Medium    Elevated BP 01/06/2023     Priority: Medium    Celiac disease 01/06/2023     Priority: Medium    Hypoxemia 03/15/2021     Priority: Medium    Diastolic dysfunction with chronic heart failure (H) 03/15/2021      Priority: Medium    BARRY (obstructive sleep apnea) 04/28/2017     Priority: Medium     Mild BARRY      Anxiety 10/22/2016     Priority: Medium    Anemia, unspecified anemia type 03/20/2016     Priority: Medium     With elevated reticulocyte count - likely r/t Dapsone      Primary hypogonadism in male      Priority: Medium     Low FSH, single left testicle - followed by Dr. Flores of endo      Hypothyroidism 02/02/2015     Priority: Medium    Dermatitis herpetiformis      Priority: Medium     Dx by biopsy of rash on elbow per derm      HTN, goal below 140/90      Priority: Medium    Hyperlipidemia      Priority: Medium    Alcohol use      Priority: Medium    Allergic rhinitis 10/13/2014     Priority: Medium     Problem list name updated by automated process. Provider to review      Pulmonary embolism (H) 02/01/2003     Priority: Medium      Past Medical History:   Diagnosis Date    Alcohol use     Dermatitis herpetiformis 2000    Dx by biopsy of rash on elbow per derm    Diastolic dysfunction with chronic heart failure (H)     HTN, goal below 140/90     Hyperlipidemia     Lung nodule Feb 2003    Benign; Left upper lobe    Multiple allergies     allergist with immunotherapy; Allergist is Dr. Santos in [place]    Primary hypogonadism in male     Low FSH, single left testicle - followed by Dr. Flores of endo    Pulmonary embolus (H) Feb 2003    Evaluated by ?[facility] [place] - unknown cause    Thyroid nodule Feb 2005    Benign per FNA --> taking Levothyroxine ever since    Uncomplicated asthma      Past Surgical History:   Procedure Laterality Date    BACK SURGERY      COLONOSCOPY  April 2012    COLONOSCOPY N/A 6/20/2022    Procedure: COLONOSCOPY;  Surgeon: Diya Lo MD;  Location:  GI    HEAD & NECK SURGERY      HERNIA REPAIR      ORCHIOPEXY CHILD Right     Age 10 due to undescended testicl    ORTHOPEDIC SURGERY      OTHER SURGICAL HISTORY  July 1997    Cervical laminectomy C4-5-6    OTHER SURGICAL HISTORY  Jan 2000     Right index tendon repair    OTHER SURGICAL HISTORY  August 2004    Ankle/wrist repair    OTHER SURGICAL HISTORY  April 2007    Lumbar microdiscectomy L5-S1    OTHER SURGICAL HISTORY  March 2010    Cervical fusion C5-6-7    OTHER SURGICAL HISTORY  March 2011    Cervical fusion C3-4-5    OTHER SURGICAL HISTORY  July 2011    Lumbar fusion L5-S1     Current Outpatient Medications   Medication Sig Dispense Refill    ALLERGY INJECTIONS       aspirin 81 MG EC tablet Take 1 tablet (81 mg) by mouth daily      atorvastatin (LIPITOR) 80 MG tablet Take 1 tablet (80 mg) by mouth daily. Appointment needed for additional refills. Please call [phone] to schedule. 90 tablet 0    fluticasone-salmeterol (ADVAIR) 250-50 MCG/ACT inhaler       hydroCHLOROthiazide 12.5 MG tablet TAKE 1 TABLET BY MOUTH EVERY DAY 90 tablet 0    levothyroxine (SYNTHROID/LEVOTHROID) 125 MCG tablet Take 1 tablet (125 mcg) by mouth daily. 90 tablet 0    testosterone (ANDROGEL 1.62 % PUMP) 20.25 MG/ACT gel Place 2 Pump (40.5 mg) onto the skin daily. Apply from dispenser to clean, dry, intact skin of the upper arms and shoulders. 75 g 1       Allergies   Allergen Reactions    No Known Allergies         Social History     Tobacco Use    Smoking status: Never    Smokeless tobacco: Never   Substance Use Topics    Alcohol use: Not Currently     Family History   Problem Relation Age of Onset    Hypertension Mother     Dementia Mother     Alcoholism Mother     Hypertension Father     Cerebrovascular Disease Father     Cancer Father         throat - was a smoker    Heart Disease Father         CABG    Diabetes Maternal Grandfather     Family History Negative Brother     Family History Negative Sister     Cerebrovascular Disease Paternal Grandmother     Cancer Son         Testicular cancer    Family History Negative Son      History   Drug Use No             Review of Systems  Constitutional, HEENT, cardiovascular, pulmonary, GI, , musculoskeletal, neuro,  "skin, endocrine and psych systems are negative, except as otherwise noted.    Objective    /80   Pulse 68   Temp 96.9  F (36.1  C) (Temporal)   Resp 16   Ht 1.88 m (6' 2\")   Wt 104.8 kg (231 lb)   SpO2 97%   BMI 29.66 kg/m     Estimated body mass index is 29.66 kg/m  as calculated from the following:    Height as of this encounter: 1.88 m (6' 2\").    Weight as of this encounter: 104.8 kg (231 lb).  Physical Exam  GENERAL: alert and no distress  EYES: Eyes grossly normal to inspection, conjunctivae and sclerae normal  HENT: normocephalic atraumatic, nose and mouth without ulcers or lesions  NECK: no asymmetry, masses, or scars  RESP: lungs clear to auscultation - no rales, rhonchi or wheezes  CV: regular rate and rhythm, normal S1 S2  ABDOMEN: soft, nontender, bowel sounds normal  MS: chronic mechanical low back paints noted, no edema  SKIN: no suspicious lesions or rashes  NEURO: Normal strength and tone, mentation intact and speech normal  PSYCH: mentation appears normal, affect normal/bright    Recent Labs   Lab Test 01/02/25  1006 08/26/24  0938   HGB  --  14.6   PLT  --  282    140   POTASSIUM 4.1 4.4   CR 0.87 0.94   A1C  --  5.5        Diagnostics  Recent Results (from the past 24 hours)   Hemoglobin    Collection Time: 04/14/25 10:07 AM   Result Value Ref Range    Hemoglobin 14.2 13.3 - 17.7 g/dL   Adult Type and Screen    Collection Time: 04/14/25 10:09 AM   Result Value Ref Range    ABO/RH(D) O POS     SPECIMEN EXPIRATION DATE [id number]       No EKG required, no history of coronary heart disease, significant arrhythmia, peripheral arterial disease or other structural heart disease.    Revised Cardiac Risk Index (RCRI)  The patient has the following serious cardiovascular risks for perioperative complications:   - No serious cardiac risks = 0 points     RCRI Interpretation: 0 points: Class I (very low risk - 0.4% complication rate)     The longitudinal plan of care for the " diagnosis(es)/condition(s) as documented were addressed during this visit. Due to the added complexity in care, I will continue to support Mikhail in the subsequent management and with ongoing continuity of care.      Signed Electronically by: Suzanne Zhang MD  A copy of this evaluation report is provided to the requesting physician.

## 2025-04-14 NOTE — TELEPHONE ENCOUNTER
Prior Authorization Retail Medication Request    Medication/Dose: Testosterone 1.62%    BIN - [id number]  N MEDDADV  ID# [id number]  GRP [id number]    Tamica Negron CMA  Adult Endocrinology  [facility], [place]

## 2025-04-16 NOTE — TELEPHONE ENCOUNTER
Retail Pharmacy Prior Authorization Team   Phone: [phone]    PA Initiation    Medication: TESTOSTERONE 20.25 MG/ACT (1.62%) TD GEL  Insurance Company: [employer] - Phone [phone] Fax [phone]  Pharmacy Filling the Rx: [id number] - [place], [place] - [address]  Filling Pharmacy Phone: [phone]  Filling Pharmacy Fax:    Start Date: 4/16/2025    Started PA on CMM and a response of A pending Prior Authorization request for the Patient and Medication exist.  Called insurance and spoke with Florinda, the patient started the PA 04/11/2025.  She transferred me to the coverage determination team.  Spoke with Kavya, completed PA via phone.

## 2025-04-16 NOTE — TELEPHONE ENCOUNTER
Prior Authorization Approval    Authorization Effective Date: 4/1/2025  Authorization Expiration Date: 4/16/2026  Medication: Testosterone 1.62%-APPROVED  Reference #:     Insurance Company: [employer] - Phone [phone] Fax [phone]  Which Pharmacy is filling the prescription (Not needed for infusion/clinic administered): [id number] - [place], [place] - [address]  Pharmacy Notified: Yes  Patient Notified: Instructed pharmacy to notify patient when script is ready to /ship.    Received verbal approval.

## 2025-04-17 ENCOUNTER — OFFICE VISIT (OUTPATIENT)
Dept: CARDIOLOGY | Facility: CLINIC | Age: 65
End: 2025-04-17
Payer: MEDICARE

## 2025-04-17 VITALS
WEIGHT: 230 LBS | SYSTOLIC BLOOD PRESSURE: 136 MMHG | HEART RATE: 84 BPM | BODY MASS INDEX: 29.52 KG/M2 | HEIGHT: 74 IN | DIASTOLIC BLOOD PRESSURE: 83 MMHG

## 2025-04-17 DIAGNOSIS — E78.5 HYPERLIPIDEMIA, UNSPECIFIED HYPERLIPIDEMIA TYPE: ICD-10-CM

## 2025-04-17 RX ORDER — ATORVASTATIN CALCIUM 80 MG/1
80 TABLET, FILM COATED ORAL DAILY
Qty: 90 TABLET | Refills: 4 | Status: SHIPPED | OUTPATIENT
Start: 2025-04-17

## 2025-04-17 NOTE — H&P (VIEW-ONLY)
HPI and Plan:   Juan C Garcia is a 65 year old male who presents with history of coronary artery calcifications, hypertension, hyperlipidemia and remote history of pulmonary embolism.  He is here for an annual follow-up visit today last year he had an increase in his atorvastatin and was checked for lipoprotein a.  His lipoprotein a was less than 6 and after starting 80 mg of atorvastatin his LDL did reduce from 148 down to 77 and he has been tolerating the high intensity atorvastatin ever since he had multiple questions for me today which I attempted to answer in regards to primary prevention for future cardiovascular disease.  He is actively working to change his lifestyle through healthy diet, weight loss and regular aerobic activity he is walking 1 hour every day he is limited in his activity due to ongoing back pain apparently he has had 5 previous back surgeries.  He is planning on having a 36 back surgery here in the near future.  Last year he did undergo a stress echocardiogram following the diagnosis of coronary calcifications.  He had a score of 631 and it was diffuse across all 3 vessels.  He walked on a standard Jesse protocol for 9 minutes without symptoms and although he did have some ECG changes the echocardiogram did show augmentation of LV function with an EF greater than 70% and no regional wall motion abnormality.  Again he continues to exercise daily and he is not having any symptoms of chest pain or shortness of breath or exercise intolerance at this time  On exam he is normotensive and exam is otherwise unremarkable  Summary    1.  Coronary calcifications noted on CT-recommend primary prevention with risk factor modification, healthy cardiac diet such as the Mediterranean, weight loss, regular aerobic exercise and monitoring for symptoms.    2.  Hyperlipidemia-had an increase in his atorvastatin to 80 mg upon his last visit, he is now close to goal which is LDL less than 70, with continued  diet weight loss and exercise he may be even lower now.  This was checked only at couple months after starting the 80 mg.  He does have follow-up with his PMD on a regular basis and we will check his cholesterol in the near future.  I did renew his prescription for atorvastatin today  I am happy to continue to follow him annually or as needed, please feel free to contact me with any questions given regards to his care      Today's clinic visit entailed:    32 minutes spent by me on the date of the encounter doing chart review, history and exam, documentation and further activities per the note  Provider  Link to MDM Help Grid     The level of medical decision making during this visit was of moderate complexity.    No orders of the defined types were placed in this encounter.    Orders Placed This Encounter   Medications    atorvastatin (LIPITOR) 80 MG tablet     Sig: Take 1 tablet (80 mg) by mouth daily. Appointment needed for additional refills. Please call [phone] to schedule.     Dispense:  90 tablet     Refill:  4     Medications Discontinued During This Encounter   Medication Reason    atorvastatin (LIPITOR) 80 MG tablet Reorder (No AVS)         Encounter Diagnosis   Name Primary?    Hyperlipidemia, unspecified hyperlipidemia type        CURRENT MEDICATIONS:  Current Outpatient Medications   Medication Sig Dispense Refill    ALLERGY INJECTIONS       aspirin 81 MG EC tablet Take 1 tablet (81 mg) by mouth daily      atorvastatin (LIPITOR) 80 MG tablet Take 1 tablet (80 mg) by mouth daily. Appointment needed for additional refills. Please call [phone] to schedule. 90 tablet 4    fluticasone-salmeterol (ADVAIR) 250-50 MCG/ACT inhaler       hydroCHLOROthiazide 12.5 MG tablet TAKE 1 TABLET BY MOUTH EVERY DAY 90 tablet 0    levothyroxine (SYNTHROID/LEVOTHROID) 125 MCG tablet Take 1 tablet (125 mcg) by mouth daily. 90 tablet 0    testosterone (ANDROGEL 1.62 % PUMP) 20.25 MG/ACT gel Place 2 Pump (40.5 mg)  onto the skin daily. Apply from dispenser to clean, dry, intact skin of the upper arms and shoulders. 75 g 1       ALLERGIES     Allergies   Allergen Reactions    No Known Allergies        PAST MEDICAL HISTORY:  Past Medical History:   Diagnosis Date    Alcohol use     Dermatitis herpetiformis 2000    Dx by biopsy of rash on elbow per derm    Diastolic dysfunction with chronic heart failure (H)     HTN, goal below 140/90     Hyperlipidemia     Lung nodule Feb 2003    Benign; Left upper lobe    Multiple allergies     allergist with immunotherapy; Allergist is Dr. Santos in [place]    Primary hypogonadism in male     Low FSH, single left testicle - followed by Dr. Flores of endo    Pulmonary embolus (H) Feb 2003    Evaluated by ?[place] - unknown cause    Thyroid nodule Feb 2005    Benign per FNA --> taking Levothyroxine ever since    Uncomplicated asthma        PAST SURGICAL HISTORY:  Past Surgical History:   Procedure Laterality Date    BACK SURGERY      COLONOSCOPY  April 2012    COLONOSCOPY N/A 6/20/2022    Procedure: COLONOSCOPY;  Surgeon: Diya Lo MD;  Location:  GI    HEAD & NECK SURGERY      HERNIA REPAIR      ORCHIOPEXY CHILD Right     Age 10 due to undescended testicl    ORTHOPEDIC SURGERY      OTHER SURGICAL HISTORY  July 1997    Cervical laminectomy C4-5-6    OTHER SURGICAL HISTORY  Jan 2000    Right index tendon repair    OTHER SURGICAL HISTORY  August 2004    Ankle/wrist repair    OTHER SURGICAL HISTORY  April 2007    Lumbar microdiscectomy L5-S1    OTHER SURGICAL HISTORY  March 2010    Cervical fusion C5-6-7    OTHER SURGICAL HISTORY  March 2011    Cervical fusion C3-4-5    OTHER SURGICAL HISTORY  July 2011    Lumbar fusion L5-S1       FAMILY HISTORY:  Family History   Problem Relation Age of Onset    Hypertension Mother     Dementia Mother     Alcoholism Mother     Hypertension Father     Cerebrovascular Disease Father     Cancer Father         throat - was a smoker    Heart Disease  Father         CABG    Diabetes Maternal Grandfather     Family History Negative Brother     Family History Negative Sister     Cerebrovascular Disease Paternal Grandmother     Cancer Son         Testicular cancer    Family History Negative Son        SOCIAL HISTORY:  Social History     Socioeconomic History    Marital status:      Spouse name: None    Number of children: None    Years of education: None    Highest education level: None   Tobacco Use    Smoking status: Never    Smokeless tobacco: Never   Vaping Use    Vaping status: Never Used   Substance and Sexual Activity    Alcohol use: Not Currently    Drug use: No    Sexual activity: Yes     Partners: Female     Birth control/protection: Pull-out method   Other Topics Concern    Parent/sibling w/ CABG, MI or angioplasty before 65F 55M? Yes     Comment: father bypass 60ish     Social Drivers of Health     Financial Resource Strain: Low Risk  (6/21/2024)    Financial Resource Strain     Within the past 12 months, have you or your family members you live with been unable to get utilities (heat, electricity) when it was really needed?: No   Food Insecurity: Low Risk  (6/21/2024)    Food Insecurity     Within the past 12 months, did you worry that your food would run out before you got money to buy more?: No     Within the past 12 months, did the food you bought just not last and you didn t have money to get more?: No   Transportation Needs: Low Risk  (6/21/2024)    Transportation Needs     Within the past 12 months, has lack of transportation kept you from medical appointments, getting your medicines, non-medical meetings or appointments, work, or from getting things that you need?: No   Physical Activity: Unknown (6/21/2024)    Exercise Vital Sign     Days of Exercise per Week: 3 days   Stress: Stress Concern Present (6/21/2024)    [employer] - Occupational Stress Questionnaire     Feeling of Stress : To some extent   Social  "Connections: Unknown (6/21/2024)    Social Connection and Isolation Panel [NHANES]     Frequency of Social Gatherings with Friends and Family: Three times a week   Interpersonal Safety: Low Risk  (4/14/2025)    Interpersonal Safety     Do you feel physically and emotionally safe where you currently live?: Yes     Within the past 12 months, have you been hit, slapped, kicked or otherwise physically hurt by someone?: No     Within the past 12 months, have you been humiliated or emotionally abused in other ways by your partner or ex-partner?: No   Housing Stability: Low Risk  (6/21/2024)    Housing Stability     Do you have housing? : Yes     Are you worried about losing your housing?: No       Review of Systems:  Skin:        Eyes:       ENT:       Respiratory:  Negative    Cardiovascular:  Negative    Gastroenterology:      Genitourinary:       Musculoskeletal:       Neurologic:       Psychiatric:       Heme/Lymph/Imm:       Endocrine:         Physical Exam:  Vitals: /83   Pulse 84   Ht 1.88 m (6' 2\")   Wt 104.3 kg (230 lb)   BMI 29.53 kg/m      Constitutional:  cooperative, alert and oriented, well developed, well nourished, in no acute distress        Skin:  warm and dry to the touch          Head:  normocephalic        Eyes:  pupils equal and round        Lymph:      ENT:  no pallor or cyanosis, dentition good        Neck:  no carotid bruit        Respiratory:  clear to auscultation         Cardiac: regular rhythm, no murmurs, gallops or rubs detected                pulses full and equal                                        GI:  abdomen soft, BS normoactive        Extremities and Muscular Skeletal:  no edema              Neurological:  no gross motor deficits        Psych:  affect appropriate, oriented to time, person and place        Recent Lab Results:  LIPID RESULTS:  Lab Results   Component Value Date    CHOL 145 05/31/2024    CHOL 264 (H) 06/03/2021    HDL 49 05/31/2024    HDL 61 06/03/2021    LDL " 77 05/31/2024     (H) 06/03/2021    TRIG 93 05/31/2024    TRIG 100 06/03/2021    CHOLHDLRATIO 5.2 (H) 02/02/2015       LIVER ENZYME RESULTS:  Lab Results   Component Value Date    AST 38 08/26/2024    AST 24 01/27/2020    ALT 34 08/26/2024    ALT 33 01/27/2020       CBC RESULTS:  Lab Results   Component Value Date    WBC 8.3 08/26/2024    WBC 6.4 03/08/2021    RBC 4.97 08/26/2024    RBC 4.77 03/08/2021    HGB 14.2 04/14/2025    HGB 14.2 03/08/2021    HCT 42.4 08/26/2024    HCT 43.8 03/08/2021    MCV 85 08/26/2024    MCV 92 03/08/2021    MCH 29.4 08/26/2024    MCH 29.8 03/08/2021    MCHC 34.4 08/26/2024    MCHC 32.4 03/08/2021    RDW 12.9 08/26/2024    RDW 13.4 03/08/2021     08/26/2024     03/08/2021       BMP RESULTS:  Lab Results   Component Value Date     01/02/2025     03/08/2021    POTASSIUM 4.1 01/02/2025    POTASSIUM 3.8 05/13/2022    POTASSIUM 4.5 03/08/2021    CHLORIDE 102 01/02/2025    CHLORIDE 107 05/13/2022    CHLORIDE 105 03/08/2021    CO2 25 01/02/2025    CO2 23 05/13/2022    CO2 29 03/08/2021    ANIONGAP 11 01/02/2025    ANIONGAP 8 05/13/2022    ANIONGAP 3 03/08/2021    GLC 99 01/02/2025    GLC 97 05/13/2022     (H) 03/08/2021    BUN 15.4 01/02/2025    BUN 10 05/13/2022    BUN 12 03/08/2021    CR 0.87 01/02/2025    CR 0.80 03/08/2021    GFRESTIMATED >90 01/02/2025    GFRESTIMATED >60 05/13/2022    GFRESTIMATED >90 03/08/2021    GFRESTBLACK >90 03/08/2021    JOSÉ MIGUEL 9.6 01/02/2025    JOSÉ MIGUEL 10.0 03/08/2021        A1C RESULTS:  Lab Results   Component Value Date    A1C 5.5 08/26/2024    A1C 4.8 03/08/2021       INR RESULTS:  Lab Results   Component Value Date    INR 0.97 06/25/2016    INR 0.93 03/17/2016           CC  Juan C Wood MD  No address on file

## 2025-04-17 NOTE — LETTER
4/17/2025    Suzanne Zhang MD  [address]  [facility] [address]    RE: Juan C Garcia       Dear Colleague,     I had the pleasure of seeing Juan C Garcia in the [facility].  HPI and Plan:   Juan C Garcia is a 65 year old male who presents with history of coronary artery calcifications, hypertension, hyperlipidemia and remote history of pulmonary embolism.  He is here for an annual follow-up visit today last year he had an increase in his atorvastatin and was checked for lipoprotein a.  His lipoprotein a was less than 6 and after starting 80 mg of atorvastatin his LDL did reduce from 148 down to 77 and he has been tolerating the high intensity atorvastatin ever since he had multiple questions for me today which I attempted to answer in regards to primary prevention for future cardiovascular disease.  He is actively working to change his lifestyle through healthy diet, weight loss and regular aerobic activity he is walking 1 hour every day he is limited in his activity due to ongoing back pain apparently he has had 5 previous back surgeries.  He is planning on having a 36 back surgery here in the near future.  Last year he did undergo a stress echocardiogram following the diagnosis of coronary calcifications.  He had a score of 631 and it was diffuse across all 3 vessels.  He walked on a standard Jesse protocol for 9 minutes without symptoms and although he did have some ECG changes the echocardiogram did show augmentation of LV function with an EF greater than 70% and no regional wall motion abnormality.  Again he continues to exercise daily and he is not having any symptoms of chest pain or shortness of breath or exercise intolerance at this time  On exam he is normotensive and exam is otherwise unremarkable  Summary    1.  Coronary calcifications noted on CT-recommend primary prevention with risk factor modification, healthy cardiac diet such as the Mediterranean, weight loss,  regular aerobic exercise and monitoring for symptoms.    2.  Hyperlipidemia-had an increase in his atorvastatin to 80 mg upon his last visit, he is now close to goal which is LDL less than 70, with continued diet weight loss and exercise he may be even lower now.  This was checked only at couple months after starting the 80 mg.  He does have follow-up with his PMD on a regular basis and we will check his cholesterol in the near future.  I did renew his prescription for atorvastatin today  I am happy to continue to follow him annually or as needed, please feel free to contact me with any questions given regards to his care      Today's clinic visit entailed:    32 minutes spent by me on the date of the encounter doing chart review, history and exam, documentation and further activities per the note  Provider  Link to MDM Help Grid     The level of medical decision making during this visit was of moderate complexity.    No orders of the defined types were placed in this encounter.    Orders Placed This Encounter   Medications     atorvastatin (LIPITOR) 80 MG tablet     Sig: Take 1 tablet (80 mg) by mouth daily. Appointment needed for additional refills. Please call [phone] to schedule.     Dispense:  90 tablet     Refill:  4     Medications Discontinued During This Encounter   Medication Reason     atorvastatin (LIPITOR) 80 MG tablet Reorder (No AVS)         Encounter Diagnosis   Name Primary?     Hyperlipidemia, unspecified hyperlipidemia type        CURRENT MEDICATIONS:  Current Outpatient Medications   Medication Sig Dispense Refill     ALLERGY INJECTIONS        aspirin 81 MG EC tablet Take 1 tablet (81 mg) by mouth daily       atorvastatin (LIPITOR) 80 MG tablet Take 1 tablet (80 mg) by mouth daily. Appointment needed for additional refills. Please call [phone] to schedule. 90 tablet 4     fluticasone-salmeterol (ADVAIR) 250-50 MCG/ACT inhaler        hydroCHLOROthiazide 12.5 MG tablet TAKE 1 TABLET BY  MOUTH EVERY DAY 90 tablet 0     levothyroxine (SYNTHROID/LEVOTHROID) 125 MCG tablet Take 1 tablet (125 mcg) by mouth daily. 90 tablet 0     testosterone (ANDROGEL 1.62 % PUMP) 20.25 MG/ACT gel Place 2 Pump (40.5 mg) onto the skin daily. Apply from dispenser to clean, dry, intact skin of the upper arms and shoulders. 75 g 1       ALLERGIES     Allergies   Allergen Reactions     No Known Allergies        PAST MEDICAL HISTORY:  Past Medical History:   Diagnosis Date     Alcohol use      Dermatitis herpetiformis 2000    Dx by biopsy of rash on elbow per derm     Diastolic dysfunction with chronic heart failure (H)      HTN, goal below 140/90      Hyperlipidemia      Lung nodule Feb 2003    Benign; Left upper lobe     Multiple allergies     allergist with immunotherapy; Allergist is Dr. Santos in [place]     Primary hypogonadism in male     Low FSH, single left testicle - followed by Dr. Flores of endo     Pulmonary embolus (H) Feb 2003    Evaluated by ?[place] - unknown cause     Thyroid nodule Feb 2005    Benign per FNA --> taking Levothyroxine ever since     Uncomplicated asthma        PAST SURGICAL HISTORY:  Past Surgical History:   Procedure Laterality Date     BACK SURGERY       COLONOSCOPY  April 2012     COLONOSCOPY N/A 6/20/2022    Procedure: COLONOSCOPY;  Surgeon: Diya Lo MD;  Location:  GI     HEAD & NECK SURGERY       HERNIA REPAIR       ORCHIOPEXY CHILD Right     Age 10 due to undescended testicl     ORTHOPEDIC SURGERY       OTHER SURGICAL HISTORY  July 1997    Cervical laminectomy C4-5-6     OTHER SURGICAL HISTORY  Jan 2000    Right index tendon repair     OTHER SURGICAL HISTORY  August 2004    Ankle/wrist repair     OTHER SURGICAL HISTORY  April 2007    Lumbar microdiscectomy L5-S1     OTHER SURGICAL HISTORY  March 2010    Cervical fusion C5-6-7     OTHER SURGICAL HISTORY  March 2011    Cervical fusion C3-4-5     OTHER SURGICAL HISTORY  July 2011    Lumbar fusion L5-S1       FAMILY  HISTORY:  Family History   Problem Relation Age of Onset     Hypertension Mother      Dementia Mother      Alcoholism Mother      Hypertension Father      Cerebrovascular Disease Father      Cancer Father         throat - was a smoker     Heart Disease Father         CABG     Diabetes Maternal Grandfather      Family History Negative Brother      Family History Negative Sister      Cerebrovascular Disease Paternal Grandmother      Cancer Son         Testicular cancer     Family History Negative Son        SOCIAL HISTORY:  Social History     Socioeconomic History     Marital status:      Spouse name: None     Number of children: None     Years of education: None     Highest education level: None   Tobacco Use     Smoking status: Never     Smokeless tobacco: Never   Vaping Use     Vaping status: Never Used   Substance and Sexual Activity     Alcohol use: Not Currently     Drug use: No     Sexual activity: Yes     Partners: Female     Birth control/protection: Pull-out method   Other Topics Concern     Parent/sibling w/ CABG, MI or angioplasty before 65F 55M? Yes     Comment: father bypass 60ish     Social Drivers of Health     Financial Resource Strain: Low Risk  (6/21/2024)    Financial Resource Strain      Within the past 12 months, have you or your family members you live with been unable to get utilities (heat, electricity) when it was really needed?: No   Food Insecurity: Low Risk  (6/21/2024)    Food Insecurity      Within the past 12 months, did you worry that your food would run out before you got money to buy more?: No      Within the past 12 months, did the food you bought just not last and you didn t have money to get more?: No   Transportation Needs: Low Risk  (6/21/2024)    Transportation Needs      Within the past 12 months, has lack of transportation kept you from medical appointments, getting your medicines, non-medical meetings or appointments, work, or from getting things that you need?: No  "  Physical Activity: Unknown (6/21/2024)    Exercise Vital Sign      Days of Exercise per Week: 3 days   Stress: Stress Concern Present (6/21/2024)    [employer] - Occupational Stress Questionnaire      Feeling of Stress : To some extent   Social Connections: Unknown (6/21/2024)    Social Connection and Isolation Panel [NHANES]      Frequency of Social Gatherings with Friends and Family: Three times a week   Interpersonal Safety: Low Risk  (4/14/2025)    Interpersonal Safety      Do you feel physically and emotionally safe where you currently live?: Yes      Within the past 12 months, have you been hit, slapped, kicked or otherwise physically hurt by someone?: No      Within the past 12 months, have you been humiliated or emotionally abused in other ways by your partner or ex-partner?: No   Housing Stability: Low Risk  (6/21/2024)    Housing Stability      Do you have housing? : Yes      Are you worried about losing your housing?: No       Review of Systems:  Skin:        Eyes:       ENT:       Respiratory:  Negative    Cardiovascular:  Negative    Gastroenterology:      Genitourinary:       Musculoskeletal:       Neurologic:       Psychiatric:       Heme/Lymph/Imm:       Endocrine:         Physical Exam:  Vitals: /83   Pulse 84   Ht 1.88 m (6' 2\")   Wt 104.3 kg (230 lb)   BMI 29.53 kg/m      Constitutional:  cooperative, alert and oriented, well developed, well nourished, in no acute distress        Skin:  warm and dry to the touch          Head:  normocephalic        Eyes:  pupils equal and round        Lymph:      ENT:  no pallor or cyanosis, dentition good        Neck:  no carotid bruit        Respiratory:  clear to auscultation         Cardiac: regular rhythm, no murmurs, gallops or rubs detected                pulses full and equal                                        GI:  abdomen soft, BS normoactive        Extremities and Muscular Skeletal:  no edema          "     Neurological:  no gross motor deficits        Psych:  affect appropriate, oriented to time, person and place        Recent Lab Results:  LIPID RESULTS:  Lab Results   Component Value Date    CHOL 145 05/31/2024    CHOL 264 (H) 06/03/2021    HDL 49 05/31/2024    HDL 61 06/03/2021    LDL 77 05/31/2024     (H) 06/03/2021    TRIG 93 05/31/2024    TRIG 100 06/03/2021    CHOLHDLRATIO 5.2 (H) 02/02/2015       LIVER ENZYME RESULTS:  Lab Results   Component Value Date    AST 38 08/26/2024    AST 24 01/27/2020    ALT 34 08/26/2024    ALT 33 01/27/2020       CBC RESULTS:  Lab Results   Component Value Date    WBC 8.3 08/26/2024    WBC 6.4 03/08/2021    RBC 4.97 08/26/2024    RBC 4.77 03/08/2021    HGB 14.2 04/14/2025    HGB 14.2 03/08/2021    HCT 42.4 08/26/2024    HCT 43.8 03/08/2021    MCV 85 08/26/2024    MCV 92 03/08/2021    MCH 29.4 08/26/2024    MCH 29.8 03/08/2021    MCHC 34.4 08/26/2024    MCHC 32.4 03/08/2021    RDW 12.9 08/26/2024    RDW 13.4 03/08/2021     08/26/2024     03/08/2021       BMP RESULTS:  Lab Results   Component Value Date     01/02/2025     03/08/2021    POTASSIUM 4.1 01/02/2025    POTASSIUM 3.8 05/13/2022    POTASSIUM 4.5 03/08/2021    CHLORIDE 102 01/02/2025    CHLORIDE 107 05/13/2022    CHLORIDE 105 03/08/2021    CO2 25 01/02/2025    CO2 23 05/13/2022    CO2 29 03/08/2021    ANIONGAP 11 01/02/2025    ANIONGAP 8 05/13/2022    ANIONGAP 3 03/08/2021    GLC 99 01/02/2025    GLC 97 05/13/2022     (H) 03/08/2021    BUN 15.4 01/02/2025    BUN 10 05/13/2022    BUN 12 03/08/2021    CR 0.87 01/02/2025    CR 0.80 03/08/2021    GFRESTIMATED >90 01/02/2025    GFRESTIMATED >60 05/13/2022    GFRESTIMATED >90 03/08/2021    GFRESTBLACK >90 03/08/2021    JOSÉ MIGUEL 9.6 01/02/2025    JOSÉ MIGUEL 10.0 03/08/2021        A1C RESULTS:  Lab Results   Component Value Date    A1C 5.5 08/26/2024    A1C 4.8 03/08/2021       INR RESULTS:  Lab Results   Component Value Date    INR 0.97 06/25/2016     INR 0.93 03/17/2016           CC  Juan C Wood MD  No address on file                  Thank you for allowing me to participate in the care of your patient.      Sincerely,     Jessica Lopez [facility]  cc:   Juan C Wood MD  No address on file

## 2025-04-17 NOTE — PROGRESS NOTES
HPI and Plan:   Juan C Garcia is a 65 year old male who presents with history of coronary artery calcifications, hypertension, hyperlipidemia and remote history of pulmonary embolism.  He is here for an annual follow-up visit today last year he had an increase in his atorvastatin and was checked for lipoprotein a.  His lipoprotein a was less than 6 and after starting 80 mg of atorvastatin his LDL did reduce from 148 down to 77 and he has been tolerating the high intensity atorvastatin ever since he had multiple questions for me today which I attempted to answer in regards to primary prevention for future cardiovascular disease.  He is actively working to change his lifestyle through healthy diet, weight loss and regular aerobic activity he is walking 1 hour every day he is limited in his activity due to ongoing back pain apparently he has had 5 previous back surgeries.  He is planning on having a 36 back surgery here in the near future.  Last year he did undergo a stress echocardiogram following the diagnosis of coronary calcifications.  He had a score of 631 and it was diffuse across all 3 vessels.  He walked on a standard Jesse protocol for 9 minutes without symptoms and although he did have some ECG changes the echocardiogram did show augmentation of LV function with an EF greater than 70% and no regional wall motion abnormality.  Again he continues to exercise daily and he is not having any symptoms of chest pain or shortness of breath or exercise intolerance at this time  On exam he is normotensive and exam is otherwise unremarkable  Summary    1.  Coronary calcifications noted on CT-recommend primary prevention with risk factor modification, healthy cardiac diet such as the Mediterranean, weight loss, regular aerobic exercise and monitoring for symptoms.    2.  Hyperlipidemia-had an increase in his atorvastatin to 80 mg upon his last visit, he is now close to goal which is LDL less than 70, with continued  diet weight loss and exercise he may be even lower now.  This was checked only at couple months after starting the 80 mg.  He does have follow-up with his PMD on a regular basis and we will check his cholesterol in the near future.  I did renew his prescription for atorvastatin today  I am happy to continue to follow him annually or as needed, please feel free to contact me with any questions given regards to his care      Today's clinic visit entailed:    32 minutes spent by me on the date of the encounter doing chart review, history and exam, documentation and further activities per the note  Provider  Link to MDM Help Grid     The level of medical decision making during this visit was of moderate complexity.    No orders of the defined types were placed in this encounter.    Orders Placed This Encounter   Medications    atorvastatin (LIPITOR) 80 MG tablet     Sig: Take 1 tablet (80 mg) by mouth daily. Appointment needed for additional refills. Please call [phone] to schedule.     Dispense:  90 tablet     Refill:  4     Medications Discontinued During This Encounter   Medication Reason    atorvastatin (LIPITOR) 80 MG tablet Reorder (No AVS)         Encounter Diagnosis   Name Primary?    Hyperlipidemia, unspecified hyperlipidemia type        CURRENT MEDICATIONS:  Current Outpatient Medications   Medication Sig Dispense Refill    ALLERGY INJECTIONS       aspirin 81 MG EC tablet Take 1 tablet (81 mg) by mouth daily      atorvastatin (LIPITOR) 80 MG tablet Take 1 tablet (80 mg) by mouth daily. Appointment needed for additional refills. Please call [phone] to schedule. 90 tablet 4    fluticasone-salmeterol (ADVAIR) 250-50 MCG/ACT inhaler       hydroCHLOROthiazide 12.5 MG tablet TAKE 1 TABLET BY MOUTH EVERY DAY 90 tablet 0    levothyroxine (SYNTHROID/LEVOTHROID) 125 MCG tablet Take 1 tablet (125 mcg) by mouth daily. 90 tablet 0    testosterone (ANDROGEL 1.62 % PUMP) 20.25 MG/ACT gel Place 2 Pump (40.5 mg)  onto the skin daily. Apply from dispenser to clean, dry, intact skin of the upper arms and shoulders. 75 g 1       ALLERGIES     Allergies   Allergen Reactions    No Known Allergies        PAST MEDICAL HISTORY:  Past Medical History:   Diagnosis Date    Alcohol use     Dermatitis herpetiformis 2000    Dx by biopsy of rash on elbow per derm    Diastolic dysfunction with chronic heart failure (H)     HTN, goal below 140/90     Hyperlipidemia     Lung nodule Feb 2003    Benign; Left upper lobe    Multiple allergies     allergist with immunotherapy; Allergist is Dr. Santos in [place]    Primary hypogonadism in male     Low FSH, single left testicle - followed by Dr. Flores of endo    Pulmonary embolus (H) Feb 2003    Evaluated by ?Wood - unknown cause    Thyroid nodule Feb 2005    Benign per [facility] --> taking Levothyroxine ever since    Uncomplicated asthma        PAST SURGICAL HISTORY:  Past Surgical History:   Procedure Laterality Date    BACK SURGERY      COLONOSCOPY  April 2012    COLONOSCOPY N/A 6/20/2022    Procedure: COLONOSCOPY;  Surgeon: Diya Lo MD;  Location:  GI    HEAD & NECK SURGERY      HERNIA REPAIR      ORCHIOPEXY CHILD Right     Age 10 due to undescended testicl    ORTHOPEDIC SURGERY      OTHER SURGICAL HISTORY  July 1997    Cervical laminectomy C4-5-6    OTHER SURGICAL HISTORY  Jan 2000    Right index tendon repair    OTHER SURGICAL HISTORY  August 2004    Ankle/wrist repair    OTHER SURGICAL HISTORY  April 2007    Lumbar microdiscectomy L5-S1    OTHER SURGICAL HISTORY  March 2010    Cervical fusion C5-6-7    OTHER SURGICAL HISTORY  March 2011    Cervical fusion C3-4-5    OTHER SURGICAL HISTORY  July 2011    Lumbar fusion L5-S1       FAMILY HISTORY:  Family History   Problem Relation Age of Onset    Hypertension Mother     Dementia Mother     Alcoholism Mother     Hypertension Father     Cerebrovascular Disease Father     Cancer Father         throat - was a smoker    Heart Disease  Father         CABG    Diabetes Maternal Grandfather     Family History Negative Brother     Family History Negative Sister     Cerebrovascular Disease Paternal Grandmother     Cancer Son         Testicular cancer    Family History Negative Son        SOCIAL HISTORY:  Social History     Socioeconomic History    Marital status:      Spouse name: None    Number of children: None    Years of education: None    Highest education level: None   Tobacco Use    Smoking status: Never    Smokeless tobacco: Never   Vaping Use    Vaping status: Never Used   Substance and Sexual Activity    Alcohol use: Not Currently    Drug use: No    Sexual activity: Yes     Partners: Female     Birth control/protection: Pull-out method   Other Topics Concern    Parent/sibling w/ CABG, MI or angioplasty before 65F 55M? Yes     Comment: father bypass 60ish     Social Drivers of Health     Financial Resource Strain: Low Risk  (6/21/2024)    Financial Resource Strain     Within the past 12 months, have you or your family members you live with been unable to get utilities (heat, electricity) when it was really needed?: No   Food Insecurity: Low Risk  (6/21/2024)    Food Insecurity     Within the past 12 months, did you worry that your food would run out before you got money to buy more?: No     Within the past 12 months, did the food you bought just not last and you didn t have money to get more?: No   Transportation Needs: Low Risk  (6/21/2024)    Transportation Needs     Within the past 12 months, has lack of transportation kept you from medical appointments, getting your medicines, non-medical meetings or appointments, work, or from getting things that you need?: No   Physical Activity: Unknown (6/21/2024)    Exercise Vital Sign     Days of Exercise per Week: 3 days   Stress: Stress Concern Present (6/21/2024)    [employer] - Occupational Stress Questionnaire     Feeling of Stress : To some extent   Social  "Connections: Unknown (6/21/2024)    Social Connection and Isolation Panel [NHANES]     Frequency of Social Gatherings with Friends and Family: Three times a week   Interpersonal Safety: Low Risk  (4/14/2025)    Interpersonal Safety     Do you feel physically and emotionally safe where you currently live?: Yes     Within the past 12 months, have you been hit, slapped, kicked or otherwise physically hurt by someone?: No     Within the past 12 months, have you been humiliated or emotionally abused in other ways by your partner or ex-partner?: No   Housing Stability: Low Risk  (6/21/2024)    Housing Stability     Do you have housing? : Yes     Are you worried about losing your housing?: No       Review of Systems:  Skin:        Eyes:       ENT:       Respiratory:  Negative    Cardiovascular:  Negative    Gastroenterology:      Genitourinary:       Musculoskeletal:       Neurologic:       Psychiatric:       Heme/Lymph/Imm:       Endocrine:         Physical Exam:  Vitals: /83   Pulse 84   Ht 1.88 m (6' 2\")   Wt 104.3 kg (230 lb)   BMI 29.53 kg/m      Constitutional:  cooperative, alert and oriented, well developed, well nourished, in no acute distress        Skin:  warm and dry to the touch          Head:  normocephalic        Eyes:  pupils equal and round        Lymph:      ENT:  no pallor or cyanosis, dentition good        Neck:  no carotid bruit        Respiratory:  clear to auscultation         Cardiac: regular rhythm, no murmurs, gallops or rubs detected                pulses full and equal                                        GI:  abdomen soft, BS normoactive        Extremities and Muscular Skeletal:  no edema              Neurological:  no gross motor deficits        Psych:  affect appropriate, oriented to time, person and place        Recent Lab Results:  LIPID RESULTS:  Lab Results   Component Value Date    CHOL 145 05/31/2024    CHOL 264 (H) 06/03/2021    HDL 49 05/31/2024    HDL 61 06/03/2021    LDL " 77 05/31/2024     (H) 06/03/2021    TRIG 93 05/31/2024    TRIG 100 06/03/2021    CHOLHDLRATIO 5.2 (H) 02/02/2015       LIVER ENZYME RESULTS:  Lab Results   Component Value Date    AST 38 08/26/2024    AST 24 01/27/2020    ALT 34 08/26/2024    ALT 33 01/27/2020       CBC RESULTS:  Lab Results   Component Value Date    WBC 8.3 08/26/2024    WBC 6.4 03/08/2021    RBC 4.97 08/26/2024    RBC 4.77 03/08/2021    HGB 14.2 04/14/2025    HGB 14.2 03/08/2021    HCT 42.4 08/26/2024    HCT 43.8 03/08/2021    MCV 85 08/26/2024    MCV 92 03/08/2021    MCH 29.4 08/26/2024    MCH 29.8 03/08/2021    MCHC 34.4 08/26/2024    MCHC 32.4 03/08/2021    RDW 12.9 08/26/2024    RDW 13.4 03/08/2021     08/26/2024     03/08/2021       BMP RESULTS:  Lab Results   Component Value Date     01/02/2025     03/08/2021    POTASSIUM 4.1 01/02/2025    POTASSIUM 3.8 05/13/2022    POTASSIUM 4.5 03/08/2021    CHLORIDE 102 01/02/2025    CHLORIDE 107 05/13/2022    CHLORIDE 105 03/08/2021    CO2 25 01/02/2025    CO2 23 05/13/2022    CO2 29 03/08/2021    ANIONGAP 11 01/02/2025    ANIONGAP 8 05/13/2022    ANIONGAP 3 03/08/2021    GLC 99 01/02/2025    GLC 97 05/13/2022     (H) 03/08/2021    BUN 15.4 01/02/2025    BUN 10 05/13/2022    BUN 12 03/08/2021    CR 0.87 01/02/2025    CR 0.80 03/08/2021    GFRESTIMATED >90 01/02/2025    GFRESTIMATED >60 05/13/2022    GFRESTIMATED >90 03/08/2021    GFRESTBLACK >90 03/08/2021    JOSÉ MIGUEL 9.6 01/02/2025    JOSÉ MIGUEL 10.0 03/08/2021        A1C RESULTS:  Lab Results   Component Value Date    A1C 5.5 08/26/2024    A1C 4.8 03/08/2021       INR RESULTS:  Lab Results   Component Value Date    INR 0.97 06/25/2016    INR 0.93 03/17/2016           CC  Juan C Wood MD  No address on file

## 2025-04-22 ENCOUNTER — MYC MEDICAL ADVICE (OUTPATIENT)
Dept: FAMILY MEDICINE | Facility: CLINIC | Age: 65
End: 2025-04-22
Payer: MEDICARE

## 2025-04-22 DIAGNOSIS — I10 ESSENTIAL HYPERTENSION: ICD-10-CM

## 2025-04-22 RX ORDER — LOSARTAN POTASSIUM 50 MG/1
50 TABLET ORAL DAILY
Qty: 90 TABLET | Refills: 1 | Status: SHIPPED | OUTPATIENT
Start: 2025-04-22

## 2025-04-25 ENCOUNTER — HOSPITAL ENCOUNTER (INPATIENT)
Facility: CLINIC | Age: 65
LOS: 3 days | Discharge: HOME | DRG: 448 | End: 2025-04-28
Attending: SURGERY | Admitting: SURGERY
Payer: MEDICARE

## 2025-04-25 ENCOUNTER — APPOINTMENT (OUTPATIENT)
Dept: RADIOLOGY | Facility: CLINIC | Age: 65
DRG: 448 | End: 2025-04-25
Attending: PHYSICIAN ASSISTANT
Payer: MEDICARE

## 2025-04-25 DIAGNOSIS — I10 ESSENTIAL HYPERTENSION: ICD-10-CM

## 2025-04-25 DIAGNOSIS — I10 HTN, GOAL BELOW 140/90: Chronic | ICD-10-CM

## 2025-04-25 DIAGNOSIS — M48.062 SPINAL STENOSIS OF LUMBAR REGION WITH NEUROGENIC CLAUDICATION: ICD-10-CM

## 2025-04-25 DIAGNOSIS — I25.10 CORONARY ARTERY CALCIFICATION: ICD-10-CM

## 2025-04-25 PROBLEM — M48.061 LUMBAR SPINAL STENOSIS: Status: ACTIVE | Noted: 2025-04-25

## 2025-04-25 LAB — GLUCOSE BLDC GLUCOMTR-MCNC: 112 MG/DL (ref 70–99)

## 2025-04-25 PROCEDURE — 250N000005 HC OR RX SURGIFLO HEMOSTATIC MATRIX 10ML 199102S OPNP: Performed by: SURGERY

## 2025-04-25 PROCEDURE — 01NB0ZZ RELEASE LUMBAR NERVE, OPEN APPROACH: ICD-10-PCS | Performed by: SURGERY

## 2025-04-25 PROCEDURE — 250N000011 HC RX IP 250 OP 636: Performed by: PHYSICIAN ASSISTANT

## 2025-04-25 PROCEDURE — 63047 LAM FACETEC & FORAMOT LUMBAR: CPT | Mod: AS | Performed by: PHYSICIAN ASSISTANT

## 2025-04-25 PROCEDURE — 250N000013 HC RX MED GY IP 250 OP 250 PS 637: Performed by: INTERNAL MEDICINE

## 2025-04-25 PROCEDURE — 999N000182 XR SURGERY OARM

## 2025-04-25 PROCEDURE — 999N000157 HC STATISTIC RCP TIME EA 10 MIN

## 2025-04-25 PROCEDURE — 0QP004Z REMOVAL OF INTERNAL FIXATION DEVICE FROM LUMBAR VERTEBRA, OPEN APPROACH: ICD-10-PCS | Performed by: SURGERY

## 2025-04-25 PROCEDURE — 360N000079 HC SURGERY LEVEL 6, PER MIN: Performed by: SURGERY

## 2025-04-25 PROCEDURE — 250N000025 HC SEVOFLURANE, PER MIN: Performed by: SURGERY

## 2025-04-25 PROCEDURE — 272N000001 HC OR GENERAL SUPPLY STERILE: Performed by: SURGERY

## 2025-04-25 PROCEDURE — C1762 CONN TISS, HUMAN(INC FASCIA): HCPCS | Performed by: SURGERY

## 2025-04-25 PROCEDURE — 370N000017 HC ANESTHESIA TECHNICAL FEE, PER MIN: Performed by: SURGERY

## 2025-04-25 PROCEDURE — 5A09357 ASSISTANCE WITH RESPIRATORY VENTILATION, LESS THAN 24 CONSECUTIVE HOURS, CONTINUOUS POSITIVE AIRWAY PRESSURE: ICD-10-PCS | Performed by: SURGERY

## 2025-04-25 PROCEDURE — 250N000009 HC RX 250: Performed by: PHYSICIAN ASSISTANT

## 2025-04-25 PROCEDURE — 0QP104Z REMOVAL OF INTERNAL FIXATION DEVICE FROM SACRUM, OPEN APPROACH: ICD-10-PCS | Performed by: SURGERY

## 2025-04-25 PROCEDURE — 22614 ARTHRD PST TQ 1NTRSPC EA ADD: CPT | Mod: AS | Performed by: PHYSICIAN ASSISTANT

## 2025-04-25 PROCEDURE — 20936 SP BONE AGRFT LOCAL ADD-ON: CPT | Performed by: SURGERY

## 2025-04-25 PROCEDURE — 20930 SP BONE ALGRFT MORSEL ADD-ON: CPT | Performed by: SURGERY

## 2025-04-25 PROCEDURE — 22612 ARTHRD PST TQ 1NTRSPC LUMBAR: CPT | Performed by: SURGERY

## 2025-04-25 PROCEDURE — 22842 INSERT SPINE FIXATION DEVICE: CPT | Mod: AS | Performed by: PHYSICIAN ASSISTANT

## 2025-04-25 PROCEDURE — 250N000013 HC RX MED GY IP 250 OP 250 PS 637: Performed by: STUDENT IN AN ORGANIZED HEALTH CARE EDUCATION/TRAINING PROGRAM

## 2025-04-25 PROCEDURE — 0SB20ZZ EXCISION OF LUMBAR VERTEBRAL DISC, OPEN APPROACH: ICD-10-PCS | Performed by: SURGERY

## 2025-04-25 PROCEDURE — 94660 CPAP INITIATION&MGMT: CPT

## 2025-04-25 PROCEDURE — 22614 ARTHRD PST TQ 1NTRSPC EA ADD: CPT | Performed by: SURGERY

## 2025-04-25 PROCEDURE — 63047 LAM FACETEC & FORAMOT LUMBAR: CPT | Mod: 51 | Performed by: SURGERY

## 2025-04-25 PROCEDURE — 00NY0ZZ RELEASE LUMBAR SPINAL CORD, OPEN APPROACH: ICD-10-PCS | Performed by: SURGERY

## 2025-04-25 PROCEDURE — 61783 SCAN PROC SPINAL: CPT | Mod: AS | Performed by: PHYSICIAN ASSISTANT

## 2025-04-25 PROCEDURE — 258N000003 HC RX IP 258 OP 636: Performed by: PHYSICIAN ASSISTANT

## 2025-04-25 PROCEDURE — 61783 SCAN PROC SPINAL: CPT | Mod: XU | Performed by: SURGERY

## 2025-04-25 PROCEDURE — 63048 LAM FACETEC &FORAMOT EA ADDL: CPT | Mod: AS | Performed by: PHYSICIAN ASSISTANT

## 2025-04-25 PROCEDURE — 250N000009 HC RX 250: Performed by: SURGERY

## 2025-04-25 PROCEDURE — C1713 ANCHOR/SCREW BN/BN,TIS/BN: HCPCS | Performed by: SURGERY

## 2025-04-25 PROCEDURE — L0627 LO SAG RI AN/POS PNL PRE CST: HCPCS

## 2025-04-25 PROCEDURE — 710N000010 HC RECOVERY PHASE 1, LEVEL 2, PER MIN: Performed by: SURGERY

## 2025-04-25 PROCEDURE — 22842 INSERT SPINE FIXATION DEVICE: CPT | Performed by: SURGERY

## 2025-04-25 PROCEDURE — 120N000001 HC R&B MED SURG/OB

## 2025-04-25 PROCEDURE — 63048 LAM FACETEC &FORAMOT EA ADDL: CPT | Performed by: SURGERY

## 2025-04-25 PROCEDURE — 258N000003 HC RX IP 258 OP 636: Performed by: ANESTHESIOLOGY

## 2025-04-25 PROCEDURE — 0SG1071 FUSION OF 2 OR MORE LUMBAR VERTEBRAL JOINTS WITH AUTOLOGOUS TISSUE SUBSTITUTE, POSTERIOR APPROACH, POSTERIOR COLUMN, OPEN APPROACH: ICD-10-PCS | Performed by: SURGERY

## 2025-04-25 PROCEDURE — 0QH004Z INSERTION OF INTERNAL FIXATION DEVICE INTO LUMBAR VERTEBRA, OPEN APPROACH: ICD-10-PCS | Performed by: SURGERY

## 2025-04-25 PROCEDURE — 22612 ARTHRD PST TQ 1NTRSPC LUMBAR: CPT | Mod: AS | Performed by: PHYSICIAN ASSISTANT

## 2025-04-25 PROCEDURE — 99222 1ST HOSP IP/OBS MODERATE 55: CPT | Performed by: INTERNAL MEDICINE

## 2025-04-25 PROCEDURE — 999N000141 HC STATISTIC PRE-PROCEDURE NURSING ASSESSMENT: Performed by: SURGERY

## 2025-04-25 PROCEDURE — 250N000013 HC RX MED GY IP 250 OP 250 PS 637: Performed by: PHYSICIAN ASSISTANT

## 2025-04-25 PROCEDURE — 0QH104Z INSERTION OF INTERNAL FIXATION DEVICE INTO SACRUM, OPEN APPROACH: ICD-10-PCS | Performed by: SURGERY

## 2025-04-25 PROCEDURE — 8E0WXBZ COMPUTER ASSISTED PROCEDURE OF TRUNK REGION: ICD-10-PCS | Performed by: SURGERY

## 2025-04-25 DEVICE — MAGNETOS EASYPACK PUTTY 10CC 1-2MM USA
Type: IMPLANTABLE DEVICE | Site: SPINE LUMBAR | Status: FUNCTIONAL
Brand: MAGNETOS

## 2025-04-25 DEVICE — IMP SCR MEDT 4.75MM SOLERA 6.5X55MM MA 54840006555: Type: IMPLANTABLE DEVICE | Site: SPINE LUMBAR | Status: FUNCTIONAL

## 2025-04-25 DEVICE — IMP ROD MEDT COCR-MOLYBDM CVD LVL1 4.75X90MM 1475501090: Type: IMPLANTABLE DEVICE | Site: SPINE LUMBAR | Status: FUNCTIONAL

## 2025-04-25 DEVICE — IMP SCR MEDT 4.75MM SOLERA 6.5X45MM MA 54840006545: Type: IMPLANTABLE DEVICE | Site: SPINE LUMBAR | Status: FUNCTIONAL

## 2025-04-25 DEVICE — IMP SCR MEDT BREAK-OFF SET SOLERA 4.75MM TI 5440030: Type: IMPLANTABLE DEVICE | Site: SPINE LUMBAR | Status: FUNCTIONAL

## 2025-04-25 DEVICE — IMP SCR MEDT 4.75MM SOLERA 6.5X50MM MA 54840006550: Type: IMPLANTABLE DEVICE | Site: SPINE LUMBAR | Status: FUNCTIONAL

## 2025-04-25 DEVICE — GRAFT BONE FIBERS GRAFTON DBM DBF 3ML T50103: Type: IMPLANTABLE DEVICE | Site: SPINE LUMBAR | Status: FUNCTIONAL

## 2025-04-25 RX ORDER — SODIUM CHLORIDE, SODIUM LACTATE, POTASSIUM CHLORIDE, CALCIUM CHLORIDE 600; 310; 30; 20 MG/100ML; MG/100ML; MG/100ML; MG/100ML
INJECTION, SOLUTION INTRAVENOUS CONTINUOUS
Status: DISCONTINUED | OUTPATIENT
Start: 2025-04-25 | End: 2025-04-25 | Stop reason: HOSPADM

## 2025-04-25 RX ORDER — GINSENG 100 MG
CAPSULE ORAL
Status: DISCONTINUED
Start: 2025-04-25 | End: 2025-04-25 | Stop reason: HOSPADM

## 2025-04-25 RX ORDER — POLYETHYLENE GLYCOL 3350 17 G/17G
17 POWDER, FOR SOLUTION ORAL DAILY
Status: DISCONTINUED | OUTPATIENT
Start: 2025-04-26 | End: 2025-04-28 | Stop reason: HOSPADM

## 2025-04-25 RX ORDER — NALOXONE HYDROCHLORIDE 0.4 MG/ML
0.4 INJECTION, SOLUTION INTRAMUSCULAR; INTRAVENOUS; SUBCUTANEOUS
Status: DISCONTINUED | OUTPATIENT
Start: 2025-04-25 | End: 2025-04-28 | Stop reason: HOSPADM

## 2025-04-25 RX ORDER — ALBUTEROL SULFATE 90 UG/1
2 INHALANT RESPIRATORY (INHALATION) EVERY 4 HOURS PRN
COMMUNITY
Start: 2025-02-28

## 2025-04-25 RX ORDER — HYDROMORPHONE HCL IN WATER/PF 6 MG/30 ML
0.4 PATIENT CONTROLLED ANALGESIA SYRINGE INTRAVENOUS EVERY 5 MIN PRN
Status: DISCONTINUED | OUTPATIENT
Start: 2025-04-25 | End: 2025-04-25 | Stop reason: HOSPADM

## 2025-04-25 RX ORDER — CEFAZOLIN SODIUM/WATER 2 G/20 ML
2 SYRINGE (ML) INTRAVENOUS SEE ADMIN INSTRUCTIONS
Status: DISCONTINUED | OUTPATIENT
Start: 2025-04-25 | End: 2025-04-25 | Stop reason: HOSPADM

## 2025-04-25 RX ORDER — DEXAMETHASONE SODIUM PHOSPHATE 4 MG/ML
4 INJECTION, SOLUTION INTRA-ARTICULAR; INTRALESIONAL; INTRAMUSCULAR; INTRAVENOUS; SOFT TISSUE
Status: DISCONTINUED | OUTPATIENT
Start: 2025-04-25 | End: 2025-04-25 | Stop reason: HOSPADM

## 2025-04-25 RX ORDER — BUPIVACAINE HYDROCHLORIDE AND EPINEPHRINE 5; 5 MG/ML; UG/ML
INJECTION, SOLUTION EPIDURAL; INTRACAUDAL; PERINEURAL PRN
Status: DISCONTINUED | OUTPATIENT
Start: 2025-04-25 | End: 2025-04-25 | Stop reason: HOSPADM

## 2025-04-25 RX ORDER — ONDANSETRON 4 MG/1
4 TABLET, ORALLY DISINTEGRATING ORAL EVERY 6 HOURS PRN
Status: DISCONTINUED | OUTPATIENT
Start: 2025-04-25 | End: 2025-04-28 | Stop reason: HOSPADM

## 2025-04-25 RX ORDER — LEVOTHYROXINE SODIUM 125 UG/1
125 TABLET ORAL
Status: DISCONTINUED | OUTPATIENT
Start: 2025-04-25 | End: 2025-04-28 | Stop reason: HOSPADM

## 2025-04-25 RX ORDER — GINSENG 100 MG
CAPSULE ORAL PRN
Status: DISCONTINUED | OUTPATIENT
Start: 2025-04-25 | End: 2025-04-25 | Stop reason: HOSPADM

## 2025-04-25 RX ORDER — NALOXONE HYDROCHLORIDE 0.4 MG/ML
0.2 INJECTION, SOLUTION INTRAMUSCULAR; INTRAVENOUS; SUBCUTANEOUS
Status: DISCONTINUED | OUTPATIENT
Start: 2025-04-25 | End: 2025-04-28 | Stop reason: HOSPADM

## 2025-04-25 RX ORDER — CEFAZOLIN SODIUM 2 G/50ML
2 SOLUTION INTRAVENOUS EVERY 8 HOURS
Status: COMPLETED | OUTPATIENT
Start: 2025-04-25 | End: 2025-04-26

## 2025-04-25 RX ORDER — OXYCODONE HYDROCHLORIDE 5 MG/1
5 TABLET ORAL EVERY 4 HOURS PRN
Status: DISCONTINUED | OUTPATIENT
Start: 2025-04-25 | End: 2025-04-28 | Stop reason: HOSPADM

## 2025-04-25 RX ORDER — FLUTICASONE PROPIONATE AND SALMETEROL 250; 50 UG/1; UG/1
1 POWDER RESPIRATORY (INHALATION) 2 TIMES DAILY
Status: DISCONTINUED | OUTPATIENT
Start: 2025-04-25 | End: 2025-04-28 | Stop reason: HOSPADM

## 2025-04-25 RX ORDER — SODIUM CHLORIDE 9 MG/ML
INJECTION, SOLUTION INTRAVENOUS CONTINUOUS
Status: DISCONTINUED | OUTPATIENT
Start: 2025-04-25 | End: 2025-04-28 | Stop reason: HOSPADM

## 2025-04-25 RX ORDER — NALOXONE HYDROCHLORIDE 0.4 MG/ML
0.1 INJECTION, SOLUTION INTRAMUSCULAR; INTRAVENOUS; SUBCUTANEOUS
Status: DISCONTINUED | OUTPATIENT
Start: 2025-04-25 | End: 2025-04-25 | Stop reason: HOSPADM

## 2025-04-25 RX ORDER — HYDROMORPHONE HCL IN WATER/PF 6 MG/30 ML
0.2 PATIENT CONTROLLED ANALGESIA SYRINGE INTRAVENOUS EVERY 4 HOURS PRN
Status: DISCONTINUED | OUTPATIENT
Start: 2025-04-25 | End: 2025-04-28 | Stop reason: HOSPADM

## 2025-04-25 RX ORDER — ALBUTEROL SULFATE 90 UG/1
2 INHALANT RESPIRATORY (INHALATION) EVERY 4 HOURS PRN
Status: DISCONTINUED | OUTPATIENT
Start: 2025-04-25 | End: 2025-04-28 | Stop reason: HOSPADM

## 2025-04-25 RX ORDER — ACETAMINOPHEN 325 MG/1
975 TABLET ORAL ONCE
Status: COMPLETED | OUTPATIENT
Start: 2025-04-25 | End: 2025-04-25

## 2025-04-25 RX ORDER — ONDANSETRON 2 MG/ML
4 INJECTION INTRAMUSCULAR; INTRAVENOUS EVERY 6 HOURS PRN
Status: DISCONTINUED | OUTPATIENT
Start: 2025-04-25 | End: 2025-04-28 | Stop reason: HOSPADM

## 2025-04-25 RX ORDER — LIDOCAINE 40 MG/G
CREAM TOPICAL
Status: DISCONTINUED | OUTPATIENT
Start: 2025-04-25 | End: 2025-04-25 | Stop reason: HOSPADM

## 2025-04-25 RX ORDER — HYDROMORPHONE HCL IN WATER/PF 6 MG/30 ML
0.1 PATIENT CONTROLLED ANALGESIA SYRINGE INTRAVENOUS EVERY 4 HOURS PRN
Status: DISCONTINUED | OUTPATIENT
Start: 2025-04-25 | End: 2025-04-28 | Stop reason: HOSPADM

## 2025-04-25 RX ORDER — CEFAZOLIN SODIUM/WATER 2 G/20 ML
2 SYRINGE (ML) INTRAVENOUS
Status: COMPLETED | OUTPATIENT
Start: 2025-04-25 | End: 2025-04-25

## 2025-04-25 RX ORDER — HYDROMORPHONE HCL IN WATER/PF 6 MG/30 ML
0.2 PATIENT CONTROLLED ANALGESIA SYRINGE INTRAVENOUS EVERY 5 MIN PRN
Status: DISCONTINUED | OUTPATIENT
Start: 2025-04-25 | End: 2025-04-25 | Stop reason: HOSPADM

## 2025-04-25 RX ORDER — METHOCARBAMOL 500 MG/1
500 TABLET, FILM COATED ORAL
Status: DISCONTINUED | OUTPATIENT
Start: 2025-04-25 | End: 2025-04-28 | Stop reason: HOSPADM

## 2025-04-25 RX ORDER — LORATADINE 10 MG/1
10 TABLET ORAL DAILY PRN
Status: DISCONTINUED | OUTPATIENT
Start: 2025-04-25 | End: 2025-04-28 | Stop reason: HOSPADM

## 2025-04-25 RX ORDER — THERA TABS 400 MCG
1 TAB ORAL DAILY
Status: DISCONTINUED | OUTPATIENT
Start: 2025-04-26 | End: 2025-04-28 | Stop reason: HOSPADM

## 2025-04-25 RX ORDER — GABAPENTIN 100 MG/1
100 CAPSULE ORAL
Status: COMPLETED | OUTPATIENT
Start: 2025-04-25 | End: 2025-04-25

## 2025-04-25 RX ORDER — LORAZEPAM 2 MG/ML
.5-1 INJECTION INTRAMUSCULAR
Status: DISCONTINUED | OUTPATIENT
Start: 2025-04-25 | End: 2025-04-25 | Stop reason: HOSPADM

## 2025-04-25 RX ORDER — PROCHLORPERAZINE MALEATE 5 MG/1
5 TABLET ORAL EVERY 6 HOURS PRN
Status: DISCONTINUED | OUTPATIENT
Start: 2025-04-25 | End: 2025-04-28 | Stop reason: HOSPADM

## 2025-04-25 RX ORDER — ENOXAPARIN SODIUM 100 MG/ML
40 INJECTION SUBCUTANEOUS EVERY 24 HOURS
Status: DISCONTINUED | OUTPATIENT
Start: 2025-04-27 | End: 2025-04-28 | Stop reason: HOSPADM

## 2025-04-25 RX ORDER — ACETAMINOPHEN 325 MG/1
975 TABLET ORAL EVERY 8 HOURS
Status: DISCONTINUED | OUTPATIENT
Start: 2025-04-25 | End: 2025-04-28 | Stop reason: HOSPADM

## 2025-04-25 RX ORDER — FENTANYL CITRATE 50 UG/ML
50 INJECTION, SOLUTION INTRAMUSCULAR; INTRAVENOUS EVERY 5 MIN PRN
Status: DISCONTINUED | OUTPATIENT
Start: 2025-04-25 | End: 2025-04-25 | Stop reason: HOSPADM

## 2025-04-25 RX ORDER — BISACODYL 10 MG
10 SUPPOSITORY, RECTAL RECTAL DAILY PRN
Status: DISCONTINUED | OUTPATIENT
Start: 2025-04-28 | End: 2025-04-28 | Stop reason: HOSPADM

## 2025-04-25 RX ORDER — ATORVASTATIN CALCIUM 40 MG/1
80 TABLET, FILM COATED ORAL DAILY
Status: DISCONTINUED | OUTPATIENT
Start: 2025-04-25 | End: 2025-04-28 | Stop reason: HOSPADM

## 2025-04-25 RX ORDER — ONDANSETRON 4 MG/1
4 TABLET, ORALLY DISINTEGRATING ORAL EVERY 30 MIN PRN
Status: DISCONTINUED | OUTPATIENT
Start: 2025-04-25 | End: 2025-04-25 | Stop reason: HOSPADM

## 2025-04-25 RX ORDER — ONDANSETRON 2 MG/ML
4 INJECTION INTRAMUSCULAR; INTRAVENOUS EVERY 30 MIN PRN
Status: DISCONTINUED | OUTPATIENT
Start: 2025-04-25 | End: 2025-04-25 | Stop reason: HOSPADM

## 2025-04-25 RX ORDER — FENTANYL CITRATE 50 UG/ML
25 INJECTION, SOLUTION INTRAMUSCULAR; INTRAVENOUS EVERY 5 MIN PRN
Status: DISCONTINUED | OUTPATIENT
Start: 2025-04-25 | End: 2025-04-25 | Stop reason: HOSPADM

## 2025-04-25 RX ORDER — AMOXICILLIN 250 MG
1 CAPSULE ORAL 2 TIMES DAILY
Status: DISCONTINUED | OUTPATIENT
Start: 2025-04-25 | End: 2025-04-28 | Stop reason: HOSPADM

## 2025-04-25 RX ADMIN — ACETAMINOPHEN 975 MG: 325 TABLET, FILM COATED ORAL at 06:55

## 2025-04-25 RX ADMIN — SENNOSIDES AND DOCUSATE SODIUM 1 TABLET: 50; 8.6 TABLET ORAL at 19:32

## 2025-04-25 RX ADMIN — SODIUM CHLORIDE, SODIUM LACTATE, POTASSIUM CHLORIDE, AND CALCIUM CHLORIDE: .6; .31; .03; .02 INJECTION, SOLUTION INTRAVENOUS at 06:45

## 2025-04-25 RX ADMIN — FLUTICASONE PROPIONATE AND SALMETEROL 1 PUFF: 250; 50 POWDER RESPIRATORY (INHALATION) at 19:31

## 2025-04-25 RX ADMIN — LEVOTHYROXINE SODIUM 125 MCG: 0.12 TABLET ORAL at 16:05

## 2025-04-25 RX ADMIN — OXYCODONE HYDROCHLORIDE 2.5 MG: 5 TABLET ORAL at 13:06

## 2025-04-25 RX ADMIN — GABAPENTIN 100 MG: 100 CAPSULE ORAL at 06:13

## 2025-04-25 RX ADMIN — METHOCARBAMOL 500 MG: 500 TABLET ORAL at 15:08

## 2025-04-25 RX ADMIN — ATORVASTATIN CALCIUM 80 MG: 40 TABLET, FILM COATED ORAL at 16:05

## 2025-04-25 RX ADMIN — CEFAZOLIN: 1 INJECTION, POWDER, FOR SOLUTION INTRAMUSCULAR; INTRAVENOUS at 11:30

## 2025-04-25 RX ADMIN — BENZOCAINE AND MENTHOL 1 LOZENGE: 15; 3.6 LOZENGE ORAL at 21:45

## 2025-04-25 RX ADMIN — METHOCARBAMOL 500 MG: 500 TABLET ORAL at 19:31

## 2025-04-25 RX ADMIN — ACETAMINOPHEN 975 MG: 325 TABLET, FILM COATED ORAL at 15:08

## 2025-04-25 RX ADMIN — SODIUM CHLORIDE: 0.9 IRRIGANT IRRIGATION at 11:30

## 2025-04-25 RX ADMIN — SODIUM CHLORIDE: 0.9 INJECTION, SOLUTION INTRAVENOUS at 19:34

## 2025-04-25 RX ADMIN — CEFAZOLIN SODIUM 2 G: 2 SOLUTION INTRAVENOUS at 19:32

## 2025-04-25 ASSESSMENT — ACTIVITIES OF DAILY LIVING (ADL)
ADLS_ACUITY_SCORE: 23
ADLS_ACUITY_SCORE: 23
ADLS_ACUITY_SCORE: 25
ADLS_ACUITY_SCORE: 23
ADLS_ACUITY_SCORE: 29
ADLS_ACUITY_SCORE: 26
ADLS_ACUITY_SCORE: 23
ADLS_ACUITY_SCORE: 29
ADLS_ACUITY_SCORE: 29
ADLS_ACUITY_SCORE: 27
ADLS_ACUITY_SCORE: 23
ADLS_ACUITY_SCORE: 20
ADLS_ACUITY_SCORE: 27
ADLS_ACUITY_SCORE: 27
ADLS_ACUITY_SCORE: 23
ADLS_ACUITY_SCORE: 25

## 2025-04-25 NOTE — OP NOTE
NEUROSURGERY OPERATIVE REPORT    PROCEDURE DATE: 4/25/2025     PREOPERATIVE DIAGNOSIS:  Spinal stenosis of lumbar region with neurogenic claudication     POSTOPERATIVE DIAGNOSIS:  Same    PROCEDURE:  1. Extension lumbar 5-sacral 1 fusion to lumbar 3 with lumbar 3-lumbar 5 laminectomies, medial facetectomies and foraminotomies and posterolateral arthrodesis with right sided discectomies and use of stealth     SURGEON: Kavya Lopez MD     ASSISTANT: Shawanda Morales PA-C     INDICATIONS:  66 yo male who presents with history of lumbar discectomy and lumbar fusion at lumbar 5-sacral 1 remotely as well as cervical 5-6 and 6 and cervical 6-7 ACDF in 2020 and foraminotomies on right cervical 3-4 and cervical 4-5 all in [place] low back and leg pain, right leg weakness, urinary frequency, sexual dysfunction, neck pain, numbness in his fingertips.  We reviewed lumbar x-ray which shows prior hardware at lumbar 5-sacral 1 with no significant subluxations or instability.  MRI of his lumbar spine was also reviewed which shows severe central and subarticular stenosis at lumbar 3-4 and lumbar 4-5 with redundancy of the cauda equina above this level.  Also noted to have moderate bilateral foraminal narrowing at lumbar 4-5. symptoms appear related to his cauda equina compression.  Anticipate greater than 50% facet resection to fully decompress the thecal sac and nerves therefore recommend extension of prior lumbar 5-sacral 1 fusion to lumbar 3 with lumbar 3-lumbar 5 laminectomies, medial facetectomies and foraminotomies and posterolateral arthrodesis with right sided discectomies and use of stealth.  Risks and benefits were discussed in detail including but not limited to infection, hematoma, nerve damage including paralysis, post op radiculitis, durotomy, lack of a sold bone fusion, hardware malfunction, adjacent segment disease, risks associated with the use of general anesthesia.     PROCEDURE:  After obtaining informed  consent, the patient was brought to the operating room with pneumatic stockings in place.  IV antibiotics was administered.  He was intubated under general endotracheal anesthesia.   A nichols catheter was placed and the patient was turned into the prone position on the radiolucent laminectomy frame. He was prepped and draped in the usual sterile fashion.         A pre incisional infiltration of local anesthetic was performed along the midline and the incision was opened sharply and extended down to the fascia.  The fascia was opened in one layer with monopolar electrocautery.  A subperiosteal dissection was undertaken to expose the lamina and posterior lateral elements at lumbar 3 to sacral 1 including his previous lumbar 5 and sacral 1 hardware. This was removed including screws, set screws and rods.         At this point, we brought in intraoperative O-arm for navigation. We did an intraoperative CT with a Stealth localization guide attached to the S1 spinous process and after confirming navigation of all instruments (registered separately on the navigational star), we proceeded to place the screws using stereotactic localization. We used the navigational wand to select an entry site, entered using the usual anatomical markers for the entry through the pedicles. Once the entry site was confirmed, we then proceeded to use the navigational drill to drill through the pedicle. We used navigational monitored taps to under-tap the screw hole by 1 mm and then proceeded to place the selected screw into the hole. At each step we confirmed that the walls of the cortex of the drilled hole were intact using a small ball-tip probe.  We replaced his screws in their prior trajectories at lumbar 5 and sacral 1 without repeating drill or tap. At lumbar 3 and lumbar 4 we repeated the steps as listed above.  We then proceeded to copiously irrigate the incision with antibiotic-containing saline and achieved hemostasis.      We then  performed a decompressive laminectomy at lumbar 3, lumbar 4 and superior remaining lumbar 5 saving the harvested autologous bone for replacement at the end of the procedure at the graft site.  We remove the spinous process and lamina with a combination of Leksell rongeurs and high speed air drill, and Kerrison rongeurs. We next removed the ligamentum. We then performed our medial facetectomies and foraminotomies. On the right side we also performed micro discectomies. Once completed the nerves, lateral recess and central canal appeared decompressed. We next decorticated the facet joints and placed previously obtained autograft mixed with allograft across the facet joints for bone fusion from lumbar 3-5.  Once this was in position, we then placed rods bilaterally at lumbar 3-sacral 1 and locked into place in the top-loaded heads of the screws and secured with set screws tightened to break off torque.  A drain was tunneled out of the wound.  Wound was again irrigated with antibiotic containing irrigation and hemostasis was achieved.     Due to the nature and complexity of the case an assistant was required for the duration of the case for positioning,retraction, hemostasis, and closure.       The incision was closed in layers with 0 Vicryl for the fascia and muscle layer, inverted 2-0 Vicryl for subcutaneous tissues and staples for the skin edges. Sponge and needle counts were correct prior to closure x 2. Sterile dressings were placed. Patient tolerated the procedure well.      The patient  was turned from the radiolucent frame onto a gurney, extubated and taken to the recovery room at their neurological baseline with no new deficits appreciated.     ESTIMATED BLOOD LOSS: 250 ml    DRAIN:  Noel-Licona     SPECIMENS: * No specimens in log *    FINDINGS: none    IMPLANTS:   Implant Name Type Inv. Item Serial No.  Lot No. LRB No. Used Action   MEDTRONIC SPINE SET SCREW    MEDTRONIC   4 Explanted   40MM BOBBI     [employer]  Bilateral 2 Explanted   CROSSLINK    [employer]  Bilateral 1 Explanted   6.5MM x 50/45 SCREW    [employer]  Left 1 Explanted   6.5MM x 50/75 SCREW    [employer]  Left 1 Explanted   6.5MM x 45 SCREW    [employer]  Right 2 Explanted   IMP SCR MEDT BREAK-OFF SET SOLERA 4.75MM TI [id number] - [id number] Metallic Hardware/[place]iss[place] IMP SCR MEDT BREAK-OFF SET SOLERA 4.75MM TI [id number]  [employer]  Right 8 Implanted   IMP SCR MEDT 4.75MM SOLERA 6.5X45MM MA [id number] - [id number] Metallic Hardware/[place]isswa IMP SCR MEDT 4.75MM SOLERA 6.5X45MM MA [id number]  [employer]  Right 4 Implanted   IMP SCR MEDT 4.75MM SOLERA 6.5X50MM MA [id number] - [id number] Metallic Hardware/Nisswa IMP SCR MEDT 4.75MM SOLERA 6.5X50MM MA [id number]  [employer]  Right 1 Implanted   IMP SCR MEDT 4.75MM SOLERA 6.5X55MM MA [id number] - [id number] Metallic Hardware/[place]isswa IMP SCR MEDT 4.75MM SOLERA 6.5X55MM MA [id number]  [employer]  Right 3 Implanted   BONE GRAFT PUTTY MAGNETOS EASYPACK PUTTY [facility] [phone] - [id number] Bone/Tissue Synthetic BONE GRAFT PUTTY MAGNETOS EASYPACK PUTTY [facility] [phone]  [employer]  Right 1 Implanted   GRAFT BONE FIBERS FLOWER DBM DBF 3ML [id number] - [id number] Bone/Tissue/Biologic GRAFT BONE FIBERS FLOWER DBM DBF 3ML [id number] [employer]  Right 1 Implanted   IMP BOBBI MEDT COCR-MOLYBDM CVD LVL1 4.89B95IT [id number] - [id number] Metallic Hardware/[place] IMP BOBBI MEDT COCR-MOLYBDM CVD LVL1 4.15W04YB [id number]  [employer]  Right 2 Implanted       Kavya Lopez MD

## 2025-04-25 NOTE — PROGRESS NOTES
S: Pt is a 65 yom seen at [facility], room 368, for delivery of an off the shelf lumbar sacral orthosis (LSO), ordered by Shawanda Morales PA-C.  DX: Spinal stenosis of lumbar region with neurogenic claudication  O: 6  2 . 230 lbs.  A: Breg Epic 627 LSO circumference was adjusted to fit the patient appropriately. Donning/doffing and wear/care instructions were discussed with the patient. 's written instructions were provided. Pt will don the LSO the next time he is up and out of bed or sitting higher than 45 degrees.  P: Pt to follow-up with orthotics as needed. All questions were answered at this time.  G: Provide hydrostatic compression to stabilize surgical site and relieve lumbar pain.    Electronically signed by Nilam Bautista CPO, LPO

## 2025-04-25 NOTE — CONSULTS
"INTERNAL MEDICINE CONSULT    Physician requesting consult: John  Reason for consult: Hypertension, history of pulmonary emboli    Assessment and Plan:  History of unprovoked pulmonary emboli in 2003  Was treated with anticoagulation for 6 months  Reportedly saw hematology  High risk for recurrent PE  SCDs, early ambulation  Recommended prophylactic Lovenox when appropriate from spine surgery perspective    Hypertension  Blood pressure is on the lower side  Hold off on antihypertensives    Dyslipidemia  Continue home statin    History of asthma  No sign of exacerbation  Continue home inhalers    History of coronary calcifications on CT  Resume aspirin when able.    lumbar spinal stenosis with neurogenic claudication status post extension of L5-S1 fusion to L3-L5 laminectomies, foraminotomies, right-sided discectomy bilateral: Postop day #0.  Continue PT OT, pain control, DVT prophylaxis per spine surgery.  Encourage incentive spirometry, monitor hemoglobin    History of alcohol abuse  In remission.    Clinically Significant Risk Factors Present on Admission                 # Drug Induced Platelet Defect: home medication list includes an antiplatelet medication   # Hypertension: Noted on problem list           # Overweight: Estimated body mass index is 29.53 kg/m  as calculated from the following:    Height as of 4/17/25: 1.88 m (6' 2\").    Weight as of 4/17/25: 104.3 kg (230 lb).                Medically Ready for Discharge: Anticipated in 2-4 Days       HPI:     Juan C Garcia is a 65 year old male  with past history significant for hypertension dyslipidemia unprovoked pulmonary embolism in 2003 was treated for 6 months of anticoagulation, asthma, coronary calcifications admitted postoperatively after he underwent a lumbar surgery.  Tolerated procedure well.  Estimated blood loss 250 mL. Currently denies any complaints such as nausea vomiting shortness of breath chest pain abdominal pain.Preop history and " physical reviewed.     Medical History    Past Medical History:   Diagnosis Date    Alcohol use     Coronary artery disease     Dermatitis herpetiformis 2000    Dx by biopsy of rash on elbow per derm    Diastolic dysfunction with chronic heart failure (H)     HTN, goal below 140/90     Hyperlipidemia     Lung nodule 02/2003    Benign; Left upper lobe    Multiple allergies     allergist with immunotherapy; Allergist is Dr. Santos in [place]    Primary hypogonadism in male     Low FSH, single left testicle - followed by Dr. Flores of endo    Pulmonary embolus (H) 02/2003    Evaluated by [facility] - unknown cause    Sleep apnea     Thrombosis     Thyroid nodule 02/2005    Benign per FNA --> taking Levothyroxine ever since    Uncomplicated asthma       Patient Active Problem List    Diagnosis Date Noted    Lumbar spinal stenosis 04/25/2025     Priority: Medium    Hashimoto's thyroiditis 01/06/2023     Priority: Medium    Elevated BP 01/06/2023     Priority: Medium    Celiac disease 01/06/2023     Priority: Medium    Hypoxemia 03/15/2021     Priority: Medium    BARRY (obstructive sleep apnea) 04/28/2017     Priority: Medium     Mild BARRY      Anxiety 10/22/2016     Priority: Medium    Anemia, unspecified anemia type 03/20/2016     Priority: Medium     With elevated reticulocyte count - likely r/t Dapsone      Primary hypogonadism in male      Priority: Medium     Low FSH, single left testicle - followed by Dr. Flores of endo      Hypothyroidism 02/02/2015     Priority: Medium    Dermatitis herpetiformis      Priority: Medium     Dx by biopsy of rash on elbow per derm      HTN, goal below 140/90      Priority: Medium    Hyperlipidemia      Priority: Medium    Alcohol use      Priority: Medium    Allergic rhinitis 10/13/2014     Priority: Medium     Problem list name updated by automated process. Provider to review      Pulmonary embolism (H) 02/01/2003     Priority: Medium        Surgical History  He  has a past surgical  history that includes other surgical history (July 1997); other surgical history (Jan 2000); other surgical history (August 2004); other surgical history (April 2007); other surgical history (March 2010); other surgical history (March 2011); other surgical history (July 2011); Orchiopexy child (Right); colonoscopy (April 2012); Head and neck surgery; hernia repair; orthopedic surgery; back surgery; and Colonoscopy (N/A, 6/20/2022).     Past Surgical History:   Procedure Laterality Date    BACK SURGERY      COLONOSCOPY  April 2012    COLONOSCOPY N/A 6/20/2022    Procedure: COLONOSCOPY;  Surgeon: Diya Lo MD;  Location:  GI    HEAD & NECK SURGERY      HERNIA REPAIR      ORCHIOPEXY CHILD Right     Age 10 due to undescended testicl    ORTHOPEDIC SURGERY      OTHER SURGICAL HISTORY  July 1997    Cervical laminectomy C4-5-6    OTHER SURGICAL HISTORY  Jan 2000    Right index tendon repair    OTHER SURGICAL HISTORY  August 2004    Ankle/wrist repair    OTHER SURGICAL HISTORY  April 2007    Lumbar microdiscectomy L5-S1    OTHER SURGICAL HISTORY  March 2010    Cervical fusion C5-6-7    OTHER SURGICAL HISTORY  March 2011    Cervical fusion C3-4-5    OTHER SURGICAL HISTORY  July 2011    Lumbar fusion L5-S1       Allergies  Allergies   Allergen Reactions    No Known Allergies        Prior to Admission Medications   Medications Prior to Admission   Medication Sig Dispense Refill Last Dose/Taking    albuterol (PROAIR HFA/PROVENTIL HFA/VENTOLIN HFA) 108 (90 Base) MCG/ACT inhaler Inhale 2 puffs into the lungs every 4 hours as needed for shortness of breath or wheezing.   Unknown    aspirin 81 MG EC tablet Take 1 tablet (81 mg) by mouth daily   4/18/2025 Morning    atorvastatin (LIPITOR) 80 MG tablet Take 1 tablet (80 mg) by mouth daily. Appointment needed for additional refills. Please call [phone] to schedule. 90 tablet 4 4/24/2025 Morning    fluticasone-salmeterol (ADVAIR) 250-50 MCG/ACT inhaler Inhale 1 puff  into the lungs 2 times daily.   4/24/2025 Bedtime    hydroCHLOROthiazide 12.5 MG tablet TAKE 1 TABLET BY MOUTH EVERY DAY 90 tablet 0 4/24/2025 Morning    levothyroxine (SYNTHROID/LEVOTHROID) 125 MCG tablet Take 1 tablet (125 mcg) by mouth daily. 90 tablet 0 4/24/2025 Morning    losartan (COZAAR) 50 MG tablet Take 1 tablet (50 mg) by mouth daily. 90 tablet 1 4/24/2025 Morning    testosterone (ANDROGEL 1.62 % PUMP) 20.25 MG/ACT gel Place 2 Pump (40.5 mg) onto the skin daily. Apply from dispenser to clean, dry, intact skin of the upper arms and shoulders. 75 g 1 4/24/2025 Morning    ALLERGY INJECTIONS           Social History  Reviewed, and he  reports that he has never smoked. He has never used smokeless tobacco. He reports that he does not currently use alcohol. He reports that he does not use drugs.  Social History     Tobacco Use    Smoking status: Never    Smokeless tobacco: Never   Substance Use Topics    Alcohol use: Not Currently       Family History  Reviewed, and not pertinent to present problem.    Review Of Systems  As per admission HPI, all others reviewed and negative.     Physical Exam:  /61 (BP Location: Right arm)   Pulse 77   Temp 97.5  F (36.4  C) (Oral)   Resp 16   SpO2 96%   General Appearance:  Awake Alert, orientedx3, not in any apparent distress   Head:  Normocephalic, without obvious abnormality   Eyes:  PERRL, conjunctiva/corneas clear   Throat: Oral mucosa moist   Neck: Supple,  no JVD   Lungs:   Clear to auscultation bilaterally, respirations unlabored   Chest Wall:  No tenderness   Heart:  Regular rate and rhythm, S1, S2 normal,no murmur   Abdomen:   Soft, non-tender, bowel sounds present,  no masses, no organomegaly   Extremities: No edema   Skin: Status post lumbar spine surgery, drain in place   Neurologic: Alert and oriented X 3, exam on operated extremity defer to surgery     Results:  Results for orders placed or performed during the hospital encounter of 04/25/25   XR  Surgery OARM     Status: None    Narrative    This exam was marked as non-reportable because it will not be read by a   radiologist or a [facility] non-radiologist provider.         Glucose by meter     Status: Abnormal   Result Value Ref Range    GLUCOSE BY METER POCT 112 (H) 70 - 99 mg/dL       Imaging:   XR Surgery OARM    Result Date: 4/25/2025  This exam was marked as non-reportable because it will not be read by a radiologist or a [facility] non-radiologist provider.     XR Lumbar Spine G/E 4 Views    Result Date: 4/3/2025  EXAM: XR LUMBAR SPINE G/E 4 VIEWS LOCATION: [facility] DATE: 4/3/2025 INDICATION:  Lumbar radiculopathy COMPARISON: CT 3/3/2025     IMPRESSION: No fracture. Postsurgical changes of instrumented posterior spinal fusion at L5-S1. Surgical hardware is intact with no evidence of fracture or loosening. Normal vertebral heights. Multilevel disc height loss and endplate osteophytosis, most pronounced at L5-S1.. Normal extraspinal structures.        Sharifa Allen M.D  [facility]  Internal Medicine    4/25/2025  3:19 PM

## 2025-04-25 NOTE — PLAN OF CARE
Problem: Spinal Surgery  Goal: Optimal Pain Control and Function  Outcome: Progressing  Intervention: Prevent or Manage Pain  Recent Flowsheet Documentation  Taken 4/25/2025 1345 by Stanislav Marcelo RN  Pain Management Interventions:   repositioned   rest   pain management plan reviewed with patient/caregiver   Goal Outcome Evaluation:  Patient vital signs are at baseline: Yes  Patient able to ambulate as they were prior to admission or with assist devices provided by therapies during their stay:  No,  Reason:  Due to ambulate  Patient MUST void prior to discharge:  No, Reason: Cooper remains in place  Patient able to tolerate oral intake:  Yes  Pain has adequate pain control using Oral analgesics:  Yes  Does patient have an identified :  Yes  Has goal D/C date and time been discussed with patient:  Yes

## 2025-04-25 NOTE — PHARMACY-ADMISSION MEDICATION HISTORY
Pharmacist MICHAEL Medication History    Admission medication history is complete. The information provided in this note is only as accurate as the sources available at the time of the update.    Medication reconciliation/reorder completed by provider prior to medication history? No    Information Source(s): Patient and Clinic records and Care Everywhere via in-person    Pertinent Information: N/A    Allergies reviewed with patient and updates made in EHR: yes    Medications available for use during hospital stay: albuterol, advair, androgel.      Medication History Completed By: Oscar Argueta [facility] 4/25/2025 6:50 AM    PTA Med List   Medication Sig Last Dose/Taking    albuterol (PROAIR HFA/PROVENTIL HFA/VENTOLIN HFA) 108 (90 Base) MCG/ACT inhaler Inhale 2 puffs into the lungs every 4 hours as needed for shortness of breath or wheezing. Unknown    aspirin 81 MG EC tablet Take 1 tablet (81 mg) by mouth daily 4/18/2025 Morning    atorvastatin (LIPITOR) 80 MG tablet Take 1 tablet (80 mg) by mouth daily. Appointment needed for additional refills. Please call [phone] to schedule. 4/24/2025 Morning    fluticasone-salmeterol (ADVAIR) 250-50 MCG/ACT inhaler Inhale 1 puff into the lungs 2 times daily. 4/24/2025 Bedtime    hydroCHLOROthiazide 12.5 MG tablet TAKE 1 TABLET BY MOUTH EVERY DAY 4/24/2025 Morning    levothyroxine (SYNTHROID/LEVOTHROID) 125 MCG tablet Take 1 tablet (125 mcg) by mouth daily. 4/24/2025 Morning    losartan (COZAAR) 50 MG tablet Take 1 tablet (50 mg) by mouth daily. 4/24/2025 Morning    testosterone (ANDROGEL 1.62 % PUMP) 20.25 MG/ACT gel Place 2 Pump (40.5 mg) onto the skin daily. Apply from dispenser to clean, dry, intact skin of the upper arms and shoulders. 4/24/2025 Morning

## 2025-04-25 NOTE — BRIEF OP NOTE
[facility]    Brief Operative Note    Pre-operative diagnosis: Spinal stenosis of lumbar region with neurogenic claudication [M48.062]  Post-operative diagnosis Same as pre-operative diagnosis    Procedure: Extension lumbar 5-sacral 1 fusion to lumbar 3 with lumbar 3-lumbar 5 laminectomies, medial facetectomies and foraminotomies and posterolateral arthrodesis with right sided discectomies and use of stealth., Bilateral - Spine    Surgeon: Surgeons and Role:     * Kavya Lopez MD - Primary     * Shawanda Morales PA-C - Assisting  Anesthesia: General   Estimated Blood Loss: 250 ml    Drains: Noel-Licona  Specimens: * No specimens in log *  Findings:   None.  Complications: None.  Implants:   Implant Name Type Inv. Item Serial No.  Lot No. LRB No. Used Action   MEDTRONIC SPINE SET SCREW    MEDTRONIC   4 Explanted   40MM BOBBI    MEDTRONIC  Bilateral 2 Explanted   CROSSLINK    MEDTRONIC  Bilateral 1 Explanted   6.5MM x 50/45 SCREW    MEDTRONIC  Left 1 Explanted   6.5MM x 50/75 SCREW    MED[employer]ONIC  Left 1 Explanted   6.5MM x 45 SCREW    MEDTRONIC  Right 2 Explanted   IMP SCR MEDT BREAK-OFF SET SOLERA 4.75MM TI [id number] - [id number] Metallic Hardware/[place] IMP SCR MEDT BREAK-OFF SET SOLERA 4.75MM TI [id number]  [employer]-DANEK  Right 8 Implanted   IMP SCR MEDT 4.75MM SOLERA 6.5X45MM MA [id number] - [id number] Metallic Hardware/[place] IMP SCR MEDT 4.75MM SOLERA 6.5X45MM MA [id number]  [employer]  Right 4 Implanted   IMP SCR MEDT 4.75MM SOLERA 7.5X45MM MA [id number] - [id number] Metallic Hardware/[place] IMP SCR MEDT 4.75MM SOLERA 7.5X45MM MA [id number]  [employer]DANE[employer]  Right 1 Wasted   IMP SCR MEDT 4.75MM SOLERA 6.5X50MM MA [id number] - [id number] Metallic Hardware/[place] IMP SCR MEDT 4.75MM SOLERA 6.5X50MM MA [id number]  [employer]DANE[employer]  Right 1 Implanted   IMP SCR MEDT 4.75MM SOLERA 6.5X55MM MA [id number] - [id number] Metallic Hardware/[place] IMP SCR MEDT  4.75MM SOLERA 6.5X55MM MA [id number]  [employer]  Right 3 Implanted   IMP BOBBI MEDT 3.5CM [id number] - [id number] Metallic Hardware/[place] IMP BOBBI MEDT 3.5CM [id number]  [employer]DANEK  Right 1 Wasted   BONE GRAFT PUTTY MAGNETOS EASYPACK PUTTY [facility] [phone] - [id number] Bone/Tissue Synthetic BONE GRAFT PUTTY MAGNETOS EASYPACK PUTTY [facility] [phone]  Piki  Right 1 Implanted   GRAFT BONE FIBERS FLOWER DBM DBF 3ML [id number] - [id number] Bone/Tissue/Biologic GRAFT BONE FIBERS FLOWER DBM DBF 3ML [id number] [employer]  Right 1 Implanted   IMP BOBBI MEDT COCR-MOLYBDM CVD LVL1 4.06J29DW [id number] - [id number] Metallic Hardware/[place] IMP BOBBI MEDT COCR-MOLYBDM CVD LVL1 4.05O72FE [id number]  [employer]  Right 2 Implanted

## 2025-04-25 NOTE — INTERVAL H&P NOTE
"I have reviewed the surgical (or preoperative) H&P that is linked to this encounter, and examined the patient. There are no significant changes    Clinical Conditions Present on Arrival:  Clinically Significant Risk Factors Present on Admission                  # Drug Induced Platelet Defect: home medication list includes an antiplatelet medication      # Overweight: Estimated body mass index is 29.53 kg/m  as calculated from the following:    Height as of 4/17/25: 1.88 m (6' 2\").    Weight as of 4/17/25: 104.3 kg (230 lb).       "

## 2025-04-25 NOTE — PROGRESS NOTES
Neurosurgery Treatment Plan:   POD#0 Extension lumbar 5-sacral 1 fusion to lumbar 3 with lumbar 3-lumbar 5 laminectomies, medial facetectomies and foraminotomies and posterolateral arthrodesis with right sided discectomies and use of stealth by Dr. Lopez        Plan:   Keep drain in place- monitor output and chart even if 0mL  Orthotics to fit patient with LSO brace- brace to be worn when out of bed and sitting greater than 45 degrees   Activity as tolerated with PT/OT  Lumbar xray to be completed  Pain control with prn and scheduled meds  Cooper in place to be removed POD#1 - bladder protocol with PVRsx3   SCDs, Lovenox to begin 48hrs postop   IS q2hrs while awake   Bowel regimen   Hold ASA until POD#5  Hospitalist consulted for medical management      Shawanda Morales PA-C  [facility] Neurosurgery  O: [phone]

## 2025-04-26 ENCOUNTER — APPOINTMENT (OUTPATIENT)
Dept: OCCUPATIONAL THERAPY | Facility: CLINIC | Age: 65
DRG: 448 | End: 2025-04-26
Attending: PHYSICIAN ASSISTANT
Payer: MEDICARE

## 2025-04-26 ENCOUNTER — APPOINTMENT (OUTPATIENT)
Dept: RADIOLOGY | Facility: CLINIC | Age: 65
DRG: 448 | End: 2025-04-26
Attending: PHYSICIAN ASSISTANT
Payer: MEDICARE

## 2025-04-26 ENCOUNTER — APPOINTMENT (OUTPATIENT)
Dept: PHYSICAL THERAPY | Facility: CLINIC | Age: 65
DRG: 448 | End: 2025-04-26
Attending: SURGERY
Payer: MEDICARE

## 2025-04-26 LAB
CREAT SERPL-MCNC: 0.95 MG/DL (ref 0.67–1.17)
EGFRCR SERPLBLD CKD-EPI 2021: 88 ML/MIN/1.73M2
PLATELET # BLD AUTO: 285 10E3/UL (ref 150–450)

## 2025-04-26 PROCEDURE — 99232 SBSQ HOSP IP/OBS MODERATE 35: CPT | Performed by: INTERNAL MEDICINE

## 2025-04-26 PROCEDURE — 250N000011 HC RX IP 250 OP 636: Performed by: PHYSICIAN ASSISTANT

## 2025-04-26 PROCEDURE — 97530 THERAPEUTIC ACTIVITIES: CPT | Mod: GP

## 2025-04-26 PROCEDURE — 85049 AUTOMATED PLATELET COUNT: CPT | Performed by: SURGERY

## 2025-04-26 PROCEDURE — 120N000001 HC R&B MED SURG/OB

## 2025-04-26 PROCEDURE — 999N000065 XR LUMBAR SPINE 2/3 VIEWS

## 2025-04-26 PROCEDURE — 97535 SELF CARE MNGMENT TRAINING: CPT | Mod: GO

## 2025-04-26 PROCEDURE — 250N000013 HC RX MED GY IP 250 OP 250 PS 637: Performed by: PHYSICIAN ASSISTANT

## 2025-04-26 PROCEDURE — 97116 GAIT TRAINING THERAPY: CPT | Mod: GP

## 2025-04-26 PROCEDURE — 97161 PT EVAL LOW COMPLEX 20 MIN: CPT | Mod: GP

## 2025-04-26 PROCEDURE — 999N000157 HC STATISTIC RCP TIME EA 10 MIN

## 2025-04-26 PROCEDURE — 97165 OT EVAL LOW COMPLEX 30 MIN: CPT | Mod: GO

## 2025-04-26 PROCEDURE — 94660 CPAP INITIATION&MGMT: CPT

## 2025-04-26 PROCEDURE — 250N000013 HC RX MED GY IP 250 OP 250 PS 637: Performed by: INTERNAL MEDICINE

## 2025-04-26 PROCEDURE — 82565 ASSAY OF CREATININE: CPT | Performed by: SURGERY

## 2025-04-26 PROCEDURE — 36415 COLL VENOUS BLD VENIPUNCTURE: CPT | Performed by: SURGERY

## 2025-04-26 RX ADMIN — OXYCODONE HYDROCHLORIDE 2.5 MG: 5 TABLET ORAL at 04:44

## 2025-04-26 RX ADMIN — MULTIVITAMIN TABLET 1 TABLET: TABLET at 10:04

## 2025-04-26 RX ADMIN — SENNOSIDES AND DOCUSATE SODIUM 1 TABLET: 50; 8.6 TABLET ORAL at 19:24

## 2025-04-26 RX ADMIN — METHOCARBAMOL 500 MG: 500 TABLET ORAL at 12:21

## 2025-04-26 RX ADMIN — POLYETHYLENE GLYCOL 3350 17 G: 17 POWDER, FOR SOLUTION ORAL at 10:04

## 2025-04-26 RX ADMIN — METHOCARBAMOL 500 MG: 500 TABLET ORAL at 00:29

## 2025-04-26 RX ADMIN — METHOCARBAMOL 500 MG: 500 TABLET ORAL at 18:11

## 2025-04-26 RX ADMIN — ATORVASTATIN CALCIUM 80 MG: 40 TABLET, FILM COATED ORAL at 10:04

## 2025-04-26 RX ADMIN — FLUTICASONE PROPIONATE AND SALMETEROL 1 PUFF: 250; 50 POWDER RESPIRATORY (INHALATION) at 19:25

## 2025-04-26 RX ADMIN — SENNOSIDES AND DOCUSATE SODIUM 1 TABLET: 50; 8.6 TABLET ORAL at 10:04

## 2025-04-26 RX ADMIN — CEFAZOLIN SODIUM 2 G: 2 SOLUTION INTRAVENOUS at 12:21

## 2025-04-26 RX ADMIN — BENZOCAINE AND MENTHOL 1 LOZENGE: 15; 3.6 LOZENGE ORAL at 00:32

## 2025-04-26 RX ADMIN — BENZOCAINE AND MENTHOL 1 LOZENGE: 15; 3.6 LOZENGE ORAL at 10:11

## 2025-04-26 RX ADMIN — ACETAMINOPHEN 975 MG: 325 TABLET, FILM COATED ORAL at 07:49

## 2025-04-26 RX ADMIN — CEFAZOLIN SODIUM 2 G: 2 SOLUTION INTRAVENOUS at 04:36

## 2025-04-26 RX ADMIN — OXYCODONE HYDROCHLORIDE 2.5 MG: 5 TABLET ORAL at 22:03

## 2025-04-26 RX ADMIN — LEVOTHYROXINE SODIUM 125 MCG: 0.12 TABLET ORAL at 07:49

## 2025-04-26 RX ADMIN — ACETAMINOPHEN 975 MG: 325 TABLET, FILM COATED ORAL at 00:29

## 2025-04-26 RX ADMIN — FLUTICASONE PROPIONATE AND SALMETEROL 1 PUFF: 250; 50 POWDER RESPIRATORY (INHALATION) at 07:52

## 2025-04-26 RX ADMIN — METHOCARBAMOL 500 MG: 500 TABLET ORAL at 07:49

## 2025-04-26 RX ADMIN — ACETAMINOPHEN 975 MG: 325 TABLET, FILM COATED ORAL at 16:02

## 2025-04-26 ASSESSMENT — ACTIVITIES OF DAILY LIVING (ADL)
ADLS_ACUITY_SCORE: 27
ADLS_ACUITY_SCORE: 29
ADLS_ACUITY_SCORE: 31
ADLS_ACUITY_SCORE: 29
ADLS_ACUITY_SCORE: 31
ADLS_ACUITY_SCORE: 29
ADLS_ACUITY_SCORE: 29
ADLS_ACUITY_SCORE: 31
ADLS_ACUITY_SCORE: 31
ADLS_ACUITY_SCORE: 29
ADLS_ACUITY_SCORE: 31
ADLS_ACUITY_SCORE: 31
ADLS_ACUITY_SCORE: 29
ADLS_ACUITY_SCORE: 29
ADLS_ACUITY_SCORE: 31
ADLS_ACUITY_SCORE: 29
ADLS_ACUITY_SCORE: 31

## 2025-04-26 NOTE — PROGRESS NOTES
04/26/25 1005   Appointment Info   Signing Clinician's Name / Credentials (OT) Paige Frommelt OTD OTR/L   Living Environment   People in Home alone   Current Living Arrangements apartment   Home Accessibility stairs to enter home   Number of Stairs, Main Entrance greater than 10 stairs   Living Environment Comments Apartment with no elevator access - lower toilet with vanity near, tub/shower   Self-Care   Equipment Currently Used at Home none   Fall history within last six months no   Activity/Exercise/Self-Care Comment Typically ind with all ADLs and IADLs - drives self   General Information   Onset of Illness/Injury or Date of Surgery 04/25/25   Referring Physician Kavya Lopez MD   Patient/Family Therapy Goal Statement (OT) To return home   Additional Occupational Profile Info/Pertinent History of Current Problem Juan C Garcia is a 65 year old male  with past history significant for hypertension dyslipidemia unprovoked pulmonary embolism in 2003 was treated for 6 months of anticoagulation, asthma, coronary calcifications admitted postoperatively after he underwent a lumbar surgery.   Existing Precautions/Restrictions spinal;brace worn when out of bed  (brace w/ OOB and sitting greater than 45% angle)   Left Lower Extremity (Weight-bearing Status) weight-bearing as tolerated (WBAT)   Right Lower Extremity (Weight-bearing Status) weight-bearing as tolerated (WBAT)   Cognitive Status Examination   Orientation Status orientation to person, place and time   Affect/Mental Status (Cognitive) WFL   Follows Commands WFL   Visual Perception   Visual Impairment/Limitations WFL   Posture   Posture not impaired   Range of Motion Comprehensive   General Range of Motion no range of motion deficits identified   Strength Comprehensive (MMT)   General Manual Muscle Testing (MMT) Assessment no strength deficits identified   Coordination   Upper Extremity Coordination No deficits were identified   Bed Mobility   Bed  Mobility supine-sit   Supine-Sit Streeter (Bed Mobility) minimum assist (75% patient effort)   Assistive Device (Bed Mobility) bed rails   Transfers   Transfers sit-stand transfer;toilet transfer;shower transfer   Sit-Stand Transfer   Sit-Stand Streeter (Transfers) contact guard   Assistive Device (Sit-Stand Transfers) walker, front-wheeled   Shower Transfer   Streeter Level (Shower Transfer) unable to assess   Shower Transfer Comments tub/shower - min A per clinical judgement   Toilet Transfer   Streeter Level (Toilet Transfer) contact guard   Assistive Device (Toilet Transfer) walker, front-wheeled   Balance   Balance Assessment standing dynamic balance   Standing Balance: Dynamic contact guard   Position/Device Used, Standing Balance walker, front-wheeled   Activities of Daily Living   BADL Assessment/Intervention lower body dressing;toileting;grooming   Lower Body Dressing Assessment/Training   Streeter Level (Lower Body Dressing) minimum assist (75% patient effort)   Grooming Assessment/Training   Streeter Level (Grooming) contact guard assist   Toileting   Streeter Level (Toileting) contact guard assist   Clinical Impression   Criteria for Skilled Therapeutic Interventions Met (OT) Yes, treatment indicated   OT Diagnosis Decreased ind with ADLs and safety   Influenced by the following impairments S/p lumbar fusion   OT Problem List-Impairments impacting ADL pain;post-surgical precautions;mobility;activity tolerance impaired   Assessment of Occupational Performance 3-5 Performance Deficits   Identified Performance Deficits dressing, toileting, bathing, fxl transfers, bed mobility   Planned Therapy Interventions (OT) ADL retraining;bed mobility training;strengthening;progressive activity/exercise;risk factor education;transfer training   Clinical Decision Making Complexity (OT) problem focused assessment/low complexity   Risk & Benefits of therapy have been explained  evaluation/treatment results reviewed;care plan/treatment goals reviewed;risks/benefits reviewed;current/potential barriers reviewed;patient   OT Total Evaluation Time   OT Eval, Low Complexity Minutes (19123) 10   OT Goals   Therapy Frequency (OT) Daily   OT Predicted Duration/Target Date for Goal Attainment 05/03/25   OT Goals Lower Body Dressing;Hygiene/Grooming;Toilet Transfer/Toileting;Bed Mobility;Transfers   OT: Hygiene/Grooming modified independent;while standing   OT: Lower Body Dressing Modified independent;within precautions;using adaptive equipment   OT: Bed Mobility Modified independent;supine to/from sitting;within precautions   OT: Transfer Supervision/stand-by assist  (tub/shower transfer)   OT: Toilet Transfer/Toileting Supervision/stand-by assist;toilet transfer;cleaning and garment management   Self-Care/Home Management   Self-Care/Home Mgmt/ADL, Compensatory, Meal Prep Minutes (49044) 24   Symptoms Noted During/After Treatment (Meal Preparation/Planning Training) none   Treatment Detail/Skilled Intervention Eval completed, treatment initiated. Reviewed spinal precautions related to ADLs - handout provided, pt asking relevant questions throughout, demos understanding. Fxl mob to bathroom SBA w/ FWW, trialed lower toilet without riser to simulate home set up, completed SBA however reports would be more comfortable with raised seat, educ on where to purchase if needed. SBA g/h standing at sink, instructed on decreased bending at sinkside. Fxl mob to EOB SBA, instructed on log roll technique, completed SBA following cueing. Instructed on donning bottoms at EOB with reacher vs. figure 4 - unable to complete full figure 4 however able to thread within precautions, instructed on use with reacher. Returned to supine SBA with min cueing. Repositioned and ice applied for comfort, brace removed mod A. Left with call light in reach.   OT Discharge Planning   OT Plan Tub/shower transfer, issue RTS and  reacher, full body dressing   OT Discharge Recommendation (DC Rec) home with assist   OT Rationale for DC Rec Pt able to have family/friends stop by to assist, progressing well with therapy   OT Brief overview of current status SBA fxl mob and ADLs   OT Total Distance Amb During Session (feet) 20   Total Session Time   Timed Code Treatment Minutes 24   Total Session Time (sum of timed and untimed services) 34

## 2025-04-26 NOTE — PLAN OF CARE
Patient vital signs are at baseline: Yes  Patient able to ambulate as they were prior to admission or with assist devices provided by therapies during their stay:  Yes  Patient MUST void prior to discharge:  No,  Reason:  Cooper patent and draining, order states to remove POD 1.  Patient able to tolerate oral intake:  Yes  Pain has adequate pain control using Oral analgesics:  Yes  Does patient have an identified :  Yes  Has goal D/C date and time been discussed with patient:  Yes

## 2025-04-26 NOTE — PROGRESS NOTES
04/26/25 0829   Appointment Info   Signing Clinician's Name / Credentials (PT) Lesley Gan PT DPT [facility] C[facility]   Living Environment   People in Home alone   Current Living Arrangements apartment   Home Accessibility stairs to enter home   Number of Stairs, Main Entrance greater than 10 stairs   Stair Railings, Main Entrance railings on both sides of stairs;railing on right side (ascending)   Living Environment Comments 2 flights of 7 steps to get to apartment; 1st flight has B rails, 2nd flight has rail on R side   Self-Care   Usual Activity Tolerance good   Current Activity Tolerance moderate   Equipment Currently Used at Home none   Fall history within last six months no   Activity/Exercise/Self-Care Comment Reports tub/shower without rail; indep with ADLs/IADLs/amb without assist prior to surgery   General Information   Onset of Illness/Injury or Date of Surgery 04/25/25   Referring Physician GARTH Lopez   Patient/Family Therapy Goals Statement (PT) return home   Pertinent History of Current Problem (include personal factors and/or comorbidities that impact the POC) per chart:  65 year old male  with past history significant for hypertension dyslipidemia unprovoked pulmonary embolism in 2003 was treated for 6 months of anticoagulation, asthma, coronary calcifications admitted postoperatively after he underwent a lumbar surgery.  Tolerated procedure well.   Existing Precautions/Restrictions brace worn when out of bed;lifting   Cognition   Affect/Mental Status (Cognition) WNL   Orientation Status (Cognition) oriented x 4   Follows Commands (Cognition) WNL   Range of Motion (ROM)   Range of Motion ROM deficits secondary to surgical procedure   Strength (Manual Muscle Testing)   Strength (Manual Muscle Testing) Deficits observed during functional mobility   Transfers   Comment, (Transfers) sit <> stand w/FWW and CGA   Gait/Stairs (Locomotion)   [place] Level (Gait) contact guard   Assistive Device (Gait) walker,  front-wheeled   Distance in Feet (Gait) 10   Pattern (Gait) step-through   Deviations/Abnormal Patterns (Gait) antalgic;base of support, wide;monty decreased;gait speed decreased;stride length decreased;weight shifting decreased   Clinical Impression   Criteria for Skilled Therapeutic Intervention Yes, treatment indicated   PT Diagnosis (PT) impaired functional mobility   Influenced by the following impairments weakness, pain   Functional limitations due to impairments ambulation/stairs, transfers   Clinical Presentation (PT Evaluation Complexity) stable   Clinical Presentation Rationale pt presents as medically diagnosed   Clinical Decision Making (Complexity) low complexity   Planned Therapy Interventions (PT) gait training;stair training;transfer training;progressive activity/exercise;risk factor education   Risk & Benefits of therapy have been explained evaluation/treatment results reviewed;care plan/treatment goals reviewed;risks/benefits reviewed;participants included;patient   PT Total Evaluation Time   PT Eval, Low Complexity Minutes (47952) 10   Physical Therapy Goals   PT Frequency Daily   PT Predicted Duration/Target Date for Goal Attainment 04/28/25   PT Goals Transfers;Gait;Stairs   PT: Transfers Supervision/stand-by assist;Sit to/from stand;Bed to/from chair;Assistive device;Within precautions   PT: Gait Supervision/stand-by assist;Rolling walker;Greater than 200 feet;Within precautions   PT: Stairs Greater than 10 stairs;Rail on both sides;Supervision/stand-by assist;Within precautions   Interventions   Interventions Quick Adds Gait Training;Therapeutic Activity;Therapeutic Procedure   Therapeutic Activity   Therapeutic Activities: dynamic activities to improve functional performance Minutes (00153) 10   Symptoms Noted During/After Treatment Fatigue;Increased pain   Treatment Detail/Skilled Intervention Pt sitting up in chair when approached for PT and agreeable to participate in session.  Able to  perform sit <> stand using FWW and CGA.  Cues for use of UE/LE to scoot back in chair.  Pt left in chair with needs within reach, chair alarm engaged and RN updated on progress with PT   Gait Training   Gait Training Minutes (24162) 15   Symptoms Noted During/After Treatment (Gait Training) fatigue   Treatment Detail/Skilled Intervention Pt amb in hallway with FWW/CGA.  Instructed/performed stairs with B rails/CGA demonstrating step to gait pattern x 4 stairs.   Distance in Feet 100'   [place] Level (Gait Training) contact guard   Physical Assistance Level (Gait Training) supervision;verbal cues   Weight Bearing (Gait Training) weight-bearing as tolerated   Assistive Device (Gait Training) rolling walker   Pattern Analysis (Gait Training) swing-through gait   Gait Analysis Deviations decreased monty;decreased velocity of limb motion;decreased step length;decreased stride length;decreased weight-shifting ability   Impairments (Gait Analysis/Training) pain;strength decreased   Stair Railings present at both sides   Physical Assist/Nonphysical Assist (Stairs) supervision;verbal cues   Level of [place] (Stairs) contact guard   PT Discharge Planning   PT Plan progress amb/stairs, transfers   PT Discharge Recommendation (DC Rec) (S)  home with assist   PT Rationale for DC Rec Pt moving well with FWW, but lives alone with  14 steps to access his apartment.   PT Brief overview of current status Amb 100' with FWW/CGA; up/down 4 steps with CGA; transfers with FWW/CGA   PT Total Distance Amb During Session (feet) 110   PT Equipment Needed at Discharge walker, rolling;cane, straight   Physical Therapy Time and Intention   Timed Code Treatment Minutes 25   Total Session Time (sum of timed and untimed services) 35

## 2025-04-26 NOTE — PROGRESS NOTES
NEUROSURGERY PROGRESS NOTE    Today's date:  04/26/25        ASSESSMENT:     POD #1 Extension lumbar 5-sacral 1 fusion to lumbar 3 with lumbar 3-lumbar 5 laminectomies, medial facetectomies and foraminotomies and posterolateral arthrodesis with right sided discectomies and use of stealth   -DOS 4/25/25  -Surgeon: Dr. Lopez     -Neuro stable        TODAY'S PLAN:   -Continue drain- monitor output and chart even if 0mL   -Orthotics to fit patient with LSO brace- brace to be worn when out of bed and sitting greater than 45 degrees   -Activity as tolerated with PT/OT   -Therapies recommending home with assist   -Pain control with prn and scheduled meds  -Cooper removed - bladder protocol with PVRsx3    -SCDs, Lovenox to begin 48hrs postop   -IS q2hrs while awake   -Bowel regimen   Hold ASA until POD#5  -Appreciate hospitalist and therapies involvement       Possible discharge tomorrow 4/27 pending drain output        Louise Castro PA-C  [facility]     Please page on-call team with additional questions or concerns.   Plan reviewed with Dr. Lopez.   ________________________________________________________________       Subjective:   Feeling quite well. Minimal low back pain. Right lower extremity symptoms have essentially resolved with surgery. Pleased by this. No new concerns.       Objective:   /66 (BP Location: Right arm)   Pulse 101   Temp 97.9  F (36.6  C) (Oral)   Resp 18   SpO2 97%      Pt in bed. Appears comfortable and in no apparent distress, moving all extremities.   CN II-XII intact, alert and appropriate with conversation and following commands.   Bilateral lower extremities with appropriate strength.  Sensation intact throughout legs.    Calves soft and non-tender bilaterally.   Lumbar bandage clean. Drain x1 with sanguinous fluid.      Pertinent labs reviewed in EPIC.        XR Lumbar spine (4/26/25)   IMPRESSION:  Nomenclature is based on five lumbar-type vertebral  bodies.     Vertebral body heights are unremarkable.     Posterior fusion hardware spans L3-S1. There is interbody fusion hardware at L5-S1. No evidence for hardware-related complication. Surgical drain remains in place. L3-L5 laminectomies.     Mild to moderate L5 interbody degenerative change, with intervertebral disc height loss and endplate spurring.     The imaged portion of the sacrum appears grossly intact. However, it is partially obscured by stool and bowel gas.      Drain output:   90 mL overnight ([id number])

## 2025-04-26 NOTE — PROGRESS NOTES
[facility] Daily Progress Note    Assessment/Plan:  History of unprovoked pulmonary emboli in 2003  Was treated with anticoagulation for 6 months  Reportedly saw hematology  High risk for recurrent PE  SCDs, early ambulation  Recommended prophylactic Lovenox when appropriate from spine surgery perspective, plan to start 4/27     Hypertension  Blood pressure is on the lower side  Hold off on antihypertensives     Dyslipidemia  Continue home statin     History of asthma  No sign of exacerbation  Continue home inhalers     History of coronary calcifications on CT  Resume aspirin when able.     lumbar spinal stenosis with neurogenic claudication status post extension of L5-S1 fusion to L3-L5 laminectomies, foraminotomies, right-sided discectomy bilateral: Postop day #1.  Continue PT OT, pain control, DVT prophylaxis per spine surgery.  Encourage incentive spirometry, monitor hemoglobin     History of alcohol abuse  In remission.    Active Problems:    Lumbar spinal stenosis     LOS: 1 day       Medically Ready for Discharge: Ready Now       Subjective:  Some burning pain at incision site.  No other shortness of breath, chest pain, urinary or bowel complaints.  Plan to remove Cooper today    Current Facility-Administered Medications   Medication Dose Route Frequency Provider Last Rate Last Admin    acetaminophen (TYLENOL) tablet 975 mg  975 mg Oral Q8H Shawanda Morales PA-C   975 mg at 04/26/25 0749    atorvastatin (LIPITOR) tablet 80 mg  80 mg Oral Daily Sharifa Allen MD   80 mg at 04/26/25 1004    ceFAZolin (ANCEF) 2 g in dextrose 50 mL intermittent infusion  2 g Intravenous Q8H Shawanda Morales PA-C   2 g at 04/26/25 0436    [START ON 4/27/2025] enoxaparin ANTICOAGULANT (LOVENOX) injection 40 mg  40 mg Subcutaneous Q24H Shawanda Morales PA-C        fluticasone-salmeterol (ADVAIR) 250-50 MCG/ACT diskus inhaler 1 puff  1 puff Inhalation BID Sharifa Allen MD   1 puff at 04/26/25 0752    levothyroxine (SYNTHROID/LEVOTHROID) tablet  125 mcg  125 mcg Oral QAM AC Sharifa Allen MD   125 mcg at 04/26/25 0749    methocarbamol (ROBAXIN) tablet 500 mg  500 mg Oral Q6H [facility] Shawanda Morales PA-C   500 mg at 04/26/25 0749    multivitamin, therapeutic (THERA-VIT) tablet 1 tablet  1 tablet Oral Daily Shawanda Morales PA-C   1 tablet at 04/26/25 1004    polyethylene glycol (MIRALAX) Packet 17 g  17 g Oral Daily Shawanda Morales PA-C   17 g at 04/26/25 1004    senna-docusate (SENOKOT-S/PERICOLACE) 8.6-50 MG per tablet 1 tablet  1 tablet Oral BID Shawanda Morales PA-C   1 tablet at 04/26/25 1004       Objective:  Vital signs in last 24 hours:  Temp:  [97.5  F (36.4  C)-98.9  F (37.2  C)] 97.9  F (36.6  C)  Pulse:  [] 101  Resp:  [10-18] 18  BP: (103-148)/(61-81) 124/66  FiO2 (%):  [21 %] 21 %  SpO2:  [93 %-99 %] 97 %  Weight:   [unfilled]    Intake/Output last 3 shifts:  I/O last 3 completed shifts:  In: 3009 [P.O.:740; I.V.:2269]  Out: 4460 [Urine:3950; Drains:260; Blood:250]  Intake/Output this shift:  I/O this shift:  In: 240 [P.O.:240]  Out: -     Review of Systems:   As per subjective, all others negative.    Physical Exam:    General Appearance:  Alert, cooperative, no distress, appears stated age   Head:  Normocephalic, without obvious abnormality, atraumatic   Lungs:   Clear to auscultation bilaterally, respirations unlabored   Chest Wall:  No tenderness or deformity   Heart:  Regular rate and rhythm, S1, S2 normal,no murmur, rub or gallop   Abdomen:   Soft, non tender, non distended, bowel sounds present, no guarding or rigidity   Extremities: No edema    Skin: Skin color, texture, turgor normal, no rashes or lesions   Neurologic: Alert and oriented X 3, Moves all 4 extremities       Lab Results:  Personally Reviewed.   Recent Results (from the past 24 hours)   Platelet count - AM Draw (tomorrow)    Collection Time: 04/26/25  6:38 AM   Result Value Ref Range    Platelet Count 285 150 - 450 10e3/uL   Creatinine    Collection Time: 04/26/25  6:38 AM    Result Value Ref Range    Creatinine 0.95 0.67 - 1.17 mg/dL    GFR Estimate 88 >60 mL/min/1.73m2         Sharifa Allen M.D  [facility]  Internal Medicine

## 2025-04-26 NOTE — PLAN OF CARE
Problem: Delirium  Goal: Improved Attention and Thought Clarity  Outcome: Progressing     Problem: Delirium  Goal: Improved Sleep  Outcome: Progressing     Problem: Adult Inpatient Plan of Care  Goal: Plan of Care Review  Description: The Plan of Care Review/Shift note should be completed every shift.  The Outcome Evaluation is a brief statement about your assessment that the patient is improving, declining, or no change.  This information will be displayed automatically on your shiftnote.  Outcome: Progressing  Flowsheets (Taken 4/26/2025 1484)  Plan of Care Reviewed With: patient  Overall Patient Progress: improving   Goal Outcome Evaluation:      Plan of Care Reviewed With: patient    Overall Patient Progress: improvingOverall Patient Progress: improving  Patient vital signs are at baseline: Yes  Patient able to ambulate as they were prior to admission or with assist devices provided by therapies during their stay:  Yes  Patient MUST void prior to discharge:  Yes  Patient able to tolerate oral intake:  Yes  Pain has adequate pain control using Oral analgesics:  Yes  Does patient have an identified :  Yes  Has goal D/C date and time been discussed with patient:  Yes  Pt alert and oriented, vss on room air, can voice needs, he is assist of one,  has one more pvr to go, has a jaylon drain in place. Pain mild.

## 2025-04-26 NOTE — PLAN OF CARE
Note for RN cares provided 1500 - 1900:  Patient vital signs are at baseline: Yes  Patient able to ambulate as they were prior to admission or with assist devices provided by therapies during their stay:  Yes  Was up in the chair for dinner, then ambulated in hallway.  Patient MUST void prior to discharge:  Yes  Pt completed 3 PVRs per void protocol.  Patient able to tolerate oral intake:  Yes  Pain has adequate pain control using Oral analgesics:  Yes  Does patient have an identified :  Yes  Has goal D/C date and time been discussed with patient:  Yes

## 2025-04-27 ENCOUNTER — APPOINTMENT (OUTPATIENT)
Dept: PHYSICAL THERAPY | Facility: CLINIC | Age: 65
DRG: 448 | End: 2025-04-27
Attending: SURGERY
Payer: MEDICARE

## 2025-04-27 ENCOUNTER — APPOINTMENT (OUTPATIENT)
Dept: OCCUPATIONAL THERAPY | Facility: CLINIC | Age: 65
DRG: 448 | End: 2025-04-27
Attending: SURGERY
Payer: MEDICARE

## 2025-04-27 PROCEDURE — 999N000157 HC STATISTIC RCP TIME EA 10 MIN

## 2025-04-27 PROCEDURE — 99232 SBSQ HOSP IP/OBS MODERATE 35: CPT | Performed by: INTERNAL MEDICINE

## 2025-04-27 PROCEDURE — 250N000013 HC RX MED GY IP 250 OP 250 PS 637: Performed by: PHYSICIAN ASSISTANT

## 2025-04-27 PROCEDURE — 94660 CPAP INITIATION&MGMT: CPT

## 2025-04-27 PROCEDURE — 250N000011 HC RX IP 250 OP 636: Mod: JZ | Performed by: PHYSICIAN ASSISTANT

## 2025-04-27 PROCEDURE — 97116 GAIT TRAINING THERAPY: CPT | Mod: GP

## 2025-04-27 PROCEDURE — 120N000001 HC R&B MED SURG/OB

## 2025-04-27 PROCEDURE — 250N000013 HC RX MED GY IP 250 OP 250 PS 637: Performed by: INTERNAL MEDICINE

## 2025-04-27 PROCEDURE — 97535 SELF CARE MNGMENT TRAINING: CPT | Mod: GO

## 2025-04-27 RX ORDER — HYDROCHLOROTHIAZIDE 12.5 MG/1
12.5 TABLET ORAL DAILY
Status: SHIPPED
Start: 2025-04-27

## 2025-04-27 RX ORDER — LOSARTAN POTASSIUM 50 MG/1
50 TABLET ORAL DAILY
Status: SHIPPED
Start: 2025-04-27

## 2025-04-27 RX ADMIN — ACETAMINOPHEN 975 MG: 325 TABLET, FILM COATED ORAL at 08:20

## 2025-04-27 RX ADMIN — OXYCODONE HYDROCHLORIDE 2.5 MG: 5 TABLET ORAL at 07:20

## 2025-04-27 RX ADMIN — OXYCODONE 5 MG: 5 TABLET ORAL at 23:39

## 2025-04-27 RX ADMIN — METHOCARBAMOL 500 MG: 500 TABLET ORAL at 18:39

## 2025-04-27 RX ADMIN — OXYCODONE HYDROCHLORIDE 2.5 MG: 5 TABLET ORAL at 14:29

## 2025-04-27 RX ADMIN — FLUTICASONE PROPIONATE AND SALMETEROL 1 PUFF: 250; 50 POWDER RESPIRATORY (INHALATION) at 19:49

## 2025-04-27 RX ADMIN — LEVOTHYROXINE SODIUM 125 MCG: 0.12 TABLET ORAL at 06:18

## 2025-04-27 RX ADMIN — FLUTICASONE PROPIONATE AND SALMETEROL 1 PUFF: 250; 50 POWDER RESPIRATORY (INHALATION) at 08:25

## 2025-04-27 RX ADMIN — SENNOSIDES AND DOCUSATE SODIUM 1 TABLET: 50; 8.6 TABLET ORAL at 19:49

## 2025-04-27 RX ADMIN — METHOCARBAMOL 500 MG: 500 TABLET ORAL at 13:23

## 2025-04-27 RX ADMIN — METHOCARBAMOL 500 MG: 500 TABLET ORAL at 06:18

## 2025-04-27 RX ADMIN — HYDROMORPHONE HYDROCHLORIDE 0.2 MG: 0.2 INJECTION, SOLUTION INTRAMUSCULAR; INTRAVENOUS; SUBCUTANEOUS at 21:37

## 2025-04-27 RX ADMIN — POLYETHYLENE GLYCOL 3350 17 G: 17 POWDER, FOR SOLUTION ORAL at 06:34

## 2025-04-27 RX ADMIN — MULTIVITAMIN TABLET 1 TABLET: TABLET at 08:21

## 2025-04-27 RX ADMIN — ATORVASTATIN CALCIUM 80 MG: 40 TABLET, FILM COATED ORAL at 08:20

## 2025-04-27 RX ADMIN — METHOCARBAMOL 500 MG: 500 TABLET ORAL at 00:08

## 2025-04-27 RX ADMIN — SENNOSIDES AND DOCUSATE SODIUM 1 TABLET: 50; 8.6 TABLET ORAL at 08:21

## 2025-04-27 RX ADMIN — ACETAMINOPHEN 975 MG: 325 TABLET, FILM COATED ORAL at 00:08

## 2025-04-27 RX ADMIN — ACETAMINOPHEN 975 MG: 325 TABLET, FILM COATED ORAL at 17:07

## 2025-04-27 RX ADMIN — ENOXAPARIN SODIUM 40 MG: 40 INJECTION SUBCUTANEOUS at 13:24

## 2025-04-27 RX ADMIN — OXYCODONE HYDROCHLORIDE 2.5 MG: 5 TABLET ORAL at 18:39

## 2025-04-27 ASSESSMENT — ACTIVITIES OF DAILY LIVING (ADL)
ADLS_ACUITY_SCORE: 31
ADLS_ACUITY_SCORE: 37
ADLS_ACUITY_SCORE: 31
ADLS_ACUITY_SCORE: 37
ADLS_ACUITY_SCORE: 37
ADLS_ACUITY_SCORE: 31
ADLS_ACUITY_SCORE: 31
ADLS_ACUITY_SCORE: 37
ADLS_ACUITY_SCORE: 31
ADLS_ACUITY_SCORE: 37
ADLS_ACUITY_SCORE: 37
ADLS_ACUITY_SCORE: 31
ADLS_ACUITY_SCORE: 37
ADLS_ACUITY_SCORE: 31
ADLS_ACUITY_SCORE: 37
ADLS_ACUITY_SCORE: 37
ADLS_ACUITY_SCORE: 31
ADLS_ACUITY_SCORE: 37
ADLS_ACUITY_SCORE: 37
ADLS_ACUITY_SCORE: 31
ADLS_ACUITY_SCORE: 31
ADLS_ACUITY_SCORE: 37
ADLS_ACUITY_SCORE: 37

## 2025-04-27 NOTE — PROGRESS NOTES
NEUROSURGERY PROGRESS NOTE    Today's date:  04/27/25        ASSESSMENT:     POD #2 Extension lumbar 5-sacral 1 fusion to lumbar 3 with lumbar 3-lumbar 5 laminectomies, medial facetectomies and foraminotomies and posterolateral arthrodesis with right sided discectomies and use of stealth   -DOS 4/25/25  -Surgeon: Dr. Lopez     -Neuro stable        TODAY'S PLAN:   -Continue drain- monitor output and chart even if 0mL   -Orthotics to fit patient with LSO brace- brace to be worn when out of bed and sitting greater than 45 degrees   -Activity as tolerated with PT/OT   -Therapies recommending home with assist   -Pain control with prn and scheduled meds   -Currently comfortable with oxycodone. If pain worsens again this evening, we can consider Toradol x1  -Cooper removed - bladder protocol with PVRsx3    -SCDs, Lovenox ordered to start 48hrs postop   -IS q2hrs while awake   -Bowel regimen   -Routine wound cares   -Hold ASA until POD#5   -Appreciate hospitalist and therapies involvement     Possible discharge tomorrow 4/28 pending drain removal and pain control          Louise Castro PA-C  [facility]     Please page on-call team with additional questions or concerns.   Plan reviewed with Dr. Lopez.   ________________________________________________________________       Subjective:   Developed worsening incisional back pain yesterday afternoon and last night. Made him anxious. Pain improved with PRN oxycodone. Feeling comfortable today. No lower extremity radicular symptoms. Moving well.        Objective:   /67 (BP Location: Right arm)   Pulse 79   Temp 98.2  F (36.8  C) (Oral)   Resp 18   SpO2 96%      Pt in chair. Appears comfortable and in no apparent distress, moving all extremities.    CN II-XII intact, alert and appropriate with conversation and following commands.   Bilateral lower extremities with appropriate strength.  Sensation intact throughout legs.    Calves soft and  non-tender bilaterally.   Lumbar bandage clean. Drain x1 with sanguinous fluid.       Pertinent labs reviewed in EPIC.        XR Lumbar spine (4/26/25)   IMPRESSION:  Nomenclature is based on five lumbar-type vertebral bodies.     Vertebral body heights are unremarkable.     Posterior fusion hardware spans L3-S1. There is interbody fusion hardware at L5-S1. No evidence for hardware-related complication. Surgical drain remains in place. L3-L5 laminectomies.     Mild to moderate L5 interbody degenerative change, with intervertebral disc height loss and endplate spurring.     The imaged portion of the sacrum appears grossly intact. However, it is partially obscured by stool and bowel gas.      Drain output:   60 mL overnight ([id number])   100 mL yesterday day shift ([id number])

## 2025-04-27 NOTE — PROGRESS NOTES
Patient vital signs are at baseline: Yes  Patient able to ambulate as they were prior to admission or with assist devices provided by therapies during their stay:  Yes  +LSO brace when OOB  Patient MUST void prior to discharge:  Yes  Per previous nurse, passed 3/3 PVRS  Patient able to tolerate oral intake:  Yes  Pain has adequate pain control using Oral analgesics:  Yes  Does patient have an identified :  Yes  Has goal D/C date and time been discussed with patient:  Yes      Drain output 25cc tonight

## 2025-04-27 NOTE — PLAN OF CARE
Alert and oriented. Moderate pain covered with PRN 2.5 mg of oxy. EMILEE drain had 80 mL output on shift. New drainage on foam dressing marked by RN. SBA w/ TSLO brace. Vitals WNL. CMS intact. Voiding.       Problem: Delirium  Goal: Improved Behavioral Control  Intervention: Minimize Safety Risk  Recent Flowsheet Documentation  Taken 4/27/2025 0004 by Juan Carlos Benitez, RN  Enhanced Safety Measures: room near unit station  Taken 4/26/2025 1923 by Juan Carlos Benitez, RN  Enhanced Safety Measures: room near unit station   Goal Outcome Evaluation:

## 2025-04-27 NOTE — PROGRESS NOTES
[facility] Daily Progress Note    Assessment/Plan:  History of unprovoked pulmonary emboli in 2003  Was treated with anticoagulation for 6 months  Reportedly saw hematology  High risk for recurrent PE  SCDs, early ambulation  Recommended prophylactic Lovenox when appropriate from spine surgery perspective, plan to start 4/27     Hypertension  Blood pressure is on the lower side  Hold off on antihypertensives     Dyslipidemia  Continue home statin     History of asthma  No sign of exacerbation  Continue home inhalers     History of coronary calcifications on CT  Resume aspirin when able.     lumbar spinal stenosis with neurogenic claudication status post extension of L5-S1 fusion to L3-L5 laminectomies, foraminotomies, right-sided discectomy bilateral: Postop day #2.  Continue PT OT, pain control, DVT prophylaxis per spine surgery.  Encourage incentive spirometry, monitor hemoglobin     History of alcohol abuse  In remission.    Active Problems:    Lumbar spinal stenosis     LOS: 1 day       Medically Ready for Discharge: Ready Now       Subjective:  Better pain control today.  No other shortness of breath, chest pain, urinary or bowel complaints.  Has drain in place.    Current Facility-Administered Medications   Medication Dose Route Frequency Provider Last Rate Last Admin    acetaminophen (TYLENOL) tablet 975 mg  975 mg Oral Q8H Shawanda Morales PA-C   975 mg at 04/27/25 0820    atorvastatin (LIPITOR) tablet 80 mg  80 mg Oral Daily Sharifa Allen MD   80 mg at 04/27/25 0820    enoxaparin ANTICOAGULANT (LOVENOX) injection 40 mg  40 mg Subcutaneous Q24H Shawanda Morales PA-C        fluticasone-salmeterol (ADVAIR) 250-50 MCG/ACT diskus inhaler 1 puff  1 puff Inhalation BID Sharifa Allen MD   1 puff at 04/27/25 0825    levothyroxine (SYNTHROID/LEVOTHROID) tablet 125 mcg  125 mcg Oral QAM AC Sharifa Allen MD   125 mcg at 04/27/25 0618    methocarbamol (ROBAXIN) tablet 500 mg  500 mg Oral Q6H [facility] Shawanda Morales PA-C   500 mg at  04/27/25 0618    multivitamin, therapeutic (THERA-VIT) tablet 1 tablet  1 tablet Oral Daily Carmen KAREN Mayberry   1 tablet at 04/27/25 0821    polyethylene glycol (MIRALAX) Packet 17 g  17 g Oral Daily Carmen KAREN Mayberry   17 g at 04/27/25 0634    senna-docusate (SENOKOT-S/PERICOLACE) 8.6-50 MG per tablet 1 tablet  1 tablet Oral BID Carmen KAREN Mayberry   1 tablet at 04/27/25 0821       Objective:  Vital signs in last 24 hours:  Temp:  [97.2  F (36.2  C)-98.4  F (36.9  C)] 98.2  F (36.8  C)  Pulse:  [76-97] 79  Resp:  [15-18] 18  BP: (111-132)/(56-79) 127/67  SpO2:  [95 %-100 %] 96 %  Weight:   [unfilled]    Intake/Output last 3 shifts:  I/O last 3 completed shifts:  In: 720 [P.O.:720]  Out: 1840 [Urine:1680; Drains:160]  Intake/Output this shift:  No intake/output data recorded.    Review of Systems:   As per subjective, all others negative.    Physical Exam:    General Appearance:  Alert, cooperative, no distress, appears stated age   Head:  Normocephalic, without obvious abnormality, atraumatic   Lungs:   Clear to auscultation bilaterally, respirations unlabored   Chest Wall:  No tenderness or deformity   Heart:  Regular rate and rhythm, S1, S2 normal,no murmur, rub or gallop   Abdomen:   Soft, non tender, non distended, bowel sounds present, no guarding or rigidity   Extremities: No edema    Skin: Skin color, texture, turgor normal, no rashes or lesions   Neurologic: Alert and oriented X 3, Moves all 4 extremities       Lab Results:  Personally Reviewed.   No results found for this or any previous visit (from the past 24 hours).        Sharifa Allen M.D  [facility]  Internal Medicine

## 2025-04-27 NOTE — PLAN OF CARE
Goal Outcome Evaluation:    Patient vital signs are at baseline: Yes  Patient able to ambulate as they were prior to admission or with assist devices provided by therapies during their stay:  Yes  Patient MUST void prior to discharge:  Yes  Patient able to tolerate oral intake:  Yes  Pain has adequate pain control using Oral analgesics:  Yes PRN oxycodone 2.5mg given   Does patient have an identified :  Yes  Has goal D/C date and time been discussed with patient:  Yes       Problem: Adult Inpatient Plan of Care  Goal: Plan of Care Review  Description: The Plan of Care Review/Shift note should be completed every shift.  The Outcome Evaluation is a brief statement about your assessment that the patient is improving, declining, or no change.  This information will be displayed automatically on your shiftnote.  Outcome: Progressing       Dressing changed. EMILEE drain output: 35mL

## 2025-04-28 ENCOUNTER — APPOINTMENT (OUTPATIENT)
Dept: PHYSICAL THERAPY | Facility: CLINIC | Age: 65
DRG: 448 | End: 2025-04-28
Attending: SURGERY
Payer: MEDICARE

## 2025-04-28 VITALS
OXYGEN SATURATION: 99 % | TEMPERATURE: 98.4 F | DIASTOLIC BLOOD PRESSURE: 59 MMHG | HEART RATE: 76 BPM | SYSTOLIC BLOOD PRESSURE: 118 MMHG | RESPIRATION RATE: 18 BRPM

## 2025-04-28 LAB
ATRIAL RATE - MUSE: 79 BPM
DIASTOLIC BLOOD PRESSURE - MUSE: NORMAL MMHG
INTERPRETATION ECG - MUSE: NORMAL
P AXIS - MUSE: 79 DEGREES
PR INTERVAL - MUSE: 166 MS
QRS DURATION - MUSE: 92 MS
QT - MUSE: 384 MS
QTC - MUSE: 440 MS
R AXIS - MUSE: 4 DEGREES
SYSTOLIC BLOOD PRESSURE - MUSE: NORMAL MMHG
T AXIS - MUSE: 38 DEGREES
VENTRICULAR RATE- MUSE: 79 BPM

## 2025-04-28 PROCEDURE — 250N000011 HC RX IP 250 OP 636: Performed by: PHYSICIAN ASSISTANT

## 2025-04-28 PROCEDURE — 97116 GAIT TRAINING THERAPY: CPT | Mod: GP

## 2025-04-28 PROCEDURE — 93005 ELECTROCARDIOGRAM TRACING: CPT

## 2025-04-28 PROCEDURE — 250N000013 HC RX MED GY IP 250 OP 250 PS 637: Performed by: PHYSICIAN ASSISTANT

## 2025-04-28 PROCEDURE — 99232 SBSQ HOSP IP/OBS MODERATE 35: CPT | Performed by: INTERNAL MEDICINE

## 2025-04-28 PROCEDURE — 250N000013 HC RX MED GY IP 250 OP 250 PS 637: Performed by: INTERNAL MEDICINE

## 2025-04-28 PROCEDURE — 97110 THERAPEUTIC EXERCISES: CPT | Mod: GP

## 2025-04-28 RX ORDER — ASPIRIN 81 MG/1
81 TABLET ORAL DAILY
Status: SHIPPED
Start: 2025-04-30

## 2025-04-28 RX ORDER — OXYCODONE HYDROCHLORIDE 5 MG/1
5 TABLET ORAL EVERY 4 HOURS PRN
Qty: 42 TABLET | Refills: 0 | Status: SHIPPED | OUTPATIENT
Start: 2025-04-28

## 2025-04-28 RX ORDER — METHOCARBAMOL 500 MG/1
500 TABLET, FILM COATED ORAL EVERY 6 HOURS PRN
Qty: 42 TABLET | Refills: 0 | Status: SHIPPED | OUTPATIENT
Start: 2025-04-28

## 2025-04-28 RX ORDER — POLYETHYLENE GLYCOL 3350 17 G/17G
17 POWDER, FOR SOLUTION ORAL DAILY
Qty: 510 G | Refills: 0 | Status: SHIPPED | OUTPATIENT
Start: 2025-04-28

## 2025-04-28 RX ORDER — ACETAMINOPHEN 325 MG/1
975 TABLET ORAL EVERY 8 HOURS
Qty: 135 TABLET | Refills: 0 | Status: SHIPPED | OUTPATIENT
Start: 2025-04-28 | End: 2025-05-13

## 2025-04-28 RX ADMIN — METHOCARBAMOL 500 MG: 500 TABLET ORAL at 09:04

## 2025-04-28 RX ADMIN — MULTIVITAMIN TABLET 1 TABLET: TABLET at 09:04

## 2025-04-28 RX ADMIN — METHOCARBAMOL 500 MG: 500 TABLET ORAL at 12:57

## 2025-04-28 RX ADMIN — ACETAMINOPHEN 975 MG: 325 TABLET, FILM COATED ORAL at 00:14

## 2025-04-28 RX ADMIN — SENNOSIDES AND DOCUSATE SODIUM 1 TABLET: 50; 8.6 TABLET ORAL at 09:04

## 2025-04-28 RX ADMIN — ATORVASTATIN CALCIUM 80 MG: 40 TABLET, FILM COATED ORAL at 09:04

## 2025-04-28 RX ADMIN — FLUTICASONE PROPIONATE AND SALMETEROL 1 PUFF: 250; 50 POWDER RESPIRATORY (INHALATION) at 09:04

## 2025-04-28 RX ADMIN — POLYETHYLENE GLYCOL 3350 17 G: 17 POWDER, FOR SOLUTION ORAL at 09:04

## 2025-04-28 RX ADMIN — LEVOTHYROXINE SODIUM 125 MCG: 0.12 TABLET ORAL at 06:33

## 2025-04-28 RX ADMIN — OXYCODONE HYDROCHLORIDE 2.5 MG: 5 TABLET ORAL at 10:55

## 2025-04-28 RX ADMIN — ACETAMINOPHEN 975 MG: 325 TABLET, FILM COATED ORAL at 09:04

## 2025-04-28 RX ADMIN — METHOCARBAMOL 500 MG: 500 TABLET ORAL at 01:51

## 2025-04-28 RX ADMIN — OXYCODONE HYDROCHLORIDE 2.5 MG: 5 TABLET ORAL at 06:09

## 2025-04-28 RX ADMIN — ENOXAPARIN SODIUM 40 MG: 40 INJECTION SUBCUTANEOUS at 12:57

## 2025-04-28 ASSESSMENT — ACTIVITIES OF DAILY LIVING (ADL)
ADLS_ACUITY_SCORE: 37
ADLS_ACUITY_SCORE: 41
ADLS_ACUITY_SCORE: 40
ADLS_ACUITY_SCORE: 37
ADLS_ACUITY_SCORE: 38
ADLS_ACUITY_SCORE: 38
ADLS_ACUITY_SCORE: 37
ADLS_ACUITY_SCORE: 41
ADLS_ACUITY_SCORE: 40
ADLS_ACUITY_SCORE: 37

## 2025-04-28 NOTE — PROGRESS NOTES
[facility] Daily Progress Note    Assessment/Plan:  History of unprovoked pulmonary emboli in 2003  Was treated with anticoagulation for 6 months  Reportedly saw hematology  High risk for recurrent PE  SCDs, early ambulation  Recommended prophylactic Lovenox when appropriate from spine surgery perspective, started 4/27     Hypertension  Blood pressure is on the lower side  Hold off on antihypertensives     Dyslipidemia  Continue home statin     History of asthma  No sign of exacerbation  Continue home inhalers     History of coronary calcifications on CT  Resume aspirin when able.     lumbar spinal stenosis with neurogenic claudication status post extension of L5-S1 fusion to L3-L5 laminectomies, foraminotomies, right-sided discectomy bilateral: Postop day #3.  Continue PT OT, pain control, DVT prophylaxis per spine surgery.  Encourage incentive spirometry, monitor hemoglobin     History of alcohol abuse  In remission.    Active Problems:    Lumbar spinal stenosis     LOS: 1 day       Medically Ready for Discharge: Ready Now       Subjective:  Better pain control today.  He had intermittent chest pain 2 days ago and last night.  Not pleuritic in nature.  Requesting for an EKG which was done.  Did not show any acute changes.  Stable to discharge home.    Current Facility-Administered Medications   Medication Dose Route Frequency Provider Last Rate Last Admin    acetaminophen (TYLENOL) tablet 975 mg  975 mg Oral Q8H Shawanda Morales PA-C   975 mg at 04/28/25 0904    atorvastatin (LIPITOR) tablet 80 mg  80 mg Oral Daily Sharifa Allen MD   80 mg at 04/28/25 0904    enoxaparin ANTICOAGULANT (LOVENOX) injection 40 mg  40 mg Subcutaneous Q24H Shawanda Morales PA-C   40 mg at 04/27/25 1324    fluticasone-salmeterol (ADVAIR) 250-50 MCG/ACT diskus inhaler 1 puff  1 puff Inhalation BID Sharifa Allen MD   1 puff at 04/28/25 0904    levothyroxine (SYNTHROID/LEVOTHROID) tablet 125 mcg  125 mcg Oral QAM AC Sharifa Allen MD   125 mcg at  04/28/25 0633    methocarbamol (ROBAXIN) tablet 500 mg  500 mg Oral Q6H MADELIN Shawanda Morales PA-C   500 mg at 04/28/25 0904    multivitamin, therapeutic (THERA-VIT) tablet 1 tablet  1 tablet Oral Daily Shawanda Morales PA-C   1 tablet at 04/28/25 0904    polyethylene glycol (MIRALAX) Packet 17 g  17 g Oral Daily Shawanda Morales PA-C   17 g at 04/28/25 0904    senna-docusate (SENOKOT-S/PERICOLACE) 8.6-50 MG per tablet 1 tablet  1 tablet Oral BID Shawanda Morales PA-C   1 tablet at 04/28/25 0904       Objective:  Vital signs in last 24 hours:  Temp:  [97.6  F (36.4  C)-98.4  F (36.9  C)] 98.4  F (36.9  C)  Pulse:  [71-80] 76  Resp:  [17-18] 18  BP: (113-134)/(58-70) 118/59  FiO2 (%):  [21 %] 21 %  SpO2:  [94 %-99 %] 99 %  Weight:   [unfilled]    Intake/Output last 3 shifts:  I/O last 3 completed shifts:  In: 1880 [P.O.:1880]  Out: 575 [Urine:500; Drains:75]  Intake/Output this shift:  I/O this shift:  In: 380 [P.O.:380]  Out: 550 [Urine:550]    Review of Systems:   As per subjective, all others negative.    Physical Exam:    General Appearance:  Alert, cooperative, no distress, appears stated age   Head:  Normocephalic, without obvious abnormality, atraumatic   Lungs:   Clear to auscultation bilaterally, respirations unlabored   Chest Wall:  No tenderness or deformity   Heart:  Regular rate and rhythm, S1, S2 normal,no murmur, rub or gallop   Abdomen:   Soft, non tender, non distended, bowel sounds present, no guarding or rigidity   Extremities: No edema no calf tenderness   Skin: Skin color, texture, turgor normal, no rashes or lesions   Neurologic: Alert and oriented X 3, Moves all 4 extremities       Lab Results:  Personally Reviewed.   Recent Results (from the past 24 hours)   ECG 12-LEAD WITH MUSE (E)    Collection Time: 04/28/25 10:48 AM   Result Value Ref Range    Systolic Blood Pressure  mmHg    Diastolic Blood Pressure  mmHg    Ventricular Rate 79 BPM    Atrial Rate 79 BPM    VT Interval 166 ms    QRS Duration 92  ms     ms    QTc 440 ms    P Axis 79 degrees    R AXIS 4 degrees    T Axis 38 degrees    Interpretation ECG       Sinus rhythm with Premature atrial complexes  Otherwise normal ECG  When compared with ECG of 31-May-2024 08:15,  Premature atrial complexes are now Present             Sharifa Allen M.D  [facility]  Internal Medicine

## 2025-04-28 NOTE — PLAN OF CARE
Pt is AxOx4. VSS on Bipap overnight. Reporting moderate pain in lower back and right hip, managed with ice, scheduled tylenol, scheduled robaxin, and prn Oxy. CMS is intact. EMILEE in place overnight with 15 outpt, drain removed in morning. Dressing is CDI. Pt passing gas, reports he had a BM on 4/27. SBA with walker and gait belt.  LSO brace utilized when OOB.     Problem: Adult Inpatient Plan of Care  Goal: Optimal Comfort and Wellbeing  Outcome: Progressing  Intervention: Monitor Pain and Promote Comfort  Recent Flowsheet Documentation  Taken 4/28/2025 0014 by Zuri Diaz, RN  Pain Management Interventions: medication (see MAR)     Problem: Spinal Surgery  Goal: Optimal Coping with Surgery  Outcome: Progressing

## 2025-04-28 NOTE — PLAN OF CARE
Goal Outcome Evaluation:       Patient vital signs are at baseline: Yes  Patient able to ambulate as they were prior to admission or with assist devices provided by therapies during their stay:  Yes, ambulating as SBAx.  Patient MUST void prior to discharge:  Yes  Patient able to tolerate oral intake:  Yes  Pain has adequate pain control using Oral analgesics:  Yes, PRN oxycodone effective.  Does patient have an identified :  Yes  Has goal D/C date and time been discussed with patient:  Yes, today. Home with friend to transport. AVS reviewed with patient and home medication returned.         Problem: Adult Inpatient Plan of Care  Goal: Absence of Hospital-Acquired Illness or Injury  Intervention: Identify and Manage Fall Risk  Recent Flowsheet Documentation  Taken 4/28/2025 0907 by Marium Fan RN  Safety Promotion/Fall Prevention:   safety round/check completed   room organization consistent   nonskid shoes/slippers when out of bed   lighting adjusted   increase visualization of patient   increased rounding and observation   clutter free environment maintained     Problem: Adult Inpatient Plan of Care  Goal: Optimal Comfort and Wellbeing  Intervention: Monitor Pain and Promote Comfort  Recent Flowsheet Documentation  Taken 4/28/2025 1055 by Marium Fan RN  Pain Management Interventions: medication (see MAR)  Taken 4/28/2025 0904 by Marium Fan RN  Pain Management Interventions: medication (see MAR)     Problem: Spinal Surgery  Goal: Absence of Bleeding  Intervention: Monitor and Manage Bleeding  Recent Flowsheet Documentation  Taken 4/28/2025 0907 by Marium Fan RN  Bleeding Management: dressing monitored

## 2025-04-28 NOTE — DISCHARGE SUMMARY
HOSPITAL DISCHARGE SUMMARY         Patient Name: Juan C Garcia  YOB: 1960  Age: 65 year old  Medical Record Number: [id number]  Primary Physician: Suzanne Zhang  Admission Date: 4/25/2025.  Discharge Date:4/28/2025      He will be discharged from [facility] to home    PRINCIPAL DIAGNOSIS CAUSING ADMISSION: <principal problem not specified>    Discharge Diagnoses:  Active Problems:    Lumbar spinal stenosis      HPI:66 yo male who presents with history of lumbar discectomy and lumbar fusion at lumbar 5-sacral 1 remotely as well as cervical 5-6 and 6 and cervical 6-7 ACDF in 2020 and foraminotomies on right cervical 3-4 and cervical 4-5 all in [place] low back and leg pain, right leg weakness, urinary frequency, sexual dysfunction, neck pain, numbness in his fingertips. We reviewed lumbar x-ray which shows prior hardware at lumbar 5-sacral 1 with no significant subluxations or instability. MRI of his lumbar spine was also reviewed which shows severe central and subarticular stenosis at lumbar 3-4 and lumbar 4-5 with redundancy of the cauda equina above this level. Also noted to have moderate bilateral foraminal narrowing at lumbar 4-5. symptoms appear related to his cauda equina compression. Anticipate greater than 50% facet resection to fully decompress the thecal sac and nerves therefore recommend extension of prior lumbar 5-sacral 1 fusion to lumbar 3 with lumbar 3-lumbar 5 laminectomies, medial facetectomies and foraminotomies and posterolateral arthrodesis with right sided discectomies and use of stealth. Risks and benefits were discussed in detail including but not limited to infection, hematoma, nerve damage including paralysis, post op radiculitis, durotomy, lack of a sold bone fusion, hardware malfunction, adjacent segment disease, risks associated with the use of general anesthesia.     BRIEF HOSPITAL COURSE: Juan C Garcia is a 65 year old male who was admitted on day of  surgery and underwent Procedure(s):  Bilateral Extension lumbar 5 - sacral 1 fusion to lumbar 3 with  Bilateral Lumbar 3 - lumbar 5 laminectomies, medial facetectomies and foraminotomies and posterolateral arthrodesis with right sided discectomies and use of stealth..  Surgery was without complications.  Postoperatively he reported incisional pain and slight right hip/buttocks pain but notes that previous radicular lower extremity pain had resolved.   On exam on day of discharge, his strength was 5/5 with no new focal deficits.  PT/OT consultations were obtained to assess function, assist with mobilization, and evaluate for discharge recommendations.  By POD # 0 he was tolerating a regular diet, ambulating, voiding without difficulty, and obtaining good pain control on oral medication.  His incision was clean, dry and intact.   He met all discharge criteria and was stable for discharge.      PROCEDURES PERFORMED DURING HOSPITALIZATION: Procedure(s):  Bilateral Extension lumbar 5 - sacral 1 fusion to lumbar 3 with  Bilateral Lumbar 3 - lumbar 5 laminectomies, medial facetectomies and foraminotomies and posterolateral arthrodesis with right sided discectomies and use of stealth.     COMPLICATIONS IN HOSPITAL:  None.    IMPORTANT PENDING TEST RESULTS: None.    PERTINENT FINDINGS/RESULTS AT DISCHARGE:  None.    CONDITION AT DISCHARGE:  improving    DISCHARGE MEDICATIONS  Robaxin   oxycodone      AFTER DISCHARGE ORDERS AND INSTRUCTIONS:    Follow up with Neurosurgery: in 2  weeks  Follow up appointment with Primary Care Physician: Suzanne Zhang as needed  Diet: regular diet  Activity: *No lifting, pushing or pulling greater than 5-10 pounds (this is about a gallon of milk).  *No repetitive bending, twisting, or jarring activities  *No overhead work  *No aerobic or strenuous activity  *No activities with increased risk of falls  *You may move about your home as tolerated  *You may walk up and down stairs as  tolerated  *You may increase your activity slowly over the next 4-6 weeks    WALKING PROGRAM: As you can tolerate, walk daily-start with 5-10 minutes of continuous walking. This is in addition to the walking that you do as part of your daily activities. Increase the time that you walk by 5 minutes every couple of days. Do not exceed 30-45 minutes of continuous walking until seen in follow-up. Walking is the best exercise after surgery.  **Listen to your body, if you find that you are more painful or fatigued, you may need to proceed more slowly.    **Do not smoke or expose yourself to second hand smoke. Cigarette smoke can delay healing and cause complications.     DRIVING:  We recommend that you do not drive while taking medications for pain or muscle spasms. Always read and follow the advice on your prescription bottle. If you have questions, speak with your pharmacist.  We recommend that you do not drive while wearing a brace, as it could limit your range of motion.    WORK: If you plan to return to work before your 4-6 week appointment, call and discuss with one of the nurses in the neurosurgery office.     USE OF ICE OR  HEAT:  *Icing can decrease post op swelling, thereby helping with post op pain control. Always protect your skin to prevent frostbite or burn.    *For the first 48 hours we recommend ice to the surgical area for 15-20 minutes at a time several times a day.      *Any longer than 15-20 minutes can cause damage to the tissues, including frostbite.     *Allow area to warm for at least 45 minutes or an hour between treatments.    *Ice as frequently as you wish, so long as the area is warm to touch and has normal sensation before repeating.    *After 48 hours, you may use heat or ice, which ever feels best to you.  Always remember to protect your skin, and to use no greater than 15-20 minutes at a time.     *You can make your own ice bags at home by mixing 3 parts water with 1 part rubbing alcohol in  a Ziplock bag and placing in the freezer.  It will not freeze solid.      BOWEL MOVEMENT:  If you did not have a bowel movement (pooped) before you were discharged from the hospital, and do not have a bowel movement within 2 days of discharge. Please call our office and request to speak to a nurse.    Your insurance may not cover medications to treat or prevent  constipation.      There are many  over the counter medications for constipation including: Colace, Senakot, Dulcolax, and Miralax. In addition to medications eat a high fiber diet and drink plenty of water.    If you are taking narcotics, you should being taking medication daily for constipation.      If you develop loose or runny stools, stop taking the medications for constipation.   Warnings: Call [phone] with the following symptoms:    *Temperature 101(fever) or greater or chills  *Redness, swelling or increased drainage from the wound  *Worsening pain not relieved by the pain prescription given  *Worsening or new onset of weakness, or numbness and tingling  *Loss or change in your ability to control bowel or bladder function  *Change in your ability to walk, talk, see or think  *If you did not have a bowel movement before leaving the hospital and your bowels have not moved within 48 hours of your discharge    !!!! IF YOU HAVE A SERIOUS OR THREATENING EMERGENCY, CALL 911 OR COME TO THE EMERGENCY ROOM.  IF YOUR CONDITIONS ALLOWS COME TO THE HOSPITAL WHERE YOUR SURGERY WAS PERFORMED.    QUESTIONS OR CONCERNS:   OUR OFFICE HOURS ARE FROM 9:00 A.M -4:30 P.M. MONDAY-FRIDAY.  AFTER OFFICE HOURS, CALLS ARE ANSWERED BY THE ON-CALL PROVIDER. PLEASE CALL WITH ROUTINE QUESTIONS DURING OFFICE HOURS. THE ON-CALL PROVIDER WILL NOT REFILL PRESCRIPTIONS.   Wound care:  Incision open to air No lotions, soaps, powders, ointments, creams, or patches on incision until the wound is well healed.    Change the dressing daily, more frequently if necessary to keep the  wound dry.  If you notice increased or persistent drainage from your wound, contact our office.  You may use an extra large band-aid or 4x4 and tape.  Both can be purchased at your pharmacy.    Sutures are usually removed in 1-2 weeks.  They are sometimes left longer.    Wash your hands before changing the dressing or touching the wound. If someone is helping you change the dressing, ensure that the person washes his/her hands.  Good handwashing can decrease the risk of infection.  If you are unable to see the wound, have someone check the wound daily for redness, swelling or drainage.  A small amount of drainage is normal.       Two days after surgery begin showering. Wash your wound daily. Remove all dressings prior to your shower.  Apply mild soap directly to the wound, form a light lather and rinse with running water.     Do not submerge the wound under water. No baths or swimming for 6 weeks.     If you develop redness, swelling, drainage, or temp 101 or greater, please call our office at [phone].     Shawanda Morales PA-C  [facility]  Tel [phone]

## 2025-04-28 NOTE — PROGRESS NOTES
Physical Therapy Discharge Summary    Reason for therapy discharge:    All goals and outcomes met, no further needs identified.  No further expectations of functional progress.    Progress towards therapy goal(s). See goals on Care Plan in [facility] electronic health record for goal details.  Goals met    Therapy recommendation(s):    Continue home exercise program.

## 2025-04-28 NOTE — PROGRESS NOTES
[facility]    Neurosurgery Progress Note    Date of Service (when I saw the patient): 04/28/2025     Assessment & Plan     Procedure(s):  Extension lumbar 5-sacral 1 fusion to lumbar 3 with lumbar 3-lumbar 5 laminectomies, medial facetectomies and foraminotomies and posterolateral arthrodesis with right sided discectomies and use of stealth.   -3 Days Post-Op    Patient ambulating in [place] with PT states that he is doing well that his previous severe radicular lower extremity pain has resolved notes overnight slight incisional pain that will extend to right buttocks/ hip, denies further radicular lower extremity pain numbness tingling or weakness. Voiding without difficulty, had BM over weekend     Plan:  Drain removed   Okay to discharge home today   Follow up as scheduled      I have discussed the following assessment and plan Dr. Lopez who is in agreement with initial plan and will follow up with further consultation recommendations.    Shawanda Morales PA-C  [facility] Neurosurgery  Tel [phone]  Text page via [employer] Paging/Directory     Interval History   stable    Physical Exam   Temp: 98.4  F (36.9  C) Temp src: Oral BP: 118/59 Pulse: 76   Resp: 18 SpO2: 99 % O2 Device: None (Room air)    There were no vitals filed for this visit.  Vital Signs with Ranges  Temp:  [97.6  F (36.4  C)-98.4  F (36.9  C)] 98.4  F (36.9  C)  Pulse:  [71-80] 76  Resp:  [17-18] 18  BP: (113-134)/(58-70) 118/59  FiO2 (%):  [21 %] 21 %  SpO2:  [94 %-99 %] 99 %  I/O last 3 completed shifts:  In: 1880 [P.O.:1880]  Out: 575 [Urine:500; Drains:75]     , Blood pressure 118/59, pulse 76, temperature 98.4  F (36.9  C), temperature source Oral, resp. rate 18, SpO2 99%.  0 lbs 0 oz  HEENT:  Normocephalic.  PERRLA.  EOM s intact.    Neck:  Supple, non-tender, without lymphadenopathy.  Heart:  No peripheral edema  Lungs:  No SOB  Abdomen:  Soft, non-tender, non-distended.   Skin:  Warm and dry, good capillary  refill.  Extremities:  Good radial and dorsalis pedis pulses bilaterally, no edema, cyanosis or clubbing.    NEUROLOGICAL EXAMINATION:   Mental status: alert and oriented x3 speech clear and appropriate   Cranial nerves: II-XII intact  Motor strength:   Hip flexors: 5/5 right, 5/5 left   Quadriceps: 5/5 right, 5/5 left  Ankle dorsiflexion: 5/5 right, 5/5 left    Ankle plantar flexion:5/5 right, 5/5 left   Extensor hallicus longus: 5/5 right, 5/5 left   Sensation:  intact  Reflexes:  Negative Babinski.  Negative Clonus.    Coordination:  Smooth finger to nose testing.   Negative pronator drift.    Gait:  not tested    EMILEE drain 15mL/8hrs overnight     Incision  well approximated with sutures no surrounding erythema, induration, or drainage       Images reviewed with good hardware placement and stable lumbar alignment   IMPRESSION:  Nomenclature is based on five lumbar-type vertebral bodies.     Vertebral body heights are unremarkable.     Posterior fusion hardware spans L3-S1. There is interbody fusion hardware at L5-S1. No evidence for hardware-related complication. Surgical drain remains in place. L3-L5 laminectomies.     Mild to moderate L5 interbody degenerative change, with intervertebral disc height loss and endplate spurring.     The imaged portion of the sacrum appears grossly intact. However, it is partially obscured by stool and bowel gas.       Medications   Current Facility-Administered Medications   Medication Dose Route Frequency Provider Last Rate Last Admin    sodium chloride 0.9 % infusion   Intravenous Continuous Shawanda Morales PA-C 75 mL/hr at 04/25/25 2250 Rate Verify at 04/25/25 2250     Current Facility-Administered Medications   Medication Dose Route Frequency Provider Last Rate Last Admin    acetaminophen (TYLENOL) tablet 975 mg  975 mg Oral Q8H Shawanda Morales PA-C   975 mg at 04/28/25 0014    atorvastatin (LIPITOR) tablet 80 mg  80 mg Oral Daily Sharifa Allen MD   80 mg at 04/27/25 0820     enoxaparin ANTICOAGULANT (LOVENOX) injection 40 mg  40 mg Subcutaneous Q24H Shawanda Morales PA-C   40 mg at 04/27/25 1324    fluticasone-salmeterol (ADVAIR) 250-50 MCG/ACT diskus inhaler 1 puff  1 puff Inhalation BID Sharifa Allen MD   1 puff at 04/27/25 1949    levothyroxine (SYNTHROID/LEVOTHROID) tablet 125 mcg  125 mcg Oral QAM AC Sharifa Allen MD   125 mcg at 04/28/25 0633    methocarbamol (ROBAXIN) tablet 500 mg  500 mg Oral Q6H MADELIN Shawanda Morales PA-C   500 mg at 04/28/25 0151    multivitamin, therapeutic (THERA-VIT) tablet 1 tablet  1 tablet Oral Daily Shawanda Morales PA-C   1 tablet at 04/27/25 0821    polyethylene glycol (MIRALAX) Packet 17 g  17 g Oral Daily Shawanda Morales PA-C   17 g at 04/27/25 0634    senna-docusate (SENOKOT-S/PERICOLACE) 8.6-50 MG per tablet 1 tablet  1 tablet Oral BID Shawanda Morales PA-C   1 tablet at 04/27/25 1949       Data     CBC RESULTS:   Recent Labs   Lab Test 04/26/25  0638 04/14/25  1007 08/26/24  0938   WBC  --   --  8.3   RBC  --   --  4.97   HGB  --  14.2 14.6   HCT  --   --  42.4   MCV  --   --  85   MCH  --   --  29.4   MCHC  --   --  34.4   RDW  --   --  12.9     --  282     Basic Metabolic Panel:  Lab Results   Component Value Date     01/02/2025     03/08/2021      Lab Results   Component Value Date    POTASSIUM 4.1 01/02/2025    POTASSIUM 3.8 05/13/2022    POTASSIUM 4.5 03/08/2021     Lab Results   Component Value Date    CHLORIDE 102 01/02/2025    CHLORIDE 107 05/13/2022    CHLORIDE 105 03/08/2021     Lab Results   Component Value Date    JOSÉ MIGUEL 9.6 01/02/2025    JOSÉ MIGUEL 10.0 03/08/2021     Lab Results   Component Value Date    CO2 25 01/02/2025    CO2 23 05/13/2022    CO2 29 03/08/2021     Lab Results   Component Value Date    BUN 15.4 01/02/2025    BUN 10 05/13/2022    BUN 12 03/08/2021     Lab Results   Component Value Date    CR 0.95 04/26/2025    CR 0.80 03/08/2021     Lab Results   Component Value Date     04/25/2025    GLC 97 05/13/2022      03/08/2021     INR:  Lab Results   Component Value Date    INR 0.97 06/25/2016    INR 0.93 03/17/2016

## 2025-04-29 ENCOUNTER — PATIENT OUTREACH (OUTPATIENT)
Dept: FAMILY MEDICINE | Facility: CLINIC | Age: 65
End: 2025-04-29
Payer: MEDICARE

## 2025-04-30 ENCOUNTER — HOSPITAL ENCOUNTER (EMERGENCY)
Facility: CLINIC | Age: 65
Discharge: HOME | End: 2025-04-30
Attending: EMERGENCY MEDICINE | Admitting: EMERGENCY MEDICINE
Payer: MEDICARE

## 2025-04-30 ENCOUNTER — TELEPHONE (OUTPATIENT)
Dept: NEUROSURGERY | Facility: CLINIC | Age: 65
End: 2025-04-30

## 2025-04-30 VITALS
TEMPERATURE: 97.9 F | HEIGHT: 74 IN | DIASTOLIC BLOOD PRESSURE: 74 MMHG | BODY MASS INDEX: 28.88 KG/M2 | OXYGEN SATURATION: 98 % | HEART RATE: 83 BPM | SYSTOLIC BLOOD PRESSURE: 132 MMHG | WEIGHT: 225 LBS | RESPIRATION RATE: 14 BRPM

## 2025-04-30 DIAGNOSIS — R00.0 SINUS TACHYCARDIA: ICD-10-CM

## 2025-04-30 DIAGNOSIS — R60.0 BILATERAL LOWER EXTREMITY EDEMA: ICD-10-CM

## 2025-04-30 LAB
ANION GAP SERPL CALCULATED.3IONS-SCNC: 11 MMOL/L (ref 7–15)
BASOPHILS # BLD AUTO: 0 10E3/UL (ref 0–0.2)
BASOPHILS NFR BLD AUTO: 0 %
BUN SERPL-MCNC: 12.2 MG/DL (ref 8–23)
CALCIUM SERPL-MCNC: 9.9 MG/DL (ref 8.8–10.4)
CHLORIDE SERPL-SCNC: 102 MMOL/L (ref 98–107)
CREAT SERPL-MCNC: 0.9 MG/DL (ref 0.67–1.17)
D DIMER PPP FEU-MCNC: 1.67 UG/ML FEU (ref 0–0.5)
EGFRCR SERPLBLD CKD-EPI 2021: >90 ML/MIN/1.73M2
EOSINOPHIL # BLD AUTO: 0.4 10E3/UL (ref 0–0.7)
EOSINOPHIL NFR BLD AUTO: 4 %
ERYTHROCYTE [DISTWIDTH] IN BLOOD BY AUTOMATED COUNT: 13.1 % (ref 10–15)
GLUCOSE SERPL-MCNC: 99 MG/DL (ref 70–99)
HCO3 SERPL-SCNC: 25 MMOL/L (ref 22–29)
HCT VFR BLD AUTO: 32.7 % (ref 40–53)
HGB BLD-MCNC: 11.2 G/DL (ref 13.3–17.7)
IMM GRANULOCYTES # BLD: 0.1 10E3/UL
IMM GRANULOCYTES NFR BLD: 1 %
LYMPHOCYTES # BLD AUTO: 2.2 10E3/UL (ref 0.8–5.3)
LYMPHOCYTES NFR BLD AUTO: 23 %
MCH RBC QN AUTO: 28.9 PG (ref 26.5–33)
MCHC RBC AUTO-ENTMCNC: 34.3 G/DL (ref 31.5–36.5)
MCV RBC AUTO: 85 FL (ref 78–100)
MONOCYTES # BLD AUTO: 1.1 10E3/UL (ref 0–1.3)
MONOCYTES NFR BLD AUTO: 12 %
NEUTROPHILS # BLD AUTO: 5.9 10E3/UL (ref 1.6–8.3)
NEUTROPHILS NFR BLD AUTO: 61 %
NRBC # BLD AUTO: 0 10E3/UL
NRBC BLD AUTO-RTO: 0 /100
NT-PROBNP SERPL-MCNC: 188 PG/ML (ref 0–900)
PLATELET # BLD AUTO: 334 10E3/UL (ref 150–450)
POTASSIUM SERPL-SCNC: 4.3 MMOL/L (ref 3.4–5.3)
RBC # BLD AUTO: 3.87 10E6/UL (ref 4.4–5.9)
SODIUM SERPL-SCNC: 138 MMOL/L (ref 135–145)
TROPONIN T SERPL HS-MCNC: 11 NG/L
TROPONIN T SERPL HS-MCNC: 15 NG/L
WBC # BLD AUTO: 9.8 10E3/UL (ref 4–11)

## 2025-04-30 PROCEDURE — 99285 EMERGENCY DEPT VISIT HI MDM: CPT | Mod: 25

## 2025-04-30 PROCEDURE — 83880 ASSAY OF NATRIURETIC PEPTIDE: CPT | Performed by: EMERGENCY MEDICINE

## 2025-04-30 PROCEDURE — 36415 COLL VENOUS BLD VENIPUNCTURE: CPT | Performed by: EMERGENCY MEDICINE

## 2025-04-30 PROCEDURE — 80048 BASIC METABOLIC PNL TOTAL CA: CPT | Performed by: EMERGENCY MEDICINE

## 2025-04-30 PROCEDURE — 250N000011 HC RX IP 250 OP 636: Performed by: EMERGENCY MEDICINE

## 2025-04-30 PROCEDURE — 250N000013 HC RX MED GY IP 250 OP 250 PS 637: Performed by: EMERGENCY MEDICINE

## 2025-04-30 PROCEDURE — 85025 COMPLETE CBC W/AUTO DIFF WBC: CPT | Performed by: EMERGENCY MEDICINE

## 2025-04-30 PROCEDURE — 93005 ELECTROCARDIOGRAM TRACING: CPT | Performed by: EMERGENCY MEDICINE

## 2025-04-30 PROCEDURE — 85379 FIBRIN DEGRADATION QUANT: CPT | Performed by: EMERGENCY MEDICINE

## 2025-04-30 PROCEDURE — 85004 AUTOMATED DIFF WBC COUNT: CPT | Performed by: EMERGENCY MEDICINE

## 2025-04-30 PROCEDURE — 84484 ASSAY OF TROPONIN QUANT: CPT | Performed by: EMERGENCY MEDICINE

## 2025-04-30 RX ORDER — IOPAMIDOL 755 MG/ML
75 INJECTION, SOLUTION INTRAVASCULAR ONCE
Status: COMPLETED | OUTPATIENT
Start: 2025-04-30 | End: 2025-04-30

## 2025-04-30 RX ORDER — OXYCODONE HYDROCHLORIDE 5 MG/1
10 TABLET ORAL ONCE
Status: COMPLETED | OUTPATIENT
Start: 2025-04-30 | End: 2025-04-30

## 2025-04-30 RX ADMIN — OXYCODONE 10 MG: 5 TABLET ORAL at 20:34

## 2025-04-30 RX ADMIN — IOPAMIDOL 75 ML: 755 INJECTION, SOLUTION INTRAVENOUS at 18:57

## 2025-04-30 ASSESSMENT — COLUMBIA-SUICIDE SEVERITY RATING SCALE - C-SSRS
1. IN THE PAST MONTH, HAVE YOU WISHED YOU WERE DEAD OR WISHED YOU COULD GO TO SLEEP AND NOT WAKE UP?: NO
6. HAVE YOU EVER DONE ANYTHING, STARTED TO DO ANYTHING, OR PREPARED TO DO ANYTHING TO END YOUR LIFE?: NO
2. HAVE YOU ACTUALLY HAD ANY THOUGHTS OF KILLING YOURSELF IN THE PAST MONTH?: NO

## 2025-04-30 ASSESSMENT — ACTIVITIES OF DAILY LIVING (ADL)
ADLS_ACUITY_SCORE: 59

## 2025-04-30 NOTE — TELEPHONE ENCOUNTER
Returned call to discuss patient's concerns. Patient reports swelling in both feet and ankles, as well as cramping pain in his upper calves. Per the patient he does have a history of PE in 2003. Discussed with Shawanda Morales PA-C and recommended present to ED for evaluation and possible US. Patient is agreeable to this course of action and stated intent to present to ED.

## 2025-04-30 NOTE — ED PROVIDER NOTES
EMERGENCY DEPARTMENT ENCOUNTER      NAME: Juan C Garcia  AGE: 65 year old male  YOB: 1960  MRN: [id number]  EVALUATION DATE & TIME: 2025  5:53 PM    PCP: Suzanne Zhang    ED PROVIDER: Marium Nicholas M.D.      Chief Complaint   Patient presents with    Leg Swelling    Leg Pain       FINAL IMPRESSION:  1. Bilateral lower extremity edema    2. Sinus tachycardia        ED COURSE & MEDICAL DECISION MAKIN. Bilateral LE leg swelling.  Tachycardic in triage.  EKG shows sinus rhythm.  Dimer, BMP, CBC ordered from triage, I reviewed results which were significant for elevated dimer of 1.67.  Troponin neg at 15.  I added on BNP which was normal at 188.  I ordered US LE duplex bilaterally and CT PE due to recent surgery and tachycardia.      6:03 PM I met with the patient to gather history and to perform my initial exam. I discussed the plan for care while in the Emergency Department.  9:11 PM I rechecked on the patient. CT PE and ultrasound neg for PE or DVT. Trop x 2 neg. EKG sinus rhythm. Rate improved in ED to 80-90s. Discussed findings with patient. DC home.  9:32 PM We discussed the plan for discharge and the patient is agreeable. Reviewed supportive cares, symptomatic treatment, outpatient follow up, and reasons to return to the Emergency Department. Patient to be discharged by ED RN.     ED Course as of 25 0034    Discharge summary 2025, op note from 2025, Extension lumbar 5-sacral 1 fusion to lumbar 3 with lumbar 3-lumbar 5 laminectomies, medial facetectomies and foraminotomies and posterolateral arthrodesis with right sided discectomies for lumbar stenosis with claudication.     1940 EKG reviewed by myself at 1929 and shows sinus rhythm rate 90, Qtc 457, compared to previous EKG 25, PAC present on previous EKG, otherwise similar.  I have independently reviewed and interpreted today's EKG, pending Cardiologist read.       Pertinent Labs &  Imaging studies reviewed. (See chart for details).      Medical Decision Making  I obtained history from patient, I reviewed the EMR as documented in HPI, ED course, and chart review section above and below, Care impacted by chronic conditions/past medical history as documented in HPI, ED course, and chart review section above below, I independently interpreted Radiological studies as documented in Radiology section below. See radiology report for final interpretation., and I discussed the care with another health care provider: NA    Discharge. No recommendations on prescription strength medication(s). See documentation for any additional details.    MIPS (CTPE, Dental pain, Cooper, Sinusitis, Asthma/COPD, Head Trauma): CT Pulmonary Angiogram:The patient had an abnormal d-dimer.    SEPSIS: None    At the conclusion of the encounter I discussed the results of all of the tests and the disposition. The questions were answered. The patient or family acknowledged understanding and was agreeable with the care plan.      CRITICAL CARE:  N/A    HPI    Patient information was obtained from: patient.    Use of : N/A.       Juan C Garcia is a 65 year old male with a pertinent history of hyperlipidemia, hypertension, CAD, and asthma who presents to the ED by private car for evaluation of bilateral leg swelling and leg pain. He had surgery (see ED course above for chart review) on 4/25/25 and felt improving symptoms since surgery until today when his legs were more notably swollen. He has been taking a baby asa and oxycodone prn for pain. He has hx of PE 20 years ago, was on 6 mo anticoagulation but no cause found for PE. He denies chest pain or shortness of breath currently.         Per Chart Review: Admission from 4/25-2/28/2025 at [facility] for lumbar spinal stenosis.       REVIEW OF SYSTEMS  All other systems negative unless noted in HPI.    PAST MEDICAL HISTORY:  Past Medical  History:   Diagnosis Date    Alcohol use     Coronary artery disease     Dermatitis herpetiformis 2000    Dx by biopsy of rash on elbow per derm    Diastolic dysfunction with chronic heart failure (H)     HTN, goal below 140/90     Hyperlipidemia     Lung nodule 02/2003    Benign; Left upper lobe    Multiple allergies     allergist with immunotherapy; Allergist is Dr. Santos in [place]    Primary hypogonadism in male     Low FSH, single left testicle - followed by Dr. Flores of endo    Pulmonary embolus (H) 02/2003    Evaluated by ?[facility] - unknown cause    Sleep apnea     Thrombosis     Thyroid nodule 02/2005    Benign per [facility] --> taking Levothyroxine ever since    Uncomplicated asthma        PAST SURGICAL HISTORY:  Past Surgical History:   Procedure Laterality Date    BACK SURGERY      COLONOSCOPY  April 2012    COLONOSCOPY N/A 6/20/2022    Procedure: COLONOSCOPY;  Surgeon: Diya Lo MD;  Location:  GI    HEAD & NECK SURGERY      HERNIA REPAIR      LAMINECTOMY, SPINE, LUMBAR, 2 LEVELS, USING OPTICAL TRACKING SYSTEM Bilateral 4/25/2025    Procedure: Bilateral Lumbar 3 - lumbar 5 laminectomies, medial facetectomies and foraminotomies and posterolateral arthrodesis with right sided discectomies and use of stealth.;  Surgeon: Kavya Lopez MD;  Location: [facility] OR    OPTICAL TRACKING SYSTEM FUSION SPINE POSTERIOR LUMBAR TWO LEVELS Bilateral 4/25/2025    Procedure: Bilateral Extension lumbar 5 - sacral 1 fusion to lumbar 3 with;  Surgeon: Kavya Lopez MD;  Location: [facility] OR    ORCHIOPEXY CHILD Right     Age 10 due to undescended testicl    ORTHOPEDIC SURGERY      OTHER SURGICAL HISTORY  July 1997    Cervical laminectomy C4-5-6    OTHER SURGICAL HISTORY  Jan 2000    Right index tendon repair    OTHER SURGICAL HISTORY  August 2004    Ankle/wrist repair    OTHER SURGICAL HISTORY  April 2007    Lumbar microdiscectomy L5-S1    OTHER SURGICAL HISTORY  March 2010    Cervical  fusion C5-6-7    OTHER SURGICAL HISTORY  March 2011    Cervical fusion C3-4-5    OTHER SURGICAL HISTORY  July 2011    Lumbar fusion L5-S1         CURRENT MEDICATIONS:    No current facility-administered medications for this encounter.     Current Outpatient Medications   Medication Sig Dispense Refill    acetaminophen (TYLENOL) 325 MG tablet Take 3 tablets (975 mg) by mouth every 8 hours for 15 days. 135 tablet 0    albuterol (PROAIR HFA/PROVENTIL HFA/VENTOLIN HFA) 108 (90 Base) MCG/ACT inhaler Inhale 2 puffs into the lungs every 4 hours as needed for shortness of breath or wheezing.      ALLERGY INJECTIONS       aspirin 81 MG EC tablet Take 1 tablet (81 mg) by mouth daily.      atorvastatin (LIPITOR) 80 MG tablet Take 1 tablet (80 mg) by mouth daily. Appointment needed for additional refills. Please call [phone] to schedule. 90 tablet 4    fluticasone-salmeterol (ADVAIR) 250-50 MCG/ACT inhaler Inhale 1 puff into the lungs 2 times daily.      hydroCHLOROthiazide 12.5 MG tablet Take 1 tablet (12.5 mg) by mouth daily.      levothyroxine (SYNTHROID/LEVOTHROID) 125 MCG tablet Take 1 tablet (125 mcg) by mouth daily. 90 tablet 0    losartan (COZAAR) 50 MG tablet Take 1 tablet (50 mg) by mouth daily.      methocarbamol (ROBAXIN) 500 MG tablet Take 1 tablet (500 mg) by mouth every 6 hours as needed for muscle spasms. 42 tablet 0    oxyCODONE (ROXICODONE) 5 MG tablet Take 1 tablet (5 mg) by mouth every 4 hours as needed for severe pain. 42 tablet 0    polyethylene glycol (MIRALAX) 17 GM/Dose powder Take 17 g by mouth daily. 510 g 0         ALLERGIES:  Allergies   Allergen Reactions    No Known Allergies        FAMILY HISTORY:  Family History   Problem Relation Age of Onset    Hypertension Mother     Dementia Mother     Alcoholism Mother     Hypertension Father     Cerebrovascular Disease Father     Cancer Father         throat - was a smoker    Heart Disease Father         CABG    Diabetes Maternal Grandfather      Family History Negative Brother     Family History Negative Sister     Cerebrovascular Disease Paternal Grandmother     Cancer Son         Testicular cancer    Family History Negative Son        SOCIAL HISTORY:  Social History     Socioeconomic History    Marital status:    Tobacco Use    Smoking status: Never    Smokeless tobacco: Never   Vaping Use    Vaping status: Never Used   Substance and Sexual Activity    Alcohol use: Not Currently    Drug use: No    Sexual activity: Yes     Partners: Female     Birth control/protection: Pull-out method   Other Topics Concern    Parent/sibling w/ CABG, MI or angioplasty before 65F 55M? Yes     Comment: father bypass 60ish     Social Drivers of Health     Financial Resource Strain: Low Risk  (4/25/2025)    Financial Resource Strain     Within the past 12 months, have you or your family members you live with been unable to get utilities (heat, electricity) when it was really needed?: No   Food Insecurity: Low Risk  (4/25/2025)    Food Insecurity     Within the past 12 months, did you worry that your food would run out before you got money to buy more?: No     Within the past 12 months, did the food you bought just not last and you didn t have money to get more?: No   Transportation Needs: Low Risk  (4/25/2025)    Transportation Needs     Within the past 12 months, has lack of transportation kept you from medical appointments, getting your medicines, non-medical meetings or appointments, work, or from getting things that you need?: No   Physical Activity: Unknown (6/21/2024)    Exercise Vital Sign     Days of Exercise per Week: 3 days   Stress: Stress Concern Present (6/21/2024)    [employer] [place] [employer] - Occupational Stress Questionnaire     Feeling of Stress : To some extent   Social Connections: Unknown (6/21/2024)    Social Connection and Isolation Panel [NHANES]     Frequency of Social Gatherings with Friends and Family: Three times a week  "  Interpersonal Safety: Low Risk  (4/25/2025)    Interpersonal Safety     Do you feel physically and emotionally safe where you currently live?: Yes     Within the past 12 months, have you been hit, slapped, kicked or otherwise physically hurt by someone?: No     Within the past 12 months, have you been humiliated or emotionally abused in other ways by your partner or ex-partner?: No   Housing Stability: Low Risk  (4/25/2025)    Housing Stability     Do you have housing? : Yes     Are you worried about losing your housing?: No       VITALS:  Patient Vitals for the past 24 hrs:   BP Temp Pulse Resp SpO2 Height Weight   04/30/25 2100 -- -- 83 14 98 % -- --   04/30/25 2011 -- -- -- 20 -- -- --   04/30/25 2010 132/74 -- 88 -- 97 % -- --   04/30/25 2000 -- 97.9  F (36.6  C) -- -- -- -- --   04/30/25 1808 -- -- 118 -- 96 % -- --   04/30/25 1753 133/75 -- -- -- -- -- --   04/30/25 1726 -- 98.7  F (37.1  C) -- -- -- -- --   04/30/25 1724 (!) 148/86 -- (!) 124 19 97 % 1.88 m (6' 2\") 102.1 kg (225 lb)       PHYSICAL EXAM    VITAL SIGNS: /74   Pulse 83   Temp 97.9  F (36.6  C)   Resp 14   Ht 1.88 m (6' 2\")   Wt 102.1 kg (225 lb)   SpO2 98%   BMI 28.89 kg/m    Physical Exam  Vitals and nursing note reviewed.   Constitutional:       General: He is not in acute distress.     Appearance: He is not toxic-appearing.   Eyes:      General: No scleral icterus.        Right eye: No discharge.         Left eye: No discharge.   Cardiovascular:      Rate and Rhythm: Regular rhythm. Tachycardia present.   Pulmonary:      Effort: Pulmonary effort is normal. No respiratory distress.      Breath sounds: Normal breath sounds. No wheezing, rhonchi or rales.   Musculoskeletal:         General: No swelling or deformity.      Cervical back: Neck supple. No rigidity.      Right lower leg: Edema (1+) present.      Left lower leg: Edema (1+) present.      Comments: Well healing midline vertical surgical site, lumbar spine. Sutures in " place, no dehiscence or surrounding redness.   Skin:     General: Skin is warm and dry.      Capillary Refill: Capillary refill takes less than 2 seconds.      Findings: No bruising or erythema.   Neurological:      General: No focal deficit present.      Mental Status: He is alert and oriented to person, place, and time. Mental status is at baseline.      Comments: No slurred speech, following commands spontaneously. No facial droop.   Psychiatric:         Mood and Affect: Mood normal.         Behavior: Behavior normal.         LABS  Labs Ordered and Resulted from Time of ED Arrival to Time of ED Departure   D DIMER QUANTITATIVE - Abnormal       Result Value    D-Dimer Quantitative 1.67 (*)    CBC WITH PLATELETS AND DIFFERENTIAL - Abnormal    WBC Count 9.8      RBC Count 3.87 (*)     Hemoglobin 11.2 (*)     Hematocrit 32.7 (*)     MCV 85      MCH 28.9      MCHC 34.3      RDW 13.1      Platelet Count 334      % Neutrophils 61      % Lymphocytes 23      % Monocytes 12      % Eosinophils 4      % Basophils 0      % Immature Granulocytes 1      NRBCs per 100 WBC 0      Absolute Neutrophils 5.9      Absolute Lymphocytes 2.2      Absolute Monocytes 1.1      Absolute Eosinophils 0.4      Absolute Basophils 0.0      Absolute Immature Granulocytes 0.1      Absolute NRBCs 0.0     BASIC METABOLIC PANEL - Normal    Sodium 138      Potassium 4.3      Chloride 102      Carbon Dioxide (CO2) 25      Anion Gap 11      Urea Nitrogen 12.2      Creatinine 0.90      GFR Estimate >90      Calcium 9.9      Glucose 99     NT PROBNP INPATIENT - Normal    N terminal Pro BNP Inpatient 188     TROPONIN T, HIGH SENSITIVITY - Normal    Troponin T, High Sensitivity 15     TROPONIN T, HIGH SENSITIVITY - Normal    Troponin T, High Sensitivity 11           RADIOLOGY  US Lower Extremity Venous Duplex Bilateral   Final Result   IMPRESSION:   1.  No deep venous thrombosis in the bilateral lower extremities.      CT Chest Pulmonary Embolism w Contrast    Final Result   IMPRESSION:   1.  No evidence of pulmonary embolus.         I have independently reviewed the above image. See radiology report for detail.      EKG:    See ED course notes above.       PROCEDURES:  N/A      MEDICATIONS GIVEN IN THE EMERGENCY:  Medications   iopamidol (ISOVUE-370) solution 75 mL (75 mLs Intravenous $Given 4/30/25 1857)   oxyCODONE (ROXICODONE) tablet 10 mg (10 mg Oral $Given 4/30/25 2034)       NEW PRESCRIPTIONS STARTED AT TODAY'S ER VISIT  Discharge Medication List as of 4/30/2025  9:33 PM           I, Celeste Rosas, am serving as a scribe to document services personally performed by Marium Nicholas MD, based on my observations and the provider's statements to me.  I, Marium Nicholas MD, attest that Celeste Rosas is acting in a scribe capacity, has observed my performance of the services and has documented them in accordance with my direction.     Marium Nicholas MD  Emergency Medicine  [facility] EMERGENCY ROOM  [address] [id number] [phone]  Dept: [phone]             Marium Nicholas MD  05/01/25 0038

## 2025-04-30 NOTE — ED TRIAGE NOTES
Pt arrives with c/o of leg swelling and tightness. Had lumbar fusion last week, discharged on Monday. Started feeling the pressure today. Tachycardic in triage. Hx of PE      Triage Assessment (Adult)       Row Name 04/30/25 172          Triage Assessment    Airway WDL WDL        Respiratory WDL    Respiratory WDL WDL        Skin Circulation/Temperature WDL    Skin Circulation/Temperature WDL WDL        Cardiac WDL    Cardiac WDL rhythm     Pulse Rate & Regularity tachycardic        Peripheral/Neurovascular WDL    Peripheral Neurovascular WDL WDL        Cognitive/Neuro/Behavioral WDL    Cognitive/Neuro/Behavioral WDL WDL

## 2025-05-01 ENCOUNTER — PATIENT OUTREACH (OUTPATIENT)
Dept: FAMILY MEDICINE | Facility: CLINIC | Age: 65
End: 2025-05-01
Payer: MEDICARE

## 2025-05-01 LAB
ATRIAL RATE - MUSE: 90 BPM
DIASTOLIC BLOOD PRESSURE - MUSE: 75 MMHG
INTERPRETATION ECG - MUSE: NORMAL
P AXIS - MUSE: 64 DEGREES
PR INTERVAL - MUSE: 156 MS
QRS DURATION - MUSE: 88 MS
QT - MUSE: 374 MS
QTC - MUSE: 457 MS
R AXIS - MUSE: -2 DEGREES
SYSTOLIC BLOOD PRESSURE - MUSE: 135 MMHG
T AXIS - MUSE: 39 DEGREES
VENTRICULAR RATE- MUSE: 90 BPM

## 2025-05-01 NOTE — DISCHARGE INSTRUCTIONS
Your CT PE study is negative (no blood clot) and your lower extremity ultrasound is negative for blood clot. Elevate your legs above your heart level to help decrease swelling.  Call your neurosurgeon office and ask if you should be on blood thinners.

## 2025-05-01 NOTE — TELEPHONE ENCOUNTER
Called patient to check in after recommending ED visit yesterday. Per notes patient discharged from ED after negative workup. Currently Mikhail reports that the swelling in his ankles and feet has been improving, and he has been consistently elevating them. Patient has restarted his 81 mg ASA per discharge instructions. Advised patient to follow-up with PCP if swelling does not continue to improve. Discussed pain control and pain medication optimization. Mikhail verbalized understanding, all questions answered to patient satisfaction.

## 2025-05-01 NOTE — TELEPHONE ENCOUNTER
Transitions of Care Outreach  Chief Complaint   Patient presents with    Hospital F/U     Most Recent Admission Date: 4/30/2025   Most Recent Admission Diagnosis:      Most Recent Discharge Date: 4/30/2025   Most Recent Discharge Diagnosis: Bilateral lower extremity edema - R60.0  Sinus tachycardia - R00.0     Transitions of Care Assessment    Discharge Assessment  How are you doing now that you are home?: much better  How are your symptoms? (Red Flag symptoms escalate to triage hotline per guidelines): Improved  Do you know how to contact your clinic care team if you have future questions or changes to your health status? : Yes  Does the patient have their discharge instructions? : Yes  Does the patient have questions regarding their discharge instructions? : No  Were you started on any new medications or were there changes to any of your previous medications? : Yes  Does the patient have all of their medications?: Yes  Do you have questions regarding any of your medications? : No  Do you have all of your needed medical supplies or equipment (DME)?  (i.e. oxygen tank, CPAP, cane, etc.): Yes    Follow up Plan      Future Appointments   Date Time Provider Department Center   5/13/2025  9:00 AM MARITA [facility] NURSE [facility]   6/10/2025  9:00 AM Anitha Mckee PA-C [facility]   6/26/2025  8:30 AM Suzanne Zhang MD [facility]   7/22/2025  8:40 AM Kavya Lopez MD [facility]   9/9/2025  8:30 AM Marium Davies PA-C [facility]     Outpatient Plan as outlined on AVS reviewed with patient.    For any urgent concerns, please contact our 24 hour nurse triage line: [phone] ([phone])       Марина ANGELES Do, RN

## 2025-05-13 ENCOUNTER — ALLIED HEALTH/NURSE VISIT (OUTPATIENT)
Dept: NEUROSURGERY | Facility: CLINIC | Age: 65
End: 2025-05-13
Payer: MEDICARE

## 2025-05-13 VITALS
HEART RATE: 80 BPM | OXYGEN SATURATION: 96 % | SYSTOLIC BLOOD PRESSURE: 141 MMHG | DIASTOLIC BLOOD PRESSURE: 76 MMHG | TEMPERATURE: 98.5 F

## 2025-05-13 DIAGNOSIS — M48.062 SPINAL STENOSIS OF LUMBAR REGION WITH NEUROGENIC CLAUDICATION: ICD-10-CM

## 2025-05-13 DIAGNOSIS — Z98.1 S/P LUMBAR FUSION: Primary | ICD-10-CM

## 2025-05-13 PROCEDURE — 99207 PR NO CHARGE NURSE ONLY: CPT

## 2025-05-13 PROCEDURE — 3078F DIAST BP <80 MM HG: CPT

## 2025-05-13 PROCEDURE — 3077F SYST BP >= 140 MM HG: CPT

## 2025-05-13 RX ORDER — METHOCARBAMOL 500 MG/1
500 TABLET, FILM COATED ORAL EVERY 6 HOURS PRN
Qty: 42 TABLET | Refills: 0 | Status: SHIPPED | OUTPATIENT
Start: 2025-05-13

## 2025-05-13 NOTE — PROGRESS NOTES
POSTOPERATIVE FOLLOW-UP NURSE VISIT NOTE    Procedure: Bilateral Extension lumbar 5 - sacral 1 fusion to lumbar 3 with Bilateral Lumbar 3 - lumbar 5 laminectomies, medial facetectomies and foraminotomies and posterolateral arthrodesis with right sided discectomies and use of stealth.     Date: 4/25/25    Surgeon: Dr. Lopez    Pre-op symptoms: low back and leg pain, right leg weakness, urinary frequency, sexual dysfunction, neck pain, numbness in his fingertips     Post-op symptoms: low back and leg pain is resolved, weakness is improved, urinary frequency is improved, sexual dysfunction is improved, neck pain is improved, fingertip numbness is the same    Pain and medications: 2/10  Oxycodone 5 mg Q4 PRN: taking HS  Methocarbamol 500 mg Q6 PRN: taking Q6  Acetaminophen 975 mg Q8 PRN: taking every day     Patient reminded that Dr. Lopez will refill pain medications for up to 6 weeks after the date of surgery, and if pain medication is still needed at that point they will have to go through primary care.    Bracing compliance: compliant    Medications refilled: methocarbamol    Imaging ordered: XR lumbar    Wound:  CDI,  approximated, no drainage, minimal erythema, no temperature change compared to surrounding tissue, non tender.  Cleansed with iodine, sutures removed, cleansed again with iodine.    Juan C ADAM Radha presents to the clinic today for removal of sutures.  The patient has had the sutures in place for 18 days. There has been no history of infection or drainage. All sutures were easily removed today.     The wound is healing well with no signs of infection. Routine wound care discussed. The patient will follow up with [facility] for 6 week post-op on 6/10/25 at 8:45 AM with a lumbar x-ray prior.

## 2025-05-13 NOTE — PATIENT INSTRUCTIONS
Follow-up 6/10/25 at 8:45 AM with an x-ray prior. Please stop at the  on your way out to schedule the x-ray.    A dressing is not required.    Keep the wound clean.    Wash your hands before touching the wound.  Ensure that anyone assisting you in the care of your wound washes her/his hands before touching the wound. Good handwashing can decrease the risk of serious infection.    If you are unable to see your wound, have someone check the wound daily for redness, swelling,or drainage. A small amount of drainage is normal.    You may shower.  Pat the wound dry. Do not rub.    No tub baths until the wound is well healed.  Usually 5-6 weeks.     If you develop redness, swelling, drainage, or temp 101 or greater, call our clinic.    * No lifting, pushing or pulling greater than 5-10 pound (this is about a gallon of milk) for the first 6 weeks after surgery .  *No repetitive bending, twisting, or jarring activities for 6 weeks.  *No overhead work  *No aerobic or strenuous activity  *No activities with increased risk of falls  *You may move about your home as tolerated  *You may walk up and down stairs as tolerated  *You may increase your activity slowly over the next 6 weeks    WALKING PROGRAM: As you can tolerate, walk daily-start with 5-10 minutes of continuous walking. This is in addition to the walking that you do as part of your daily activities. Increase the time that you walk by 5 minutes every couple of days. Do not exceed 30-45 minutes of continuous walking until seen in follow-up. Walking is the best exercise after surgery.  **Listen to your body, if you find that you are more painful or fatigued, you may need to proceed more slowly.    **Do not smoke or expose yourself to second hand smoke. Cigarette smoke can delay healing and cause complications.     DRIVING:  We recommend that you do not drive while taking medications for pain or muscle spasms. Always read and follow the advice on your  prescription bottle. If you have questions, speak with your pharmacist.  We recommend that you do not drive while wearing a brace, as it could limit your range of motion.    WORK: If you plan to return to work before your 6 week post-op appointment, call and discuss with one of the nurses in the neurosurgery office.     WEARING THE LUMBAR BRACE:    * Wear the brace when out of bed or sitting upright in bed.  * You should apply the brace before standing.  * You may shower seated without brace.   Sit down on shower chair, remove brace   Shower   Dry   Re-apply brace before standing.   Take care not to fall    *Check  your incision and the skin under your brace at least daily.

## 2025-05-15 ENCOUNTER — TELEPHONE (OUTPATIENT)
Dept: NEUROSURGERY | Facility: CLINIC | Age: 65
End: 2025-05-15
Payer: MEDICARE

## 2025-05-15 NOTE — TELEPHONE ENCOUNTER
"Called and spoke with Mikhail, discussed his current concerns. He denied any red flag symptoms but left side back \"tightness\". Recommended Robaxin 500 mg QID, reminded about ice/heat, positioning. Asked Mikhail to keep us posted.  Selene Valadez RN, CNRN    "

## 2025-05-15 NOTE — TELEPHONE ENCOUNTER
"[facility] Call Center    Phone Message    May a detailed message be left on voicemail: yes     Reason for Call: Symptoms or Concerns     If patient has red-flag symptoms, warm transfer to triage line    Current symptom or concern: Patient called and stated  that overall he has been doing really really well, and feeling great post surgery. However, yesterday he bent over to tie his shoe and wasn't too careful about how he was bending. While bending over, when he stood up he heard a pop and felt a \"jolt\" of pain on his left side. Pt now is sore on the left side, but not so bad that he can't walk as usual. Please call to discuss as patient just wants to make sure he didn't do anything to mess up his surgery.     Symptoms have been present for:  1 day(s)    Has patient previously been seen for this? Yes    By: John    Date: 4/25    Are there any new or worsening symptoms? Yes: New    Action Taken: Message routed to:  Other: Neurosurgery    Travel Screening: Not Applicable     Date of Service:                                                                     "

## 2025-05-16 ENCOUNTER — HOSPITAL ENCOUNTER (OUTPATIENT)
Dept: GENERAL RADIOLOGY | Facility: HOSPITAL | Age: 65
Discharge: HOME | End: 2025-05-16
Attending: SURGERY | Admitting: SURGERY
Payer: MEDICARE

## 2025-05-16 DIAGNOSIS — Z98.1 S/P LUMBAR FUSION: ICD-10-CM

## 2025-05-16 PROCEDURE — 72100 X-RAY EXAM L-S SPINE 2/3 VWS: CPT

## 2025-05-17 ENCOUNTER — HOSPITAL ENCOUNTER (EMERGENCY)
Facility: HOSPITAL | Age: 65
Discharge: HOME | End: 2025-05-17
Payer: MEDICARE

## 2025-05-17 VITALS
HEART RATE: 81 BPM | HEIGHT: 74 IN | OXYGEN SATURATION: 96 % | RESPIRATION RATE: 16 BRPM | SYSTOLIC BLOOD PRESSURE: 130 MMHG | TEMPERATURE: 98 F | BODY MASS INDEX: 28.88 KG/M2 | DIASTOLIC BLOOD PRESSURE: 75 MMHG | WEIGHT: 225 LBS

## 2025-05-17 DIAGNOSIS — M54.50 ACUTE LEFT-SIDED LOW BACK PAIN WITHOUT SCIATICA: ICD-10-CM

## 2025-05-17 PROCEDURE — 99282 EMERGENCY DEPT VISIT SF MDM: CPT

## 2025-05-17 ASSESSMENT — ACTIVITIES OF DAILY LIVING (ADL): ADLS_ACUITY_SCORE: 59

## 2025-05-17 ASSESSMENT — COLUMBIA-SUICIDE SEVERITY RATING SCALE - C-SSRS
2. HAVE YOU ACTUALLY HAD ANY THOUGHTS OF KILLING YOURSELF IN THE PAST MONTH?: NO
6. HAVE YOU EVER DONE ANYTHING, STARTED TO DO ANYTHING, OR PREPARED TO DO ANYTHING TO END YOUR LIFE?: NO
1. IN THE PAST MONTH, HAVE YOU WISHED YOU WERE DEAD OR WISHED YOU COULD GO TO SLEEP AND NOT WAKE UP?: NO

## 2025-05-17 NOTE — ED TRIAGE NOTES
"Patient c/o lower left back pain.  Patient had a fusion surgery at  3 weeks ago, was doing well walking good.  Patient stated \" 3 days ago while putting his shoes heard a popped on his lower back \" since then c/o pain and walking difficulty, xray was done yesterday but no result yet.      Triage Assessment (Adult)       Row Name 05/17/25 0746          Triage Assessment    Airway WDL WDL        Respiratory WDL    Respiratory WDL WDL        Skin Circulation/Temperature WDL    Skin Circulation/Temperature WDL WDL        Cardiac WDL    Cardiac WDL WDL        Peripheral/Neurovascular WDL    Peripheral Neurovascular WDL WDL        Cognitive/Neuro/Behavioral WDL    Cognitive/Neuro/Behavioral WDL WDL                     "

## 2025-05-17 NOTE — ED PROVIDER NOTES
"Emergency Department Encounter   NAME: Juan C Garcia ; AGE: 65 year old male ; YOB: 1960 ; MRN: [id number] ; PCP: Suzanne Zhang   ED PROVIDER: Jaleesa Espitia PA-C    Evaluation Date & Time:   5/17/2025  7:49 AM    CHIEF COMPLAINT:  Back Pain      Impression and Plan   MDM: Juan C Garcia is a 65 year old male who presents to the ED for evaluation of back pain.  On Wednesday, patient bent over to tie his shoe, and he felt a \"serious pinch\".  Since then, he has been experiencing a worsening \"searing pain\" in his left lower back.  He is status post a recent lumbar spine fusion, so he was concerned that the hardware was affected.  He denies any radiation of the pain down his legs or any bilateral lower extremity numbness/tingling/weakness.  No loss of bowel or bladder control.  Patient had lumbar spine x-ray done yesterday, and is unsure of the results.    Vitals reviewed and are stable. On exam patient has +5 strength bilaterally with plantarflexion, dorsi, leg extension, and  strength.  No sensory deficits in bilateral lower and upper extremities.  No midline tenderness of his lumbar, thoracic, or cervical spine.  Point tenderness to palpation of his left lower back musculature.    I independently reviewed the x-ray results from yesterday and interpreted that his hardware is properly aligned and I do not document any significant changes from his previous x-ray done on 4/26/2025 postsurgery.  I have no concerns for a spinal emergency, as patient is not describing any neurological symptoms or spinal red flag symptoms.  I am further reassured that his pain is described as point tenderness to his left lower back musculature and does not shoot down his leg.  I discussed with the patient that since he has been slowly increasing his activity after being more immobile after surgery, I suspect that he strained a muscle in his back.  Patient is reassured by the x-ray results and is okay to continue " "taking his pain medication prescribed by his neurosurgeon, which includes oxycodone, Robaxin, and Tylenol.  I also recommended over-the-counter lidocaine patches and heat.      ED COURSE:  8:00 AM I met and introduced myself to the patient. I gathered initial history and performed my physical exam. We discussed plan for initial workup.     8:26 AM I rechecked the patient and discussed results, discharge, follow up, and reasons to return to the ED.     At the conclusion of the encounter I discussed the results of all the tests and the disposition. The questions were answered. The patient or family acknowledged understanding and was agreeable with the care plan.    Medical Decision Making  I reviewed the EMR: Prior Imaging: X-ray lumbar spine from 5/16/2025 and 4/26/2025.  Discharge. No recommendations on prescription strength medication(s). See documentation for any additional details.    MIPS (CTPE, Dental pain, Cooper, Sinusitis, Asthma/COPD, Head Trauma): Not Applicable    SEPSIS: None        FINAL IMPRESSION:    ICD-10-CM    1. Acute left-sided low back pain without sciatica  M54.50             MEDICATIONS GIVEN IN THE EMERGENCY DEPARTMENT:  Medications - No data to display      NEW PRESCRIPTIONS STARTED AT TODAY'S ED VISIT:  New Prescriptions    No medications on file         HPI   Use of Intrepreter: N/A     Juan C Garcia is a 65 year old male with a pertinent history of lumbar spinal stenosis s/p spinal fusion who presents to the ED for evaluation of back pain.  Patient underwent spinal surgery on 4/25/2025 for his right leg pain/weakness, urinary frequency, and sexual dysfunction.  Patient tolerated the procedure well and was improving, however, on Wednesday, he bent over to tie his shoe, and felt a \"serious pinch\" on the left side of his lower back.  He describes this as a \"searing pain\" that is getting worse.  He denies any radiation of this pain down his legs.  He was previously able to walk 4 to 5 miles " daily, but hard to walk to the bathroom due to pain.  He denies any weakness, and states his right leg weakness is actually improved with the surgery.  He has been taking his prescribed oxycodone at night to help him sleep and occasionally Robaxin and Tylenol.  Patient had a lumbar spine x-ray done yesterday and is unsure of the results.    He denies any loss of bowel or bladder function, lower extremity numbness or tingling, lower extremity weakness, abdominal pain.    Chart review from discharge summary from 4/20/2025:  Patient has history of lumbar discectomy and lumbar fusion at lumbar 5-sacral 1 remotely as well as cervical 5-6 and 6 and cervical 6-7 ACDF in 2020 and foraminotomies on right cervical 3-4 and cervical 4-5, all done in [place].  Undergoing surgery on 4/25/2025 for right leg weakness and pain, urinary frequency, sexual dysfunction, neck pain, numbness in his fingertips.      REVIEW OF SYSTEMS:  Pertinent positive and negative symptoms per HPI.       Medical History     Past Medical History:   Diagnosis Date    Alcohol use     Coronary artery disease     Dermatitis herpetiformis 2000    Diastolic dysfunction with chronic heart failure (H)     HTN, goal below 140/90     Hyperlipidemia     Lung nodule 02/2003    Multiple allergies     Primary hypogonadism in male     Pulmonary embolus (H) 02/2003    Sleep apnea     Thrombosis     Thyroid nodule 02/2005    Uncomplicated asthma        Past Surgical History:   Procedure Laterality Date    BACK SURGERY      COLONOSCOPY  April 2012    COLONOSCOPY N/A 6/20/2022    Procedure: COLONOSCOPY;  Surgeon: Diya Lo MD;  Location:  GI    HEAD & NECK SURGERY      HERNIA REPAIR      LAMINECTOMY, SPINE, LUMBAR, 2 LEVELS, USING OPTICAL TRACKING SYSTEM Bilateral 4/25/2025    Procedure: Bilateral Lumbar 3 - lumbar 5 laminectomies, medial facetectomies and foraminotomies and posterolateral arthrodesis with right sided discectomies and use of stealth.;   Surgeon: Kavya Lopez MD;  Location: [facility] OR    OPTICAL TRACKING SYSTEM FUSION SPINE POSTERIOR LUMBAR TWO LEVELS Bilateral 4/25/2025    Procedure: Bilateral Extension lumbar 5 - sacral 1 fusion to lumbar 3 with;  Surgeon: Kavya Lopez MD;  Location: [facility] Main OR    ORCHIOPEXY CHILD Right     Age 10 due to undescended testicl    ORTHOPEDIC SURGERY      OTHER SURGICAL HISTORY  July 1997    Cervical laminectomy C4-5-6    OTHER SURGICAL HISTORY  Jan 2000    Right index tendon repair    OTHER SURGICAL HISTORY  August 2004    Ankle/wrist repair    OTHER SURGICAL HISTORY  April 2007    Lumbar microdiscectomy L5-S1    OTHER SURGICAL HISTORY  March 2010    Cervical fusion C5-6-7    OTHER SURGICAL HISTORY  March 2011    Cervical fusion C3-4-5    OTHER SURGICAL HISTORY  July 2011    Lumbar fusion L5-S1       Family History   Problem Relation Age of Onset    Hypertension Mother     Dementia Mother     Alcoholism Mother     Hypertension Father     Cerebrovascular Disease Father     Cancer Father         throat - was a smoker    Heart Disease Father         CABG    Diabetes Maternal Grandfather     Family History Negative Brother     Family History Negative Sister     Cerebrovascular Disease Paternal Grandmother     Cancer Son         Testicular cancer    Family History Negative Son        Social History     Tobacco Use    Smoking status: Never    Smokeless tobacco: Never   Vaping Use    Vaping status: Never Used   Substance Use Topics    Alcohol use: Not Currently    Drug use: No       albuterol (PROAIR HFA/PROVENTIL HFA/VENTOLIN HFA) 108 (90 Base) MCG/ACT inhaler  ALLERGY INJECTIONS  aspirin 81 MG EC tablet  atorvastatin (LIPITOR) 80 MG tablet  fluticasone-salmeterol (ADVAIR) 250-50 MCG/ACT inhaler  hydroCHLOROthiazide 12.5 MG tablet  levothyroxine (SYNTHROID/LEVOTHROID) 125 MCG tablet  losartan (COZAAR) 50 MG tablet  methocarbamol (ROBAXIN) 500 MG tablet  oxyCODONE (ROXICODONE) 5 MG  "tablet  polyethylene glycol (MIRALAX) 17 GM/Dose powder          Physical Exam     First Vitals:  Patient Vitals for the past 24 hrs:   BP Temp Temp src Pulse Resp SpO2 Height Weight   05/17/25 0744 130/75 98  F (36.7  C) Oral 81 16 96 % 1.88 m (6' 2\") 102.1 kg (225 lb)         PHYSICAL EXAM:   Physical Exam  Vitals reviewed.   Constitutional:       General: He is not in acute distress.     Appearance: Normal appearance. He is not toxic-appearing.   HENT:      Head: Atraumatic.   Eyes:      General: No scleral icterus.     Conjunctiva/sclera: Conjunctivae normal.   Pulmonary:      Effort: Pulmonary effort is normal. No respiratory distress.   Abdominal:      Palpations: Abdomen is soft.      Tenderness: There is no abdominal tenderness.   Musculoskeletal:         General: Tenderness (tenderness to palpation of musculature on L side of lumbar spine. No tenderness to midline palpation of lumbar, thoracic, or cervical spine) present. No deformity. Normal range of motion.      Cervical back: Neck supple.   Skin:     General: Skin is warm.   Neurological:      General: No focal deficit present.      Mental Status: He is alert and oriented to person, place, and time.      Cranial Nerves: No cranial nerve deficit.      Sensory: No sensory deficit.      Motor: No weakness.             Results     LAB:  All pertinent labs reviewed and interpreted  Labs Ordered and Resulted from Time of ED Arrival to Time of ED Departure - No data to display    RADIOLOGY:  No orders to display       PROCEDURES:  none       Jaleesa Espitia PA-C   Emergency Medicine   [facility] EMERGENCY DEPARTMENT        Jaleesa Espitia PA-C  05/17/25 0829    "

## 2025-05-17 NOTE — DISCHARGE INSTRUCTIONS
Your emergency department evaluation today is reassuring.  Please continue taking Tylenol or ibuprofen for your pain as well as your prescribed medications from your neurosurgeon.  I recommend you follow-up with your neurosurgeon on Monday for further recommendations regarding your back pain.  I also recommend over-the-counter lidocaine patches for your pain as well as heat.

## 2025-05-17 NOTE — ED NOTES
"Patient noting increased pain since \"pop\" felt Wednesday. Denies n/t, CMS intact, no loss of bowel or bladder function. Using a cane for mobility, prior to \"pop\" was walking without equipment.  "

## 2025-05-26 DIAGNOSIS — M48.062 SPINAL STENOSIS OF LUMBAR REGION WITH NEUROGENIC CLAUDICATION: ICD-10-CM

## 2025-05-27 RX ORDER — METHOCARBAMOL 500 MG/1
500 TABLET, FILM COATED ORAL EVERY 6 HOURS PRN
Qty: 42 TABLET | Refills: 0 | Status: SHIPPED | OUTPATIENT
Start: 2025-05-27

## 2025-05-31 DIAGNOSIS — E03.9 HYPOTHYROIDISM, UNSPECIFIED TYPE: ICD-10-CM

## 2025-05-31 DIAGNOSIS — E06.3 HASHIMOTO'S THYROIDITIS: ICD-10-CM

## 2025-06-02 RX ORDER — LEVOTHYROXINE SODIUM 125 UG/1
125 TABLET ORAL DAILY
Qty: 90 TABLET | Refills: 2 | Status: SHIPPED | OUTPATIENT
Start: 2025-06-02

## 2025-06-02 NOTE — TELEPHONE ENCOUNTER
Last Visit: 3/5/2025 [facility]         Request from pharmacy:  Requested Prescriptions   Pending Prescriptions Disp Refills    levothyroxine (SYNTHROID/LEVOTHROID) 125 MCG tablet [Pharmacy Med Name: LEVOTHYROXINE 125 MCG TABLET] 90 tablet 0     Sig: TAKE 1 TABLET BY MOUTH EVERY DAY       Thyroid Protocol Passed - 6/2/2025  4:27 PM        Passed - Patient is 12 years or older        Passed - Medication is active on med list and the sig matches. RN to manually verify dose and sig if red X/fail.     If the protocol passes (green check), you do not need to verify med dose and sig.    A prescription matches if they are the same clinical intention.    For Example: once daily and every morning are the same.    The protocol can not identify upper and lower case letters as matching and will fail.     For Example: Take 1 tablet (50 mg) by mouth daily     TAKE 1 TABLET (50 MG) BY MOUTH DAILY    For all fails (red x), verify dose and sig.    If the refill does match what is on file, the RN can still proceed to approve the refill request.       If they do not match, route to the appropriate provider.             Passed - Recent (12 month) or future (90 days) visit with authorizing provider's specialty (provided they have been seen in the past 15 months)     The patient must have completed an in-person or virtual visit within the past 12 months or has a future visit scheduled within the next 90 days with the authorizing provider s specialty.  Urgent care and e-visits do not qualify as an office visit for this protocol.          Passed - Medication indicated for associated diagnosis     Medication is associated with one or more of the following diagnoses:  Hypothyroidism  Thyroid stimulating hormone suppression therapy  Thyroid cancer  Acquired atrophy of thyroid          Passed - Normal TSH on file in past 12 months     Recent Labs   Lab Test 02/26/25  0745   TSH 3.34

## 2025-06-04 ENCOUNTER — DOCUMENTATION ONLY (OUTPATIENT)
Dept: PHYSICAL THERAPY | Facility: CLINIC | Age: 65
End: 2025-06-04
Payer: MEDICARE

## 2025-06-04 ENCOUNTER — MYC MEDICAL ADVICE (OUTPATIENT)
Dept: FAMILY MEDICINE | Facility: CLINIC | Age: 65
End: 2025-06-04
Payer: MEDICARE

## 2025-06-04 ENCOUNTER — MYC MEDICAL ADVICE (OUTPATIENT)
Dept: ENDOCRINOLOGY | Facility: CLINIC | Age: 65
End: 2025-06-04
Payer: MEDICARE

## 2025-06-04 DIAGNOSIS — R79.89 LOW TESTOSTERONE IN MALE: ICD-10-CM

## 2025-06-04 DIAGNOSIS — I10 HTN, GOAL BELOW 140/90: Chronic | ICD-10-CM

## 2025-06-04 DIAGNOSIS — M48.062 SPINAL STENOSIS OF LUMBAR REGION WITH NEUROGENIC CLAUDICATION: Primary | ICD-10-CM

## 2025-06-04 DIAGNOSIS — R79.89 LOW TESTOSTERONE IN MALE: Primary | ICD-10-CM

## 2025-06-04 RX ORDER — TESTOSTERONE 1.62 MG/G
2 GEL TRANSDERMAL DAILY
Qty: 75 G | Refills: 1 | Status: CANCELLED | OUTPATIENT
Start: 2025-06-04

## 2025-06-04 RX ORDER — HYDROCHLOROTHIAZIDE 12.5 MG/1
12.5 TABLET ORAL DAILY
Qty: 90 TABLET | Refills: 0 | Status: SHIPPED | OUTPATIENT
Start: 2025-06-04

## 2025-06-10 ENCOUNTER — TELEPHONE (OUTPATIENT)
Dept: NEUROSURGERY | Facility: CLINIC | Age: 65
End: 2025-06-10

## 2025-06-10 ENCOUNTER — OFFICE VISIT (OUTPATIENT)
Dept: NEUROSURGERY | Facility: CLINIC | Age: 65
End: 2025-06-10
Payer: MEDICARE

## 2025-06-10 ENCOUNTER — HOSPITAL ENCOUNTER (OUTPATIENT)
Dept: GENERAL RADIOLOGY | Facility: HOSPITAL | Age: 65
Discharge: HOME | End: 2025-06-10
Attending: SURGERY
Payer: MEDICARE

## 2025-06-10 VITALS
DIASTOLIC BLOOD PRESSURE: 76 MMHG | WEIGHT: 225 LBS | OXYGEN SATURATION: 98 % | BODY MASS INDEX: 28.88 KG/M2 | HEIGHT: 74 IN | SYSTOLIC BLOOD PRESSURE: 130 MMHG | HEART RATE: 74 BPM

## 2025-06-10 DIAGNOSIS — Z98.1 S/P LUMBAR FUSION: ICD-10-CM

## 2025-06-10 DIAGNOSIS — Z98.1 S/P LUMBAR FUSION: Primary | ICD-10-CM

## 2025-06-10 DIAGNOSIS — M54.50 ACUTE LEFT-SIDED LOW BACK PAIN WITHOUT SCIATICA: ICD-10-CM

## 2025-06-10 PROCEDURE — 3078F DIAST BP <80 MM HG: CPT

## 2025-06-10 PROCEDURE — 99024 POSTOP FOLLOW-UP VISIT: CPT

## 2025-06-10 PROCEDURE — 1125F AMNT PAIN NOTED PAIN PRSNT: CPT

## 2025-06-10 PROCEDURE — 3075F SYST BP GE 130 - 139MM HG: CPT

## 2025-06-10 PROCEDURE — 72100 X-RAY EXAM L-S SPINE 2/3 VWS: CPT

## 2025-06-10 RX ORDER — TESTOSTERONE 1.62 MG/G
2 GEL TRANSDERMAL DAILY
Qty: 150 G | Refills: 0 | Status: SHIPPED | OUTPATIENT
Start: 2025-06-10

## 2025-06-10 ASSESSMENT — PAIN SCALES - GENERAL: PAINLEVEL_OUTOF10: MILD PAIN (2)

## 2025-06-10 NOTE — PROGRESS NOTES
[facility] Neurosurgery  Neurosurgery Follow Up:    HPI:   4/3/25: 64 yo male who presents with history of lumbar discectomy and lumbar fusion at lumbar 5-sacral 1 remotely as well as cervical 5-6 and 6 and cervical 6-7 ACDF in 2020 and foraminotomies on right cervical 3-4 and cervical 4-5 all in [place] low back and leg pain, right leg weakness, urinary frequency, sexual dysfunction, neck pain, numbness in his fingertips.  We reviewed lumbar x-ray which shows prior hardware at lumbar 5-sacral 1 with no significant subluxations or instability.  MRI of his lumbar spine was also reviewed which shows severe central and subarticular stenosis at lumbar 3-4 and lumbar 4-5 with redundancy of the cauda equina above this level.  Also noted to have moderate bilateral foraminal narrowing at lumbar 4-5. symptoms appear related to his cauda equina compression.  Anticipate greater than 50% facet resection to fully decompress the thecal sac and nerves therefore recommend extension of prior lumbar 5-sacral 1 fusion to lumbar 3 with lumbar 3-lumbar 5 laminectomies, medial facetectomies and foraminotomies and posterolateral arthrodesis with right sided discectomies and use of stealth.  Risks and benefits were discussed in detail including but not limited to infection, hematoma, nerve damage including paralysis, post op radiculitis, durotomy, lack of a sold bone fusion, hardware malfunction, adjacent segment disease, risks associated with the use of general anesthesia.    6/10/25: 6 weeks s/p bilateral extension of L5-S1 fusion ot L3 with bilateral L3-L5 laminectomies, medial facectomies, foraminotomies and posterolateral arthrodesis with right sided discectomies and use of stealth with Dr. Lopez 4/25/25.     Patient reports had resolution of preoperative symptoms during the first 2 weeks after surgery, was walking 4-5 miles a day. After his incision check with nursing around 3 weeks, patient was putting on his left shoe and  "felt a popping sensation with pain localized in the left paraspinous muscles adjacent to his incision, was seen in the ED due to severe back pain, pain has gradually improved over the past week.  Patient was seen by his chiropractor who lightly adjusted his back with improvement, states chiropractor told him to discontinue the brace, notes improvement of back pain after discontinuing brace and with pressure to the area.  Denies new or worsening radicular pain, paresthesias, weakness, incisional concerns, recent fall/trauma.  Not currently using any pain medications or muscle relaxers.    Medical, surgical, family, and social history unchanged since prior exam.  Exam:  Constitutional:  Alert, well nourished, NAD.  HEENT: Normocephalic, atraumatic.   Pulm:  Without shortness of breath   CV:  No pitting edema of BLE.      Vital Signs:  /76   Pulse 74   Ht 1.88 m (6' 2\")   Wt 102.1 kg (225 lb)   SpO2 98%   BMI 28.89 kg/m      Neurological:  Awake  Alert  Oriented x 3  Motor exam:    Hip Flexion:   Right: 5/5 Left: 5/5   Knee Flexion:  Right: 5/5 Left: 5/5  Knee Extension:  Right: 5/5 Left: 5/5  Dorsiflexion:  Right: 5/5  Left: 5/5  Plantar Flexion: Right: 5/5 Left: 5/5  EHL:    Right: 5/5 Left: 5/5    Sensation intact throughout all BLE  Incision: Intact, healing well, no surrounding erythema, edema, drainage  Nontender to palpation over lumbar paraspinous muscles, patient points to left paraspinous muscle adjacent to incision for area of localized pain    Imaging:   Imaging Interpretation provided by radiologist.   EXAM: XR LUMBAR SPINE 2/3 VIEWS  LOCATION: [facility]  DATE: 5/16/2025     INDICATION: Back pain status post lumbar fusion  COMPARISON: Lumbar spine x-ray dated 04/26/2025.                                                                   IMPRESSION: L3-S1 posterior fusion hardware and decompressive laminectomy change without complication. Straightening of usual lumbar " lordosis without significant spinal listhesis. No acute fracture. Scattered degenerative change. Pelvic phleboliths.    A/P:   6 weeks s/p bilateral extension of L5-S1 fusion ot L3 with bilateral L3-L5 laminectomies, medial facectomies, foraminotomies and posterolateral arthrodesis with right sided discectomies and use of stealth with Dr. Lopez 4/25/25.     Patient reports had resolution of preoperative symptoms during the first 2 weeks after surgery, was walking 4-5 miles a day. After his incision check with nursing around 3 weeks, patient was putting on his left shoe and felt a popping sensation with pain localized in the left paraspinous muscles adjacent to his incision, was seen in the ED due to severe back pain, pain has gradually improved over the past week.  Patient was seen by his chiropractor who lightly adjusted his back with improvement, states chiropractor told him to discontinue the brace, notes improvement of back pain after discontinuing brace and with pressure to the area.  Denies new or worsening radicular pain, paresthesias, weakness, incisional concerns, recent fall/trauma.  Not currently using any pain medications or muscle relaxers.    X-rays reviewed and discussed with patient, imaging demonstrates grossly stable surgical hardware.    Plan:  - Okay to discontinue brace TODAY  - May gradually advance lifting to no more than 30-40 pounds  until 12 weeks post-op   - PT referral placed  -Okay to continue using muscle relaxers and do short burst of ibuprofen for 1 week.  Discussed limiting ibuprofen after short burst to aid with fusion.  - Continue post-operative cares   - Scheduled for 12-week postop appointment 7/22/2025 with Dr. Lopez with x-ray prior    Patient assessment, examination, plan discussed with Dr. Lopez.    - Call the clinic at [phone] for increased pain or any other questions and concerns.    Patient verbalized understanding and is in agreement with the plan.       Anitha  KAREN Mckee  [facility] Neurosurgery  [address] [place] [id number]

## 2025-06-10 NOTE — LETTER
6/10/2025      Juan C Garcia  [address]  [place] [address]      Dear Colleague,    Thank you for referring your patient, Juan C Garcia, to the [facility]. Please see a copy of my visit note below.    [facility]  Neurosurgery Follow Up:    HPI:   4/3/25: 64 yo male who presents with history of lumbar discectomy and lumbar fusion at lumbar 5-sacral 1 remotely as well as cervical 5-6 and 6 and cervical 6-7 ACDF in 2020 and foraminotomies on right cervical 3-4 and cervical 4-5 all in [place] low back and leg pain, right leg weakness, urinary frequency, sexual dysfunction, neck pain, numbness in his fingertips.  We reviewed lumbar x-ray which shows prior hardware at lumbar 5-sacral 1 with no significant subluxations or instability.  MRI of his lumbar spine was also reviewed which shows severe central and subarticular stenosis at lumbar 3-4 and lumbar 4-5 with redundancy of the cauda equina above this level.  Also noted to have moderate bilateral foraminal narrowing at lumbar 4-5. symptoms appear related to his cauda equina compression.  Anticipate greater than 50% facet resection to fully decompress the thecal sac and nerves therefore recommend extension of prior lumbar 5-sacral 1 fusion to lumbar 3 with lumbar 3-lumbar 5 laminectomies, medial facetectomies and foraminotomies and posterolateral arthrodesis with right sided discectomies and use of stealth.  Risks and benefits were discussed in detail including but not limited to infection, hematoma, nerve damage including paralysis, post op radiculitis, durotomy, lack of a sold bone fusion, hardware malfunction, adjacent segment disease, risks associated with the use of general anesthesia.    6/10/25: 6 weeks s/p bilateral extension of L5-S1 fusion ot L3 with bilateral L3-L5 laminectomies, medial facectomies, foraminotomies and posterolateral arthrodesis with right sided discectomies and use of  "ian with Dr. Lopez 4/25/25.     Patient reports had resolution of preoperative symptoms during the first 2 weeks after surgery, was walking 4-5 miles a day. After his incision check with nursing around 3 weeks, patient was putting on his left shoe and felt a popping sensation with pain localized in the left paraspinous muscles adjacent to his incision, was seen in the ED due to severe back pain, pain has gradually improved over the past week.  Patient was seen by his chiropractor who lightly adjusted his back with improvement, states chiropractor told him to discontinue the brace, notes improvement of back pain after discontinuing brace and with pressure to the area.  Denies new or worsening radicular pain, paresthesias, weakness, incisional concerns, recent fall/trauma.  Not currently using any pain medications or muscle relaxers.    Medical, surgical, family, and social history unchanged since prior exam.  Exam:  Constitutional:  Alert, well nourished, NAD.  HEENT: Normocephalic, atraumatic.   Pulm:  Without shortness of breath   CV:  No pitting edema of BLE.      Vital Signs:  /76   Pulse 74   Ht 1.88 m (6' 2\")   Wt 102.1 kg (225 lb)   SpO2 98%   BMI 28.89 kg/m      Neurological:  Awake  Alert  Oriented x 3  Motor exam:    Hip Flexion:   Right: 5/5 Left: 5/5   Knee Flexion:  Right: 5/5 Left: 5/5  Knee Extension:  Right: 5/5 Left: 5/5  Dorsiflexion:  Right: 5/5  Left: 5/5  Plantar Flexion: Right: 5/5 Left: 5/5  EHL:    Right: 5/5 Left: 5/5    Sensation intact throughout all BLE  Incision: Intact, healing well, no surrounding erythema, edema, drainage  Nontender to palpation over lumbar paraspinous muscles, patient points to left paraspinous muscle adjacent to incision for area of localized pain    Imaging:   Imaging Interpretation provided by radiologist.   EXAM: XR LUMBAR SPINE 2/3 VIEWS  LOCATION: [facility]  DATE: 5/16/2025     INDICATION: Back pain status post lumbar " fusion  COMPARISON: Lumbar spine x-ray dated 04/26/2025.                                                                   IMPRESSION: L3-S1 posterior fusion hardware and decompressive laminectomy change without complication. Straightening of usual lumbar lordosis without significant spinal listhesis. No acute fracture. Scattered degenerative change. Pelvic phleboliths.    A/P:   6 weeks s/p bilateral extension of L5-S1 fusion ot L3 with bilateral L3-L5 laminectomies, medial facectomies, foraminotomies and posterolateral arthrodesis with right sided discectomies and use of stealth with Dr. Lopez 4/25/25.     Patient reports had resolution of preoperative symptoms during the first 2 weeks after surgery, was walking 4-5 miles a day. After his incision check with nursing around 3 weeks, patient was putting on his left shoe and felt a popping sensation with pain localized in the left paraspinous muscles adjacent to his incision, was seen in the ED due to severe back pain, pain has gradually improved over the past week.  Patient was seen by his chiropractor who lightly adjusted his back with improvement, states chiropractor told him to discontinue the brace, notes improvement of back pain after discontinuing brace and with pressure to the area.  Denies new or worsening radicular pain, paresthesias, weakness, incisional concerns, recent fall/trauma.  Not currently using any pain medications or muscle relaxers.    X-rays reviewed and discussed with patient, imaging demonstrates grossly stable surgical hardware.    Plan:  - Okay to discontinue brace TODAY  - May gradually advance lifting to no more than 30-40 pounds  until 12 weeks post-op   - PT referral placed  -Okay to continue using muscle relaxers and do short burst of ibuprofen for 1 week.  Discussed limiting ibuprofen after short burst to aid with fusion.  - Continue post-operative cares   - Scheduled for 12-week postop appointment 7/22/2025 with Dr. Lopez with  x-ray prior    Patient assessment, examination, plan discussed with Dr. Lopez.    - Call the clinic at [phone] for increased pain or any other questions and concerns.    Patient verbalized understanding and is in agreement with the plan.       Anitha Mckee PA-C  [facility]  [address] [place] [id number]      Again, thank you for allowing me to participate in the care of your patient.        Sincerely,        Anitha Mckee PA-C    Electronically signed

## 2025-06-10 NOTE — PATIENT INSTRUCTIONS
- Okay to discontinue lumbar brace TODAY    - Okay to gradually increase lifting to 30-40 pounds.  Continue to limit excessive bending, twisting, jarring motions.    - Physical therapy referral placed, schedulers will contact you via telephone in next 1-2 business days to schedule appointment.     - Okay to use over-the-counter pain medication, heat/ice, topical pain gels as needed.  You may have some increased pain while increasing activity, this should resolve with rest and over-the-counter pain medication.    - Recommend continuing to use muscle relaxer and okay to use Ibuprofen (NSAIDs) for 1 week (okay to take 600mg three times daily), then would recommend limiting Ibuprofen because inflammation helps with fusion development.     - Scheduled for 12-week postop appointment 7/22/2025 with x-rays prior    - Please contact neurosurgery clinic via [employer] or telephone [phone] for questions, concerns, scheduling assistance, or new or worsening symptoms.

## 2025-06-10 NOTE — NURSING NOTE
"Juan C Garcia is a 65 year old male who presents for:  Chief Complaint   Patient presents with    Surgical Followup     6 week post-op for lumbar fusion. Patient reports pain in left side of low back about a level 2 today. No numbness/tingling.        Initial Vitals:  /76   Pulse 74   Ht 6' 2\" (1.88 m)   Wt 225 lb (102.1 kg)   SpO2 98%   BMI 28.89 kg/m   Estimated body mass index is 28.89 kg/m  as calculated from the following:    Height as of this encounter: 6' 2\" (1.88 m).    Weight as of this encounter: 225 lb (102.1 kg). Body surface area is 2.31 meters squared. BP completed using cuff size: regular  Mild Pain (2)        Yoel Aguilar    "

## 2025-06-16 ENCOUNTER — THERAPY VISIT (OUTPATIENT)
Dept: PHYSICAL THERAPY | Facility: CLINIC | Age: 65
End: 2025-06-16
Payer: MEDICARE

## 2025-06-16 DIAGNOSIS — Z98.1 S/P LUMBAR FUSION: Primary | ICD-10-CM

## 2025-06-16 DIAGNOSIS — M54.50 BILATERAL LOW BACK PAIN WITHOUT SCIATICA: ICD-10-CM

## 2025-06-16 PROCEDURE — 97161 PT EVAL LOW COMPLEX 20 MIN: CPT | Mod: GP | Performed by: PHYSICAL THERAPIST

## 2025-06-16 PROCEDURE — 97112 NEUROMUSCULAR REEDUCATION: CPT | Mod: GP | Performed by: PHYSICAL THERAPIST

## 2025-06-16 PROCEDURE — 97110 THERAPEUTIC EXERCISES: CPT | Mod: GP | Performed by: PHYSICAL THERAPIST

## 2025-06-16 NOTE — PROGRESS NOTES
PHYSICAL THERAPY EVALUATION  Type of Visit: Evaluation       Fall Risk Screen:  Have you fallen 2 or more times in the past year?: No  Have you fallen and had an injury in the past year?: No    Subjective         Presenting condition or subjective complaint:   He walk walking 4-5 miles daily 1 week after surgery.  One day he was trying to put on his shoe after 2 weeks and something popped on the inside and he had 3 weeks of pain.  It felt like he got punched and he got some increased pain.  The last 2 weeks he is feeling better.  He slept the best last night that he has in months.  He went to his chiropractor who told him to stop wearing the belt and it felt like it improved. He has not done PT before. He feels great today. He has had 6 back surgeries: lower back fused L3-S1, Cervical fusion C3-7 (15 years ago), Cervical laminectomy 30 years ago, Discectomy 20 years ago, Cervical fusion 15 years ago, C3-7 fusion 14 years ago, S1 L5 14 years ago, L3-S1 fusion 4/25/2025. He is hoping to protect the disc around his fusion.  He is feeling pretty good.  He has been focusing on his core and posture. He took Advil for 1 week.  The left side he was able to pinpoint that exact pain, however he now only has a vague ache. Rates current: 2/10.  Nothing in the legs.  Left lower side like there is always something right there. He has a methocarbamol which he started intermittently at night when his pain increased at 2 weeks. He does not feel like it was helpful.     Date of onset: 08/11/25    Relevant medical history:     Dates & types of surgery:      Prior diagnostic imaging/testing results:       Prior therapy history for the same diagnosis, illness or injury:        Prior Level of Function  Transfers: Independent  Ambulation: Independent  ADL: Independent    Living Environment  Social support:     Type of home:     Stairs to enter the home:         Ramp:     Stairs inside the home:         Help at home:    Equipment owned:        Employment:      Hobbies/Interests:      Patient goals for therapy:       Objective   Lumbar Objective:  Observation: Gait: stiff gait with limited lumbar motion.   Lumbar AROM:  Motion    Flexion Fingertips to upper shins with tightness through hamstrings.    Extension 5 degrees motion from upper lumbar and thoracic spine.     Right Left    Side Bending 25%  25%   Rotation Not assessed due to recent surgery Not assessed due to recent surgery   Reflexes: normal and symmetrical throughout BLE's.  Clonus: Absent through BLE's.  Sensation: Patient denies current numbness or tingling.   Myotomes:   Nerve Root Lower Limb Movement Right Left   L2 Hip Flexion 5/5 5/5   L3 Knee Extension 5/5 5/5   S2 Knee Flexion 5/5 5/5   L4 Ankle Dorsiflexion 5/5 5/5   L5 Great Toe Extension 5/5 5/5   TA contraction: poor TA contraction with RA compensation.     Assessment & Plan   CLINICAL IMPRESSIONS  Medical Diagnosis: S/P lumbar fusion (Z98.1)    Treatment Diagnosis: S/P lumbar fusion (Z98.1)   Impression/Assessment: Patient is a 65 year old male with Post op lumbar complaints.  The following significant findings have been identified: Pain, Decreased ROM/flexibility, Decreased joint mobility, Decreased strength, Impaired gait, Impaired muscle performance, Decreased activity tolerance, and Impaired posture. These impairments interfere with their ability to perform self care tasks, recreational activities, household chores, driving , household mobility, and community mobility as compared to previous level of function.     Clinical Decision Making (Complexity):  Clinical Presentation: Stable/Uncomplicated  Clinical Presentation Rationale: based on medical and personal factors listed in PT evaluation  Clinical Decision Making (Complexity): Low complexity    PLAN OF CARE  Treatment Interventions:  Modalities: Cryotherapy, Cupping, Dry Needling, E-stim, Hot Pack, Tape  Interventions: Gait Training, Manual Therapy, Neuromuscular  Re-education, Therapeutic Activity, Therapeutic Exercise, Self-Care/Home Management2    Long Term Goals     PT Goal 1  Goal Identifier: Transitional movements  Goal Description: Paitent will report ability to perform all Transitional movements for ADL's without pain and with confidence.  Rationale: to maximize safety and independence with performance of ADLs and functional tasks;to maximize safety and independence within the home;to maximize safety and independence within the community;to maximize safety and independence with transportation  Target Date: 08/11/25      Frequency of Treatment: 1 time per week  Duration of Treatment: 8 weeks    Education Assessment:   Learner/Method: Patient;Listening;Demonstration;Reading;Pictures/Video;No Barriers to Learning  Education Comments: Educated on findings of exam and likely frequency/duration of PT POC.    Risks and benefits of evaluation/treatment have been explained.   Patient/Family/caregiver agrees with Plan of Care.     Evaluation Time:     PT Eval, Low Complexity Minutes (32627): 16    Signing Clinician: Kate Lindquist, PT        [facility]                                                                                   OUTPATIENT PHYSICAL THERAPY      PLAN OF TREATMENT FOR OUTPATIENT REHABILITATION   Patient's Last Name, First Name, Juan C Romero YOB: 1960   Provider's Name   [facility]   Medical Record No.  [id number]     Onset Date: 08/11/25  Start of Care Date: 06/16/25     Medical Diagnosis:  S/P lumbar fusion (Z98.1)      PT Treatment Diagnosis:  S/P lumbar fusion (Z98.1) Plan of Treatment  Frequency/Duration: 1 time per week/ 8 weeks    Certification date from 06/16/25 to 08/11/25         See note for plan of treatment details and functional goals     Kate Lindquist, PT                         I CERTIFY THE NEED FOR THESE SERVICES FURNISHED UNDER        THIS PLAN OF  TREATMENT AND WHILE UNDER MY CARE     (Physician attestation of this document indicates review and certification of the therapy plan).              Referring Provider:  Anitha Mckee    Initial Assessment  See Epic Evaluation- Start of Care Date: 06/16/25

## 2025-06-17 PROBLEM — Z98.1 S/P LUMBAR FUSION: Status: ACTIVE | Noted: 2025-06-17

## 2025-06-17 PROBLEM — M54.50 BILATERAL LOW BACK PAIN WITHOUT SCIATICA: Status: ACTIVE | Noted: 2025-06-17

## 2025-06-23 ENCOUNTER — THERAPY VISIT (OUTPATIENT)
Dept: PHYSICAL THERAPY | Facility: CLINIC | Age: 65
End: 2025-06-23
Payer: MEDICARE

## 2025-06-23 DIAGNOSIS — Z98.1 S/P LUMBAR FUSION: Primary | ICD-10-CM

## 2025-06-23 DIAGNOSIS — M54.50 BILATERAL LOW BACK PAIN WITHOUT SCIATICA: ICD-10-CM

## 2025-06-23 PROCEDURE — 97110 THERAPEUTIC EXERCISES: CPT | Mod: GP | Performed by: PHYSICAL THERAPIST

## 2025-06-23 PROCEDURE — 97112 NEUROMUSCULAR REEDUCATION: CPT | Mod: GP | Performed by: PHYSICAL THERAPIST

## 2025-06-26 ENCOUNTER — OFFICE VISIT (OUTPATIENT)
Dept: FAMILY MEDICINE | Facility: CLINIC | Age: 65
End: 2025-06-26
Payer: MEDICARE

## 2025-06-26 VITALS
RESPIRATION RATE: 16 BRPM | HEIGHT: 74 IN | OXYGEN SATURATION: 98 % | HEART RATE: 71 BPM | DIASTOLIC BLOOD PRESSURE: 77 MMHG | SYSTOLIC BLOOD PRESSURE: 122 MMHG | BODY MASS INDEX: 29.13 KG/M2 | TEMPERATURE: 98.4 F | WEIGHT: 227 LBS

## 2025-06-26 DIAGNOSIS — R63.8 UNABLE TO LOSE WEIGHT: ICD-10-CM

## 2025-06-26 DIAGNOSIS — E78.5 HYPERLIPIDEMIA LDL GOAL <100: ICD-10-CM

## 2025-06-26 DIAGNOSIS — Z00.00 ENCOUNTER FOR INITIAL ANNUAL WELLNESS VISIT IN MEDICARE PATIENT: Primary | ICD-10-CM

## 2025-06-26 DIAGNOSIS — Z12.5 SCREENING FOR PROSTATE CANCER: ICD-10-CM

## 2025-06-26 LAB
CHOLEST SERPL-MCNC: 168 MG/DL
FASTING STATUS PATIENT QL REPORTED: YES
HDLC SERPL-MCNC: 55 MG/DL
LDLC SERPL CALC-MCNC: 87 MG/DL
NONHDLC SERPL-MCNC: 113 MG/DL
PSA SERPL DL<=0.01 NG/ML-MCNC: 0.9 NG/ML (ref 0–4.5)
TRIGL SERPL-MCNC: 131 MG/DL

## 2025-06-26 SDOH — HEALTH STABILITY: PHYSICAL HEALTH: ON AVERAGE, HOW MANY DAYS PER WEEK DO YOU ENGAGE IN MODERATE TO STRENUOUS EXERCISE (LIKE A BRISK WALK)?: 5 DAYS

## 2025-06-26 ASSESSMENT — SOCIAL DETERMINANTS OF HEALTH (SDOH): HOW OFTEN DO YOU GET TOGETHER WITH FRIENDS OR RELATIVES?: ONCE A WEEK

## 2025-06-26 ASSESSMENT — PAIN SCALES - GENERAL: PAINLEVEL_OUTOF10: NO PAIN (0)

## 2025-06-26 NOTE — PROGRESS NOTES
"Preventive Care Visit  [facility]  Suzanne Zhang MD, Internal Medicine  Jun 26, 2025      Assessment & Plan     Unable to lose weight  - tirzepatide-Weight Management (ZEPBOUND) 2.5 MG/0.5ML prefilled pen  Dispense: 2 mL; Refill: 1    BMI 29.0-29.9,adult  - tirzepatide-Weight Management (ZEPBOUND) 2.5 MG/0.5ML prefilled pen  Dispense: 2 mL; Refill: 1    Hyperlipidemia LDL goal <100  - Lipid panel reflex to direct LDL Non-fasting    Encounter for subsequent annual wellness visit (AWV) in Medicare patient  - diet, exercise    Screening for prostate cancer  - PSA, screen      Patient has been advised of split billing requirements and indicates understanding: Yes        BMI  Estimated body mass index is 29.15 kg/m  as calculated from the following:    Height as of this encounter: 1.88 m (6' 2\").    Weight as of this encounter: 103 kg (227 lb).   Weight management plan: Discussed healthy diet and exercise guidelines  Reviewed preventive health counseling, as reflected in patient instructions  Special attention given to:        Regular exercise       Healthy diet/nutrition  Counseling  Appropriate preventive services were addressed with this patient via screening, questionnaire, or discussion as appropriate for fall prevention, nutrition, physical activity, Tobacco-use cessation, social engagement, weight loss and cognition.  Checklist reviewing preventive services available has been given to the patient.  Reviewed patient's diet, addressing concerns and/or questions.         Follow-up in 1 year      Angel Elizondo is a 65 year old, presenting for the following:  Physical      HPI  Patient presents to internal medicine clinic today for yearly physical exam.    Advance Care Planning    Patient states has Health Care Directive and will send to [employer].        6/26/2025   General Health   How would you rate your overall physical health? Good   Feel stress (tense, anxious, or unable to sleep) " Only a little   (!) STRESS CONCERN      6/26/2025   Nutrition   Diet: Gluten-free/reduced         6/26/2025   Exercise   Days per week of moderate/strenous exercise 5 days         6/26/2025   Social Factors   Frequency of gathering with friends or relatives Once a week   Worry food won't last until get money to buy more No   Food not last or not have enough money for food? No   Do you have housing? (Housing is defined as stable permanent housing and does not include staying outside in a car, in a tent, in an abandoned building, in an overnight shelter, or couch-surfing.) Yes   Are you worried about losing your housing? No   Lack of transportation? No   Unable to get utilities (heat,electricity)? No         6/26/2025   Fall Risk   Fallen 2 or more times in the past year? No    No   Trouble with walking or balance? No    No   Gait Speed Test (Document in seconds) 3.7   Gait Speed Test Interpretation Less than or equal to 5.00 seconds - PASS       Multiple values from one day are sorted in reverse-chronological order          6/26/2025   Activities of Daily Living- Home Safety   Needs help with the following daily activites None of the above   Safety concerns in the home None of the above         6/26/2025   Dental   Dentist two times every year? Yes         6/26/2025   Hearing Screening   Hearing concerns? None of the above         6/26/2025   Driving Risk Screening   Patient/family members have concerns about driving No         6/26/2025   General Alertness/Fatigue Screening   Have you been more tired than usual lately? No         6/26/2025   Urinary Incontinence Screening   Bothered by leaking urine in past 6 months No         Today's PHQ-2 Score:       6/26/2025     8:23 AM   PHQ-2 ( 1999 [employer])   Q1: Little interest or pleasure in doing things 0   Q2: Feeling down, depressed or hopeless 0   PHQ-2 Score 0    Q1: Little interest or pleasure in doing things Not at all   Q2: Feeling down, depressed or hopeless Not at  all   PHQ-2 Score 0       Patient-reported           6/26/2025   Substance Use   Alcohol more than 3/day or more than 7/wk Not Applicable   Do you have a current opioid prescription? No   How severe/bad is pain from 1 to 10? 2/10   Do you use any other substances recreationally? No     Social History     Tobacco Use     Smoking status: Never     Smokeless tobacco: Never   Vaping Use     Vaping status: Never Used   Substance Use Topics     Alcohol use: Not Currently     Drug use: No           6/26/2025   AAA Screening   Family history of Abdominal Aortic Aneurysm (AAA)? No   Last PSA:   PSA   Date Value Ref Range Status   03/08/2021 0.66 0 - 4 ug/L Final     Comment:     Assay Method:  Chemiluminescence using Siemens Vista analyzer     Prostate Specific Antigen Screen   Date Value Ref Range Status   06/21/2024 1.16 0.00 - 4.50 ng/mL Final   05/13/2022 0.93 0.00 - 4.00 ug/L Final     ASCVD Risk   The 10-year ASCVD risk score (Isis DK, et al., 2019) is: 14.6%    Values used to calculate the score:      Age: 65 years      Sex: Male      Is Non- : No      Diabetic: No      Tobacco smoker: No      Systolic Blood Pressure: 130 mmHg      Is BP treated: Yes      HDL Cholesterol: 49 mg/dL      Total Cholesterol: 145 mg/dL    Reviewed and updated as needed this visit by Provider                    Past Medical History:   Diagnosis Date     Alcohol use      Coronary artery disease      Dermatitis herpetiformis 2000    Dx by biopsy of rash on elbow per derm     Diastolic dysfunction with chronic heart failure (H)      HTN, goal below 140/90      Hyperlipidemia      Lung nodule 02/2003    Benign; Left upper lobe     Multiple allergies     allergist with immunotherapy; Allergist is Dr. Santos in [place]     Primary hypogonadism in male     Low FSH, single left testicle - followed by Dr. Flores of endo     Pulmonary embolus (H) 02/2003    Evaluated by [facility] - unknown cause     Sleep apnea       Thrombosis      Thyroid nodule 02/2005    Benign per FNA --> taking Levothyroxine ever since     Uncomplicated asthma      Current providers sharing in care for this patient include:  Patient Care Team:  Suzanne Zhang MD as PCP - General (Internal Medicine)  Suzanne Zhang MD as Assigned PCP  Goltz, Bennett Ezra, PA-C as Assigned Neuroscience Provider  Juan C Wood MD as MD (Cardiovascular Disease)  Marium Davies PA-C as Physician Assistant (Endocrinology, Diabetes, and Metabolism)  Marium Davies PA-C as Assigned Endocrinology Provider  Jessica Lopez DO as Assigned Heart and Vascular Provider    The following health maintenance items are reviewed in Epic and correct as of today:  Health Maintenance   Topic Date Due     ASTHMA ACTION PLAN  Never done     PNEUMOCOCCAL VACCINE 50+ YEARS (1 of 2 - PCV) Never done     RSV VACCINE (1 - Risk 60-74 years 1-dose series) Never done     COVID-19 VACCINE (7 - 2024-25 season) 09/01/2024     LIPID  05/31/2025     MEDICARE ANNUAL WELLNESS VISIT  06/21/2025     ASTHMA CONTROL TEST  10/14/2025     TSH W/FREE T4 REFLEX  02/26/2026     ANNUAL REVIEW OF HM ORDERS  04/14/2026     BMP  04/30/2026     FALL RISK ASSESSMENT  06/26/2026     DIABETES SCREENING  04/30/2028     ADVANCE CARE PLANNING  04/14/2030     COLORECTAL CANCER SCREENING  06/20/2032     DTAP/TDAP/TD VACCINE (3 - Td or Tdap) 11/17/2032     HEPATITIS C SCREENING  Completed     PHQ-2 (once per calendar year)  Completed     INFLUENZA VACCINE  Completed     ZOSTER VACCINE  Completed     HPV VACCINE  Aged Out     MENINGITIS VACCINE  Aged Out         Review of Systems  Constitutional, HEENT, cardiovascular, pulmonary, GI, , musculoskeletal, neuro, skin, endocrine and psych systems are negative, except as otherwise noted.     Objective    Exam  /77 (BP Location: Right arm, Patient Position: Sitting, Cuff Size: Adult Large)   Pulse 71   Temp 98.4  F (36.9  C) (Temporal)   Resp 16   Ht  "1.88 m (6' 2\")   Wt 103 kg (227 lb)   SpO2 98%   BMI 29.15 kg/m     Estimated body mass index is 28.89 kg/m  as calculated from the following:    Height as of 6/10/25: 1.88 m (6' 2\").    Weight as of 6/10/25: 102.1 kg (225 lb).    Physical Exam  GENERAL: alert and no distress  EYES: Eyes grossly normal to inspection, PERRL and conjunctivae and sclerae normal  HENT: ear canals and TM's normal, nose and mouth without ulcers or lesions  NECK: no adenopathy, no asymmetry, masses, or scars  RESP: lungs clear to auscultation - no rales, rhonchi or wheezes  CV: regular rate and rhythm, normal S1 S2, no S3 or S4, no murmur, click or rub, no peripheral edema  ABDOMEN: soft, nontender, no hepatosplenomegaly, no masses and bowel sounds normal  MS: no gross musculoskeletal defects noted, no edema  SKIN: no suspicious lesions or rashes  NEURO: Normal strength and tone, mentation intact and speech normal  PSYCH: mentation appears normal, affect normal/bright  Gait and balance assessed per Gait Speed Test.  Result as above.         6/26/2025   Mini Cog   Clock Draw Score 2 Normal   3 Item Recall 3 objects recalled   Mini Cog Total Score 5         Vision Screen  Patient wears corrective lenses (select all that apply): Worn during vision screen  Vision Screen Results: Pass      Signed Electronically by: Suzanne Zhang MD    "

## 2025-06-30 ENCOUNTER — THERAPY VISIT (OUTPATIENT)
Dept: PHYSICAL THERAPY | Facility: CLINIC | Age: 65
End: 2025-06-30
Payer: MEDICARE

## 2025-06-30 DIAGNOSIS — M54.50 BILATERAL LOW BACK PAIN WITHOUT SCIATICA: ICD-10-CM

## 2025-06-30 DIAGNOSIS — Z98.1 S/P LUMBAR FUSION: Primary | ICD-10-CM

## 2025-06-30 PROCEDURE — 97110 THERAPEUTIC EXERCISES: CPT | Mod: GP | Performed by: PHYSICAL THERAPIST

## 2025-06-30 PROCEDURE — 97112 NEUROMUSCULAR REEDUCATION: CPT | Mod: GP | Performed by: PHYSICAL THERAPIST

## 2025-07-02 ENCOUNTER — TELEPHONE (OUTPATIENT)
Dept: FAMILY MEDICINE | Facility: CLINIC | Age: 65
End: 2025-07-02

## 2025-07-07 ENCOUNTER — THERAPY VISIT (OUTPATIENT)
Dept: PHYSICAL THERAPY | Facility: CLINIC | Age: 65
End: 2025-07-07
Payer: MEDICARE

## 2025-07-07 DIAGNOSIS — Z98.1 S/P LUMBAR FUSION: Primary | ICD-10-CM

## 2025-07-07 DIAGNOSIS — M54.50 BILATERAL LOW BACK PAIN WITHOUT SCIATICA: ICD-10-CM

## 2025-07-07 PROCEDURE — 97110 THERAPEUTIC EXERCISES: CPT | Mod: GP | Performed by: PHYSICAL THERAPIST

## 2025-07-07 PROCEDURE — 97112 NEUROMUSCULAR REEDUCATION: CPT | Mod: GP | Performed by: PHYSICAL THERAPIST

## 2025-07-15 ENCOUNTER — ALLIED HEALTH/NURSE VISIT (OUTPATIENT)
Dept: NEUROSURGERY | Facility: CLINIC | Age: 65
End: 2025-07-15
Payer: MEDICARE

## 2025-07-15 DIAGNOSIS — M54.16 LUMBAR RADICULOPATHY: ICD-10-CM

## 2025-07-15 DIAGNOSIS — Z98.1 S/P LUMBAR FUSION: Primary | ICD-10-CM

## 2025-07-15 NOTE — PROGRESS NOTES
Mikhail is status post bilateral Extension lumbar 5 - sacral 1 fusion to lumbar 3 with Bilateral Lumbar 3 - lumbar 5 laminectomies, medial facetectomies and foraminotomies and posterolateral arthrodesis with right sided discectomies on 04/25/2025 with Dr. Lopez.  Last seen on 06/10/2025, was referred to PT.  Today he returns in follow up for wound check. He denies fever, chills, pain, sensory or motor issues.    Surgical wound WNL - CDI, no signs of infection or skin breakdown.  Incision well-healed: good skin approximation, no redness or visible/palpable edema, no tenderness to palpation.     Selene Valadez, RN, CNRN

## 2025-07-21 ENCOUNTER — TELEPHONE (OUTPATIENT)
Dept: NEUROSURGERY | Facility: CLINIC | Age: 65
End: 2025-07-21

## 2025-07-21 ENCOUNTER — THERAPY VISIT (OUTPATIENT)
Dept: PHYSICAL THERAPY | Facility: CLINIC | Age: 65
End: 2025-07-21
Payer: MEDICARE

## 2025-07-21 DIAGNOSIS — Z98.1 S/P LUMBAR FUSION: Primary | ICD-10-CM

## 2025-07-21 DIAGNOSIS — M54.50 BILATERAL LOW BACK PAIN WITHOUT SCIATICA: ICD-10-CM

## 2025-07-21 PROCEDURE — 97110 THERAPEUTIC EXERCISES: CPT | Mod: GP | Performed by: PHYSICAL THERAPIST

## 2025-07-21 PROCEDURE — 97112 NEUROMUSCULAR REEDUCATION: CPT | Mod: GP | Performed by: PHYSICAL THERAPIST

## 2025-07-21 NOTE — TELEPHONE ENCOUNTER
Who's calling:   Patient             Family/Other name:  N/A    On C2C: yes or No: N/A       Providers name/other:    Dr. Lopez   Reason for call:  ORDERS: CASE REQUEST/IMAGING/ INJECTION/ PROCEDURES     Detailed Message: Writer received voicemail from patient requesting if appropriate if a additional imaging can be ordered for his cervical per patient stated that he had cervical  fusion completed prior to his surgery with Dr. Lopez ad this would be to look at his disc from previous surgery .Please advise      Call back #: [phone]  Preferred Pharmacy: n/a

## 2025-07-21 NOTE — PROGRESS NOTES
07/21/25 0500   Appointment Info   Signing clinician's name / credentials Kate Lindquist, PT, DPT, CMT, OCS   Total/Authorized Visits 8 (E&T)   Visits Used 5   Medical Diagnosis S/P lumbar fusion (Z98.1)   PT Tx Diagnosis S/P lumbar fusion (Z98.1)   Precautions/Limitations Okay to gradually increase lifting to 30-40 pounds.  Continue to limit excessive bending, twisting, jarring motions.   Progress Note/Certification   Start of Care Date 06/16/25   Onset of illness/injury or Date of Surgery 04/25/25   Therapy Frequency 1 time per week   Predicted Duration 8 weeks   Certification date from 06/16/25   Certification date to 08/11/25   Progress Note Due Date 08/11/25   PT Goal 1   Goal Identifier Transitional movements   Goal Description Paitent will report ability to perform all Transitional movements for ADL's without pain and with confidence.   Rationale to maximize safety and independence with performance of ADLs and functional tasks;to maximize safety and independence within the home;to maximize safety and independence within the community;to maximize safety and independence with transportation   Goal Progress Improving.   Target Date 08/11/25   Subjective Report   Subjective Report He pushed a boat and felt a little wonky for a day or two however he has been feeling really good.  He has been working out every day and has been doing a mile or 2 every day. He goes in tomorrow for his last check up, however he feels good. He added the weights to his bridges.  He seems to feel better after his exercise.  He typically feels so good during the day that he forgets he had surgery. He has focused on posture for his exercises as well.  He just feels a little nerve stuff happening in the R low back that happens if he reaches for something funny.  The feeling is brief and ill defined. In the morning he feels a little crinkly.   Objective Measures   Objective Measures Objective Measure 2   Objective Measure 1   Objective  Measure ROM   Details Lumbar flexion: fingertips to upper shins, ex: not tested.   Objective Measure 2   Objective Measure Strength   Details MMT of LE's hip flex: 5/5 B, knee ex: 5/5 B, DF: 5/5 B, Great toe extension: 5/5 B, PF: 5/5 B.   Treatment Interventions (PT)   Interventions Therapeutic Activity   Therapeutic Procedure/Exercise   Therapeutic Procedures: strength, endurance, ROM, flexibility minutes (98635) 28   Ther Proc 1 Side lying open book with arm only   Ther Proc 1 - Details Patient asked if he could do a lumbar roll and was instructed not to as this is a lot of weight providing a torsion force on his fusions.  Instructed patient in this exercise with specific instructions to use the arm only and to just get a little thoracic movement but not extend into a deep stretch or pain. Instructed patient that he should not feel this in his lumbar spine/low back.   PTRx Ther Proc 1 Icing   PTRx Ther Proc 1 - Details instructed to continue for pain if it returns   PTRx Ther Proc 2 Doorway Hamstring Stretch   PTRx Ther Proc 2 - Details verbal review   PTRx Ther Proc 3 Bridging   PTRx Ther Proc 3 - Details 1 x 20 reps cues to add weight to increase challenge up to 20lbs as long as it is pain free.   PTRx Ther Proc 4 birddog   PTRx Ther Proc 4 - Details 1 x 10 reps good challenge,  1 x 5 reps B holding 5 sec each. Progressed to this   PTRx Ther Proc 5 Functional Lunge Backward   PTRx Ther Proc 5 - Details Pause on this to work on sit to stand for glute strength.   PTRx Ther Proc 6 Sit to Stand   PTRx Ther Proc 6 - Details 1 x 10 reps trial of squats, however discontinued due to quad compensation.   Skilled Intervention Verbal cuing and demonstration for proper performance of exercises.   Patient Response/Progress Patient tolerated well.   Neuromuscular Re-education   Neuromuscular re-ed of mvmt, balance, coord, kinesthetic sense, posture, proprioception minutes (80184) 10   PTRx Neuro Re-ed 1 Supine Abdominal  Exercise #3B (Two Leg Marching)   PTRx Neuro Re-ed 1 - Details 1 x 10 reps  Discussed progression of core exercise and demonstrated loss of core control.  Patient verbalized understanding of loss of core control once visualized and understood core progression.   PTRx Neuro Re-ed 2 Posterior Pelvic Tilt   PTRx Neuro Re-ed 2 - Details 1 x 10 reps. cues for gentle movement only as this movement with only come from L3 and avoe due fusion location   Skilled Intervention see above   Patient Response/Progress Tolerated well   Education   Learner/Method Patient;Listening;Demonstration;Reading;Pictures/Video;No Barriers to Learning   Plan   Home program see PTrx   Plan for next session Focus on core strength and abdominal stabilization exercises in a neutral position.   Total Session Time   Timed Code Treatment Minutes 38   Total Treatment Time (sum of timed and untimed services) 38         PLAN  Continue therapy per current plan of care.    Beginning/End Dates of Progress Note Reporting Period:   6/16/2025 to 07/21/2025    Referring Provider:  Anitha Mckee

## 2025-07-22 ENCOUNTER — HOSPITAL ENCOUNTER (OUTPATIENT)
Dept: RADIOLOGY | Facility: HOSPITAL | Age: 65
Discharge: HOME | End: 2025-07-22
Payer: MEDICARE

## 2025-07-22 ENCOUNTER — OFFICE VISIT (OUTPATIENT)
Dept: NEUROSURGERY | Facility: CLINIC | Age: 65
End: 2025-07-22
Payer: MEDICARE

## 2025-07-22 VITALS — OXYGEN SATURATION: 98 % | HEART RATE: 73 BPM | SYSTOLIC BLOOD PRESSURE: 110 MMHG | DIASTOLIC BLOOD PRESSURE: 71 MMHG

## 2025-07-22 DIAGNOSIS — Z98.1 S/P LUMBAR FUSION: ICD-10-CM

## 2025-07-22 DIAGNOSIS — Z98.1 S/P LUMBAR FUSION: Primary | ICD-10-CM

## 2025-07-22 PROCEDURE — 72100 X-RAY EXAM L-S SPINE 2/3 VWS: CPT

## 2025-07-22 PROCEDURE — 3074F SYST BP LT 130 MM HG: CPT | Performed by: SURGERY

## 2025-07-22 PROCEDURE — 3078F DIAST BP <80 MM HG: CPT | Performed by: SURGERY

## 2025-07-22 PROCEDURE — 99024 POSTOP FOLLOW-UP VISIT: CPT | Performed by: SURGERY

## 2025-07-22 NOTE — NURSING NOTE
"Juan C Garcia is a 65 year old male who presents for:  Chief Complaint   Patient presents with    RECHECK     Feels like he has a few stitches he'd like pulled, interested in getting more imaging of his neck.        Initial Vitals:  /71 (BP Location: Left arm, Patient Position: Sitting, Cuff Size: Adult Regular)   Pulse 73   SpO2 98%  Estimated body mass index is 29.15 kg/m  as calculated from the following:    Height as of 6/26/25: 6' 2\" (1.88 m).    Weight as of 6/26/25: 227 lb (103 kg).. There is no height or weight on file to calculate BSA. BP completed using cuff size: regular  Data Unavailable    Nursing Comments:      Gurmeet Johnson    "

## 2025-07-22 NOTE — LETTER
"7/22/2025      Juan C Garcia  [address]  [place] [address]      Dear Colleague,    Thank you for referring your patient, Juan C Garcia, to the [facility]. Please see a copy of my visit note below.    NEUROSURGERY FOLLOW UP  NOTE    Juan C Garcia comes today in follow-up. Status post bilateral extension of L5-S1 fusion to L3 with bilateral L3-L5 laminectomies, medial facetectomies, foraminotomies and posterolateral arthrodesis with right sided discectomies on 4/25/25.     Reported experiencing intermittent setbacks in his recovery following recent back surgery. He described episodes, occurring two or three times, where he felt he may have overexerted himself and worried about compromising the surgical outcome, but managed these episodes by resting and using ice. Over the past three weeks, he has felt significantly improved.  He noted that previous nerve pain, which had been severe and affected his sleep, has resolved since the surgery. He described being vigilant about his recovery, feeling hyper aware of any symptoms, and acknowledged that this surgery was more challenging than previous procedures, particularly as it involved two spinal levels rather than one.    Mikhail reported ongoing engagement in physical therapy, specifically mentioning working with a therapist named Kate, and has been performing light gym exercises with a focus on core, glute, and hamstring strengthening. He is intentionally avoiding heavy weights and compressive movements, instead using very light weights and higher repetitions to minimize risk of injury.    He described a residual symptom of a \"crinkly\" or \"tinfoil\" sensation in his lower back upon waking in the morning, which resolves after completing his morning routine. He also reported a persistent sensation of something \"sticking\" in his back, particularly when getting into his car, and questioned whether this could be related to " scar tissue or un dissolved sutures. He mentioned a recent visit to a dermatologist for skin cancer, during which the dermatologist suggested a follow-up in three weeks for a possible injection to address the issue in his back. He stated that he has vitamin E at home but has not yet used it for this purpose. No other symptoms or concerns were reported.    PHYSICAL EXAM:   Constitutional: /71 (BP Location: Left arm, Patient Position: Sitting, Cuff Size: Adult Regular)   Pulse 73   SpO2 98%      Mental Status: A & O in no acute distress.  Affect is appropriate.  Speech is fluent.  Recent and remote memory are intact.  Attention span and concentration are normal.     Motor:  Normal bulk and tone all muscle groups of upper and lower extremities. 5/5 in LE     Sensory: Sensation intact bilaterally to light touch in LE     Incision CDI     IMAGING:   I personally reviewed all radiographic images        CONSULTATION ASSESSMENT AND PLAN:    S/P lumbar fusion: Imaging shows no hardware failure or loosening. Bone graft is progressing, with signs of solidification. No lucencies around screws. Patient advised to avoid repetitive twisting and lifting over 50 lbs to prevent hardware stress. Continue monitoring bone graft progress. Follow-up visit scheduled in 3 months with X-ray to assess further healing.     Scar tissue concerns: Patient reports sensation of something sticking in the back. No visible suture today to trim but does have some raised areas of scar. He was going to follow up with his dermatologist which is OK.     Kavya Lopez MD      CC:     Suzanne Zhang  [address]  [place] [id number]    Again, thank you for allowing me to participate in the care of your patient.        Sincerely,        Kavya Lopez MD    Electronically signed

## 2025-07-22 NOTE — PATIENT INSTRUCTIONS
S/P lumbar fusion: Imaging shows no hardware failure or loosening. Bone graft is progressing, with signs of solidification. No lucencies around screws. Patient advised to avoid repetitive twisting and lifting over 50 lbs to prevent hardware stress. Continue monitoring bone graft progress. Follow-up visit scheduled in 3 months with X-ray to assess further healing.     Scar tissue concerns: Patient reports sensation of something sticking in the back. No visible suture today to trim but does have some raised areas of scar. He was going to follow up with his dermatologist which is OK.

## 2025-07-22 NOTE — PROGRESS NOTES
"NEUROSURGERY FOLLOW UP  NOTE    Juan C Garcia comes today in follow-up. Status post bilateral extension of L5-S1 fusion to L3 with bilateral L3-L5 laminectomies, medial facetectomies, foraminotomies and posterolateral arthrodesis with right sided discectomies on 4/25/25.     Reported experiencing intermittent setbacks in his recovery following recent back surgery. He described episodes, occurring two or three times, where he felt he may have overexerted himself and worried about compromising the surgical outcome, but managed these episodes by resting and using ice. Over the past three weeks, he has felt significantly improved.  He noted that previous nerve pain, which had been severe and affected his sleep, has resolved since the surgery. He described being vigilant about his recovery, feeling hyper aware of any symptoms, and acknowledged that this surgery was more challenging than previous procedures, particularly as it involved two spinal levels rather than one.    Mikhail reported ongoing engagement in physical therapy, specifically mentioning working with a therapist named Kate, and has been performing light gym exercises with a focus on core, glute, and hamstring strengthening. He is intentionally avoiding heavy weights and compressive movements, instead using very light weights and higher repetitions to minimize risk of injury.    He described a residual symptom of a \"crinkly\" or \"tinfoil\" sensation in his lower back upon waking in the morning, which resolves after completing his morning routine. He also reported a persistent sensation of something \"sticking\" in his back, particularly when getting into his car, and questioned whether this could be related to scar tissue or un dissolved sutures. He mentioned a recent visit to a dermatologist for skin cancer, during which the dermatologist suggested a follow-up in three weeks for a possible injection to address the issue in his back. He stated that he has " vitamin E at home but has not yet used it for this purpose. No other symptoms or concerns were reported.    PHYSICAL EXAM:   Constitutional: /71 (BP Location: Left arm, Patient Position: Sitting, Cuff Size: Adult Regular)   Pulse 73   SpO2 98%      Mental Status: A & O in no acute distress.  Affect is appropriate.  Speech is fluent.  Recent and remote memory are intact.  Attention span and concentration are normal.     Motor:  Normal bulk and tone all muscle groups of upper and lower extremities. 5/5 in LE     Sensory: Sensation intact bilaterally to light touch in LE     Incision CDI     IMAGING:   I personally reviewed all radiographic images        CONSULTATION ASSESSMENT AND PLAN:    S/P lumbar fusion: Imaging shows no hardware failure or loosening. Bone graft is progressing, with signs of solidification. No lucencies around screws. Patient advised to avoid repetitive twisting and lifting over 50 lbs to prevent hardware stress. Continue monitoring bone graft progress. Follow-up visit scheduled in 3 months with X-ray to assess further healing.     Scar tissue concerns: Patient reports sensation of something sticking in the back. No visible suture today to trim but does have some raised areas of scar. He was going to follow up with his dermatologist which is OK.     Kavya Lopez MD      CC:     Suzanne Zhang  [address] [facility] [address]  [place] [facility] [place] [facility] [address]

## 2025-07-30 ENCOUNTER — MYC MEDICAL ADVICE (OUTPATIENT)
Dept: FAMILY MEDICINE | Facility: CLINIC | Age: 65
End: 2025-07-30
Payer: MEDICARE

## 2025-07-30 DIAGNOSIS — I10 ESSENTIAL HYPERTENSION: ICD-10-CM

## 2025-07-30 RX ORDER — LOSARTAN POTASSIUM 50 MG/1
50 TABLET ORAL DAILY
Qty: 90 TABLET | Refills: 1 | Status: SHIPPED | OUTPATIENT
Start: 2025-07-30

## 2025-08-11 ENCOUNTER — TRANSFERRED RECORDS (OUTPATIENT)
Dept: HEALTH INFORMATION MANAGEMENT | Facility: CLINIC | Age: 65
End: 2025-08-11

## 2025-08-25 ENCOUNTER — THERAPY VISIT (OUTPATIENT)
Dept: PHYSICAL THERAPY | Facility: CLINIC | Age: 65
End: 2025-08-25
Payer: MEDICARE

## 2025-08-25 ENCOUNTER — MYC MEDICAL ADVICE (OUTPATIENT)
Dept: SLEEP MEDICINE | Facility: CLINIC | Age: 65
End: 2025-08-25

## 2025-08-25 DIAGNOSIS — G47.33 OBSTRUCTIVE SLEEP APNEA (ADULT) (PEDIATRIC): Primary | ICD-10-CM

## 2025-08-25 DIAGNOSIS — M54.50 BILATERAL LOW BACK PAIN WITHOUT SCIATICA: ICD-10-CM

## 2025-08-25 DIAGNOSIS — Z98.1 S/P LUMBAR FUSION: Primary | ICD-10-CM

## 2025-08-25 PROCEDURE — 97110 THERAPEUTIC EXERCISES: CPT | Mod: GP | Performed by: PHYSICAL THERAPIST

## 2025-08-25 PROCEDURE — 97112 NEUROMUSCULAR REEDUCATION: CPT | Mod: GP | Performed by: PHYSICAL THERAPIST

## 2025-09-01 DIAGNOSIS — I10 HTN, GOAL BELOW 140/90: Chronic | ICD-10-CM

## 2025-09-02 DIAGNOSIS — R79.89 LOW TESTOSTERONE IN MALE: ICD-10-CM

## 2025-09-02 RX ORDER — HYDROCHLOROTHIAZIDE 12.5 MG/1
12.5 TABLET ORAL DAILY
Qty: 90 TABLET | Refills: 0 | Status: SHIPPED | OUTPATIENT
Start: 2025-09-02

## 2025-09-03 RX ORDER — TESTOSTERONE 1.62 MG/G
GEL TRANSDERMAL
Qty: 150 G | Refills: 0 | Status: SHIPPED | OUTPATIENT
Start: 2025-09-03

## (undated) DEVICE — SOL NACL 0.9% IRRIG 1000ML BOTTLE 2F7124

## (undated) DEVICE — TIP SUCTION FRAIZER 10FR DISP 163

## (undated) DEVICE — SPONGE RAY-TEC 4X8" 7318

## (undated) DEVICE — DRAPE STERI TOWEL LG 1010

## (undated) DEVICE — SPONGE NEURO 1/2X1/2 WECK 200100

## (undated) DEVICE — SU PDS II 2-0 CT-1 18" VIL MONO Z739D

## (undated) DEVICE — IMP ROD MEDT 3.5CM 1475501030: Type: IMPLANTABLE DEVICE | Site: SPINE LUMBAR | Status: NON-FUNCTIONAL

## (undated) DEVICE — DRAIN FLAT 10MM FULL PERF SIL 0070440

## (undated) DEVICE — DRSG PRIMAPORE 04X11 3/4"

## (undated) DEVICE — RX SURGIFLO HEMOSTATIC MATRIX 8ML 2991

## (undated) DEVICE — GOWN IMPERVIOUS BREATHABLE SMART XLG 89045

## (undated) DEVICE — GLOVE UNDER INDICATOR PI SZ 6.5 LF 41665

## (undated) DEVICE — PACK 9X6IN THRP HOT COLD CMPR  MED GEL 80104

## (undated) DEVICE — DRSG DRAIN 4X4" 7086

## (undated) DEVICE — GOWN LG DISP 9515

## (undated) DEVICE — Device

## (undated) DEVICE — WRAP B-COOL TERI LUMBAR 08143380

## (undated) DEVICE — SPONGE KITNER DISSECTING 7102*

## (undated) DEVICE — DRAIN RESERVOIR 100ML JP 0070740

## (undated) DEVICE — PITCHER STERILE 1000ML  SSK9004A

## (undated) DEVICE — CUSHION INSERT LG PRONE VIEW JACKSON TABLE

## (undated) DEVICE — MARKER SPHERES PASSIVE MEDT PACK 1 8801071

## (undated) DEVICE — ESU PENCIL SMOKE EVAC W/ROCKER SWITCH 0703-047-000

## (undated) DEVICE — SYR BULB IRRIG DOVER 60 ML LATEX FREE 67000

## (undated) DEVICE — CUSTOM PACK LUMBAR FUSION SNE5BLFHEA

## (undated) DEVICE — SU VICRYL+ 0 8-18 CT1/CR UND VCP840D

## (undated) DEVICE — CATH TRAY FOLEY SURESTEP 16FR DRAIN BAG STATOCK A899916

## (undated) DEVICE — ESU ELEC BLADE 2.75" COATED/INSULATED E1455

## (undated) DEVICE — GLOVE UNDER INDICATOR PI SZ 7.0 LF 41670

## (undated) DEVICE — TRAY PREP DRY SKIN SCRUB 067

## (undated) DEVICE — TUBING SUCTION MEDI-VAC 1/4"X20' N620A

## (undated) DEVICE — IMP SCR MEDT 4.75MM SOLERA 7.5X45MM MA 54840007545: Type: IMPLANTABLE DEVICE | Site: SPINE LUMBAR | Status: NON-FUNCTIONAL

## (undated) DEVICE — SOL WATER IRRIG 1000ML BOTTLE 2F7114

## (undated) DEVICE — SOL ISOPROPYL RUBBING ALCOHOL USP 70% 4OZ HDX-20 I0020

## (undated) DEVICE — SU ETHILON 2-0 FS 18" 664G

## (undated) DEVICE — PACK MINOR SINGLE BASIN SSK3001

## (undated) DEVICE — DRAPE O ARM BAR MEDTRONIC 9733023

## (undated) DEVICE — ELECTRODE PATIENT RETURN ADULT L10 FT 2 PLATE CORD 0855C

## (undated) DEVICE — MARKER SURG SKIN 2 TIP STRL SPP99DT2AA

## (undated) DEVICE — SUCTION MANIFOLD NEPTUNE 2 SYS 1 PORT 702-025-000

## (undated) DEVICE — MARKER SPHERES PASSIVE MEDT PACK 5 8801075

## (undated) DEVICE — POSITIONER ARM CRADLE LAMINECTOMY DISP

## (undated) DEVICE — TOOL DISSECT MIDAS MR8 14CM MATCH HEAD 3MM MR8-14MH30

## (undated) DEVICE — BLADE KNIFE SURG 11 371111

## (undated) DEVICE — PREP DYNA-HEX 4% CHG SCRUB 4OZ BOTTLE MDS098710

## (undated) RX ORDER — LIDOCAINE HYDROCHLORIDE 10 MG/ML
INJECTION, SOLUTION EPIDURAL; INFILTRATION; INTRACAUDAL; PERINEURAL
Status: DISPENSED
Start: 2025-04-25

## (undated) RX ORDER — FENTANYL CITRATE 50 UG/ML
INJECTION, SOLUTION INTRAMUSCULAR; INTRAVENOUS
Status: DISPENSED
Start: 2022-06-20

## (undated) RX ORDER — DEXAMETHASONE SODIUM PHOSPHATE 10 MG/ML
INJECTION, EMULSION INTRAMUSCULAR; INTRAVENOUS
Status: DISPENSED
Start: 2025-04-25

## (undated) RX ORDER — CEFAZOLIN SODIUM 1 G/3ML
INJECTION, POWDER, FOR SOLUTION INTRAMUSCULAR; INTRAVENOUS
Status: DISPENSED
Start: 2025-04-25

## (undated) RX ORDER — ONDANSETRON 2 MG/ML
INJECTION INTRAMUSCULAR; INTRAVENOUS
Status: DISPENSED
Start: 2025-04-25

## (undated) RX ORDER — EPHEDRINE SULFATE 50 MG/ML
INJECTION, SOLUTION INTRAMUSCULAR; INTRAVENOUS; SUBCUTANEOUS
Status: DISPENSED
Start: 2025-04-25

## (undated) RX ORDER — FENTANYL CITRATE 50 UG/ML
INJECTION, SOLUTION INTRAMUSCULAR; INTRAVENOUS
Status: DISPENSED
Start: 2025-04-25

## (undated) RX ORDER — PROPOFOL 10 MG/ML
INJECTION, EMULSION INTRAVENOUS
Status: DISPENSED
Start: 2025-04-25

## (undated) RX ORDER — PHENYLEPHRINE HYDROCHLORIDE 10 MG/ML
INJECTION INTRAVENOUS
Status: DISPENSED
Start: 2025-04-25